# Patient Record
Sex: FEMALE | Race: WHITE | NOT HISPANIC OR LATINO | Employment: OTHER | URBAN - METROPOLITAN AREA
[De-identification: names, ages, dates, MRNs, and addresses within clinical notes are randomized per-mention and may not be internally consistent; named-entity substitution may affect disease eponyms.]

---

## 2022-11-26 ENCOUNTER — HOSPITAL ENCOUNTER (INPATIENT)
Facility: MEDICAL CENTER | Age: 77
LOS: 27 days | DRG: 308 | End: 2022-12-23
Attending: STUDENT IN AN ORGANIZED HEALTH CARE EDUCATION/TRAINING PROGRAM | Admitting: STUDENT IN AN ORGANIZED HEALTH CARE EDUCATION/TRAINING PROGRAM
Payer: MEDICARE

## 2022-11-26 ENCOUNTER — HOSPITAL ENCOUNTER (OUTPATIENT)
Dept: RADIOLOGY | Facility: MEDICAL CENTER | Age: 77
End: 2022-11-26

## 2022-11-26 DIAGNOSIS — E83.42 HYPOMAGNESEMIA: ICD-10-CM

## 2022-11-26 DIAGNOSIS — R78.81 MRSA BACTEREMIA: ICD-10-CM

## 2022-11-26 DIAGNOSIS — L27.0 DRUG RASH: ICD-10-CM

## 2022-11-26 DIAGNOSIS — B95.62 MRSA BACTEREMIA: ICD-10-CM

## 2022-11-26 DIAGNOSIS — I48.91 ATRIAL FIBRILLATION WITH RAPID VENTRICULAR RESPONSE (HCC): ICD-10-CM

## 2022-11-26 DIAGNOSIS — M62.82 NON-TRAUMATIC RHABDOMYOLYSIS: ICD-10-CM

## 2022-11-26 PROBLEM — R79.89 ELEVATED LFTS: Status: ACTIVE | Noted: 2022-11-26

## 2022-11-26 PROBLEM — R62.7 ADULT FAILURE TO THRIVE: Status: ACTIVE | Noted: 2022-11-26

## 2022-11-26 PROBLEM — E87.6 HYPOKALEMIA: Status: ACTIVE | Noted: 2022-11-26

## 2022-11-26 PROBLEM — E87.20 METABOLIC ACIDOSIS, NORMAL ANION GAP (NAG): Status: ACTIVE | Noted: 2022-11-26

## 2022-11-26 PROBLEM — U07.1 COVID-19 VIRUS INFECTION: Status: ACTIVE | Noted: 2022-11-26

## 2022-11-26 LAB
ALBUMIN SERPL BCP-MCNC: 3.2 G/DL (ref 3.2–4.9)
ALBUMIN/GLOB SERPL: 1.1 G/DL
ALP SERPL-CCNC: 55 U/L (ref 30–99)
ALT SERPL-CCNC: 58 U/L (ref 2–50)
AMPHET UR QL SCN: NEGATIVE
ANION GAP SERPL CALC-SCNC: 15 MMOL/L (ref 7–16)
APPEARANCE UR: CLEAR
AST SERPL-CCNC: 77 U/L (ref 12–45)
BACTERIA #/AREA URNS HPF: NEGATIVE /HPF
BARBITURATES UR QL SCN: NEGATIVE
BASOPHILS # BLD AUTO: 0.6 % (ref 0–1.8)
BASOPHILS # BLD: 0.05 K/UL (ref 0–0.12)
BENZODIAZ UR QL SCN: NEGATIVE
BILIRUB SERPL-MCNC: 0.7 MG/DL (ref 0.1–1.5)
BILIRUB UR QL STRIP.AUTO: NEGATIVE
BUN SERPL-MCNC: 31 MG/DL (ref 8–22)
BZE UR QL SCN: NEGATIVE
CALCIUM SERPL-MCNC: 8.6 MG/DL (ref 8.5–10.5)
CANNABINOIDS UR QL SCN: NEGATIVE
CHLORIDE SERPL-SCNC: 107 MMOL/L (ref 96–112)
CK SERPL-CCNC: 816 U/L (ref 0–154)
CO2 SERPL-SCNC: 19 MMOL/L (ref 20–33)
COLOR UR: YELLOW
CREAT SERPL-MCNC: 0.69 MG/DL (ref 0.5–1.4)
EOSINOPHIL # BLD AUTO: 0.05 K/UL (ref 0–0.51)
EOSINOPHIL NFR BLD: 0.6 % (ref 0–6.9)
EPI CELLS #/AREA URNS HPF: NEGATIVE /HPF
ERYTHROCYTE [DISTWIDTH] IN BLOOD BY AUTOMATED COUNT: 50.3 FL (ref 35.9–50)
GFR SERPLBLD CREATININE-BSD FMLA CKD-EPI: 90 ML/MIN/1.73 M 2
GLOBULIN SER CALC-MCNC: 2.8 G/DL (ref 1.9–3.5)
GLUCOSE SERPL-MCNC: 101 MG/DL (ref 65–99)
GLUCOSE UR STRIP.AUTO-MCNC: NEGATIVE MG/DL
HCT VFR BLD AUTO: 33.9 % (ref 37–47)
HGB BLD-MCNC: 11.3 G/DL (ref 12–16)
IMM GRANULOCYTES # BLD AUTO: 0.03 K/UL (ref 0–0.11)
IMM GRANULOCYTES NFR BLD AUTO: 0.4 % (ref 0–0.9)
KETONES UR STRIP.AUTO-MCNC: 40 MG/DL
LEUKOCYTE ESTERASE UR QL STRIP.AUTO: NEGATIVE
LYMPHOCYTES # BLD AUTO: 1.29 K/UL (ref 1–4.8)
LYMPHOCYTES NFR BLD: 16.3 % (ref 22–41)
MAGNESIUM SERPL-MCNC: 1.7 MG/DL (ref 1.5–2.5)
MCH RBC QN AUTO: 31.4 PG (ref 27–33)
MCHC RBC AUTO-ENTMCNC: 33.3 G/DL (ref 33.6–35)
MCV RBC AUTO: 94.2 FL (ref 81.4–97.8)
METHADONE UR QL SCN: NEGATIVE
MICRO URNS: ABNORMAL
MONOCYTES # BLD AUTO: 0.69 K/UL (ref 0–0.85)
MONOCYTES NFR BLD AUTO: 8.7 % (ref 0–13.4)
NEUTROPHILS # BLD AUTO: 5.78 K/UL (ref 2–7.15)
NEUTROPHILS NFR BLD: 73.4 % (ref 44–72)
NITRITE UR QL STRIP.AUTO: NEGATIVE
NRBC # BLD AUTO: 0 K/UL
NRBC BLD-RTO: 0 /100 WBC
OPIATES UR QL SCN: NEGATIVE
OXYCODONE UR QL SCN: NEGATIVE
PCP UR QL SCN: NEGATIVE
PH UR STRIP.AUTO: 5.5 [PH] (ref 5–8)
PLATELET # BLD AUTO: 238 K/UL (ref 164–446)
PMV BLD AUTO: 10.2 FL (ref 9–12.9)
POTASSIUM SERPL-SCNC: 3.2 MMOL/L (ref 3.6–5.5)
PROPOXYPH UR QL SCN: NEGATIVE
PROT SERPL-MCNC: 6 G/DL (ref 6–8.2)
PROT UR QL STRIP: NEGATIVE MG/DL
RBC # BLD AUTO: 3.6 M/UL (ref 4.2–5.4)
RBC # URNS HPF: ABNORMAL /HPF
RBC UR QL AUTO: ABNORMAL
SODIUM SERPL-SCNC: 141 MMOL/L (ref 135–145)
SP GR UR STRIP.AUTO: >=1.045
T4 FREE SERPL-MCNC: 1.89 NG/DL (ref 0.93–1.7)
THYROPEROXIDASE AB SERPL-ACNC: <9 IU/ML (ref 0–9)
TROPONIN T SERPL-MCNC: 31 NG/L (ref 6–19)
TROPONIN T SERPL-MCNC: 33 NG/L (ref 6–19)
TSH SERPL DL<=0.005 MIU/L-ACNC: 0.67 UIU/ML (ref 0.38–5.33)
UROBILINOGEN UR STRIP.AUTO-MCNC: 1 MG/DL
WBC # BLD AUTO: 7.9 K/UL (ref 4.8–10.8)
WBC #/AREA URNS HPF: ABNORMAL /HPF

## 2022-11-26 PROCEDURE — 8E0ZXY6 ISOLATION: ICD-10-PCS | Performed by: FAMILY MEDICINE

## 2022-11-26 PROCEDURE — 81001 URINALYSIS AUTO W/SCOPE: CPT

## 2022-11-26 PROCEDURE — 84439 ASSAY OF FREE THYROXINE: CPT

## 2022-11-26 PROCEDURE — 36415 COLL VENOUS BLD VENIPUNCTURE: CPT

## 2022-11-26 PROCEDURE — 82550 ASSAY OF CK (CPK): CPT

## 2022-11-26 PROCEDURE — 700102 HCHG RX REV CODE 250 W/ 637 OVERRIDE(OP): Performed by: STUDENT IN AN ORGANIZED HEALTH CARE EDUCATION/TRAINING PROGRAM

## 2022-11-26 PROCEDURE — 86376 MICROSOMAL ANTIBODY EACH: CPT

## 2022-11-26 PROCEDURE — 84484 ASSAY OF TROPONIN QUANT: CPT

## 2022-11-26 PROCEDURE — 80307 DRUG TEST PRSMV CHEM ANLYZR: CPT

## 2022-11-26 PROCEDURE — 80053 COMPREHEN METABOLIC PANEL: CPT

## 2022-11-26 PROCEDURE — 85025 COMPLETE CBC W/AUTO DIFF WBC: CPT

## 2022-11-26 PROCEDURE — 99223 1ST HOSP IP/OBS HIGH 75: CPT | Mod: AI | Performed by: STUDENT IN AN ORGANIZED HEALTH CARE EDUCATION/TRAINING PROGRAM

## 2022-11-26 PROCEDURE — 770020 HCHG ROOM/CARE - TELE (206)

## 2022-11-26 PROCEDURE — A9270 NON-COVERED ITEM OR SERVICE: HCPCS | Performed by: STUDENT IN AN ORGANIZED HEALTH CARE EDUCATION/TRAINING PROGRAM

## 2022-11-26 PROCEDURE — 83735 ASSAY OF MAGNESIUM: CPT

## 2022-11-26 PROCEDURE — 99285 EMERGENCY DEPT VISIT HI MDM: CPT

## 2022-11-26 PROCEDURE — 84443 ASSAY THYROID STIM HORMONE: CPT

## 2022-11-26 PROCEDURE — 700105 HCHG RX REV CODE 258: Performed by: STUDENT IN AN ORGANIZED HEALTH CARE EDUCATION/TRAINING PROGRAM

## 2022-11-26 PROCEDURE — 51798 US URINE CAPACITY MEASURE: CPT

## 2022-11-26 RX ORDER — BISACODYL 10 MG
10 SUPPOSITORY, RECTAL RECTAL
Status: DISCONTINUED | OUTPATIENT
Start: 2022-11-26 | End: 2022-12-19

## 2022-11-26 RX ORDER — AMOXICILLIN 250 MG
2 CAPSULE ORAL 2 TIMES DAILY
Status: DISCONTINUED | OUTPATIENT
Start: 2022-11-26 | End: 2022-12-19

## 2022-11-26 RX ORDER — HYDRALAZINE HYDROCHLORIDE 20 MG/ML
10 INJECTION INTRAMUSCULAR; INTRAVENOUS EVERY 4 HOURS PRN
Status: DISCONTINUED | OUTPATIENT
Start: 2022-11-26 | End: 2022-12-23 | Stop reason: HOSPADM

## 2022-11-26 RX ORDER — ENOXAPARIN SODIUM 100 MG/ML
40 INJECTION SUBCUTANEOUS DAILY
Status: DISCONTINUED | OUTPATIENT
Start: 2022-11-26 | End: 2022-11-26

## 2022-11-26 RX ORDER — METOPROLOL TARTRATE 1 MG/ML
5 INJECTION, SOLUTION INTRAVENOUS
Status: DISCONTINUED | OUTPATIENT
Start: 2022-11-26 | End: 2022-12-23 | Stop reason: HOSPADM

## 2022-11-26 RX ORDER — POLYETHYLENE GLYCOL 3350 17 G/17G
1 POWDER, FOR SOLUTION ORAL
Status: DISCONTINUED | OUTPATIENT
Start: 2022-11-26 | End: 2022-12-19

## 2022-11-26 RX ORDER — ACETAMINOPHEN 325 MG/1
650 TABLET ORAL EVERY 6 HOURS PRN
Status: DISCONTINUED | OUTPATIENT
Start: 2022-11-26 | End: 2022-12-13

## 2022-11-26 RX ORDER — IBUPROFEN 200 MG
200 TABLET ORAL EVERY 6 HOURS PRN
Status: ON HOLD | COMMUNITY
End: 2022-12-23

## 2022-11-26 RX ORDER — SODIUM CHLORIDE 9 MG/ML
INJECTION, SOLUTION INTRAVENOUS CONTINUOUS
Status: DISCONTINUED | OUTPATIENT
Start: 2022-11-26 | End: 2022-11-26

## 2022-11-26 RX ORDER — POTASSIUM CHLORIDE 20 MEQ/1
40 TABLET, EXTENDED RELEASE ORAL ONCE
Status: COMPLETED | OUTPATIENT
Start: 2022-11-26 | End: 2022-11-26

## 2022-11-26 RX ADMIN — SODIUM CHLORIDE: 9 INJECTION, SOLUTION INTRAVENOUS at 07:49

## 2022-11-26 RX ADMIN — DOCUSATE SODIUM 50 MG AND SENNOSIDES 8.6 MG 2 TABLET: 8.6; 5 TABLET, FILM COATED ORAL at 17:17

## 2022-11-26 RX ADMIN — APIXABAN 5 MG: 5 TABLET, FILM COATED ORAL at 17:17

## 2022-11-26 RX ADMIN — APIXABAN 5 MG: 5 TABLET, FILM COATED ORAL at 11:08

## 2022-11-26 RX ADMIN — POTASSIUM CHLORIDE 40 MEQ: 1500 TABLET, EXTENDED RELEASE ORAL at 11:08

## 2022-11-26 ASSESSMENT — FIBROSIS 4 INDEX
FIB4 SCORE: 3.23

## 2022-11-26 ASSESSMENT — LIFESTYLE VARIABLES
EVER HAD A DRINK FIRST THING IN THE MORNING TO STEADY YOUR NERVES TO GET RID OF A HANGOVER: NO
ALCOHOL_USE: NO
HAVE YOU EVER FELT YOU SHOULD CUT DOWN ON YOUR DRINKING: NO
ON A TYPICAL DAY WHEN YOU DRINK ALCOHOL HOW MANY DRINKS DO YOU HAVE: 0
DOES PATIENT WANT TO STOP DRINKING: NO
SUBSTANCE_ABUSE: 0
HAVE PEOPLE ANNOYED YOU BY CRITICIZING YOUR DRINKING: NO
AVERAGE NUMBER OF DAYS PER WEEK YOU HAVE A DRINK CONTAINING ALCOHOL: 0
TOTAL SCORE: 0
EVER FELT BAD OR GUILTY ABOUT YOUR DRINKING: NO
TOTAL SCORE: 0
HOW MANY TIMES IN THE PAST YEAR HAVE YOU HAD 5 OR MORE DRINKS IN A DAY: 0
CONSUMPTION TOTAL: NEGATIVE
TOTAL SCORE: 0

## 2022-11-26 ASSESSMENT — COGNITIVE AND FUNCTIONAL STATUS - GENERAL
DRESSING REGULAR LOWER BODY CLOTHING: A LOT
EATING MEALS: A LITTLE
PERSONAL GROOMING: A LITTLE
SUGGESTED CMS G CODE MODIFIER MOBILITY: CL
DRESSING REGULAR UPPER BODY CLOTHING: A LOT
TURNING FROM BACK TO SIDE WHILE IN FLAT BAD: A LOT
DAILY ACTIVITIY SCORE: 14
MOBILITY SCORE: 12
CLIMB 3 TO 5 STEPS WITH RAILING: A LOT
WALKING IN HOSPITAL ROOM: A LOT
STANDING UP FROM CHAIR USING ARMS: A LOT
MOVING FROM LYING ON BACK TO SITTING ON SIDE OF FLAT BED: A LOT
SUGGESTED CMS G CODE MODIFIER DAILY ACTIVITY: CK
HELP NEEDED FOR BATHING: A LOT
MOVING TO AND FROM BED TO CHAIR: A LOT
TOILETING: A LOT

## 2022-11-26 ASSESSMENT — ENCOUNTER SYMPTOMS
WEAKNESS: 1
INSOMNIA: 0
HEADACHES: 0
COUGH: 0
CHILLS: 0
FEVER: 0
BACK PAIN: 0
FALLS: 1
EYE PAIN: 0
NAUSEA: 0
FOCAL WEAKNESS: 0
VOMITING: 0
ABDOMINAL PAIN: 0
SHORTNESS OF BREATH: 0
SENSORY CHANGE: 0
LOSS OF CONSCIOUSNESS: 0
SORE THROAT: 0
PALPITATIONS: 0
BLURRED VISION: 0
DIARRHEA: 0
DIZZINESS: 0

## 2022-11-26 ASSESSMENT — CHA2DS2 SCORE
PRIOR STROKE OR TIA OR THROMBOEMBOLISM: NO
DIABETES: NO
VASCULAR DISEASE: NO
CHF OR LEFT VENTRICULAR DYSFUNCTION: NO
HYPERTENSION: NO
SEX: FEMALE
AGE 75 OR GREATER: YES
CHA2DS2 VASC SCORE: 3
AGE 65 TO 74: NO

## 2022-11-26 ASSESSMENT — PATIENT HEALTH QUESTIONNAIRE - PHQ9
1. LITTLE INTEREST OR PLEASURE IN DOING THINGS: NOT AT ALL
2. FEELING DOWN, DEPRESSED, IRRITABLE, OR HOPELESS: NOT AT ALL
SUM OF ALL RESPONSES TO PHQ9 QUESTIONS 1 AND 2: 0

## 2022-11-26 ASSESSMENT — PAIN DESCRIPTION - PAIN TYPE: TYPE: ACUTE PAIN

## 2022-11-26 NOTE — ED NOTES
Unable to obtain med rec, patient unable to stay awake for interview   No demo or pharmacy on file

## 2022-11-26 NOTE — ED NOTES
Patient reports that her preferred pharmacy is [facility] in [place], [place].  An attempt made to contact this pharmacy, but is closed on the weekends.

## 2022-11-26 NOTE — ED PROVIDER NOTES
CHIEF COMPLAINT  Chief Complaint   Patient presents with    Fall    Shortness of Breath    Abnormal Labs       HPI  Paulina Castellanos is a 77 y.o. female who presents as a transfer from an outside facility.  Patient had a mechanical ground-level fall, stated that she slipped out of her chair and slid to the ground.  She was having subjective fevers chills and generalized weakness for the past few days, and then was unable to get off off the ground after her fall.  Did not strike her head no LOC.  States that she laid on the ground for several days before she was found by a neighbor.  She was brought to [facility] where she was noted to have mild rhabdomyolysis, and be in atrial fibrillation with rapid ventricular response.  No prior history of A. fib.  She was given diltiazem and a amiodarone, and was transferred here for higher level of care secondary to her A. fib RVR.  Patient was also noted to have a COVID-19 infection.  Did have a CTA which was negative for PE.  REVIEW OF SYSTEMS  See HPI for further details. All other systems are negative.     PAST MEDICAL HISTORY       SOCIAL HISTORY  Social History     Tobacco Use    Smoking status: Not on file    Smokeless tobacco: Not on file   Substance and Sexual Activity    Alcohol use: Not on file    Drug use: Not on file    Sexual activity: Not on file       SURGICAL HISTORY  patient denies any surgical history    CURRENT MEDICATIONS  Home Medications    **Home medications have not yet been reviewed for this encounter**         ALLERGIES  No Known Allergies    FAMILY HISTORY  No pertinent family history    PHYSICAL EXAM   BP (!) 141/63   Pulse 82   Temp 36.7 °C (98.1 °F) (Temporal)   Resp (!) 25   SpO2 96%  @[id number]::@   Pulse ox interpretation: I interpret this pulse ox as normal.  VITALS - vital signs documented prior to this note have been reviewed and noted,  GENERAL - awake, alert, oriented, GCS 15, no apparent distress, non-toxic  appearing  HEENT -  normocephalic, atraumatic, pupils equal, sclera anicteric, mucus  membranes moist  NECK - supple, no meningismus, full active range of motion, trachea midline  CARDIOVASCULAR - regular rate/rhythm, no murmurs/gallops/rubs  PULMONARY - no respiratory distress, speaking in full sentences, clear to  auscultation bilaterally, no wheezing/ronchi/rales, no accessory muscle use  GASTROINTESTINAL - soft, non-tender, non-distended, no rebound, guarding,  or peritonitis  GENITOURINARY - Deferred  NEUROLOGIC - Awake alert, normal mental status, speech fluid, cognition  normal, moves all extremities  MUSCULOSKELETAL - no obvious asymmetry or deformities present  EXTREMITIES - warm, well-perfused, no cyanosis or significant edema  DERMATOLOGIC - warm, dry, no rashes, no jaundice  PSYCHIATRIC - normal affect, normal insight, normal concentration            EKG  EKG from outside facility does show an atrial fibrillation with a rapid ventricular response abnormal curious QTC axis, no STEMI pattern.  Interpreted and is A. fib RVR.    LABS      Labs Reviewed - No data to display      Pertinent Labs & Imaging studies reviewed. (See chart for details)    RADIOLOGY  No orders to display             ED COURSE/PROCEDURES          Medications - No data to display            MEDICAL DECISION MAKING    Patient presented for evaluation of atrial fibrillation with rapid ventricular response as well as a rhabdomyolysis from outside facility.  Patient was bolused with diltiazem and loaded with amiodarone.  Upon arrival in our emergency department the patient is in a normal sinus rhythm.  Review labs are remarkable for mild rhabdomyolysis.  Repeat labs were obtained, CK is elevated.  Troponin mildly elevated likely demand secondary to the patient's A. fib RVR.  Given the patient's rhabdo, generalized weakness elevated troponin believe that she warrants admission for further care and evaluation.        FINAL IMPRESSION  1.  Paroxysmal atrial  fibrillation  2.  Rhabdomyolysis  3.  Generalized weakness  4.  COVID-19  5.  Elevated troponin         Electronically signed by: Taras Hector D.O., 11/26/2022 5:04 AM      Dictation Disclaimer  Please note this report has been produced using speech recognition software and  may contain errors related to that system, including errors seen in grammar,  punctuation and spelling, as well as words and phrases that may be inappropriate.  If there are any questions or concerns, please feel free to contact the dictating  physician for clarification.

## 2022-11-26 NOTE — H&P
Hospital Medicine History & Physical Note    Date of Service  11/26/2022    Primary Care Physician  No primary care provider on file.    Code Status  Full Code    Chief Complaint  Chief Complaint   Patient presents with    Fall    Shortness of Breath    Abnormal Labs       History of Presenting Illness  Paulina Castellanos is a 77 y.o. female who was transferred from outside facility 11/26/2022 with atrial fibrillation with RVR on amiodarone drip, mild rhabdo.  No significant PMH, does not follow with physicians, only takes ibuprofen as needed.  Patient says she slipped while getting out of her bed and was unable to get up off the ground afterwards for a few days before being found by neighbor.  Denied hitting her head and denies LOC.  At outside facility she was found to have mild rhabdomyolysis with a CPK of 1500.      While there initially she was sinus rhythm but then converted to A. fib with RVR to the 150s, heart rate improved with initial diltiazem 10 mg IV but then went back up, and additional 5 mg diltiazem was given but she became hypotensive so stopped and she was started on amiodarone drip.  She also had an episode of SVT for a few minutes confirmed by EKG with heart rate up to the 180s.      On my evaluation in the ED amiodarone was turned off and no longer in atrial fibrillation.  She was also noted to have a COVID-19 positive but is not requiring any oxygen and denies any shortness of breath or cough.  CTA chest was negative for PE, showed findings consistent with pulmonary hypertension and atelectasis.    On my evaluation afebrile, hemodynamically stable, not in RVR, saturating well on room air.    I discussed the plan of care with patient, bedside RN, and edp .    Review of Systems  Review of Systems   Constitutional:  Negative for chills and fever.   HENT:  Negative for sore throat.    Respiratory:  Negative for cough and shortness of breath.    Cardiovascular:  Negative for chest pain.   Gastrointestinal:   Negative for abdominal pain, diarrhea, nausea and vomiting.   Genitourinary:  Negative for dysuria and urgency.   Musculoskeletal:  Positive for falls.   Neurological:  Negative for dizziness and headaches.   All other systems reviewed and are negative.    Past Medical History  Skin cancer    Surgical History  Procedure for skin cancer  Family History  family history is not on file.   Family history reviewed with patient. There is no family history that is pertinent to the chief complaint.     Social History  Denies tobacco, alcohol, drug use    Allergies  No Known Allergies    Medications  None       Physical Exam  Temp:  [36.7 °C (98.1 °F)] 36.7 °C (98.1 °F)  Pulse:  [71-89] 81  Resp:  [12-25] 21  BP: (113-141)/(53-63) 120/59  SpO2:  [92 %-99 %] 92 %  Blood Pressure : 117/53   Temperature: 36.7 °C (98.1 °F)   Pulse: 71   Respiration: 12   Pulse Oximetry: 99 %       Physical Exam  Constitutional:       Appearance: Normal appearance.   HENT:      Head: Normocephalic and atraumatic.      Mouth/Throat:      Mouth: Mucous membranes are moist.      Pharynx: No oropharyngeal exudate or posterior oropharyngeal erythema.   Eyes:      General: No scleral icterus.  Cardiovascular:      Rate and Rhythm: Normal rate and regular rhythm.      Pulses: Normal pulses.      Heart sounds: Normal heart sounds. No murmur heard.  Pulmonary:      Effort: Pulmonary effort is normal. No respiratory distress.      Breath sounds: Normal breath sounds.   Abdominal:      General: There is no distension.      Palpations: Abdomen is soft.      Tenderness: There is no abdominal tenderness.   Musculoskeletal:         General: No swelling or tenderness. Normal range of motion.      Cervical back: Normal range of motion and neck supple.   Skin:     General: Skin is warm and dry.   Neurological:      General: No focal deficit present.      Mental Status: She is alert and oriented to person, place, and time.   Psychiatric:         Mood and Affect:  Mood normal.       Laboratory:  Recent Labs     11/26/22 0331   WBC 7.9   RBC 3.60*   HEMOGLOBIN 11.3*   HEMATOCRIT 33.9*   MCV 94.2   MCH 31.4   MCHC 33.3*   RDW 50.3*   PLATELETCT 238   MPV 10.2     Recent Labs     11/26/22 0331   SODIUM 141   POTASSIUM 3.2*   CHLORIDE 107   CO2 19*   GLUCOSE 101*   BUN 31*   CREATININE 0.69   CALCIUM 8.6     Recent Labs     11/26/22 0331   ALTSGPT 58*   ASTSGOT 77*   ALKPHOSPHAT 55   TBILIRUBIN 0.7   GLUCOSE 101*         No results for input(s): NTPROBNP in the last 72 hours.      Recent Labs     11/26/22 0331   TROPONINT 31*       Imaging:  EC-ECHOCARDIOGRAM COMPLETE W/O CONT    (Results Pending)       EKG:  I have personally reviewed the images and compared with prior images. and My impression is: outside EKG reviewed, SVT    Assessment/Plan:  Justification for Admission Status  I anticipate this patient will require at least two midnights for appropriate medical management, necessitating inpatient admission because Afib rvr, rhabdo, Eleaated lft. Needs placement and is covid positive     * Atrial fibrillation with rapid ventricular response (HCC)- (present on admission)  Assessment & Plan  New onset atrial fibrillation, patient does not follow with physicians and is not on any medications  Was in A. fib with RVR outside facility initially improved with diltiazem but she became hypotensive so she was started on amiodarone drip  On my evaluation of amiodarone drip and in sinus rhythm  [id number] of 3 for age and female sex.  Day team to discuss anticoagulation with patient  Echocardiogram and thyroid panel ordered    Adult failure to thrive- (present on admission)  Assessment & Plan  Patient lives alone, fell down and was unable to get up for couple days.  Per report from outside hospital her home was found to be in an unlivable condition  PT, OT ordered for placement.  She is COVID-positive    COVID-19 virus infection- (present on admission)  Assessment & Plan  Patient  positive for COVID, denies any shortness of breath or cough  Supportive care and isolation    Metabolic acidosis, normal anion gap (NAG)- (present on admission)  Assessment & Plan  Bicarb 19 on presentation likely due to dehydration.  IV fluids    Non-traumatic rhabdomyolysis- (present on admission)  Assessment & Plan  Slipped on the ground and was unable to get up for couple days, CPK at outside facility 1500, trended down to 800 here  No SO, mild LFT elevation.  Continue IV fluids    Elevated LFTs- (present on admission)  Assessment & Plan  On presentation AST 77, ALT 58.  Likely secondary to mild rhabdomyolysis.  IV fluids    Hypokalemia- (present on admission)  Assessment & Plan  3.2 on presentation, replete as needed check magnesium      VTE prophylaxis: enoxaparin ppx

## 2022-11-26 NOTE — ED TRIAGE NOTES
[place] transfer, patient CPS case, found down by family yesterday possibly down since Monday. +COVID, +RHABDO, +AfibRVR on amnio drip new onset.

## 2022-11-26 NOTE — PROGRESS NOTES
[facility] Medicine Daily Progress Note    Date of Service  11/26/2022    Chief Complaint  Paulina Castellanos is a 77 y.o. female admitted 11/26/2022 with found down unable to get up.    Hospital Course  Paulina Castellanos is a 77 y.o. female who was transferred from outside facility 11/26/2022 with atrial fibrillation with RVR on amiodarone drip, mild rhabdo.  No significant PMH, does not follow with physicians, only takes ibuprofen as needed.  Patient says she slipped while getting out of her bed and was unable to get up off the ground afterwards for a few days before being found by neighbor.  Denied hitting her head and denies LOC.  At outside facility she was found to have mild rhabdomyolysis with a CPK of 1500.       While there initially she was sinus rhythm but then converted to A. fib with RVR to the 150s, heart rate improved with initial diltiazem 10 mg IV but then went back up, and additional 5 mg diltiazem was given but she became hypotensive so stopped and she was started on amiodarone drip.  She also had an episode of SVT for a few minutes confirmed by EKG with heart rate up to the 180s.       On my evaluation in the ED amiodarone was turned off and no longer in atrial fibrillation.  She was also noted to have a COVID-19 positive but is not requiring any oxygen and denies any shortness of breath or cough.  CTA chest was negative for PE, showed findings consistent with pulmonary hypertension and atelectasis.    Interval Problem Update  Admitted earlier this morning for atrial fibrillation in RVR at OSH, found down on floor for multiple days with elevated CPK.  Patient altered on presentation? She is alert and oriented to self, place, situation at bedside in ER.  She is in normal sinus rhythm, EKG from OSH reviewed showing atrial fibrillation.  Discussed anticoagulation for a fib stroke prevention, she is agreeable to starting AC.  Start eliquis.  Echo ordered.  Continue telemetry monitoring.  TSH normal, free T4  slightly elevated. TSH receptor and TPO antibodies ordered. Consider RAIU scan inpatient/outpatient pending results.  COVID positive, on room air. Supportive treatment.  CPK improved and minimal at 816, stop IV fluids today.  PT/OT evaluation for disposition planning, may need SNF as she was unable to get herself off floor at home without assistance.    I have discussed this patient's plan of care and discharge plan at IDT rounds today with Case Management, Nursing, Nursing leadership, and other members of the IDT team.    Consultants/Specialty  none    Code Status  Full Code    Disposition  Patient is not medically cleared for discharge.   Anticipate discharge to to skilled nursing facility.  I have placed the appropriate orders for post-discharge needs.    Review of Systems  Review of Systems   Constitutional:  Negative for chills and fever.   Eyes:  Negative for blurred vision and pain.   Respiratory:  Negative for cough and shortness of breath.    Cardiovascular:  Negative for chest pain, palpitations and leg swelling.   Gastrointestinal:  Negative for abdominal pain, nausea and vomiting.   Genitourinary:  Negative for dysuria and urgency.   Musculoskeletal:  Positive for falls. Negative for back pain.   Skin:  Negative for itching and rash.   Neurological:  Positive for weakness. Negative for dizziness, sensory change, focal weakness, loss of consciousness and headaches.   Psychiatric/Behavioral:  Negative for substance abuse. The patient does not have insomnia.       Physical Exam  Temp:  [36.7 °C (98.1 °F)] 36.7 °C (98.1 °F)  Pulse:  [71-89] 79  Resp:  [10-25] 10  BP: (113-141)/(53-63) 119/58  SpO2:  [92 %-99 %] 98 %    Physical Exam  Vitals reviewed.   Constitutional:       General: She is not in acute distress.     Appearance: She is not diaphoretic.   HENT:      Head: Normocephalic and atraumatic.      Right Ear: External ear normal.      Left Ear: External ear normal.      Nose: Nose normal. No  congestion.      Mouth/Throat:      Pharynx: No oropharyngeal exudate or posterior oropharyngeal erythema.   Eyes:      Extraocular Movements: Extraocular movements intact.      Pupils: Pupils are equal, round, and reactive to light.   Cardiovascular:      Rate and Rhythm: Normal rate and regular rhythm.   Pulmonary:      Effort: Pulmonary effort is normal. No respiratory distress.      Breath sounds: Normal breath sounds. No wheezing or rales.   Abdominal:      General: Bowel sounds are normal. There is no distension.      Palpations: Abdomen is soft.      Tenderness: There is no abdominal tenderness. There is no guarding.   Musculoskeletal:         General: No swelling. Normal range of motion.      Cervical back: Normal range of motion and neck supple.      Right lower leg: No edema.      Left lower leg: No edema.   Skin:     General: Skin is warm and dry.   Neurological:      General: No focal deficit present.      Mental Status: She is alert and oriented to person, place, and time.      Cranial Nerves: No cranial nerve deficit.      Sensory: No sensory deficit.      Motor: No weakness.   Psychiatric:         Mood and Affect: Mood normal.         Behavior: Behavior normal.       Fluids    Intake/Output Summary (Last 24 hours) at 11/26/2022 1218  Last data filed at 11/26/2022 1113  Gross per 24 hour   Intake 270.9 ml   Output --   Net 270.9 ml       Laboratory  Recent Labs     11/26/22  0331   WBC 7.9   RBC 3.60*   HEMOGLOBIN 11.3*   HEMATOCRIT 33.9*   MCV 94.2   MCH 31.4   MCHC 33.3*   RDW 50.3*   PLATELETCT 238   MPV 10.2     Recent Labs     11/26/22  0331   SODIUM 141   POTASSIUM 3.2*   CHLORIDE 107   CO2 19*   GLUCOSE 101*   BUN 31*   CREATININE 0.69   CALCIUM 8.6                   Imaging  OUTSIDE IMAGES-CT CHEST   Final Result      EC-ECHOCARDIOGRAM COMPLETE W/O CONT    (Results Pending)        Assessment/Plan  * Atrial fibrillation with rapid ventricular response (HCC)- (present on admission)  Assessment &  Plan  New onset atrial fibrillation, patient does not follow with physicians and is not on any medications  Was in A. fib with RVR outside facility initially improved with diltiazem but she became hypotensive so she was started on amiodarone drip  Remains in sinus rhythm  [id number] of 3 for age and female sex. Discussed anticoagulation and a fib stroke risk , she is agreeable to starting AC, started on eliquis.  Echocardiogram ordered  TSH normal, free T4 slightly elevated, TSH and TPO antibodies ordered, consider RAIU scan    Metabolic acidosis, normal anion gap (NAG)- (present on admission)  Assessment & Plan  Bicarb 19 on presentation likely due to dehydration.  IV fluids    Adult failure to thrive- (present on admission)  Assessment & Plan  Patient lives alone, fell down and was unable to get up for couple days.  Per report from outside hospital her home was found to be in an unlivable condition  PT, OT ordered for placement.  She is COVID-positive    Non-traumatic rhabdomyolysis- (present on admission)  Assessment & Plan  Slipped on the ground and was unable to get up for couple days, CPK at outside facility 1500, trended down to 800 here  No SO, mild LFT elevation.  Can stop IV fluids    Elevated LFTs- (present on admission)  Assessment & Plan  On presentation AST 77, ALT 58.  Likely secondary to mild rhabdomyolysis.  IV fluids    COVID-19 virus infection- (present on admission)  Assessment & Plan  Patient positive for COVID, denies any shortness of breath or cough  Supportive care and isolation    Hypokalemia- (present on admission)  Assessment & Plan  3.2 on presentation, replete as needed check magnesium       VTE prophylaxis: therapeutic anticoagulation with eliquis    I have performed a physical exam and reviewed and updated ROS and Plan today (11/26/2022). In review of yesterday's note (11/25/2022), there are no changes except as documented above.

## 2022-11-26 NOTE — PROGRESS NOTES
4 Eyes Skin Assessment Completed by EVERT Camara and SOSA Weems.    Head WDL  Ears WDL  Nose WDL  Mouth WDL  Neck WDL  Breast/Chest Scab  Shoulder Blades Redness and Blanching  Spine Redness and Blanching  (R) Arm/Elbow/Hand Bruising  (L) Arm/Elbow/Hand Bruising  Abdomen Scab  Groin Scab  Scrotum/Coccyx/Buttocks Redness and Blanching  (R) Leg Bruising  (L) Leg Bruising  (R) Heel/Foot/Toe Callous  (L) Heel/Foot/Toe WDL          Devices In Places Tele Box and Pulse Ox      Interventions In Place Pillows and Heels Loaded W/Pillows    Possible Skin Injury No    Pictures Uploaded Into Epic N/A  Wound Consult Placed N/A  RN Wound Prevention Protocol Ordered No

## 2022-11-27 ENCOUNTER — APPOINTMENT (OUTPATIENT)
Dept: CARDIOLOGY | Facility: MEDICAL CENTER | Age: 77
DRG: 308 | End: 2022-11-27
Attending: STUDENT IN AN ORGANIZED HEALTH CARE EDUCATION/TRAINING PROGRAM
Payer: MEDICARE

## 2022-11-27 PROBLEM — J96.01 ACUTE RESPIRATORY FAILURE WITH HYPOXIA (HCC): Status: ACTIVE | Noted: 2022-11-27

## 2022-11-27 PROBLEM — J12.82 PNEUMONIA DUE TO COVID-19 VIRUS: Status: ACTIVE | Noted: 2022-11-26

## 2022-11-27 LAB
ALBUMIN SERPL BCP-MCNC: 3 G/DL (ref 3.2–4.9)
ALBUMIN/GLOB SERPL: 1.2 G/DL
ALP SERPL-CCNC: 51 U/L (ref 30–99)
ALT SERPL-CCNC: 49 U/L (ref 2–50)
ANION GAP SERPL CALC-SCNC: 8 MMOL/L (ref 7–16)
AST SERPL-CCNC: 51 U/L (ref 12–45)
BILIRUB SERPL-MCNC: 0.4 MG/DL (ref 0.1–1.5)
BUN SERPL-MCNC: 26 MG/DL (ref 8–22)
CALCIUM SERPL-MCNC: 8.7 MG/DL (ref 8.5–10.5)
CHLORIDE SERPL-SCNC: 110 MMOL/L (ref 96–112)
CO2 SERPL-SCNC: 21 MMOL/L (ref 20–33)
CREAT SERPL-MCNC: 0.78 MG/DL (ref 0.5–1.4)
ERYTHROCYTE [DISTWIDTH] IN BLOOD BY AUTOMATED COUNT: 50.6 FL (ref 35.9–50)
GFR SERPLBLD CREATININE-BSD FMLA CKD-EPI: 78 ML/MIN/1.73 M 2
GLOBULIN SER CALC-MCNC: 2.6 G/DL (ref 1.9–3.5)
GLUCOSE SERPL-MCNC: 145 MG/DL (ref 65–99)
HCT VFR BLD AUTO: 33.2 % (ref 37–47)
HGB BLD-MCNC: 10.9 G/DL (ref 12–16)
LV EJECT FRACT  99904: 65
LV EJECT FRACT MOD 2C 99903: 73.03
LV EJECT FRACT MOD 4C 99902: 78.72
LV EJECT FRACT MOD BP 99901: 73.07
MCH RBC QN AUTO: 30.6 PG (ref 27–33)
MCHC RBC AUTO-ENTMCNC: 32.8 G/DL (ref 33.6–35)
MCV RBC AUTO: 93.3 FL (ref 81.4–97.8)
PLATELET # BLD AUTO: 231 K/UL (ref 164–446)
PMV BLD AUTO: 9.6 FL (ref 9–12.9)
POTASSIUM SERPL-SCNC: 3.7 MMOL/L (ref 3.6–5.5)
PROT SERPL-MCNC: 5.6 G/DL (ref 6–8.2)
RBC # BLD AUTO: 3.56 M/UL (ref 4.2–5.4)
SODIUM SERPL-SCNC: 139 MMOL/L (ref 135–145)
WBC # BLD AUTO: 6.7 K/UL (ref 4.8–10.8)

## 2022-11-27 PROCEDURE — 80053 COMPREHEN METABOLIC PANEL: CPT

## 2022-11-27 PROCEDURE — 85027 COMPLETE CBC AUTOMATED: CPT

## 2022-11-27 PROCEDURE — 97535 SELF CARE MNGMENT TRAINING: CPT

## 2022-11-27 PROCEDURE — 51798 US URINE CAPACITY MEASURE: CPT

## 2022-11-27 PROCEDURE — 93306 TTE W/DOPPLER COMPLETE: CPT | Mod: 26 | Performed by: INTERNAL MEDICINE

## 2022-11-27 PROCEDURE — 97166 OT EVAL MOD COMPLEX 45 MIN: CPT

## 2022-11-27 PROCEDURE — 93306 TTE W/DOPPLER COMPLETE: CPT

## 2022-11-27 PROCEDURE — 36415 COLL VENOUS BLD VENIPUNCTURE: CPT

## 2022-11-27 PROCEDURE — 700102 HCHG RX REV CODE 250 W/ 637 OVERRIDE(OP): Performed by: STUDENT IN AN ORGANIZED HEALTH CARE EDUCATION/TRAINING PROGRAM

## 2022-11-27 PROCEDURE — A9270 NON-COVERED ITEM OR SERVICE: HCPCS | Performed by: STUDENT IN AN ORGANIZED HEALTH CARE EDUCATION/TRAINING PROGRAM

## 2022-11-27 PROCEDURE — 97162 PT EVAL MOD COMPLEX 30 MIN: CPT

## 2022-11-27 PROCEDURE — 99233 SBSQ HOSP IP/OBS HIGH 50: CPT | Performed by: STUDENT IN AN ORGANIZED HEALTH CARE EDUCATION/TRAINING PROGRAM

## 2022-11-27 PROCEDURE — 83520 IMMUNOASSAY QUANT NOS NONAB: CPT

## 2022-11-27 PROCEDURE — 770020 HCHG ROOM/CARE - TELE (206)

## 2022-11-27 RX ORDER — DEXAMETHASONE 6 MG/1
6 TABLET ORAL DAILY
Status: DISCONTINUED | OUTPATIENT
Start: 2022-11-27 | End: 2022-12-04

## 2022-11-27 RX ORDER — BENZONATATE 100 MG/1
100 CAPSULE ORAL 3 TIMES DAILY PRN
Status: DISCONTINUED | OUTPATIENT
Start: 2022-11-27 | End: 2022-12-22

## 2022-11-27 RX ORDER — GUAIFENESIN/DEXTROMETHORPHAN 100-10MG/5
5 SYRUP ORAL 3 TIMES DAILY
Status: DISCONTINUED | OUTPATIENT
Start: 2022-11-27 | End: 2022-12-21

## 2022-11-27 RX ADMIN — APIXABAN 5 MG: 5 TABLET, FILM COATED ORAL at 06:07

## 2022-11-27 RX ADMIN — DOCUSATE SODIUM 50 MG AND SENNOSIDES 8.6 MG 2 TABLET: 8.6; 5 TABLET, FILM COATED ORAL at 16:47

## 2022-11-27 RX ADMIN — DEXAMETHASONE 6 MG: 4 TABLET ORAL at 13:17

## 2022-11-27 RX ADMIN — GUAIFENESIN SYRUP AND DEXTROMETHORPHAN 5 ML: 100; 10 SYRUP ORAL at 16:49

## 2022-11-27 RX ADMIN — APIXABAN 5 MG: 5 TABLET, FILM COATED ORAL at 16:47

## 2022-11-27 RX ADMIN — GUAIFENESIN SYRUP AND DEXTROMETHORPHAN 5 ML: 100; 10 SYRUP ORAL at 19:48

## 2022-11-27 ASSESSMENT — COGNITIVE AND FUNCTIONAL STATUS - GENERAL
PERSONAL GROOMING: A LITTLE
EATING MEALS: A LITTLE
MOBILITY SCORE: 7
DAILY ACTIVITIY SCORE: 14
SUGGESTED CMS G CODE MODIFIER DAILY ACTIVITY: CK
SUGGESTED CMS G CODE MODIFIER MOBILITY: CM
MOVING FROM LYING ON BACK TO SITTING ON SIDE OF FLAT BED: UNABLE
HELP NEEDED FOR BATHING: A LOT
DRESSING REGULAR UPPER BODY CLOTHING: A LOT
WALKING IN HOSPITAL ROOM: TOTAL
CLIMB 3 TO 5 STEPS WITH RAILING: TOTAL
MOVING TO AND FROM BED TO CHAIR: UNABLE
TURNING FROM BACK TO SIDE WHILE IN FLAT BAD: UNABLE
STANDING UP FROM CHAIR USING ARMS: A LOT
DRESSING REGULAR LOWER BODY CLOTHING: A LOT
TOILETING: A LOT

## 2022-11-27 ASSESSMENT — ENCOUNTER SYMPTOMS
FOCAL WEAKNESS: 0
COUGH: 0
PALPITATIONS: 0
HEADACHES: 0
WEAKNESS: 1
EYE PAIN: 0
SHORTNESS OF BREATH: 0
NAUSEA: 0
FEVER: 0
CHILLS: 0
BLURRED VISION: 0
INSOMNIA: 0
LOSS OF CONSCIOUSNESS: 0
FALLS: 1
DIZZINESS: 0
BACK PAIN: 0
VOMITING: 0
ABDOMINAL PAIN: 0
SENSORY CHANGE: 0

## 2022-11-27 ASSESSMENT — LIFESTYLE VARIABLES: SUBSTANCE_ABUSE: 0

## 2022-11-27 ASSESSMENT — PATIENT HEALTH QUESTIONNAIRE - PHQ9
SUM OF ALL RESPONSES TO PHQ9 QUESTIONS 1 AND 2: 0
2. FEELING DOWN, DEPRESSED, IRRITABLE, OR HOPELESS: NOT AT ALL
1. LITTLE INTEREST OR PLEASURE IN DOING THINGS: NOT AT ALL

## 2022-11-27 ASSESSMENT — GAIT ASSESSMENTS: GAIT LEVEL OF ASSIST: UNABLE TO PARTICIPATE

## 2022-11-27 ASSESSMENT — ACTIVITIES OF DAILY LIVING (ADL): TOILETING: INDEPENDENT

## 2022-11-27 ASSESSMENT — PAIN DESCRIPTION - PAIN TYPE: TYPE: ACUTE PAIN

## 2022-11-27 NOTE — THERAPY
Physical Therapy   Initial Evaluation     Patient Name: Paulina Castellanos  Age:  77 y.o., Sex:  female  Medical Record #: [id number]  Today's Date: 11/27/2022     Precautions  Precautions: Fall Risk    Assessment  Patient is 77 y.o. female admitted after being found down for days. Pt diagnosed with COVID, afib with RVR, and mild rhabdo. Pt presented with generalized weakness and balance deficits. Max assist required for bed mob and transfers with 2 assist. PT will cont while pt is in acute care setting to address strength, balance, activity tolerance, safety, and mobility.     Plan    Recommend Physical Therapy 3 times per week until therapy goals are met for the following treatments:  Bed Mobility, Gait Training, Neuro Re-Education / Balance, Self Care/Home Evaluation, Stair Training, Therapeutic Activities, and Therapeutic Exercises    DC Equipment Recommendations: Unable to determine at this time  Discharge Recommendations: Recommend post-acute placement for additional physical therapy services prior to discharge home          11/27/22 0933   Vitals   O2 (LPM) 1   O2 Delivery Device Silicone Nasal Cannula   Prior Living Situation   Prior Services Home-Independent   Housing / Facility 1 Story House   Steps Into Home 4   Steps In Home 0   Rail Right Rail (Steps into Home)   Equipment Owned Single Point Cane   Lives with - Patient's Self Care Capacity Alone and Able to Care For Self   Comments per RN, pts family assists with some IADLs   Prior Level of Functional Mobility   Bed Mobility Independent   Transfer Status Independent   Ambulation Independent   Distance Ambulation (Feet)   (community)   Assistive Devices Used None   Stairs Independent   Comments pt typically goes to [employer] on the weekends and has a friend who drives her   History of Falls   History of Falls Yes   Cognition    Cognition / Consciousness X   Level of Consciousness Alert   Safety Awareness Impaired   New Learning Impaired   Comments poor carryover    Active ROM Lower Body    Active ROM Lower Body  WDL   Strength Lower Body   Lower Body Strength  X   Gross Strength Generalized Weakness, Equal Bilaterally   Sensation Lower Body   Lower Extremity Sensation   WDL   Balance Assessment   Sitting Balance (Static) Fair -   Sitting Balance (Dynamic) Poor +   Standing Balance (Static) Poor -   Standing Balance (Dynamic) Trace +   Weight Shift Sitting Poor   Weight Shift Standing Poor   Comments transfer with 2 assist   Gait Analysis   Gait Level Of Assist Unable to Participate   Bed Mobility    Supine to Sit Maximal Assist   Scooting Maximal Assist   Functional Mobility   Sit to Stand Maximal Assist   Bed, Chair, Wheelchair Transfer Maximal Assist   Transfer Method Stand Pivot   [facility]   Edema / Skin Assessment   Edema / Skin  Not Assessed   Short Term Goals    Short Term Goal # 1 pt will be able to complete supine<>sitting with SPV in 6tx in order to dc home   Short Term Goal # 2 pt will be able to complete functional transfers with FWW and min assist in 6tx in order to progress home   Short Term Goal # 3 pt will be able to ambulate 25ft with FWW and min assist in 6tx in order to progress to home   Short Term Goal # 4 pt will be able to negotiate 4 steps with min assist in 6tx in order to progress to home   Education Group   Education Provided Role of Physical Therapist   Role of Physical Therapist Patient Response Patient;Acceptance;Demonstration;Action Demonstration   Anticipated Discharge Equipment and Recommendations   DC Equipment Recommendations Unable to determine at this time   Discharge Recommendations Recommend post-acute placement for additional physical therapy services prior to discharge home

## 2022-11-27 NOTE — CARE PLAN
The patient is Stable - Low risk of patient condition declining or worsening    Shift Goals  Clinical Goals: Monitor I&Os, Monitor VS,labs  Patient Goals: Sleep  Family Goals: n/a    Progress made toward(s) clinical / shift goals:    Problem: Knowledge Deficit - Standard  Goal: Patient and family/care givers will demonstrate understanding of plan of care, disease process/condition, diagnostic tests and medications  Outcome: Progressing     Problem: Skin Integrity  Goal: Skin integrity is maintained or improved  Outcome: Progressing     Problem: Fall Risk  Goal: Patient will remain free from falls  Outcome: Progressing       Patient is not progressing towards the following goals:

## 2022-11-27 NOTE — PROGRESS NOTES
Report received from day shift RN, assumed care of pt. Pt A&Ox3, disoriented to time. Plan of care discussed with pt, labs and chart reviewed. All needs met at this time. Tele box on. On RA. Call light within reach, bed locked and in lowest position. All fall precautions and hourly rounding in place.

## 2022-11-27 NOTE — CARE PLAN
The patient is Stable - Low risk of patient condition declining or worsening    Shift Goals  Clinical Goals: monitor O2, monitor I&Os  Patient Goals: rest, call family  Family Goals: n/a    Progress made toward(s) clinical / shift goals:    Problem: Knowledge Deficit - Standard  Goal: Patient and family/care givers will demonstrate understanding of plan of care, disease process/condition, diagnostic tests and medications  Outcome: Progressing     Problem: Skin Integrity  Goal: Skin integrity is maintained or improved  Outcome: Progressing     Problem: Fall Risk  Goal: Patient will remain free from falls  Outcome: Progressing       Patient is not progressing towards the following goals:

## 2022-11-27 NOTE — PROGRESS NOTES
"Alyse Spears, patients niece who lives in [place], called RN and requested update. Per patient, ok to update. Alyse states patient lives alone, states she has \"cognitive issues that she has had her whole life\" that bar her from being completley independent (patient doesn't pay her own bills), states that patients POA is sister Marielena who lives in [place] and patients older brother lives in same town as her in [place]. Alyse unable to provide phone number for contact. Alyse expressed concerns for discharge planning stating she feels patient is unsafe to be discharged home alone.  "

## 2022-11-27 NOTE — PROGRESS NOTES
MONITOR SUMMARY:     Rhythm: SR, ST  Rate: 92 - 111  Ectopy: (r)PVC  Measurements: .17/.08/.33

## 2022-11-27 NOTE — PROGRESS NOTES
[facility] Medicine Daily Progress Note    Date of Service  11/27/2022    Chief Complaint  Paulina Castellanos is a 77 y.o. female admitted 11/26/2022 with found down unable to get up.    Hospital Course  Paulina Castellanos is a 77 y.o. female who was transferred from outside facility 11/26/2022 with atrial fibrillation with RVR on amiodarone drip, mild rhabdo.  No significant PMH, does not follow with physicians, only takes ibuprofen as needed.  Patient says she slipped while getting out of her bed and was unable to get up off the ground afterwards for a few days before being found by neighbor.  Denied hitting her head and denies LOC.  At outside facility she was found to have mild rhabdomyolysis with a CPK of 1500.       While there initially she was sinus rhythm but then converted to A. fib with RVR to the 150s, heart rate improved with initial diltiazem 10 mg IV but then went back up, and additional 5 mg diltiazem was given but she became hypotensive so stopped and she was started on amiodarone drip.  She also had an episode of SVT for a few minutes confirmed by EKG with heart rate up to the 180s.       On my evaluation in the ED amiodarone was turned off and no longer in atrial fibrillation.  She was also noted to have a COVID-19 positive but is not requiring any oxygen and denies any shortness of breath or cough.  CTA chest was negative for PE, showed findings consistent with pulmonary hypertension and atelectasis.    Interval Problem Update  No acute events overnight.  Remains in sinus rhythm.  Continue eliquis for hx a fib.  Echo pending for a fib evaluation.  TPO antibody normal, TSH receptor antibody pending.  Consider RAIU scan outpatient pending results.  On 2L oxygen. With COVID infection, will start decadron.  PT/OT recommend SNF, referral placed.      I have discussed this patient's plan of care and discharge plan at IDT rounds today with Case Management, Nursing, Nursing leadership, and other members of the IDT  team.    Consultants/Specialty  none    Code Status  Full Code    Disposition  Patient is not medically cleared for discharge.   Anticipate discharge to to skilled nursing facility.  I have placed the appropriate orders for post-discharge needs.    Review of Systems  Review of Systems   Constitutional:  Negative for chills and fever.   Eyes:  Negative for blurred vision and pain.   Respiratory:  Negative for cough and shortness of breath.    Cardiovascular:  Negative for chest pain, palpitations and leg swelling.   Gastrointestinal:  Negative for abdominal pain, nausea and vomiting.   Genitourinary:  Negative for dysuria and urgency.   Musculoskeletal:  Positive for falls. Negative for back pain.   Skin:  Negative for itching and rash.   Neurological:  Positive for weakness. Negative for dizziness, sensory change, focal weakness, loss of consciousness and headaches.   Psychiatric/Behavioral:  Negative for substance abuse. The patient does not have insomnia.       Physical Exam  Temp:  [36.1 °C (97 °F)-37.2 °C (99 °F)] 36.6 °C (97.9 °F)  Pulse:  [] 102  Resp:  [14-25] 18  BP: (119-141)/(57-81) 128/81  SpO2:  [91 %-96 %] 96 %    Physical Exam  Vitals reviewed.   Constitutional:       General: She is not in acute distress.     Appearance: She is not diaphoretic.   HENT:      Head: Normocephalic and atraumatic.      Right Ear: External ear normal.      Left Ear: External ear normal.      Nose: Nose normal. No congestion.      Mouth/Throat:      Pharynx: No oropharyngeal exudate or posterior oropharyngeal erythema.   Eyes:      Extraocular Movements: Extraocular movements intact.      Pupils: Pupils are equal, round, and reactive to light.   Cardiovascular:      Rate and Rhythm: Normal rate and regular rhythm.   Pulmonary:      Effort: Pulmonary effort is normal. No respiratory distress.      Breath sounds: Normal breath sounds. No wheezing or rales.   Abdominal:      General: Bowel sounds are normal. There is no  distension.      Palpations: Abdomen is soft.      Tenderness: There is no abdominal tenderness. There is no guarding.   Musculoskeletal:         General: No swelling. Normal range of motion.      Cervical back: Normal range of motion and neck supple.      Right lower leg: No edema.      Left lower leg: No edema.   Skin:     General: Skin is warm and dry.   Neurological:      General: No focal deficit present.      Mental Status: She is alert and oriented to person, place, and time.      Cranial Nerves: No cranial nerve deficit.      Sensory: No sensory deficit.      Motor: No weakness.   Psychiatric:         Mood and Affect: Mood normal.         Behavior: Behavior normal.       Fluids    Intake/Output Summary (Last 24 hours) at 11/27/2022 1158  Last data filed at 11/27/2022 0600  Gross per 24 hour   Intake 720 ml   Output --   Net 720 ml       Laboratory  Recent Labs     11/26/22  0331 11/27/22  0022   WBC 7.9 6.7   RBC 3.60* 3.56*   HEMOGLOBIN 11.3* 10.9*   HEMATOCRIT 33.9* 33.2*   MCV 94.2 93.3   MCH 31.4 30.6   MCHC 33.3* 32.8*   RDW 50.3* 50.6*   PLATELETCT 238 231   MPV 10.2 9.6     Recent Labs     11/26/22  0331 11/27/22  0022   SODIUM 141 139   POTASSIUM 3.2* 3.7   CHLORIDE 107 110   CO2 19* 21   GLUCOSE 101* 145*   BUN 31* 26*   CREATININE 0.69 0.78   CALCIUM 8.6 8.7                   Imaging  OUTSIDE IMAGES-CT CHEST   Final Result      EC-ECHOCARDIOGRAM COMPLETE W/O CONT    (Results Pending)        Assessment/Plan  * Atrial fibrillation with rapid ventricular response (HCC)- (present on admission)  Assessment & Plan  New onset atrial fibrillation, patient does not follow with physicians and is not on any medications  Was in A. fib with RVR outside facility initially improved with diltiazem but she became hypotensive so she was started on amiodarone drip  Remains in sinus rhythm  [id number] of 3 for age and female sex. Discussed anticoagulation and a fib stroke risk , she is agreeable to starting AC,  started on eliquis.  Echocardiogram ordered  TSH normal, free T4 slightly elevated, TSH and TPO antibodies ordered, consider RAIU scan    Acute respiratory failure with hypoxia (HCC)  Assessment & Plan  Due to COVID infection  Start decadron  On 2L oxygen, continue to wean as able    Metabolic acidosis, normal anion gap (NAG)- (present on admission)  Assessment & Plan  Bicarb 19 on presentation likely due to dehydration.  IV fluids    Adult failure to thrive- (present on admission)  Assessment & Plan  Patient lives alone, fell down and was unable to get up for couple days.  Per report from outside hospital her home was found to be in an unlivable condition  PT, OT ordered for placement.  She is COVID-positive    Non-traumatic rhabdomyolysis- (present on admission)  Assessment & Plan  Slipped on the ground and was unable to get up for couple days, CPK at outside facility 1500, trended down to 800 here  No SO, mild LFT elevation.  Can stop IV fluids    Elevated LFTs- (present on admission)  Assessment & Plan  On presentation AST 77, ALT 58.  Likely secondary to mild rhabdomyolysis.   improved    Pneumonia due to COVID-19 virus- (present on admission)  Assessment & Plan  Patient positive for COVID, denies any shortness of breath or cough  On 2L oxygen  Start decadron    Hypokalemia- (present on admission)  Assessment & Plan  3.2 on presentation, replete as needed check magnesium       VTE prophylaxis: therapeutic anticoagulation with eliquis    I have performed a physical exam and reviewed and updated ROS and Plan today (11/27/2022). In review of yesterday's note (11/26/2022), there are no changes except as documented above.

## 2022-11-27 NOTE — CARE PLAN
The patient is Stable - Low risk of patient condition declining or worsening    Shift Goals  Clinical Goals: monitor O2, discharge planning  Patient Goals: sleep  Family Goals: safe discharge, visiting    Progress made toward(s) clinical / shift goals:    Problem: Knowledge Deficit - Standard  Goal: Patient and family/care givers will demonstrate understanding of plan of care, disease process/condition, diagnostic tests and medications  Outcome: Progressing     Problem: Skin Integrity  Goal: Skin integrity is maintained or improved  Outcome: Progressing     Problem: Fall Risk  Goal: Patient will remain free from falls  Outcome: Progressing       Patient is not progressing towards the following goals:

## 2022-11-27 NOTE — THERAPY
"Occupational Therapy   Initial Evaluation     Patient Name: Paulina Castellanos  Age:  77 y.o., Sex:  female  Medical Record #: [id number]  Today's Date: 11/27/2022       Precautions: Fall Risk    Assessment  Patient is 77 y.o. female admitted after being found down for several days. PMhx: no significant hx besides skin cancer,  and family reporting baseline cognitive impairment, pt does not see a physician regularly. This admission pt is dx w/Afib, acute respiratory failure w/hypoxia, metabolic acidosis, adult FTT, non-traumatic rhabdomyolysis, elevated LFT's, pneumonia d/t COVID, and hypokalemia. Today pt presents w/generalized weakness and pain, limited activity tolerance impaired postural control and impaired sequencing, attention, and safety awareness.     Plan  Recommend Occupational Therapy 3 times per week until therapy goals are met for the following treatments:  Adaptive Equipment, Cognitive Skill Development, Neuro Re-Education / Balance, Self Care/Activities of Daily Living, Therapeutic Activities, and Therapeutic Exercises.    DC Equipment Recommendations: Unable to determine at this time  Discharge Recommendations: Recommend post-acute placement for additional occupational therapy services prior to discharge home     Subjective  \"I have a big cat at home like maybe 25lbs he bites me sometimes\"      Objective     11/27/22 0945   Charge Group   OT Evaluation OT Evaluation Mod   OT Self Care / ADL 1   Total Time Spent   OT Time Spent Yes   OT Self Care / ADL (Minutes) 15   OT Evaluation (Minutes) 20   OT Total Time Spent (Calculated) 35   Initial Contact Note    Initial Contact Note Order Received and Verified, Occupational Therapy Evaluation in Progress with Full Report to Follow.   Prior Living Situation   Prior Services Home-Independent   Housing / Facility 1 Story House   Steps Into Home 4   Steps In Home 0   Bathroom Set up Bathtub / Shower Combination   Equipment Owned Single Point Cane   Lives with - " Patient's Self Care Capacity Alone and Unable to Care For Self;Other (Comments)   Comments Notes/RN indicate pt gets assist w/IADl's or at least bill pay, unclear if pt is able to care for herself   Prior Level of ADL Function   Self Feeding Independent   Grooming / Hygiene Independent   Bathing Independent   Dressing Independent   Toileting Independent   Comments per pt report   Prior Level of IADL Function   Medication Management Independent   Laundry Independent   Kitchen Mobility Independent   Finances Requires Assist   Home Management Requires Assist   Shopping Requires Assist   Prior Level Of Mobility Independent With Device in Community   History of Falls   History of Falls Yes   Precautions   Precautions Fall Risk   Pain 0 - 10 Group   Location Generalized   Therapist Pain Assessment During Activity;Nurse Notified;5   Cognition    Cognition / Consciousness X   Level of Consciousness Alert   Safety Awareness Impaired   New Learning Impaired   Sequencing Impaired   Comments very pleasant and cooperative generally easily distracted needed cues to terminate tasks   Passive ROM Upper Body   Passive ROM Upper Body WDL   Active ROM Upper Body   Active ROM Upper Body  WDL   Strength Upper Body   Upper Body Strength  X   Gross Strength Generalized Weakness, Equal Bilaterally.    Sensation Upper Body   Upper Extremity Sensation  Not Tested   Upper Body Muscle Tone   Upper Body Muscle Tone  WDL   Neurological Concerns   Neurological Concerns No   Coordination Upper Body   Coordination WDL   Balance Assessment   Sitting Balance (Static) Fair -   Sitting Balance (Dynamic) Poor +   Standing Balance (Static) Poor -   Standing Balance (Dynamic) Trace +   Weight Shift Sitting Poor   Weight Shift Standing Poor   Comments x2 for txf   Bed Mobility    Supine to Sit Maximal Assist   Scooting Maximal Assist   ADL Assessment   Grooming Moderate Assist;Seated   Upper Body Dressing Moderate Assist   Lower Body Dressing Maximal  Assist   Toileting   (NT)   Comments limited by weakness and sequencing- requried increased cues to terminate grooming tasks (washed face and oral care seated in chair)   How much help from another person does the patient currently need...   6 Clicks Daily Activity Score 14   Functional Mobility   Sit to Stand Maximal Assist   Bed, Chair, Wheelchair Transfer Maximal Assist   Transfer Method Stand Pivot   Mobility EOB>chair   Edema / Skin Assessment   Edema / Skin  Not Assessed   Activity Tolerance   Comments fatigued quickly   Patient / Family Goals   Patient / Family Goal #1 to go home   Short Term Goals   Short Term Goal # 1 pt will complete grooming seated w/set up   Short Term Goal # 2 pt will complete txf to BSC w/min A   Short Term Goal # 3 pt will complete UB dressing w/set u p   Education Group   Role of Occupational Therapist Patient Response Patient;Acceptance;Explanation;Demonstration   Problem List   Problem List Decreased Active Daily Living Skills;Decreased Upper Extremity Strength Right;Decreased Upper Extremity Strength Left;Decreased Functional Mobility;Decreased Activity Tolerance;Safety Awareness Deficits / Cognition;Impaired Postural Control / Balance

## 2022-11-28 PROCEDURE — 99232 SBSQ HOSP IP/OBS MODERATE 35: CPT | Performed by: STUDENT IN AN ORGANIZED HEALTH CARE EDUCATION/TRAINING PROGRAM

## 2022-11-28 PROCEDURE — 770001 HCHG ROOM/CARE - MED/SURG/GYN PRIV*

## 2022-11-28 PROCEDURE — 700102 HCHG RX REV CODE 250 W/ 637 OVERRIDE(OP): Performed by: STUDENT IN AN ORGANIZED HEALTH CARE EDUCATION/TRAINING PROGRAM

## 2022-11-28 PROCEDURE — A9270 NON-COVERED ITEM OR SERVICE: HCPCS | Performed by: STUDENT IN AN ORGANIZED HEALTH CARE EDUCATION/TRAINING PROGRAM

## 2022-11-28 RX ADMIN — APIXABAN 5 MG: 5 TABLET, FILM COATED ORAL at 18:51

## 2022-11-28 RX ADMIN — APIXABAN 5 MG: 5 TABLET, FILM COATED ORAL at 05:10

## 2022-11-28 RX ADMIN — GUAIFENESIN SYRUP AND DEXTROMETHORPHAN 5 ML: 100; 10 SYRUP ORAL at 08:29

## 2022-11-28 RX ADMIN — DEXAMETHASONE 6 MG: 4 TABLET ORAL at 05:11

## 2022-11-28 ASSESSMENT — ENCOUNTER SYMPTOMS
FALLS: 1
DIZZINESS: 0
SHORTNESS OF BREATH: 0
VOMITING: 0
BLURRED VISION: 0
ABDOMINAL PAIN: 0
COUGH: 0
LOSS OF CONSCIOUSNESS: 0
CHILLS: 0
SENSORY CHANGE: 0
BACK PAIN: 0
FOCAL WEAKNESS: 0
INSOMNIA: 0
PALPITATIONS: 0
FEVER: 0
WEAKNESS: 1
NAUSEA: 0
HEADACHES: 0
EYE PAIN: 0

## 2022-11-28 ASSESSMENT — LIFESTYLE VARIABLES: SUBSTANCE_ABUSE: 0

## 2022-11-28 ASSESSMENT — FIBROSIS 4 INDEX: FIB4 SCORE: 2.4

## 2022-11-28 NOTE — PROGRESS NOTES
Hospital Medicine Daily Progress Note    Date of Service  11/28/2022    Chief Complaint  Paulina Castellanos is a 77 y.o. female admitted 11/26/2022 with found down unable to get up.    Hospital Course  Paulina Castellanos is a 77 y.o. female who was transferred from outside facility 11/26/2022 with atrial fibrillation with RVR on amiodarone drip, mild rhabdo.  Patient slid off her bed at home and was unable to get up for multiple days until found by friend. She was treated with IV fluids and amiodarone at OSH. On presentation she is in sinus rhythm. She is COVID positive with acute hypoxic respiratory failure requiring nasal cannula oxygen. She is started on decadron. She requires additional PT/OT at SNF. She is medically cleared to discharge to SNF when available, may have to wait 10 days for COVID clearance per SNF policy.    Interval Problem Update  No acute events overnight.  Remains in sinus rhythm.  Continue eliquis for hx a fib.  Echo reviewed, EF 65%, no acute abnormalities.  TPO antibody normal, TSH receptor antibody pending for mildly elevated free T4.  Consider RAIU scan outpatient pending results.  On 2L oxygen, wean as able.  Continue decadron for COVID pneumonia.  Patient is medically cleared pending SNF. May need to wait 10 days for COVID clearance per SNF policy. CM following.      I have discussed this patient's plan of care and discharge plan at IDT rounds today with Case Management, Nursing, Nursing leadership, and other members of the IDT team.    Consultants/Specialty  none    Code Status  Full Code    Disposition  Patient is medically cleared for discharge.   Anticipate discharge to to skilled nursing facility.  I have placed the appropriate orders for post-discharge needs.    Review of Systems  Review of Systems   Constitutional:  Negative for chills and fever.   Eyes:  Negative for blurred vision and pain.   Respiratory:  Negative for cough and shortness of breath.    Cardiovascular:  Negative for chest  pain, palpitations and leg swelling.   Gastrointestinal:  Negative for abdominal pain, nausea and vomiting.   Genitourinary:  Negative for dysuria and urgency.   Musculoskeletal:  Positive for falls. Negative for back pain.   Skin:  Negative for itching and rash.   Neurological:  Positive for weakness. Negative for dizziness, sensory change, focal weakness, loss of consciousness and headaches.   Psychiatric/Behavioral:  Negative for substance abuse. The patient does not have insomnia.       Physical Exam  Temp:  [36.4 °C (97.5 °F)-37.1 °C (98.8 °F)] 36.4 °C (97.5 °F)  Pulse:  [] 111  Resp:  [16-22] 18  BP: (122-154)/(71-87) 136/71  SpO2:  [89 %-98 %] 94 %    Physical Exam  Vitals reviewed.   Constitutional:       General: She is not in acute distress.     Appearance: She is not diaphoretic.   HENT:      Head: Normocephalic and atraumatic.      Right Ear: External ear normal.      Left Ear: External ear normal.      Nose: Nose normal. No congestion.      Mouth/Throat:      Pharynx: No oropharyngeal exudate or posterior oropharyngeal erythema.   Eyes:      Extraocular Movements: Extraocular movements intact.      Pupils: Pupils are equal, round, and reactive to light.   Cardiovascular:      Rate and Rhythm: Normal rate and regular rhythm.   Pulmonary:      Effort: Pulmonary effort is normal. No respiratory distress.      Breath sounds: Normal breath sounds. No wheezing or rales.   Abdominal:      General: Bowel sounds are normal. There is no distension.      Palpations: Abdomen is soft.      Tenderness: There is no abdominal tenderness. There is no guarding.   Musculoskeletal:         General: No swelling. Normal range of motion.      Cervical back: Normal range of motion and neck supple.      Right lower leg: No edema.      Left lower leg: No edema.   Skin:     General: Skin is warm and dry.   Neurological:      General: No focal deficit present.      Mental Status: She is alert and oriented to person, place,  and time.      Cranial Nerves: No cranial nerve deficit.      Sensory: No sensory deficit.      Motor: No weakness.   Psychiatric:         Mood and Affect: Mood normal.         Behavior: Behavior normal.       Fluids    Intake/Output Summary (Last 24 hours) at 11/28/2022 1450  Last data filed at 11/28/2022 1200  Gross per 24 hour   Intake 480 ml   Output 1100 ml   Net -620 ml       Laboratory  Recent Labs     11/26/22  0331 11/27/22  0022   WBC 7.9 6.7   RBC 3.60* 3.56*   HEMOGLOBIN 11.3* 10.9*   HEMATOCRIT 33.9* 33.2*   MCV 94.2 93.3   MCH 31.4 30.6   MCHC 33.3* 32.8*   RDW 50.3* 50.6*   PLATELETCT 238 231   MPV 10.2 9.6     Recent Labs     11/26/22  0331 11/27/22  0022   SODIUM 141 139   POTASSIUM 3.2* 3.7   CHLORIDE 107 110   CO2 19* 21   GLUCOSE 101* 145*   BUN 31* 26*   CREATININE 0.69 0.78   CALCIUM 8.6 8.7                   Imaging  EC-ECHOCARDIOGRAM COMPLETE W/O CONT   Final Result      OUTSIDE IMAGES-CT CHEST   Final Result           Assessment/Plan  * Atrial fibrillation with rapid ventricular response (HCC)- (present on admission)  Assessment & Plan  New onset atrial fibrillation, patient does not follow with physicians and is not on any medications  Was in A. fib with RVR outside facility initially improved with diltiazem but she became hypotensive so she was started on amiodarone drip  Remains in sinus rhythm  [id number] of 3 for age and female sex. Discussed anticoagulation and a fib stroke risk , she is agreeable to starting AC, started on eliquis.  Echocardiogram normal, EF 65%.  TSH normal, free T4 slightly elevated, TSH and TPO antibodies ordered, consider RAIU scan    Acute respiratory failure with hypoxia (HCC)  Assessment & Plan  Due to COVID infection  Start decadron  On 2L oxygen, continue to wean as able    Metabolic acidosis, normal anion gap (NAG)- (present on admission)  Assessment & Plan  Bicarb 19 on presentation likely due to dehydration.  IV fluids    Adult failure to thrive-  (present on admission)  Assessment & Plan  Patient lives alone, fell down and was unable to get up for couple days.  Per report from outside hospital her home was found to be in an unlivable condition  SNF pending   She is COVID-positive    Non-traumatic rhabdomyolysis- (present on admission)  Assessment & Plan  Slipped on the ground and was unable to get up for couple days, CPK at outside facility 1500, trended down to 800 here  No SO, mild LFT elevation.  Can stop IV fluids    Elevated LFTs- (present on admission)  Assessment & Plan  On presentation AST 77, ALT 58.  Likely secondary to mild rhabdomyolysis.   improved    Pneumonia due to COVID-19 virus- (present on admission)  Assessment & Plan  Patient positive for COVID, denies any shortness of breath or cough  On 2L oxygen  Continue decadron  Possible barrier to SNF, needs to be COVID cleared per SNF policy, CM following    Hypokalemia- (present on admission)  Assessment & Plan  3.2 on presentation  Improved  Monitor as needed       VTE prophylaxis: therapeutic anticoagulation with eliquis    I have performed a physical exam and reviewed and updated ROS and Plan today (11/28/2022). In review of yesterday's note (11/27/2022), there are no changes except as documented above.

## 2022-11-28 NOTE — PROGRESS NOTES
Med rec complete per patient at bedside. Patient takes no rx meds, supplements/vitamins, and only the occasional ibuprofen.    Patient has NKDA.    No abx in the last 30 days.    Pharmacy is [facility] in [place], [place]

## 2022-11-28 NOTE — CARE PLAN
The patient is Stable - Low risk of patient condition declining or worsening    Shift Goals  Clinical Goals: monitor 02, discharge planning/placement, hemodynamic stability  Patient Goals: go home  Family Goals: RICARDO    Progress made toward(s) clinical / shift goals:    Problem: Skin Integrity  Goal: Skin integrity is maintained or improved  Outcome: Progressing     Problem: Fall Risk  Goal: Patient will remain free from falls  Outcome: Progressing       Patient is not progressing towards the following goals:      Problem: Knowledge Deficit - Standard  Goal: Patient and family/care givers will demonstrate understanding of plan of care, disease process/condition, diagnostic tests and medications  Outcome: Not Progressing

## 2022-11-28 NOTE — DISCHARGE PLANNING
Case Management Discharge Planning    Admission Date: 11/26/2022  GMLOS: 5.2  ALOS: 2    6-Clicks ADL Score: 14  6-Clicks Mobility Score: 7    PT and/or OT Eval ordered: Yes  Post-acute Referrals Ordered: Yes  Post-acute Choice Obtained: No  Has referral(s) been sent to post-acute provider:  No  RNCM to inquire Choice    Anticipated Discharge Dispo: Discharge Disposition: D/T to SNF with Medicare cert in anticipation of skilled care (03)    Medically Clear: No    Next Steps: f/u with pt and medical team to discuss dc needs and barriers.    Barriers to Discharge: Medical clearance and Pending Placement    RNCM PC to family on contacts to discuss discharge wishes; per RAE Rivas to call pt brother Carlos (who is hard of hearing); awaiting callback.

## 2022-11-28 NOTE — DISCHARGE PLANNING
Received Choice form at 1316  Agency/Facility Name: [facility]   Referral sent per Choice form @ 1400     1412  Agency/Facility Name: [facility]   Spoke To: Natalio   Outcome: Natalio called they are considering referral. Per Natalio Pt needs to be Covid Clear for 10 days which is 12/6.

## 2022-11-29 LAB
CRP SERPL HS-MCNC: 2.78 MG/DL (ref 0–0.75)
D DIMER PPP IA.FEU-MCNC: 1.38 UG/ML (FEU) (ref 0–0.5)
PROCALCITONIN SERPL-MCNC: 0.07 NG/ML
TSH RECEP AB SER-ACNC: <0.8 IU/L

## 2022-11-29 PROCEDURE — 36415 COLL VENOUS BLD VENIPUNCTURE: CPT

## 2022-11-29 PROCEDURE — 86140 C-REACTIVE PROTEIN: CPT

## 2022-11-29 PROCEDURE — 99232 SBSQ HOSP IP/OBS MODERATE 35: CPT | Performed by: HOSPITALIST

## 2022-11-29 PROCEDURE — A9270 NON-COVERED ITEM OR SERVICE: HCPCS | Performed by: STUDENT IN AN ORGANIZED HEALTH CARE EDUCATION/TRAINING PROGRAM

## 2022-11-29 PROCEDURE — 84145 PROCALCITONIN (PCT): CPT

## 2022-11-29 PROCEDURE — 700102 HCHG RX REV CODE 250 W/ 637 OVERRIDE(OP): Performed by: STUDENT IN AN ORGANIZED HEALTH CARE EDUCATION/TRAINING PROGRAM

## 2022-11-29 PROCEDURE — 770001 HCHG ROOM/CARE - MED/SURG/GYN PRIV*

## 2022-11-29 PROCEDURE — 85379 FIBRIN DEGRADATION QUANT: CPT

## 2022-11-29 RX ADMIN — APIXABAN 5 MG: 5 TABLET, FILM COATED ORAL at 05:29

## 2022-11-29 RX ADMIN — GUAIFENESIN SYRUP AND DEXTROMETHORPHAN 5 ML: 100; 10 SYRUP ORAL at 21:09

## 2022-11-29 RX ADMIN — APIXABAN 5 MG: 5 TABLET, FILM COATED ORAL at 17:41

## 2022-11-29 RX ADMIN — GUAIFENESIN SYRUP AND DEXTROMETHORPHAN 5 ML: 100; 10 SYRUP ORAL at 09:22

## 2022-11-29 RX ADMIN — GUAIFENESIN SYRUP AND DEXTROMETHORPHAN 5 ML: 100; 10 SYRUP ORAL at 16:21

## 2022-11-29 RX ADMIN — DEXAMETHASONE 6 MG: 4 TABLET ORAL at 05:29

## 2022-11-29 ASSESSMENT — ENCOUNTER SYMPTOMS
FALLS: 1
WEAKNESS: 1
BLURRED VISION: 0
LOSS OF CONSCIOUSNESS: 0
INSOMNIA: 0
SHORTNESS OF BREATH: 0
NAUSEA: 0
EYE PAIN: 0
VOMITING: 0
COUGH: 0
HEADACHES: 0
BACK PAIN: 0
CHILLS: 0
FOCAL WEAKNESS: 0
SENSORY CHANGE: 0
FEVER: 0
PALPITATIONS: 0
DIZZINESS: 0
ABDOMINAL PAIN: 0

## 2022-11-29 ASSESSMENT — FIBROSIS 4 INDEX: FIB4 SCORE: 2.4

## 2022-11-29 ASSESSMENT — LIFESTYLE VARIABLES: SUBSTANCE_ABUSE: 0

## 2022-11-29 ASSESSMENT — PAIN DESCRIPTION - PAIN TYPE: TYPE: ACUTE PAIN

## 2022-11-29 NOTE — CARE PLAN
The patient is Stable - Low risk of patient condition declining or worsening    Shift Goals  Clinical Goals: monitor resp  Patient Goals: rest  Family Goals: NA    Progress made toward(s) clinical / shift goals:  skin remains intact and free from falls      Patient is not progressing towards the following goals:

## 2022-11-29 NOTE — PROGRESS NOTES
Hospital Medicine Daily Progress Note    Date of Service  11/29/2022    Chief Complaint  Paulina Castellanos is a 77 y.o. female admitted 11/26/2022 with found down unable to get up.    Hospital Course  Paulina Castellanos is a 77 y.o. female who was transferred from outside facility 11/26/2022 with atrial fibrillation with RVR on amiodarone drip, mild rhabdo.  Patient slid off her bed at home and was unable to get up for multiple days until found by friend. She was treated with IV fluids and amiodarone at OSH. On presentation she is in sinus rhythm. She is COVID positive with acute hypoxic respiratory failure requiring nasal cannula oxygen. She is started on decadron. She requires additional PT/OT at SNF. She is medically cleared to discharge to SNF when available, may have to wait 10 days for COVID clearance per SNF policy.    Interval Problem Update  11/29: Patient's cardiac echo did not find significant abnormalities.  Check CRP, D-dimer, procalcitonin.  Waiting on SNF placement.  Elevated T4 is secondary to sick euthyroid syndrome.      I have discussed this patient's plan of care and discharge plan at IDT rounds today with Case Management, Nursing, Nursing leadership, and other members of the IDT team.    Consultants/Specialty  none    Code Status  Full Code    Disposition  Patient is medically cleared for discharge.   Anticipate discharge to to skilled nursing facility.  I have placed the appropriate orders for post-discharge needs.    Review of Systems  Review of Systems   Constitutional:  Negative for chills and fever.   Eyes:  Negative for blurred vision and pain.   Respiratory:  Negative for cough and shortness of breath.    Cardiovascular:  Negative for chest pain, palpitations and leg swelling.   Gastrointestinal:  Negative for abdominal pain, nausea and vomiting.   Genitourinary:  Negative for dysuria and urgency.   Musculoskeletal:  Positive for falls. Negative for back pain.   Skin:  Negative for itching and rash.    Neurological:  Positive for weakness. Negative for dizziness, sensory change, focal weakness, loss of consciousness and headaches.   Psychiatric/Behavioral:  Negative for substance abuse. The patient does not have insomnia.       Physical Exam  Temp:  [36 °C (96.8 °F)-36.9 °C (98.4 °F)] 36.9 °C (98.4 °F)  Pulse:  [] 104  Resp:  [16-18] 17  BP: (138-172)/(75-86) 138/75  SpO2:  [92 %-97 %] 95 %    Physical Exam  Vitals reviewed.   Constitutional:       General: She is not in acute distress.     Appearance: She is not diaphoretic.   HENT:      Head: Normocephalic and atraumatic.      Right Ear: External ear normal.      Left Ear: External ear normal.      Nose: Nose normal. No congestion.      Mouth/Throat:      Pharynx: No oropharyngeal exudate or posterior oropharyngeal erythema.   Eyes:      Extraocular Movements: Extraocular movements intact.      Pupils: Pupils are equal, round, and reactive to light.   Cardiovascular:      Rate and Rhythm: Normal rate and regular rhythm.   Pulmonary:      Effort: Pulmonary effort is normal. No respiratory distress.      Breath sounds: Normal breath sounds. No wheezing or rales.   Abdominal:      General: Bowel sounds are normal. There is no distension.      Palpations: Abdomen is soft.      Tenderness: There is no abdominal tenderness. There is no guarding.   Musculoskeletal:         General: No swelling. Normal range of motion.      Cervical back: Normal range of motion and neck supple.      Right lower leg: No edema.      Left lower leg: No edema.   Skin:     General: Skin is warm and dry.   Neurological:      General: No focal deficit present.      Mental Status: She is alert and oriented to person, place, and time.      Cranial Nerves: No cranial nerve deficit.      Sensory: No sensory deficit.      Motor: No weakness.   Psychiatric:         Mood and Affect: Mood normal.         Behavior: Behavior normal.       Fluids    Intake/Output Summary (Last 24 hours) at  11/29/2022 1525  Last data filed at 11/29/2022 0500  Gross per 24 hour   Intake 620 ml   Output 1100 ml   Net -480 ml       Laboratory  Recent Labs     11/27/22  0022   WBC 6.7   RBC 3.56*   HEMOGLOBIN 10.9*   HEMATOCRIT 33.2*   MCV 93.3   MCH 30.6   MCHC 32.8*   RDW 50.6*   PLATELETCT 231   MPV 9.6     Recent Labs     11/27/22  0022   SODIUM 139   POTASSIUM 3.7   CHLORIDE 110   CO2 21   GLUCOSE 145*   BUN 26*   CREATININE 0.78   CALCIUM 8.7                   Imaging  EC-ECHOCARDIOGRAM COMPLETE W/O CONT   Final Result      OUTSIDE IMAGES-CT CHEST   Final Result           Assessment/Plan  * Atrial fibrillation with rapid ventricular response (HCC)- (present on admission)  Assessment & Plan  New onset atrial fibrillation, patient does not follow with physicians and is not on any medications  Was in A. fib with RVR outside facility initially improved with diltiazem but she became hypotensive so she was started on amiodarone drip  Remains in sinus rhythm  [id number] of 3 for age and female sex. Discussed anticoagulation and a fib stroke risk , she is agreeable to starting AC, started on eliquis.  Echocardiogram normal, EF 65%.  Elevated T4 secondary to sick euthyroid syndrome    Acute respiratory failure with hypoxia (HCC)  Assessment & Plan  Due to COVID infection  Start decadron  On 2L oxygen, continue to wean as able    Metabolic acidosis, normal anion gap (NAG)- (present on admission)  Assessment & Plan  Bicarb 19 on presentation likely due to dehydration.  IV fluids    Adult failure to thrive- (present on admission)  Assessment & Plan  Patient lives alone, fell down and was unable to get up for couple days.  Per report from outside hospital her home was found to be in an unlivable condition  SNF pending   She is COVID-positive    Non-traumatic rhabdomyolysis- (present on admission)  Assessment & Plan  Slipped on the ground and was unable to get up for couple days, CPK at outside facility 1500, trended down to 800  here  No SO, mild LFT elevation.  Can stop IV fluids    Elevated LFTs- (present on admission)  Assessment & Plan  On presentation AST 77, ALT 58.  Likely secondary to mild rhabdomyolysis.   improved    Pneumonia due to COVID-19 virus- (present on admission)  Assessment & Plan  Patient positive for COVID, denies any shortness of breath or cough  On 2L oxygen  Continue decadron  Possible barrier to SNF, needs to be COVID cleared per SNF policy, CM following    Hypokalemia- (present on admission)  Assessment & Plan  3.2 on presentation  Improved  Monitor as needed       VTE prophylaxis: therapeutic anticoagulation with eliquis    I have performed a physical exam and reviewed and updated ROS and Plan today (11/29/2022). In review of yesterday's note (11/28/2022), there are no changes except as documented above.

## 2022-11-29 NOTE — DISCHARGE PLANNING
Agency/Facility Name: [facility]   Spoke To: Jayro   Outcome: DPA called to verify Covid bed availability, Per Jayro no Covid bed available at the moment, will need to wait for Covid clearance on 12/6 for bed.

## 2022-11-30 ENCOUNTER — APPOINTMENT (OUTPATIENT)
Dept: RADIOLOGY | Facility: MEDICAL CENTER | Age: 77
DRG: 308 | End: 2022-11-30
Attending: HOSPITALIST
Payer: MEDICARE

## 2022-11-30 PROCEDURE — 700102 HCHG RX REV CODE 250 W/ 637 OVERRIDE(OP): Performed by: STUDENT IN AN ORGANIZED HEALTH CARE EDUCATION/TRAINING PROGRAM

## 2022-11-30 PROCEDURE — 93971 EXTREMITY STUDY: CPT | Mod: LT

## 2022-11-30 PROCEDURE — A9270 NON-COVERED ITEM OR SERVICE: HCPCS | Performed by: STUDENT IN AN ORGANIZED HEALTH CARE EDUCATION/TRAINING PROGRAM

## 2022-11-30 PROCEDURE — 770001 HCHG ROOM/CARE - MED/SURG/GYN PRIV*

## 2022-11-30 PROCEDURE — 93971 EXTREMITY STUDY: CPT | Mod: 26,LT | Performed by: INTERNAL MEDICINE

## 2022-11-30 PROCEDURE — 99232 SBSQ HOSP IP/OBS MODERATE 35: CPT | Performed by: HOSPITALIST

## 2022-11-30 PROCEDURE — 97530 THERAPEUTIC ACTIVITIES: CPT

## 2022-11-30 RX ADMIN — GUAIFENESIN SYRUP AND DEXTROMETHORPHAN 5 ML: 100; 10 SYRUP ORAL at 17:14

## 2022-11-30 RX ADMIN — DEXAMETHASONE 6 MG: 4 TABLET ORAL at 05:17

## 2022-11-30 RX ADMIN — GUAIFENESIN SYRUP AND DEXTROMETHORPHAN 5 ML: 100; 10 SYRUP ORAL at 09:11

## 2022-11-30 RX ADMIN — GUAIFENESIN SYRUP AND DEXTROMETHORPHAN 5 ML: 100; 10 SYRUP ORAL at 20:00

## 2022-11-30 RX ADMIN — APIXABAN 5 MG: 5 TABLET, FILM COATED ORAL at 17:14

## 2022-11-30 RX ADMIN — APIXABAN 5 MG: 5 TABLET, FILM COATED ORAL at 05:17

## 2022-11-30 RX ADMIN — DOCUSATE SODIUM 50 MG AND SENNOSIDES 8.6 MG 2 TABLET: 8.6; 5 TABLET, FILM COATED ORAL at 17:14

## 2022-11-30 ASSESSMENT — ENCOUNTER SYMPTOMS
DIZZINESS: 0
NAUSEA: 0
COUGH: 0
BLURRED VISION: 0
ABDOMINAL PAIN: 0
CHILLS: 0
BACK PAIN: 0
WEAKNESS: 1
VOMITING: 0
SENSORY CHANGE: 0
EYE PAIN: 0
HEADACHES: 0
FEVER: 0
LOSS OF CONSCIOUSNESS: 0
SHORTNESS OF BREATH: 0
FALLS: 1
PALPITATIONS: 0
FOCAL WEAKNESS: 0
INSOMNIA: 0

## 2022-11-30 ASSESSMENT — COGNITIVE AND FUNCTIONAL STATUS - GENERAL
SUGGESTED CMS G CODE MODIFIER DAILY ACTIVITY: CL
MOVING TO AND FROM BED TO CHAIR: UNABLE
TURNING FROM BACK TO SIDE WHILE IN FLAT BAD: UNABLE
DAILY ACTIVITIY SCORE: 9
DRESSING REGULAR UPPER BODY CLOTHING: TOTAL
MOVING FROM LYING ON BACK TO SITTING ON SIDE OF FLAT BED: UNABLE
WALKING IN HOSPITAL ROOM: TOTAL
PERSONAL GROOMING: A LOT
EATING MEALS: A LITTLE
SUGGESTED CMS G CODE MODIFIER MOBILITY: CM
MOBILITY SCORE: 7
CLIMB 3 TO 5 STEPS WITH RAILING: TOTAL
STANDING UP FROM CHAIR USING ARMS: A LOT
STANDING UP FROM CHAIR USING ARMS: TOTAL
DRESSING REGULAR LOWER BODY CLOTHING: TOTAL
TOILETING: TOTAL
WALKING IN HOSPITAL ROOM: TOTAL
CLIMB 3 TO 5 STEPS WITH RAILING: TOTAL
TURNING FROM BACK TO SIDE WHILE IN FLAT BAD: A LOT
MOVING TO AND FROM BED TO CHAIR: A LOT
MOVING FROM LYING ON BACK TO SITTING ON SIDE OF FLAT BED: UNABLE
HELP NEEDED FOR BATHING: TOTAL
MOBILITY SCORE: 8
SUGGESTED CMS G CODE MODIFIER MOBILITY: CM

## 2022-11-30 ASSESSMENT — FIBROSIS 4 INDEX: FIB4 SCORE: 2.4

## 2022-11-30 ASSESSMENT — LIFESTYLE VARIABLES: SUBSTANCE_ABUSE: 0

## 2022-11-30 ASSESSMENT — GAIT ASSESSMENTS: GAIT LEVEL OF ASSIST: UNABLE TO PARTICIPATE

## 2022-11-30 NOTE — DISCHARGE PLANNING
Agency/Facility Name: [facility]   Spoke To: Jayro   Outcome: DPA called to verify iso bed available. Jayro informed no iso bed available today.     1035  Agency/Facility Name: [facility]   Spoke To: Cecile   Outcome: DPA called to verify if [facility] has iso beds available, Per Cecile no iso beds available and can accept to facility until she is Covid clear on 12/6.

## 2022-11-30 NOTE — THERAPY
"Physical Therapy   Daily Treatment     Patient Name: Paulina Castellanos  Age:  77 y.o., Sex:  female  Medical Record #: [id number]  Today's Date: 11/30/2022     Precautions  Precautions: Fall Risk  Comments: COVID+    Assessment    Pt seen for follow up PT tx. Pt very agreeable to work with therapist. Max assist required for bed mobility with HOB raised as well as max assist for STS and SPT to chair. Pt fearful of falling but responds well to cues for hand placement and participation with transfers. Once in chair, pt instructed in and encouraged to perform LE exercises for ROM and strength. PT will cont while pt is in acute care setting.     Plan    Continue current treatment plan.    DC Equipment Recommendations: Unable to determine at this time  Discharge Recommendations: Recommend post-acute placement for additional physical therapy services prior to discharge home      Subjective    \"I'm going to miss the view here\"       11/30/22 1004   Vitals   O2 (LPM) 2   O2 Delivery Device Silicone Nasal Cannula   Cognition    Level of Consciousness Alert   Comments very pleasant and cooperative but poor attention   Other Treatments   Other Treatments Provided increased time required between transitions   Balance   Sitting Balance (Static) Fair -   Sitting Balance (Dynamic) Poor +   Standing Balance (Static) Poor   Standing Balance (Dynamic) Poor -   Weight Shift Sitting Fair   Weight Shift Standing Poor   Skilled Intervention Verbal Cuing;Compensatory Strategies   Comments transfer only   Gait Analysis   Gait Level Of Assist Unable to Participate   Bed Mobility    Supine to Sit Maximal Assist   Scooting Moderate Assist   Skilled Intervention Verbal Cuing;Compensatory Strategies   Comments HOB raised   Functional Mobility   Sit to Stand Maximal Assist   Bed, Chair, Wheelchair Transfer Maximal Assist   Transfer Method Stand Pivot   Mobility EOB>recliner   Skilled Intervention Verbal Cuing;Compensatory Strategies   Short Term Goals  "   Short Term Goal # 1 pt will be able to complete supine<>sitting with SPV in 6tx in order to dc home   Goal Outcome # 1 goal not met   Short Term Goal # 2 pt will be able to complete functional transfers with FWW and min assist in 6tx in order to progress home   Goal Outcome # 2 Goal not met   Short Term Goal # 3 pt will be able to ambulate 25ft with FWW and min assist in 6tx in order to progress to home   Goal Outcome # 3 Goal not met   Short Term Goal # 4 pt will be able to negotiate 4 steps with min assist in 6tx in order to progress to home   Goal Outcome # 4 Goal not met   Anticipated Discharge Equipment and Recommendations   DC Equipment Recommendations Unable to determine at this time   Discharge Recommendations Recommend post-acute placement for additional physical therapy services prior to discharge home

## 2022-11-30 NOTE — PROGRESS NOTES
Hospital Medicine Daily Progress Note    Date of Service  11/30/2022    Chief Complaint  Paulina Castellanos is a 77 y.o. female admitted 11/26/2022 with found down unable to get up.    Hospital Course  Paulina Castellanos is a 77 y.o. female who was transferred from outside facility 11/26/2022 with atrial fibrillation with RVR on amiodarone drip, mild rhabdo.  Patient slid off her bed at home and was unable to get up for multiple days until found by friend. She was treated with IV fluids and amiodarone at OSH. On presentation she is in sinus rhythm. She is COVID positive with acute hypoxic respiratory failure requiring nasal cannula oxygen. She is started on decadron. She requires additional PT/OT at SNF. She is medically cleared to discharge to SNF when available, may have to wait 10 days for COVID clearance per SNF policy.    Interval Problem Update  11/29: Patient's cardiac echo did not find significant abnormalities.  Check CRP, D-dimer, procalcitonin.  Waiting on SNF placement.  Elevated T4 is secondary to sick euthyroid syndrome.    11/30: Patient had a venous Doppler of the left upper extremity that was negative.  Cardiac echo did not reveal any abnormalities.  CRP is 2.78 and will continue with Decadron.  We will continue to wean her off oxygen.  We will have a reevaluation by PT OT.  SNF placement is pending.    I have discussed this patient's plan of care and discharge plan at IDT rounds today with Case Management, Nursing, Nursing leadership, and other members of the IDT team.    Consultants/Specialty  none    Code Status  Full Code    Disposition  Patient is medically cleared for discharge.   Anticipate discharge to to skilled nursing facility.  I have placed the appropriate orders for post-discharge needs.    Review of Systems  Review of Systems   Constitutional:  Negative for chills and fever.   Eyes:  Negative for blurred vision and pain.   Respiratory:  Negative for cough and shortness of breath.    Cardiovascular:   Negative for chest pain, palpitations and leg swelling.   Gastrointestinal:  Negative for abdominal pain, nausea and vomiting.   Genitourinary:  Negative for dysuria and urgency.   Musculoskeletal:  Positive for falls. Negative for back pain.   Skin:  Negative for itching and rash.   Neurological:  Positive for weakness. Negative for dizziness, sensory change, focal weakness, loss of consciousness and headaches.   Psychiatric/Behavioral:  Negative for substance abuse. The patient does not have insomnia.       Physical Exam  Temp:  [36.5 °C (97.7 °F)-37.4 °C (99.3 °F)] 36.5 °C (97.7 °F)  Pulse:  [] 98  Resp:  [17-22] 19  BP: (103-143)/(60-88) 121/81  SpO2:  [93 %-97 %] 96 %    Physical Exam  Vitals reviewed.   Constitutional:       General: She is not in acute distress.     Appearance: She is not diaphoretic.   HENT:      Head: Normocephalic and atraumatic.      Right Ear: External ear normal.      Left Ear: External ear normal.      Nose: Nose normal. No congestion.      Mouth/Throat:      Pharynx: No oropharyngeal exudate or posterior oropharyngeal erythema.   Eyes:      Extraocular Movements: Extraocular movements intact.      Pupils: Pupils are equal, round, and reactive to light.   Cardiovascular:      Rate and Rhythm: Normal rate and regular rhythm.   Pulmonary:      Effort: Pulmonary effort is normal. No respiratory distress.      Breath sounds: Normal breath sounds. No wheezing or rales.   Abdominal:      General: Bowel sounds are normal. There is no distension.      Palpations: Abdomen is soft.      Tenderness: There is no abdominal tenderness. There is no guarding.   Musculoskeletal:         General: No swelling. Normal range of motion.      Cervical back: Normal range of motion and neck supple.      Right lower leg: No edema.      Left lower leg: No edema.   Skin:     General: Skin is warm and dry.   Neurological:      General: No focal deficit present.      Mental Status: She is alert and oriented  to person, place, and time.      Cranial Nerves: No cranial nerve deficit.      Sensory: No sensory deficit.      Motor: No weakness.   Psychiatric:         Mood and Affect: Mood normal.         Behavior: Behavior normal.       Fluids    Intake/Output Summary (Last 24 hours) at 11/30/2022 1530  Last data filed at 11/30/2022 1512  Gross per 24 hour   Intake 1500 ml   Output 2100 ml   Net -600 ml       Laboratory                            Imaging  US-EXTREMITY VENOUS UPPER UNILAT LEFT         EC-ECHOCARDIOGRAM COMPLETE W/O CONT   Final Result      OUTSIDE IMAGES-CT CHEST   Final Result           Assessment/Plan  * Atrial fibrillation with rapid ventricular response (HCC)- (present on admission)  Assessment & Plan  New onset atrial fibrillation, patient does not follow with physicians and is not on any medications  Was in A. fib with RVR outside facility initially improved with diltiazem but she became hypotensive so she was started on amiodarone drip  Remains in sinus rhythm  [id number] of 3 for age and female sex. Discussed anticoagulation and a fib stroke risk , she is agreeable to starting AC, started on eliquis.  Echocardiogram normal, EF 65%.  Elevated T4 secondary to sick euthyroid syndrome    Acute respiratory failure with hypoxia (HCC)  Assessment & Plan  Due to COVID infection  Start decadron  On 2L oxygen, continue to wean as able    Metabolic acidosis, normal anion gap (NAG)- (present on admission)  Assessment & Plan  Bicarb 19 on presentation likely due to dehydration.  IV fluids    Adult failure to thrive- (present on admission)  Assessment & Plan  Patient lives alone, fell down and was unable to get up for couple days.  Per report from outside hospital her home was found to be in an unlivable condition  SNF pending   She is COVID-positive    Non-traumatic rhabdomyolysis- (present on admission)  Assessment & Plan  Slipped on the ground and was unable to get up for couple days, CPK at outside facility  1500, trended down to 800 here  No SO, mild LFT elevation.  Can stop IV fluids    Elevated LFTs- (present on admission)  Assessment & Plan  On presentation AST 77, ALT 58.  Likely secondary to mild rhabdomyolysis.   improved    Pneumonia due to COVID-19 virus- (present on admission)  Assessment & Plan  Patient positive for COVID, denies any shortness of breath or cough  On 2L oxygen  Continue decadron  Possible barrier to SNF, needs to be COVID cleared per SNF policy, CM following    Hypokalemia- (present on admission)  Assessment & Plan  3.2 on presentation  Improved  Monitor as needed       VTE prophylaxis: therapeutic anticoagulation with eliquis    I have performed a physical exam and reviewed and updated ROS and Plan today (11/30/2022). In review of yesterday's note (11/29/2022), there are no changes except as documented above.

## 2022-11-30 NOTE — CARE PLAN
The patient is Watcher - Medium risk of patient condition declining or worsening    Shift Goals  Clinical Goals: Monitor COVID symptoms  Patient Goals: Rest and Comfort  Family Goals: RICARDO    Progress made toward(s) clinical / shift goals:      Patient is not progressing towards the following goals:    Report received from day shift RN. The patient's assessments and medication administration were documented on the flow sheets and MAR respectively. Pt had an uneventful evening. Report was given to day shift RN.

## 2022-11-30 NOTE — CARE PLAN
The patient is Stable - Low risk of patient condition declining or worsening    Shift Goals  Clinical Goals: monitor covid symptoms  Patient Goals: rest, comfort  Family Goals: NA    Progress made toward(s) clinical / shift goals:    Problem: Knowledge Deficit - Standard  Goal: Patient and family/care givers will demonstrate understanding of plan of care, disease process/condition, diagnostic tests and medications  Outcome: Progressing     Problem: Skin Integrity  Goal: Skin integrity is maintained or improved  Outcome: Progressing     Problem: Fall Risk  Goal: Patient will remain free from falls  Outcome: Progressing       Patient is not progressing towards the following goals:

## 2022-12-01 PROBLEM — I80.9 THROMBOPHLEBITIS: Status: ACTIVE | Noted: 2022-12-01

## 2022-12-01 LAB
ALBUMIN SERPL BCP-MCNC: 3 G/DL (ref 3.2–4.9)
ALBUMIN/GLOB SERPL: 0.9 G/DL
ALP SERPL-CCNC: 63 U/L (ref 30–99)
ALT SERPL-CCNC: 24 U/L (ref 2–50)
ANION GAP SERPL CALC-SCNC: 14 MMOL/L (ref 7–16)
AST SERPL-CCNC: 19 U/L (ref 12–45)
BILIRUB SERPL-MCNC: 0.3 MG/DL (ref 0.1–1.5)
BUN SERPL-MCNC: 29 MG/DL (ref 8–22)
CALCIUM SERPL-MCNC: 8.9 MG/DL (ref 8.5–10.5)
CHLORIDE SERPL-SCNC: 98 MMOL/L (ref 96–112)
CO2 SERPL-SCNC: 20 MMOL/L (ref 20–33)
CREAT SERPL-MCNC: 0.99 MG/DL (ref 0.5–1.4)
GFR SERPLBLD CREATININE-BSD FMLA CKD-EPI: 59 ML/MIN/1.73 M 2
GLOBULIN SER CALC-MCNC: 3.3 G/DL (ref 1.9–3.5)
GLUCOSE SERPL-MCNC: 115 MG/DL (ref 65–99)
PHOSPHATE SERPL-MCNC: 2 MG/DL (ref 2.5–4.5)
POTASSIUM SERPL-SCNC: 3.7 MMOL/L (ref 3.6–5.5)
PROT SERPL-MCNC: 6.3 G/DL (ref 6–8.2)
SODIUM SERPL-SCNC: 132 MMOL/L (ref 135–145)

## 2022-12-01 PROCEDURE — 97535 SELF CARE MNGMENT TRAINING: CPT

## 2022-12-01 PROCEDURE — 770001 HCHG ROOM/CARE - MED/SURG/GYN PRIV*

## 2022-12-01 PROCEDURE — 700111 HCHG RX REV CODE 636 W/ 250 OVERRIDE (IP): Performed by: HOSPITALIST

## 2022-12-01 PROCEDURE — 80053 COMPREHEN METABOLIC PANEL: CPT

## 2022-12-01 PROCEDURE — 700105 HCHG RX REV CODE 258: Performed by: HOSPITALIST

## 2022-12-01 PROCEDURE — 36415 COLL VENOUS BLD VENIPUNCTURE: CPT

## 2022-12-01 PROCEDURE — 99232 SBSQ HOSP IP/OBS MODERATE 35: CPT | Performed by: HOSPITALIST

## 2022-12-01 PROCEDURE — 84100 ASSAY OF PHOSPHORUS: CPT

## 2022-12-01 PROCEDURE — A9270 NON-COVERED ITEM OR SERVICE: HCPCS | Performed by: STUDENT IN AN ORGANIZED HEALTH CARE EDUCATION/TRAINING PROGRAM

## 2022-12-01 PROCEDURE — 700102 HCHG RX REV CODE 250 W/ 637 OVERRIDE(OP): Performed by: STUDENT IN AN ORGANIZED HEALTH CARE EDUCATION/TRAINING PROGRAM

## 2022-12-01 PROCEDURE — 700101 HCHG RX REV CODE 250: Performed by: HOSPITALIST

## 2022-12-01 RX ORDER — SODIUM CHLORIDE, SODIUM LACTATE, POTASSIUM CHLORIDE, CALCIUM CHLORIDE 600; 310; 30; 20 MG/100ML; MG/100ML; MG/100ML; MG/100ML
INJECTION, SOLUTION INTRAVENOUS CONTINUOUS
Status: DISCONTINUED | OUTPATIENT
Start: 2022-12-01 | End: 2022-12-03

## 2022-12-01 RX ADMIN — APIXABAN 5 MG: 5 TABLET, FILM COATED ORAL at 06:38

## 2022-12-01 RX ADMIN — AMPICILLIN SODIUM AND SULBACTAM SODIUM 3 G: 2; 1 INJECTION, POWDER, FOR SOLUTION INTRAMUSCULAR; INTRAVENOUS at 22:13

## 2022-12-01 RX ADMIN — GUAIFENESIN SYRUP AND DEXTROMETHORPHAN 5 ML: 100; 10 SYRUP ORAL at 15:10

## 2022-12-01 RX ADMIN — AMPICILLIN SODIUM AND SULBACTAM SODIUM 3 G: 2; 1 INJECTION, POWDER, FOR SOLUTION INTRAMUSCULAR; INTRAVENOUS at 15:11

## 2022-12-01 RX ADMIN — DEXAMETHASONE 6 MG: 4 TABLET ORAL at 06:38

## 2022-12-01 RX ADMIN — APIXABAN 5 MG: 5 TABLET, FILM COATED ORAL at 16:29

## 2022-12-01 RX ADMIN — POTASSIUM PHOSPHATE, MONOBASIC AND POTASSIUM PHOSPHATE, DIBASIC 30 MMOL: 224; 236 INJECTION, SOLUTION, CONCENTRATE INTRAVENOUS at 16:29

## 2022-12-01 RX ADMIN — SODIUM CHLORIDE, POTASSIUM CHLORIDE, SODIUM LACTATE AND CALCIUM CHLORIDE: 600; 310; 30; 20 INJECTION, SOLUTION INTRAVENOUS at 16:30

## 2022-12-01 RX ADMIN — GUAIFENESIN SYRUP AND DEXTROMETHORPHAN 5 ML: 100; 10 SYRUP ORAL at 08:42

## 2022-12-01 RX ADMIN — GUAIFENESIN SYRUP AND DEXTROMETHORPHAN 5 ML: 100; 10 SYRUP ORAL at 21:42

## 2022-12-01 ASSESSMENT — ENCOUNTER SYMPTOMS
NAUSEA: 0
BLURRED VISION: 0
HEADACHES: 0
PALPITATIONS: 0
ABDOMINAL PAIN: 0
WEAKNESS: 1
INSOMNIA: 0
FOCAL WEAKNESS: 0
FEVER: 0
LOSS OF CONSCIOUSNESS: 0
VOMITING: 0
DIZZINESS: 0
SENSORY CHANGE: 0
CHILLS: 0
SHORTNESS OF BREATH: 0
BACK PAIN: 0
EYE PAIN: 0
COUGH: 0
FALLS: 1

## 2022-12-01 ASSESSMENT — COGNITIVE AND FUNCTIONAL STATUS - GENERAL
DRESSING REGULAR LOWER BODY CLOTHING: A LOT
SUGGESTED CMS G CODE MODIFIER DAILY ACTIVITY: CL
PERSONAL GROOMING: A LOT
DAILY ACTIVITIY SCORE: 13
EATING MEALS: A LITTLE
DRESSING REGULAR UPPER BODY CLOTHING: A LOT
HELP NEEDED FOR BATHING: A LOT
TOILETING: A LOT

## 2022-12-01 ASSESSMENT — FIBROSIS 4 INDEX: FIB4 SCORE: 1.28

## 2022-12-01 ASSESSMENT — PAIN DESCRIPTION - PAIN TYPE: TYPE: ACUTE PAIN

## 2022-12-01 ASSESSMENT — LIFESTYLE VARIABLES: SUBSTANCE_ABUSE: 0

## 2022-12-01 NOTE — CARE PLAN
The patient is Stable - Low risk of patient condition declining or worsening    Shift Goals  Clinical Goals: Monitor and wean o2  Patient Goals: Rest, comfort  Family Goals: RICARDO    Progress made toward(s) clinical / shift goals:        Problem: Knowledge Deficit - Standard  Goal: Patient and family/care givers will demonstrate understanding of plan of care, disease process/condition, diagnostic tests and medications  Outcome: Progressing     Problem: Skin Integrity  Goal: Skin integrity is maintained or improved  Outcome: Progressing     Problem: Fall Risk  Goal: Patient will remain free from falls  Outcome: Progressing     Problem: Psychosocial  Goal: Patient's level of anxiety will decrease  Outcome: Progressing  Goal: Patient's ability to verbalize feelings about condition will improve  Outcome: Progressing  Goal: Patient's ability to re-evaluate and adapt role responsibilities will improve  Outcome: Progressing  Goal: Patient and family will demonstrate ability to cope with life altering diagnosis and/or procedure  Outcome: Progressing  Goal: Spiritual and cultural needs incorporated into hospitalization  Outcome: Progressing     Problem: Communication  Goal: The ability to communicate needs accurately and effectively will improve  Outcome: Progressing     Problem: Respiratory  Goal: Patient will achieve/maintain optimum respiratory ventilation and gas exchange  Outcome: Progressing       Patient is not progressing towards the following goals:

## 2022-12-01 NOTE — CARE PLAN
The patient is Stable - Low risk of patient condition declining or worsening    Shift Goals  Clinical Goals: monitor covid symptoms  Patient Goals: comfort, talk with daniel  Family Goals: RICARDO    Progress made toward(s) clinical / shift goals:    Problem: Knowledge Deficit - Standard  Goal: Patient and family/care givers will demonstrate understanding of plan of care, disease process/condition, diagnostic tests and medications  Outcome: Progressing     Problem: Fall Risk  Goal: Patient will remain free from falls  Outcome: Progressing     Problem: Psychosocial  Goal: Patient's level of anxiety will decrease  Outcome: Progressing       Patient is not progressing towards the following goals:

## 2022-12-01 NOTE — PROGRESS NOTES
Report received from Nataliia Khan. Assumed pt care. Pt sitting up in bed for dinner. Pt A&O x 3. Fall precautions in place, call light and belongings within reach, bed in lowest position. No signs of distress.

## 2022-12-01 NOTE — PROGRESS NOTES
RN received Sepsis alert on pt. MD Shira notified. Discussed high HR and RR. Pt has shallow breaths but not in signs of resp. Distress. MD stated digoxin was started yesterday to help with high HR. Addressing thrombophlebitis with Unasyn. No changes to POC. RN will monitor for changes in pt and VS and labs.

## 2022-12-01 NOTE — PROGRESS NOTES
Hospital Medicine Daily Progress Note    Date of Service  12/1/2022    Chief Complaint  Paulina Castellanos is a 77 y.o. female admitted 11/26/2022 with found down unable to get up.    Hospital Course  Paulina Castellanos is a 77 y.o. female who was transferred from outside facility 11/26/2022 with atrial fibrillation with RVR on amiodarone drip, mild rhabdo.  Patient slid off her bed at home and was unable to get up for multiple days until found by friend. She was treated with IV fluids and amiodarone at OSH. On presentation she is in sinus rhythm. She is COVID positive with acute hypoxic respiratory failure requiring nasal cannula oxygen. She is started on decadron. She requires additional PT/OT at SNF. She is medically cleared to discharge to SNF when available, may have to wait 10 days for COVID clearance per SNF policy.    Interval Problem Update  11/29: Patient's cardiac echo did not find significant abnormalities.  Check CRP, D-dimer, procalcitonin.  Waiting on SNF placement.  Elevated T4 is secondary to sick euthyroid syndrome.    11/30: Patient had a venous Doppler of the left upper extremity that was negative.  Cardiac echo did not reveal any abnormalities.  CRP is 2.78 and will continue with Decadron.  We will continue to wean her off oxygen.  We will have a reevaluation by PT OT.  SNF placement is pending.    12/1: Patient was found to have thrombophlebitis on the right arm.  I have started IV Unasyn.    I have discussed this patient's plan of care and discharge plan at IDT rounds today with Case Management, Nursing, Nursing leadership, and other members of the IDT team.    Consultants/Specialty  none    Code Status  Full Code    Disposition  Patient is medically cleared for discharge.   Anticipate discharge to to skilled nursing facility.  I have placed the appropriate orders for post-discharge needs.    Review of Systems  Review of Systems   Constitutional:  Negative for chills and fever.   Eyes:  Negative for blurred  vision and pain.   Respiratory:  Negative for cough and shortness of breath.    Cardiovascular:  Negative for chest pain, palpitations and leg swelling.   Gastrointestinal:  Negative for abdominal pain, nausea and vomiting.   Genitourinary:  Negative for dysuria and urgency.   Musculoskeletal:  Positive for falls. Negative for back pain.   Skin:  Negative for itching and rash.   Neurological:  Positive for weakness. Negative for dizziness, sensory change, focal weakness, loss of consciousness and headaches.   Psychiatric/Behavioral:  Negative for substance abuse. The patient does not have insomnia.       Physical Exam  Temp:  [36.2 °C (97.2 °F)-38.2 °C (100.8 °F)] 38.2 °C (100.8 °F)  Pulse:  [] 110  Resp:  [18-29] 22  BP: (103-135)/(62-81) 115/62  SpO2:  [93 %-98 %] 93 %    Physical Exam  Vitals reviewed.   Constitutional:       General: She is not in acute distress.     Appearance: She is not diaphoretic.   HENT:      Head: Normocephalic and atraumatic.      Right Ear: External ear normal.      Left Ear: External ear normal.      Nose: Nose normal. No congestion.      Mouth/Throat:      Pharynx: No oropharyngeal exudate or posterior oropharyngeal erythema.   Eyes:      Extraocular Movements: Extraocular movements intact.      Pupils: Pupils are equal, round, and reactive to light.   Cardiovascular:      Rate and Rhythm: Normal rate and regular rhythm.   Pulmonary:      Effort: Pulmonary effort is normal. No respiratory distress.      Breath sounds: Normal breath sounds. No wheezing or rales.   Abdominal:      General: Bowel sounds are normal. There is no distension.      Palpations: Abdomen is soft.      Tenderness: There is no abdominal tenderness. There is no guarding.   Musculoskeletal:         General: No swelling. Normal range of motion.      Cervical back: Normal range of motion and neck supple.      Right lower leg: No edema.      Left lower leg: No edema.   Skin:     General: Skin is warm and dry.    Neurological:      General: No focal deficit present.      Mental Status: She is alert and oriented to person, place, and time.      Cranial Nerves: No cranial nerve deficit.      Sensory: No sensory deficit.      Motor: No weakness.   Psychiatric:         Mood and Affect: Mood normal.         Behavior: Behavior normal.       Fluids    Intake/Output Summary (Last 24 hours) at 12/1/2022 1437  Last data filed at 12/1/2022 1235  Gross per 24 hour   Intake 1280 ml   Output 1700 ml   Net -420 ml         Laboratory        Recent Labs     12/01/22  0330   SODIUM 132*   POTASSIUM 3.7   CHLORIDE 98   CO2 20   GLUCOSE 115*   BUN 29*   CREATININE 0.99   CALCIUM 8.9                     Imaging  US-EXTREMITY VENOUS UPPER UNILAT LEFT   Final Result      EC-ECHOCARDIOGRAM COMPLETE W/O CONT   Final Result      OUTSIDE IMAGES-CT CHEST   Final Result             Assessment/Plan  * Atrial fibrillation with rapid ventricular response (HCC)- (present on admission)  Assessment & Plan  New onset atrial fibrillation, patient does not follow with physicians and is not on any medications  Was in A. fib with RVR outside facility initially improved with diltiazem but she became hypotensive so she was started on amiodarone drip  Remains in sinus rhythm  IAT3XO2-ZBHs of 3 for age and female sex. Discussed anticoagulation and a fib stroke risk , she is agreeable to starting AC, started on eliquis.  Echocardiogram normal, EF 65%.  Elevated T4 secondary to sick euthyroid syndrome    Thrombophlebitis  Assessment & Plan  Secondary to an infiltrative right IV  IV Unasyn    Acute respiratory failure with hypoxia (HCC)  Assessment & Plan  Due to COVID infection  Start decadron  On 2L oxygen, continue to wean as able    Metabolic acidosis, normal anion gap (NAG)- (present on admission)  Assessment & Plan  Bicarb 19 on presentation likely due to dehydration.  IV fluids    Adult failure to thrive- (present on admission)  Assessment & Plan  Patient lives  alone, fell down and was unable to get up for couple days.  Per report from outside hospital her home was found to be in an unlivable condition  SNF pending   She is COVID-positive    Non-traumatic rhabdomyolysis- (present on admission)  Assessment & Plan  Slipped on the ground and was unable to get up for couple days, CPK at outside facility 1500, trended down to 800 here  No SO, mild LFT elevation.  Can stop IV fluids    Elevated LFTs- (present on admission)  Assessment & Plan  On presentation AST 77, ALT 58.  Likely secondary to mild rhabdomyolysis.   improved    Pneumonia due to COVID-19 virus- (present on admission)  Assessment & Plan  Patient positive for COVID, denies any shortness of breath or cough  On 2L oxygen  Continue decadron  Possible barrier to SNF, needs to be COVID cleared per SNF policy, CM following    Hypokalemia- (present on admission)  Assessment & Plan  3.2 on presentation  Improved  Monitor as needed         VTE prophylaxis: therapeutic anticoagulation with eliquis    I have performed a physical exam and reviewed and updated ROS and Plan today (12/1/2022). In review of yesterday's note (11/30/2022), there are no changes except as documented above.

## 2022-12-01 NOTE — PROGRESS NOTES
Pt resting in bed. Call bell within reach. Pt reports no pain. On 2L NC. Bed locked and low. Personal items within reach. Pt medical so no tele monitoring.

## 2022-12-01 NOTE — CARE PLAN
The patient is Stable - Low risk of patient condition declining or worsening    Shift Goals  Clinical Goals: Monitor and wean o2  Patient Goals: Rest, comfort  Family Goals: RICARDO    Progress made toward(s) clinical / shift goals:    Problem: Knowledge Deficit - Standard  Goal: Patient and family/care givers will demonstrate understanding of plan of care, disease process/condition, diagnostic tests and medications  Outcome: Progressing     Problem: Fall Risk  Goal: Patient will remain free from falls  Outcome: Progressing     Problem: Communication  Goal: The ability to communicate needs accurately and effectively will improve  Outcome: Progressing       Patient is not progressing towards the following goals:

## 2022-12-02 ENCOUNTER — APPOINTMENT (OUTPATIENT)
Dept: RADIOLOGY | Facility: MEDICAL CENTER | Age: 77
DRG: 308 | End: 2022-12-02
Attending: HOSPITALIST
Payer: MEDICARE

## 2022-12-02 PROBLEM — G93.40 ENCEPHALOPATHY: Status: ACTIVE | Noted: 2022-12-02

## 2022-12-02 PROBLEM — A41.9 SEPSIS (HCC): Status: ACTIVE | Noted: 2022-12-02

## 2022-12-02 LAB
ALBUMIN SERPL BCP-MCNC: 3.2 G/DL (ref 3.2–4.9)
ALBUMIN/GLOB SERPL: 0.9 G/DL
ALP SERPL-CCNC: 73 U/L (ref 30–99)
ALT SERPL-CCNC: 26 U/L (ref 2–50)
ANION GAP SERPL CALC-SCNC: 21 MMOL/L (ref 7–16)
AST SERPL-CCNC: 30 U/L (ref 12–45)
BASE EXCESS BLDA CALC-SCNC: -2 MMOL/L (ref -4–3)
BASOPHILS # BLD AUTO: 0.3 % (ref 0–1.8)
BASOPHILS # BLD: 0.05 K/UL (ref 0–0.12)
BILIRUB SERPL-MCNC: 0.5 MG/DL (ref 0.1–1.5)
BODY TEMPERATURE: 36.4 CENTIGRADE
BUN SERPL-MCNC: 31 MG/DL (ref 8–22)
CALCIUM SERPL-MCNC: 8.9 MG/DL (ref 8.5–10.5)
CHLORIDE SERPL-SCNC: 102 MMOL/L (ref 96–112)
CO2 SERPL-SCNC: 17 MMOL/L (ref 20–33)
CREAT SERPL-MCNC: 1.08 MG/DL (ref 0.5–1.4)
CRP SERPL HS-MCNC: 25.02 MG/DL (ref 0–0.75)
EKG IMPRESSION: NORMAL
EOSINOPHIL # BLD AUTO: 0.03 K/UL (ref 0–0.51)
EOSINOPHIL NFR BLD: 0.2 % (ref 0–6.9)
ERYTHROCYTE [DISTWIDTH] IN BLOOD BY AUTOMATED COUNT: 46.8 FL (ref 35.9–50)
GFR SERPLBLD CREATININE-BSD FMLA CKD-EPI: 53 ML/MIN/1.73 M 2
GLOBULIN SER CALC-MCNC: 3.4 G/DL (ref 1.9–3.5)
GLUCOSE BLD STRIP.AUTO-MCNC: 164 MG/DL (ref 65–99)
GLUCOSE BLD STRIP.AUTO-MCNC: 169 MG/DL (ref 65–99)
GLUCOSE SERPL-MCNC: 138 MG/DL (ref 65–99)
HCO3 BLDA-SCNC: 21 MMOL/L (ref 17–25)
HCT VFR BLD AUTO: 34.9 % (ref 37–47)
HGB BLD-MCNC: 11.6 G/DL (ref 12–16)
IMM GRANULOCYTES # BLD AUTO: 0.23 K/UL (ref 0–0.11)
IMM GRANULOCYTES NFR BLD AUTO: 1.5 % (ref 0–0.9)
LACTATE SERPL-SCNC: 1 MMOL/L (ref 0.5–2)
LACTATE SERPL-SCNC: 1.3 MMOL/L (ref 0.5–2)
LACTATE SERPL-SCNC: 1.8 MMOL/L (ref 0.5–2)
LACTATE SERPL-SCNC: 1.9 MMOL/L (ref 0.5–2)
LACTATE SERPL-SCNC: 2 MMOL/L (ref 0.5–2)
LYMPHOCYTES # BLD AUTO: 0.51 K/UL (ref 1–4.8)
LYMPHOCYTES NFR BLD: 3.2 % (ref 22–41)
MCH RBC QN AUTO: 30.4 PG (ref 27–33)
MCHC RBC AUTO-ENTMCNC: 33.2 G/DL (ref 33.6–35)
MCV RBC AUTO: 91.4 FL (ref 81.4–97.8)
MONOCYTES # BLD AUTO: 1.09 K/UL (ref 0–0.85)
MONOCYTES NFR BLD AUTO: 6.9 % (ref 0–13.4)
NEUTROPHILS # BLD AUTO: 13.89 K/UL (ref 2–7.15)
NEUTROPHILS NFR BLD: 87.9 % (ref 44–72)
NRBC # BLD AUTO: 0 K/UL
NRBC BLD-RTO: 0 /100 WBC
PCO2 BLDA: 29.5 MMHG (ref 26–37)
PCO2 TEMP ADJ BLDA: 28.7 MMHG (ref 26–37)
PH BLDA: 7.47 [PH] (ref 7.4–7.5)
PH TEMP ADJ BLDA: 7.48 [PH] (ref 7.4–7.5)
PLATELET # BLD AUTO: 149 K/UL (ref 164–446)
PMV BLD AUTO: 9.4 FL (ref 9–12.9)
PO2 BLDA: 106.1 MMHG (ref 64–87)
PO2 TEMP ADJ BLDA: 102.5 MMHG (ref 64–87)
POTASSIUM SERPL-SCNC: 4.8 MMOL/L (ref 3.6–5.5)
PROCALCITONIN SERPL-MCNC: 20.9 NG/ML
PROT SERPL-MCNC: 6.6 G/DL (ref 6–8.2)
RBC # BLD AUTO: 3.82 M/UL (ref 4.2–5.4)
SAO2 % BLDA: 97.3 % (ref 93–99)
SCCMEC + MECA PNL NOSE NAA+PROBE: NEGATIVE
SODIUM SERPL-SCNC: 140 MMOL/L (ref 135–145)
WBC # BLD AUTO: 15.8 K/UL (ref 4.8–10.8)

## 2022-12-02 PROCEDURE — 93010 ELECTROCARDIOGRAM REPORT: CPT | Performed by: INTERNAL MEDICINE

## 2022-12-02 PROCEDURE — 86140 C-REACTIVE PROTEIN: CPT

## 2022-12-02 PROCEDURE — 80053 COMPREHEN METABOLIC PANEL: CPT

## 2022-12-02 PROCEDURE — 770001 HCHG ROOM/CARE - MED/SURG/GYN PRIV*

## 2022-12-02 PROCEDURE — A9270 NON-COVERED ITEM OR SERVICE: HCPCS | Performed by: STUDENT IN AN ORGANIZED HEALTH CARE EDUCATION/TRAINING PROGRAM

## 2022-12-02 PROCEDURE — 87147 CULTURE TYPE IMMUNOLOGIC: CPT

## 2022-12-02 PROCEDURE — 87040 BLOOD CULTURE FOR BACTERIA: CPT

## 2022-12-02 PROCEDURE — 82962 GLUCOSE BLOOD TEST: CPT | Mod: 91

## 2022-12-02 PROCEDURE — 36415 COLL VENOUS BLD VENIPUNCTURE: CPT

## 2022-12-02 PROCEDURE — 87150 DNA/RNA AMPLIFIED PROBE: CPT

## 2022-12-02 PROCEDURE — 99233 SBSQ HOSP IP/OBS HIGH 50: CPT | Performed by: HOSPITALIST

## 2022-12-02 PROCEDURE — 700105 HCHG RX REV CODE 258: Performed by: HOSPITALIST

## 2022-12-02 PROCEDURE — 87641 MR-STAPH DNA AMP PROBE: CPT

## 2022-12-02 PROCEDURE — 84145 PROCALCITONIN (PCT): CPT

## 2022-12-02 PROCEDURE — 82803 BLOOD GASES ANY COMBINATION: CPT

## 2022-12-02 PROCEDURE — 87186 SC STD MICRODIL/AGAR DIL: CPT

## 2022-12-02 PROCEDURE — 87077 CULTURE AEROBIC IDENTIFY: CPT

## 2022-12-02 PROCEDURE — 85025 COMPLETE CBC W/AUTO DIFF WBC: CPT

## 2022-12-02 PROCEDURE — 700102 HCHG RX REV CODE 250 W/ 637 OVERRIDE(OP): Performed by: STUDENT IN AN ORGANIZED HEALTH CARE EDUCATION/TRAINING PROGRAM

## 2022-12-02 PROCEDURE — 700111 HCHG RX REV CODE 636 W/ 250 OVERRIDE (IP): Performed by: HOSPITALIST

## 2022-12-02 PROCEDURE — 83605 ASSAY OF LACTIC ACID: CPT | Mod: 91

## 2022-12-02 PROCEDURE — 93005 ELECTROCARDIOGRAM TRACING: CPT | Performed by: HOSPITALIST

## 2022-12-02 PROCEDURE — 71045 X-RAY EXAM CHEST 1 VIEW: CPT

## 2022-12-02 RX ORDER — SODIUM CHLORIDE, SODIUM LACTATE, POTASSIUM CHLORIDE, AND CALCIUM CHLORIDE .6; .31; .03; .02 G/100ML; G/100ML; G/100ML; G/100ML
500 INJECTION, SOLUTION INTRAVENOUS
Status: DISCONTINUED | OUTPATIENT
Start: 2022-12-02 | End: 2022-12-23 | Stop reason: HOSPADM

## 2022-12-02 RX ADMIN — VANCOMYCIN HYDROCHLORIDE 2000 MG: 500 INJECTION, POWDER, LYOPHILIZED, FOR SOLUTION INTRAVENOUS at 12:25

## 2022-12-02 RX ADMIN — APIXABAN 5 MG: 5 TABLET, FILM COATED ORAL at 18:29

## 2022-12-02 RX ADMIN — APIXABAN 5 MG: 5 TABLET, FILM COATED ORAL at 04:55

## 2022-12-02 RX ADMIN — GUAIFENESIN SYRUP AND DEXTROMETHORPHAN 5 ML: 100; 10 SYRUP ORAL at 20:22

## 2022-12-02 RX ADMIN — GUAIFENESIN SYRUP AND DEXTROMETHORPHAN 5 ML: 100; 10 SYRUP ORAL at 15:19

## 2022-12-02 RX ADMIN — AMPICILLIN SODIUM AND SULBACTAM SODIUM 3 G: 2; 1 INJECTION, POWDER, FOR SOLUTION INTRAMUSCULAR; INTRAVENOUS at 09:05

## 2022-12-02 RX ADMIN — AMPICILLIN SODIUM AND SULBACTAM SODIUM 3 G: 2; 1 INJECTION, POWDER, FOR SOLUTION INTRAMUSCULAR; INTRAVENOUS at 03:54

## 2022-12-02 RX ADMIN — GUAIFENESIN SYRUP AND DEXTROMETHORPHAN 5 ML: 100; 10 SYRUP ORAL at 09:04

## 2022-12-02 RX ADMIN — AMPICILLIN SODIUM AND SULBACTAM SODIUM 3 G: 2; 1 INJECTION, POWDER, FOR SOLUTION INTRAMUSCULAR; INTRAVENOUS at 15:21

## 2022-12-02 RX ADMIN — DOCUSATE SODIUM 50 MG AND SENNOSIDES 8.6 MG 2 TABLET: 8.6; 5 TABLET, FILM COATED ORAL at 18:29

## 2022-12-02 RX ADMIN — AMPICILLIN SODIUM AND SULBACTAM SODIUM 3 G: 2; 1 INJECTION, POWDER, FOR SOLUTION INTRAMUSCULAR; INTRAVENOUS at 20:27

## 2022-12-02 RX ADMIN — DEXAMETHASONE 6 MG: 4 TABLET ORAL at 04:55

## 2022-12-02 ASSESSMENT — LIFESTYLE VARIABLES
SUBSTANCE_ABUSE: 0
EVER_SMOKED: YES

## 2022-12-02 ASSESSMENT — COPD QUESTIONNAIRES
DO YOU EVER COUGH UP ANY MUCUS OR PHLEGM?: NO/ONLY WITH OCCASIONAL COLDS OR INFECTIONS
COPD SCREENING SCORE: 3
DURING THE PAST 4 WEEKS HOW MUCH DID YOU FEEL SHORT OF BREATH: SOME OF THE TIME
HAVE YOU SMOKED AT LEAST 100 CIGARETTES IN YOUR ENTIRE LIFE: NO/DON'T KNOW

## 2022-12-02 ASSESSMENT — ENCOUNTER SYMPTOMS
WEAKNESS: 1
SENSORY CHANGE: 0
VOMITING: 0
PALPITATIONS: 0
FALLS: 1
BACK PAIN: 0
ABDOMINAL PAIN: 0
SHORTNESS OF BREATH: 0
COUGH: 0
CHILLS: 0
EYE PAIN: 0
LOSS OF CONSCIOUSNESS: 0
NAUSEA: 0
INSOMNIA: 0
FOCAL WEAKNESS: 0
DIZZINESS: 0
BLURRED VISION: 0
FEVER: 0
HEADACHES: 0

## 2022-12-02 ASSESSMENT — FIBROSIS 4 INDEX: FIB4 SCORE: 3

## 2022-12-02 ASSESSMENT — PAIN DESCRIPTION - PAIN TYPE: TYPE: ACUTE PAIN

## 2022-12-02 NOTE — PROGRESS NOTES
"Pharmacy Vancomycin Kinetics Note for 12/2/2022     77 y.o. female on Vancomycin day # 1    Vancomycin Indication (AUC Dosing): Skin/skin structure infection    Provider specified end date: 12/09/22    Active Antibiotics (From admission, onward)      Ordered     Ordering Provider       Fri Dec 2, 2022 11:45 AM    12/02/22 1145  vancomycin (VANCOCIN) 2,000 mg in  mL IVPB  (vancomycin (VANCOCIN) IV (LD + Maintenance))  ONCE        Note to Pharmacy: Dose per Pharmacokinetic Protocol    Linwood Silva M.D.       Fri Dec 2, 2022  9:42 AM    12/02/22 0942  MD Alert...Vancomycin per Pharmacy  PHARMACY TO DOSE        Question:  Indication(s) for vancomycin?  Answer:  Skin and soft tissue infection    Linwood Silva M.D.       Thu Dec 1, 2022  8:41 PM    12/01/22 2041  ampicillin/sulbactam (UNASYN) 3 g in  mL IVPB  EVERY 6 HOURS         Linwood Silva M.D.          Dosing Weight: 80.1 kg (176 lb 9.4 oz)    Admission History: Admitted on 11/26/2022 for Atrial fibrillation with rapid ventricular response (HCC) [I48.91]  Pertinent history: Unasyn started 12/1 for thrombophlebitis from pIV infilitrating w/ subsequent fever. Bcx's ordered & abx's escalated to vanc/unasyn for MRSA coverage given worsening site of potential infection, PCT 20 & worsening leukocytosis (attributed to steroids vs bacterial infection?).    Allergies:     Patient has no known allergies.     Pertinent cultures to date:     Results       Procedure Component Value Units Date/Time    MRSA By PCR (Amp) [[id number]]     Order Status: No result Specimen: Respirate from Nares     BLOOD CULTURE [[id number]] Collected: 12/02/22 0514    Order Status: Sent Specimen: Blood from Peripheral Updated: 12/02/22 0637    Narrative:      Enhanced Droplet, Contact, and Eye Protection  Per Hospital Policy: Only change Specimen Src: to \"Line\" if  specified by physician order.    BLOOD CULTURE [[id number]] Collected: 12/02/22 0514    Order Status: Sent Specimen: Blood from " "Peripheral Updated: 22 0636    Narrative:      Enhanced Droplet, Contact, and Eye Protection  Per Hospital Policy: Only change Specimen Src: to \"Line\" if  specified by physician order.    URINALYSIS [[id number]]     Order Status: No result Specimen: Urine, Clean Catch     URINALYSIS [[id number]]  (Abnormal) Collected: 22 033    Order Status: Completed Specimen: Urine, Clean Catch Updated: 22     Color Yellow     Character Clear     Specific Gravity >=1.045     Ph 5.5     Glucose Negative mg/dL      Ketones 40 mg/dL      Protein Negative mg/dL      Bilirubin Negative     Urobilinogen, Urine 1.0     Nitrite Negative     Leukocyte Esterase Negative     Occult Blood Small     Micro Urine Req Microscopic    Narrative:      Enhanced Droplet, Contact, and Eye Protection            Labs:     Estimated Creatinine Clearance: 43.4 mL/min (by C-G formula based on SCr of 1.08 mg/dL).  Recent Labs     22  0142   WBC 15.8*   NEUTSPOLYS 87.90*     Recent Labs     220 22  0514   BUN 29* 31*   CREATININE 0.99 1.08   ALBUMIN 3.0* 3.2       Intake/Output Summary (Last 24 hours) at 2022 1157  Last data filed at 2022 1000  Gross per 24 hour   Intake 530.03 ml   Output 700 ml   Net -169.97 ml      /68   Pulse 93   Temp 36.1 °C (97 °F)   Resp 14   Ht 1.575 m (5' 2\")   Wt 80.1 kg (176 lb 9.4 oz)   SpO2 99%  Temp (24hrs), Av.8 °C (98.2 °F), Min:36.1 °C (97 °F), Max:37.5 °C (99.5 °F)      List concerns for Vancomycin clearance:     Age;Obesity;BUN/Scr ratio greater than 20:1    Pharmacokinetics:     AUC kinetics:   Ke (hr ^-1): 0.0404 hr^-1  Half life: 17.16 hr  Clearance: 2.629  Estimated TDD: 1314.5  Estimated Dose: 1052  Estimated interval: 19.2    A/P:     -  Vancomycin dose: 2g loading dose x1 (~25 mg/kg), followed by 1250mg q24h for maintenance dosing    -  Next vancomycin level(s): steady state, after 4th/5th maintenance dose    -  Predicted vancomycin AUC from " initial AUC test calculator: 475 mg·hr/L    -  Comments: Refer to pertinent history above for additional details. Numerous concerns for accumulation noted. MRSA nares ordered, consider de-escalating if PCR results are negative.      Santana Emerson, EmeliD

## 2022-12-02 NOTE — DISCHARGE INSTRUCTIONS
Atrial Fibrillation    Atrial fibrillation is a type of heartbeat that is irregular or fast (rapid). If you have this condition, your heart beats without any order. This makes it hard for your heart to pump blood in a normal way. Having this condition gives you more risk for stroke, heart failure, and other heart problems.  Atrial fibrillation may start all of a sudden and then stop on its own, or it may become a long-lasting problem.  What are the causes?  This condition may be caused by heart conditions, such as:  High blood pressure.  Heart failure.  Heart valve disease.  Heart surgery.  Other causes include:  Pneumonia.  Obstructive sleep apnea.  Lung cancer.  Thyroid disease.  Drinking too much alcohol.  Sometimes the cause is not known.  What increases the risk?  You are more likely to develop this condition if:  You smoke.  You are older.  You have diabetes.  You are overweight.  You have a family history of this condition.  You exercise often and hard.  What are the signs or symptoms?  Common symptoms of this condition include:  A feeling like your heart is beating very fast.  Chest pain.  Feeling short of breath.  Feeling light-headed or weak.  Getting tired easily.  Follow these instructions at home:  Medicines  Take over-the-counter and prescription medicines only as told by your doctor.  If your doctor gives you a blood-thinning medicine, take it exactly as told. Taking too much of it can cause bleeding. Taking too little of it does not protect you against clots. Clots can cause a stroke.  Lifestyle         Do not use any tobacco products. These include cigarettes, chewing tobacco, and e-cigarettes. If you need help quitting, ask your doctor.  Do not drink alcohol.  Do not drink beverages that have caffeine. These include coffee, soda, and tea.  Follow diet instructions as told by your doctor.  Exercise regularly as told by your doctor.  General instructions  If you have a condition that causes breathing  "to stop for a short period of time (apnea), treat it as told by your doctor.  Keep a healthy weight. Do not use diet pills unless your doctor says they are safe for you. Diet pills may make heart problems worse.  Keep all follow-up visits as told by your doctor. This is important.  Contact a doctor if:  You notice a change in the speed, rhythm, or strength of your heartbeat.  You are taking a blood-thinning medicine and you see more bruising.  You get tired more easily when you move or exercise.  You have a sudden change in weight.  Get help right away if:    You have pain in your chest or your belly (abdomen).  You have trouble breathing.  You have blood in your vomit, poop, or pee (urine).  You have any signs of a stroke. \"BE FAST\" is an easy way to remember the main warning signs:  B - Balance. Signs are dizziness, sudden trouble walking, or loss of balance.  E - Eyes. Signs are trouble seeing or a change in how you see.  F - Face. Signs are sudden weakness or loss of feeling in the face, or the face or eyelid drooping on one side.  A - Arms. Signs are weakness or loss of feeling in an arm. This happens suddenly and usually on one side of the body.  S - Speech. Signs are sudden trouble speaking, slurred speech, or trouble understanding what people say.  T - Time. Time to call emergency services. Write down what time symptoms started.  You have other signs of a stroke, such as:  A sudden, very bad headache with no known cause.  Feeling sick to your stomach (nausea).  Throwing up (vomiting).  Jerky movements you cannot control (seizure).  These symptoms may be an emergency. Do not wait to see if the symptoms will go away. Get medical help right away. Call your local emergency services (911 in the [place] Do not drive yourself to the hospital.  Summary  Atrial fibrillation is a type of heartbeat that is irregular or fast (rapid).  You are at higher risk of this condition if you smoke, are older, have diabetes, or are " overweight.  Follow your doctor's instructions about medicines, diet, exercise, and follow-up visits.  Get help right away if you think that you have signs of a stroke.  This information is not intended to replace advice given to you by your health care provider. Make sure you discuss any questions you have with your health care provider.  Document Released: 09/26/2009 Document Revised: 02/21/2019 Document Reviewed: 02/08/2019  Elsevier Patient Education © 2020 [employer]

## 2022-12-02 NOTE — THERAPY
"Occupational Therapy  Daily Treatment     Patient Name: Paulina Castellanos  Age:  77 y.o., Sex:  female  Medical Record #: [id number]  Today's Date: 12/1/2022     Precautions: Fall Risk  Comments: COVID+    Assessment    Pt seen for OT tx, agreeable to participate in ADLs. Pt answered orientation questions correctly but affect seems somewhat odd with delayed responses, pt moaning with each breath at rest but unable to identify her pain specifically. Pt participated in seated grooming but required assist for thorough hair brushing d/t fatigue and LUE pain from thrombophlebitis to L biceps area. Pt declined OOB activity d/t generalized discomfort and malaise. Pt will require post-acute placement for additional therapy prior to DC home.     Plan    Continue current treatment plan.    DC Equipment Recommendations: Unable to determine at this time  Discharge Recommendations: Recommend post-acute placement for additional occupational therapy services prior to discharge home    Subjective    \"My stomach, I guess.\" (When asked to localize her pain)     Objective       12/01/22 1602   Precautions   Precautions Fall Risk   Comments COVID+   Pain 0 - 10 Group   Therapist Pain Assessment Prior to Activity;During Activity;Post Activity Pain Same as Prior to Activity;Nurse Notified  (moaning in pain, unable to specify or localize)   Cognition    Level of Consciousness Alert   Safety Awareness Impaired   New Learning Impaired   Sequencing Impaired   Comments pt moaning, unable to localize pain, moaning seems habitual/not related to specific discomfort   Strength Upper Body   Gross Strength Generalized Weakness, Equal Bilaterally.    Sitting Upper Body Exercises   Sitting Upper Body Exercises Yes   Front Arm Raise Bilateral   Bicep Curls Bilateral   Elbow Extension Bilateral   Comments against gravity, cued for AROM exercise, pt with redness and warmth to L biceps area, RN and MD aware   Balance   Sitting Balance (Static) Fair -   Sitting " Balance (Dynamic) Poor +   Standing Balance (Static) Poor   Standing Balance (Dynamic) Poor -   Weight Shift Sitting Fair   Weight Shift Standing Poor   Skilled Intervention Verbal Cuing;Compensatory Strategies   Comments transfer only   Activities of Daily Living   Eating Minimal Assist   Grooming Moderate Assist;Seated   Upper Body Dressing Moderate Assist   Lower Body Dressing Maximal Assist   Toileting Maximal Assist   Skilled Intervention Sequencing;Tactile Cuing;Verbal Cuing   How much help from another person does the patient currently need...   Putting on and taking off regular lower body clothing? 2   Bathing (including washing, rinsing, and drying)? 2   Toileting, which includes using a toilet, bedpan, or urinal? 2   Putting on and taking off regular upper body clothing? 2   Taking care of personal grooming such as brushing teeth? 2   Eating meals? 3   6 Clicks Daily Activity Score 13   Activity Tolerance   Comments moaning, generalized discomfort, low tolerance for activity   Patient / Family Goals   Patient / Family Goal #1 to go home   Short Term Goals   Short Term Goal # 1 pt will complete grooming seated w/set up   Goal Outcome # 1 Progressing slower than expected   Short Term Goal # 2 pt will complete txf to BSC w/min A   Goal Outcome # 2 Goal not met   Short Term Goal # 3 pt will complete UB dressing w/set u p   Goal Outcome # 3 Progressing slower than expected   Education Group   Education Provided Role of Occupational Therapist;Activities of Daily Living   Role of Occupational Therapist Patient Response Patient;Acceptance;Explanation;Verbal Demonstration;Action Demonstration   ADL Patient Response Patient;Acceptance;Explanation;Verbal Demonstration;Action Demonstration   Anticipated Discharge Equipment and Recommendations   DC Equipment Recommendations Unable to determine at this time   Discharge Recommendations Recommend post-acute placement for additional occupational therapy services prior to  discharge home

## 2022-12-02 NOTE — RESPIRATORY CARE
Pt is on 4L NC SpO2 100%. No respiratory distress noted. Pt unable to answer respiratory consult questions at this time.

## 2022-12-02 NOTE — PROGRESS NOTES
Hospital Medicine Daily Progress Note    Date of Service  12/2/2022    Chief Complaint  Paulina Castellanos is a 77 y.o. female admitted 11/26/2022 with found down unable to get up.    Hospital Course  Paulina Castellanos is a 77 y.o. female who was transferred from outside facility 11/26/2022 with atrial fibrillation with RVR on amiodarone drip, mild rhabdo.  Patient slid off her bed at home and was unable to get up for multiple days until found by friend. She was treated with IV fluids and amiodarone at OSH. On presentation she is in sinus rhythm. She is COVID positive with acute hypoxic respiratory failure requiring nasal cannula oxygen. She is started on decadron. She requires additional PT/OT at SNF. She is medically cleared to discharge to SNF when available, may have to wait 10 days for COVID clearance per SNF policy.    Interval Problem Update  11/29: Patient's cardiac echo did not find significant abnormalities.  Check CRP, D-dimer, procalcitonin.  Waiting on SNF placement.  Elevated T4 is secondary to sick euthyroid syndrome.    11/30: Patient had a venous Doppler of the left upper extremity that was negative.  Cardiac echo did not reveal any abnormalities.  CRP is 2.78 and will continue with Decadron.  We will continue to wean her off oxygen.  We will have a reevaluation by PT OT.  SNF placement is pending.    12/1: Patient was found to have thrombophlebitis on the right arm.  I have started IV Unasyn.    12/2: Patient was found to have worsening WBC count.  We will continue with IV hydration.  Procalcitonin is elevated, CRP is elevated.  I will start IV vancomycin.  She was found to be lethargic on examination likely secondary to infectious encephalopathy.  ABG did not show any evidence of hypercapnia or hypoxia.  Blood glucose was within normal limits.  Chest x-ray found mild bilateral infiltrates.  Blood cultures are still pending.    I have discussed this patient's plan of care and discharge plan at IDT rounds today  with Case Management, Nursing, Nursing leadership, and other members of the IDT team.    Consultants/Specialty  none    Code Status  Full Code    Disposition  Patient is medically cleared for discharge.   Anticipate discharge to to skilled nursing facility.  I have placed the appropriate orders for post-discharge needs.    Review of Systems  Review of Systems   Constitutional:  Negative for chills and fever.   Eyes:  Negative for blurred vision and pain.   Respiratory:  Negative for cough and shortness of breath.    Cardiovascular:  Negative for chest pain, palpitations and leg swelling.   Gastrointestinal:  Negative for abdominal pain, nausea and vomiting.   Genitourinary:  Negative for dysuria and urgency.   Musculoskeletal:  Positive for falls. Negative for back pain.   Skin:  Negative for itching and rash.   Neurological:  Positive for weakness. Negative for dizziness, sensory change, focal weakness, loss of consciousness and headaches.   Psychiatric/Behavioral:  Negative for substance abuse. The patient does not have insomnia.       Physical Exam  Temp:  [36.1 °C (97 °F)-37.5 °C (99.5 °F)] 36.1 °C (97 °F)  Pulse:  [] 93  Resp:  [14-23] 14  BP: (102-135)/(61-73) 102/65  SpO2:  [93 %-100 %] 100 %    Physical Exam  Vitals reviewed.   Constitutional:       General: She is not in acute distress.     Appearance: She is not diaphoretic.   HENT:      Head: Normocephalic and atraumatic.      Right Ear: External ear normal.      Left Ear: External ear normal.      Nose: Nose normal. No congestion.      Mouth/Throat:      Pharynx: No oropharyngeal exudate or posterior oropharyngeal erythema.   Eyes:      Extraocular Movements: Extraocular movements intact.      Pupils: Pupils are equal, round, and reactive to light.   Cardiovascular:      Rate and Rhythm: Normal rate and regular rhythm.   Pulmonary:      Effort: Pulmonary effort is normal. No respiratory distress.      Breath sounds: Normal breath sounds. No  wheezing or rales.   Abdominal:      General: Bowel sounds are normal. There is no distension.      Palpations: Abdomen is soft.      Tenderness: There is no abdominal tenderness. There is no guarding.   Musculoskeletal:         General: No swelling. Normal range of motion.      Cervical back: Normal range of motion and neck supple.      Right lower leg: No edema.      Left lower leg: No edema.   Skin:     General: Skin is warm and dry.   Neurological:      General: No focal deficit present.      Mental Status: She is alert and oriented to person, place, and time.      Cranial Nerves: No cranial nerve deficit.      Sensory: No sensory deficit.      Motor: No weakness.   Psychiatric:         Mood and Affect: Mood normal.         Behavior: Behavior normal.       Fluids    Intake/Output Summary (Last 24 hours) at 12/2/2022 1355  Last data filed at 12/2/2022 1000  Gross per 24 hour   Intake 290.03 ml   Output 700 ml   Net -409.97 ml       Laboratory  Recent Labs     12/02/22  0142   WBC 15.8*   RBC 3.82*   HEMOGLOBIN 11.6*   HEMATOCRIT 34.9*   MCV 91.4   MCH 30.4   MCHC 33.2*   RDW 46.8   PLATELETCT 149*   MPV 9.4       Recent Labs     12/01/22  0330 12/02/22  0514   SODIUM 132* 140   POTASSIUM 3.7 4.8   CHLORIDE 98 102   CO2 20 17*   GLUCOSE 115* 138*   BUN 29* 31*   CREATININE 0.99 1.08   CALCIUM 8.9 8.9                     Imaging  DX-CHEST-PORTABLE (1 VIEW)   Final Result      1.  Curvilinear opacities in the lung bases likely representing atelectasis, less likely pneumonitis.      US-EXTREMITY VENOUS UPPER UNILAT LEFT   Final Result      EC-ECHOCARDIOGRAM COMPLETE W/O CONT   Final Result      OUTSIDE IMAGES-CT CHEST   Final Result             Assessment/Plan  * Atrial fibrillation with rapid ventricular response (HCC)- (present on admission)  Assessment & Plan  New onset atrial fibrillation, patient does not follow with physicians and is not on any medications  Was in A. fib with RVR outside facility initially  improved with diltiazem but she became hypotensive so she was started on amiodarone drip  Remains in sinus rhythm  [id number] of 3 for age and female sex. Discussed anticoagulation and a fib stroke risk , she is agreeable to starting AC, started on eliquis.  Echocardiogram normal, EF 65%.  Elevated T4 secondary to sick euthyroid syndrome    Encephalopathy  Assessment & Plan  secondary to infectious etiology  ABG did not show any evidence of hypercapnia or hypoxia    Sepsis (HCC)  Assessment & Plan  This is Sepsis Not present on admission  SIRS criteria identified on my evaluation include: Fever, with temperature greater than 101 deg F, Tachycardia, with heart rate greater than 90 BPM and Leukocytosis, with WBC greater than 12,000  Source is thrombophlebitis  Sepsis protocol initiated  Fluid resuscitation ordered per protocol  Crystalloid Fluid Administration: Patient has advanced or end-stage heart failure (with documentation of NYHA class III or symptoms with minimal exertion, Or NYHA class IV or symptoms at rest or with activity), for this/these reason(s) it is unsafe for this patient to receive 30 mL/kg of fluid  Partial Fluid Dose Given: 10 mL/kg per current or ideal body weight, patient to be reassessed shortly after completion of partial fluid bolus to ensure adequacy of resuscitation  IV antibiotics as appropriate for source of sepsis  Reassessment: I have reassessed the patient's hemodynamic status          Thrombophlebitis  Assessment & Plan  Secondary to an infiltrative right IV  IV Unasyn    Acute respiratory failure with hypoxia (HCC)  Assessment & Plan  Due to COVID infection  Start decadron  On 2L oxygen, continue to wean as able    Metabolic acidosis, normal anion gap (NAG)- (present on admission)  Assessment & Plan  Bicarb 19 on presentation likely due to dehydration.  IV fluids    Adult failure to thrive- (present on admission)  Assessment & Plan  Patient lives alone, fell down and was unable to  get up for couple days.  Per report from outside hospital her home was found to be in an unlivable condition  SNF pending   She is COVID-positive    Non-traumatic rhabdomyolysis- (present on admission)  Assessment & Plan  Slipped on the ground and was unable to get up for couple days, CPK at outside facility 1500, trended down to 800 here  No SO, mild LFT elevation.  Can stop IV fluids    Elevated LFTs- (present on admission)  Assessment & Plan  On presentation AST 77, ALT 58.  Likely secondary to mild rhabdomyolysis.   improved    Pneumonia due to COVID-19 virus- (present on admission)  Assessment & Plan  Patient positive for COVID, denies any shortness of breath or cough  On 2L oxygen  Continue decadron  Possible barrier to SNF, needs to be COVID cleared per SNF policy, CM following    Hypokalemia- (present on admission)  Assessment & Plan  3.2 on presentation  Improved  Monitor as needed       VTE prophylaxis: therapeutic anticoagulation with eliquis    I have performed a physical exam and reviewed and updated ROS and Plan today (12/2/2022). In review of yesterday's note (12/1/2022), there are no changes except as documented above.

## 2022-12-02 NOTE — PROGRESS NOTES
Patient difficult to arouse at 1000. MD Silva notified. FSBG taken and normal. Vitals all WNL. ABG and lactate ordered and drawn. Results WNL. Patient still somnolent and difficulty maintaining conversation. Patient following commands.. RN still concerned over patient status, RRT called. 12 lead and CXR done. All WNL. Over the course of the rapid response, patient's mentation improved and was able to hold a conversation with nurses. MD Silva notified.

## 2022-12-02 NOTE — CODE DOCUMENTATION
Pt minimally responsive, previously conversational yesterday. VSS. MD aware, labs and xray ordered. Pt grimacing on palpation of RUQ of abdomen.

## 2022-12-02 NOTE — CARE PLAN
The patient is Watcher - Medium risk of patient condition declining or worsening    Shift Goals  Clinical Goals: Monitor o2, safety  Patient Goals: Comfort  Family Goals: RICARDO    Progress made toward(s) clinical / shift goals:    Problem: Skin Integrity  Goal: Skin integrity is maintained or improved  Outcome: Progressing     Problem: Fall Risk  Goal: Patient will remain free from falls  Outcome: Progressing       Patient is not progressing towards the following goals:

## 2022-12-02 NOTE — PROGRESS NOTES
Report received from Nataliia Hines. Assumed pt care. Pt sitting up in bed. Pt A&O x 3. Fall precautions in place, call light and belongings within reach, bed in lowest position. No signs of distress.

## 2022-12-02 NOTE — PROGRESS NOTES
Rapid Response Summary     Rapid response called at 1143 for: Altered mental status     VS: WDL (See Vitals Flowsheet)  Additional info: Pt somnolent, minimally responsive  MD Paged: Shira  Interventions:    Imaging/Tests: Chest X-ray and EKG    Labs: BMP   Medications:  antibiotics already ordered, fluids infusing   Other: N/A  Disposition: Improved with rapid response team interventions. Primary RN updated on plan of care. Transfer not indicated at this time.  RRT following, patient awake and conversing now, self-suctioning oral secretions.

## 2022-12-03 PROBLEM — B95.62 MRSA BACTEREMIA: Status: ACTIVE | Noted: 2022-12-03

## 2022-12-03 PROBLEM — R78.81 MRSA BACTEREMIA: Status: ACTIVE | Noted: 2022-12-03

## 2022-12-03 PROBLEM — A41.02 MRSA (METHICILLIN RESISTANT STAPHYLOCOCCUS AUREUS) SEPTICEMIA (HCC): Status: RESOLVED | Noted: 2022-12-03 | Resolved: 2022-12-03

## 2022-12-03 PROBLEM — A41.02 MRSA (METHICILLIN RESISTANT STAPHYLOCOCCUS AUREUS) SEPTICEMIA (HCC): Status: ACTIVE | Noted: 2022-12-03

## 2022-12-03 LAB
ANION GAP SERPL CALC-SCNC: 10 MMOL/L (ref 7–16)
BASOPHILS # BLD AUTO: 0.4 % (ref 0–1.8)
BASOPHILS # BLD: 0.07 K/UL (ref 0–0.12)
BUN SERPL-MCNC: 33 MG/DL (ref 8–22)
CALCIUM SERPL-MCNC: 8.7 MG/DL (ref 8.5–10.5)
CHLORIDE SERPL-SCNC: 99 MMOL/L (ref 96–112)
CO2 SERPL-SCNC: 21 MMOL/L (ref 20–33)
CREAT SERPL-MCNC: 0.67 MG/DL (ref 0.5–1.4)
EOSINOPHIL # BLD AUTO: 0.02 K/UL (ref 0–0.51)
EOSINOPHIL NFR BLD: 0.1 % (ref 0–6.9)
ERYTHROCYTE [DISTWIDTH] IN BLOOD BY AUTOMATED COUNT: 47.9 FL (ref 35.9–50)
GFR SERPLBLD CREATININE-BSD FMLA CKD-EPI: 90 ML/MIN/1.73 M 2
GLUCOSE SERPL-MCNC: 119 MG/DL (ref 65–99)
HCT VFR BLD AUTO: 29.9 % (ref 37–47)
HGB BLD-MCNC: 10.2 G/DL (ref 12–16)
IMM GRANULOCYTES # BLD AUTO: 0.16 K/UL (ref 0–0.11)
IMM GRANULOCYTES NFR BLD AUTO: 0.9 % (ref 0–0.9)
LYMPHOCYTES # BLD AUTO: 0.68 K/UL (ref 1–4.8)
LYMPHOCYTES NFR BLD: 4 % (ref 22–41)
MCH RBC QN AUTO: 31.5 PG (ref 27–33)
MCHC RBC AUTO-ENTMCNC: 34.1 G/DL (ref 33.6–35)
MCV RBC AUTO: 92.3 FL (ref 81.4–97.8)
MONOCYTES # BLD AUTO: 1.16 K/UL (ref 0–0.85)
MONOCYTES NFR BLD AUTO: 6.8 % (ref 0–13.4)
NEUTROPHILS # BLD AUTO: 15.01 K/UL (ref 2–7.15)
NEUTROPHILS NFR BLD: 87.8 % (ref 44–72)
NRBC # BLD AUTO: 0 K/UL
NRBC BLD-RTO: 0 /100 WBC
PLATELET # BLD AUTO: 126 K/UL (ref 164–446)
PMV BLD AUTO: 9.9 FL (ref 9–12.9)
POTASSIUM SERPL-SCNC: 4.5 MMOL/L (ref 3.6–5.5)
RBC # BLD AUTO: 3.24 M/UL (ref 4.2–5.4)
SODIUM SERPL-SCNC: 130 MMOL/L (ref 135–145)
WBC # BLD AUTO: 17.1 K/UL (ref 4.8–10.8)

## 2022-12-03 PROCEDURE — 87040 BLOOD CULTURE FOR BACTERIA: CPT

## 2022-12-03 PROCEDURE — 36415 COLL VENOUS BLD VENIPUNCTURE: CPT

## 2022-12-03 PROCEDURE — 700105 HCHG RX REV CODE 258: Performed by: HOSPITALIST

## 2022-12-03 PROCEDURE — 87147 CULTURE TYPE IMMUNOLOGIC: CPT

## 2022-12-03 PROCEDURE — 80048 BASIC METABOLIC PNL TOTAL CA: CPT

## 2022-12-03 PROCEDURE — 700102 HCHG RX REV CODE 250 W/ 637 OVERRIDE(OP): Performed by: STUDENT IN AN ORGANIZED HEALTH CARE EDUCATION/TRAINING PROGRAM

## 2022-12-03 PROCEDURE — 99232 SBSQ HOSP IP/OBS MODERATE 35: CPT | Performed by: HOSPITALIST

## 2022-12-03 PROCEDURE — A9270 NON-COVERED ITEM OR SERVICE: HCPCS | Performed by: STUDENT IN AN ORGANIZED HEALTH CARE EDUCATION/TRAINING PROGRAM

## 2022-12-03 PROCEDURE — 85025 COMPLETE CBC W/AUTO DIFF WBC: CPT

## 2022-12-03 PROCEDURE — 700111 HCHG RX REV CODE 636 W/ 250 OVERRIDE (IP): Performed by: HOSPITALIST

## 2022-12-03 PROCEDURE — 770001 HCHG ROOM/CARE - MED/SURG/GYN PRIV*

## 2022-12-03 PROCEDURE — 99223 1ST HOSP IP/OBS HIGH 75: CPT | Performed by: INTERNAL MEDICINE

## 2022-12-03 RX ADMIN — DEXAMETHASONE 6 MG: 4 TABLET ORAL at 05:02

## 2022-12-03 RX ADMIN — AMPICILLIN SODIUM AND SULBACTAM SODIUM 3 G: 2; 1 INJECTION, POWDER, FOR SOLUTION INTRAMUSCULAR; INTRAVENOUS at 03:37

## 2022-12-03 RX ADMIN — SODIUM CHLORIDE, POTASSIUM CHLORIDE, SODIUM LACTATE AND CALCIUM CHLORIDE: 600; 310; 30; 20 INJECTION, SOLUTION INTRAVENOUS at 06:48

## 2022-12-03 RX ADMIN — APIXABAN 5 MG: 5 TABLET, FILM COATED ORAL at 05:02

## 2022-12-03 RX ADMIN — APIXABAN 5 MG: 5 TABLET, FILM COATED ORAL at 18:00

## 2022-12-03 RX ADMIN — GUAIFENESIN SYRUP AND DEXTROMETHORPHAN 5 ML: 100; 10 SYRUP ORAL at 09:00

## 2022-12-03 RX ADMIN — DOCUSATE SODIUM 50 MG AND SENNOSIDES 8.6 MG 2 TABLET: 8.6; 5 TABLET, FILM COATED ORAL at 05:02

## 2022-12-03 RX ADMIN — GUAIFENESIN SYRUP AND DEXTROMETHORPHAN 5 ML: 100; 10 SYRUP ORAL at 15:00

## 2022-12-03 RX ADMIN — GUAIFENESIN SYRUP AND DEXTROMETHORPHAN 5 ML: 100; 10 SYRUP ORAL at 21:06

## 2022-12-03 RX ADMIN — AMPICILLIN SODIUM AND SULBACTAM SODIUM 3 G: 2; 1 INJECTION, POWDER, FOR SOLUTION INTRAMUSCULAR; INTRAVENOUS at 09:00

## 2022-12-03 RX ADMIN — VANCOMYCIN HYDROCHLORIDE 1750 MG: 500 INJECTION, POWDER, LYOPHILIZED, FOR SOLUTION INTRAVENOUS at 13:00

## 2022-12-03 ASSESSMENT — ENCOUNTER SYMPTOMS
SENSORY CHANGE: 0
LOSS OF CONSCIOUSNESS: 0
DIZZINESS: 0
VOMITING: 0
INSOMNIA: 0
EYE PAIN: 0
NAUSEA: 0
HEADACHES: 0
FEVER: 0
SHORTNESS OF BREATH: 0
ABDOMINAL PAIN: 0
BACK PAIN: 0
CHILLS: 0
WEAKNESS: 1
PALPITATIONS: 0
BLURRED VISION: 0
COUGH: 0
FOCAL WEAKNESS: 0
FALLS: 1

## 2022-12-03 ASSESSMENT — FIBROSIS 4 INDEX: FIB4 SCORE: 3.55

## 2022-12-03 ASSESSMENT — LIFESTYLE VARIABLES: SUBSTANCE_ABUSE: 0

## 2022-12-03 ASSESSMENT — PAIN DESCRIPTION - PAIN TYPE
TYPE: ACUTE PAIN
TYPE: ACUTE PAIN

## 2022-12-03 NOTE — CONSULTS
"INFECTIOUS DISEASES INPATIENT CONSULT NOTE     Date of Service: 12/3/2022    Consult Requested By: Linwood Silva M.D.    Reason for Consultation: MRSA sepsis    History of Present Illness:   Paluina Castellanos is a 77 y.o. female admitted 11/26/2022. For Afib with RVR and rhabdomyolysis.  No significant PMH, does not follow with physicians, only takes ibuprofen as needed.  By report \"Patient says she slipped while getting out of her bed and was unable to get up off the ground afterwards for a few days before being found by neighbor.  Denied hitting her head and denies LOC.  At outside facility she was found to have mild rhabdomyolysis with a CPK of 1500.    While there initially she was sinus rhythm but then converted to A. fib with RVR to the 150s, heart rate improved with initial diltiazem 10 mg IV but then went back up, and additional 5 mg diltiazem was given but she became hypotensive so stopped and she was started on amiodarone drip.  She also had an episode of SVT for a few minutes confirmed by EKG with heart rate up to the 180s.\"       In ED afebrile, No hypoxemia No leukocytosis  CTA chest was negative for PE, showed findings consistent with pulmonary hypertension and atelectasis. Noted to have thrombophlebitis on 12/1-started on Unasyn  She had low grade fever on 12/1 and on 12/2 lethargic. Blood cultures done and are now +MRSA  She was started on vancomycin  Infectious Diseases consulted for antibiotic recommendation and management      Review Of Systems:  Low grade fever on 12/1-resolved  On room air-no resp complaint  All other systems are reviewed and are negative limited    PMH:   History reviewed. No pertinent past medical history.      PSH:  History reviewed. No pertinent surgical history.    FAMILY HX:  History reviewed. No pertinent family history.    SOCIAL HX:  Social History     Socioeconomic History    Marital status: Single     Spouse name: Not on file    Number of children: Not on file    Years of " education: Not on file    Highest education level: Not on file   Occupational History    Not on file   Tobacco Use    Smoking status: Never     Passive exposure: Never    Smokeless tobacco: Never   Substance and Sexual Activity    Alcohol use: Never    Drug use: Never    Sexual activity: Not on file   Other Topics Concern    Not on file   Social History Narrative    Not on file     Social Determinants of Health     Financial Resource Strain: Not on file   Food Insecurity: Not on file   Transportation Needs: Not on file   Physical Activity: Not on file   Stress: Not on file   Social Connections: Not on file   Intimate Partner Violence: Not on file   Housing Stability: Not on file     Social History     Tobacco Use   Smoking Status Never    Passive exposure: Never   Smokeless Tobacco Never     Social History     Substance and Sexual Activity   Alcohol Use Never       Allergies/Intolerances:  No Known Allergies      Other Current Medications:    Current Facility-Administered Medications:     vancomycin (VANCOCIN) 1,750 mg in  mL IVPB, 1,750 mg, Intravenous, Q24HR, Linwood Silva M.D., Last Rate: 250 mL/hr at 12/03/22 1300, 1,750 mg at 12/03/22 1300    MD Alert...Vancomycin per Pharmacy, , Other, PHARMACY TO DOSE, Linwood Silva M.D.    Respiratory Therapy Consult, , Nebulization, Continuous RT, Linwood Silva M.D.    lactated ringers infusion (BOLUS), 500 mL, Intravenous, Once PRN, Linwood Silva M.D.    dexamethasone (DECADRON) tablet 6 mg, 6 mg, Oral, DAILY, Sathish Juarez M.D., 6 mg at 12/03/22 0502    guaiFENesin dextromethorphan (ROBITUSSIN DM) 100-10 MG/5ML syrup 5 mL, 5 mL, Oral, TID, Sathish Juarez M.D., 5 mL at 12/03/22 0900    benzonatate (TESSALON) capsule 100 mg, 100 mg, Oral, TID PRN, Sathish Juarez M.D.    senna-docusate (PERICOLACE or SENOKOT S) 8.6-50 MG per tablet 2 Tablet, 2 Tablet, Oral, BID, 2 Tablet at 12/03/22 0502 **AND** polyethylene glycol/lytes (MIRALAX) PACKET 1 Packet, 1 Packet, Oral, QDAY  "PRN **AND** magnesium hydroxide (MILK OF MAGNESIA) suspension 30 mL, 30 mL, Oral, QDAY PRN **AND** bisacodyl (DULCOLAX) suppository 10 mg, 10 mg, Rectal, QDAY PRN, Yonis Orlando M.D.    acetaminophen (Tylenol) tablet 650 mg, 650 mg, Oral, Q6HRS PRN, Yonis Orlando M.D.    hydrALAZINE (APRESOLINE) injection 10 mg, 10 mg, Intravenous, Q4HRS PRN, Yonis Orlando M.D.    Metoprolol Tartrate (LOPRESSOR) injection 5 mg, 5 mg, Intravenous, Q5 MIN PRN, Yonis Orlando M.D.    apixaban (ELIQUIS) tablet 5 mg, 5 mg, Oral, BID, Sathish Juarez M.D., 5 mg at 22 0502      Most Recent Vital Signs:  /74   Pulse 92   Temp 37 °C (98.6 °F) (Temporal)   Resp 20   Ht 1.575 m (5' 2\")   Wt 80.2 kg (176 lb 12.9 oz)   SpO2 92%   BMI 32.34 kg/m²   Temp  Av.8 °C (98.2 °F)  Min: 36 °C (96.8 °F)  Max: 38.2 °C (100.8 °F)    Physical Exam:  General: Ill-appearing, disheveled frail  Neck: supple, no JVD  Chest: CTAB, unlabored.No wheeze or rhonchi  Cardiac: RRR,  ectopy  Abdomen: + bowel sounds, soft, non-tender, non-distended  Extremities: No cyanosis, clubbing. no edema, 2+ pulses  Skin: no rashes thrombophlebitis LUE with erythema humer  Neuro: Not oriented OLIVER CN intact Speech fluent  Psych: Mood and behaviour normal      Pertinent Lab Results:  Recent Labs     22  05   WBC 15.8* 17.1*      Recent Labs     222 22  0524   HEMOGLOBIN 11.6* 10.2*   HEMATOCRIT 34.9* 29.9*   MCV 91.4 92.3   MCH 30.4 31.5   PLATELETCT 149* 126*         Recent Labs     22  0330 22  0514 22  0524   SODIUM 132* 140 130*   POTASSIUM 3.7 4.8 4.5   CHLORIDE 98 102 99   CO2 20 17* 21   CREATININE 0.99 1.08 0.67        Recent Labs     22  0330 22  0514   ALBUMIN 3.0* 3.2        Pertinent Micro:  Results       Procedure Component Value Units Date/Time    BLOOD CULTURE [[id number]] Collected: 22 1233    Order Status: Sent Specimen: Blood from Peripheral Updated: 22 1251 " "   Narrative:      Enhanced Droplet, Contact, and Eye Protection  Per Hospital Policy: Only change Specimen Src: to \"Line\" if  specified by physician order.    BLOOD CULTURE [[id number]] Collected: 12/03/22 1233    Order Status: Sent Specimen: Blood from Peripheral Updated: 12/03/22 1251    Narrative:      Enhanced Droplet, Contact, and Eye Protection  Per Hospital Policy: Only change Specimen Src: to \"Line\" if  specified by physician order.    BLOOD CULTURE [[id number]]  (Abnormal) Collected: 12/02/22 0514    Order Status: Completed Specimen: Blood from Peripheral Updated: 12/03/22 0318     Significant Indicator POS     Source BLD     Site PERIPHERAL     Culture Result Growth detected by Bactec instrument. 12/03/2022  01:14  Gram Stain: Gram positive cocci: Possible Staphylococcus sp.  Methicillin Resistant Staphylococcus aureus (MRSA)  detected by PCR.  Susceptibility to follow.      Narrative:      CALL  [facility]  171 tel. [phone],  CALLED  171 tel. [phone] 12/03/2022, 03:18, RB PERF. RESULTS CALLED  TO:[phone] ([place])  Enhanced Droplet, Contact, and Eye Protection  Per Hospital Policy: Only change Specimen Src: to \"Line\" if  specified by physician order.  Right Hand    BLOOD CULTURE [[id number]]  (Abnormal) Collected: 12/02/22 0514    Order Status: Completed Specimen: Blood from Peripheral Updated: 12/03/22 0115     Significant Indicator POS     Source BLD     Site PERIPHERAL     Culture Result Growth detected by Bactec instrument. 12/03/2022  01:14  Gram Stain: Gram positive cocci: Possible Staphylococcus sp.      Narrative:      Enhanced Droplet, Contact, and Eye Protection  Per Hospital Policy: Only change Specimen Src: to \"Line\" if  specified by physician order.  Right AC    MRSA By PCR (Amp) [[id number]] Collected: 12/02/22 1220    Order Status: Completed Specimen: Respirate from Nares Updated: 12/02/22 1704     MRSA by PCR Negative    Narrative:      Enhanced Droplet, Contact, and Eye Protection  Collected " By: [id number] RENETTA QUINTERO    URINALYSIS [[id number]]     Order Status: No result Specimen: Urine, Clean Catch           No results found for: BLOODCULTU, BLDCULT, BCHOLD     Studies:  CXR  IMPRESSION:   1.  Curvilinear opacities in the lung bases likely representing atelectasis, less likely pneumonitis.    CONCLUSIONS   Left upper extremity venous duplex imaging.    No evidence of deep venous thrombosis.    Evidence of acute to subacute superficial venous thrombosis in the LEFT    cephalic vein from the mid to distal bicep.    IMPRESSION:   Recent COVID  MRSA sepsis  Leukocytosis  thrombophlebitis    PLAN:     MRSA sepsis  Unclear if arm source  CXR no new infiltrates and no hypoxemia so doubt pneumonia  Low grade fever  Leukocytosis persistent  BCxs + MRSA 12/2  Repeat Bcxs every 48 hours until neg  PICC when Bcxs neg 48 hours  TTE unrevealing on 11/27; recommend TEODORO if persistently positive blood cultures  Continue vancomycin  Monitor vanco trough and renal function  Anticipate 6 weeks IV abx from date of negative blood cxs     DC COVID isolation per Infection Control    Plan of care discussed with TAMMY Silva M.D.. Will continue to follow    Sofia Hernández M.D.

## 2022-12-03 NOTE — PROGRESS NOTES
Hospital Medicine Daily Progress Note    Date of Service  12/3/2022    Chief Complaint  Paulina Castellanos is a 77 y.o. female admitted 11/26/2022 with found down unable to get up.    Hospital Course  Paulina Castellanos is a 77 y.o. female who was transferred from outside facility 11/26/2022 with atrial fibrillation with RVR on amiodarone drip, mild rhabdo.  Patient slid off her bed at home and was unable to get up for multiple days until found by friend. She was treated with IV fluids and amiodarone at OSH. On presentation she is in sinus rhythm. She is COVID positive with acute hypoxic respiratory failure requiring nasal cannula oxygen. She is started on decadron. She requires additional PT/OT at SNF. She is medically cleared to discharge to SNF when available, may have to wait 10 days for COVID clearance per SNF policy.    Interval Problem Update  11/29: Patient's cardiac echo did not find significant abnormalities.  Check CRP, D-dimer, procalcitonin.  Waiting on SNF placement.  Elevated T4 is secondary to sick euthyroid syndrome.    11/30: Patient had a venous Doppler of the left upper extremity that was negative.  Cardiac echo did not reveal any abnormalities.  CRP is 2.78 and will continue with Decadron.  We will continue to wean her off oxygen.  We will have a reevaluation by PT OT.  SNF placement is pending.    12/1: Patient was found to have thrombophlebitis on the right arm.  I have started IV Unasyn.    12/2: Patient was found to have worsening WBC count.  We will continue with IV hydration.  Procalcitonin is elevated, CRP is elevated.  I will start IV vancomycin.  She was found to be lethargic on examination likely secondary to infectious encephalopathy.  ABG did not show any evidence of hypercapnia or hypoxia.  Blood glucose was within normal limits.  Chest x-ray found mild bilateral infiltrates.  Blood cultures are still pending.    12/3: Patient was found to have MRSA bacteremia.  IV vancomycin was started  yesterday.  I have consulted infectious disease for further management.  Patient currently does not complain of back pain, sore throat, headaches, photophobia.  Mental status is improved today.    I have discussed this patient's plan of care and discharge plan at IDT rounds today with Case Management, Nursing, Nursing leadership, and other members of the IDT team.    Consultants/Specialty  none    Code Status  Full Code    Disposition  Patient is medically cleared for discharge.   Anticipate discharge to to skilled nursing facility.  I have placed the appropriate orders for post-discharge needs.    Review of Systems  Review of Systems   Constitutional:  Negative for chills and fever.   Eyes:  Negative for blurred vision and pain.   Respiratory:  Negative for cough and shortness of breath.    Cardiovascular:  Negative for chest pain, palpitations and leg swelling.   Gastrointestinal:  Negative for abdominal pain, nausea and vomiting.   Genitourinary:  Negative for dysuria and urgency.   Musculoskeletal:  Positive for falls. Negative for back pain.   Skin:  Negative for itching and rash.   Neurological:  Positive for weakness. Negative for dizziness, sensory change, focal weakness, loss of consciousness and headaches.   Psychiatric/Behavioral:  Negative for substance abuse. The patient does not have insomnia.       Physical Exam  Temp:  [36.1 °C (97 °F)-37 °C (98.6 °F)] 37 °C (98.6 °F)  Pulse:  [86-98] 92  Resp:  [17-20] 20  BP: (109-119)/(64-74) 119/74  SpO2:  [92 %-100 %] 92 %    Physical Exam  Vitals reviewed.   Constitutional:       General: She is not in acute distress.     Appearance: She is not diaphoretic.   HENT:      Head: Normocephalic and atraumatic.      Right Ear: External ear normal.      Left Ear: External ear normal.      Nose: Nose normal. No congestion.      Mouth/Throat:      Pharynx: No oropharyngeal exudate or posterior oropharyngeal erythema.   Eyes:      Extraocular Movements: Extraocular  movements intact.      Pupils: Pupils are equal, round, and reactive to light.   Cardiovascular:      Rate and Rhythm: Normal rate and regular rhythm.   Pulmonary:      Effort: Pulmonary effort is normal. No respiratory distress.      Breath sounds: Normal breath sounds. No wheezing or rales.   Abdominal:      General: Bowel sounds are normal. There is no distension.      Palpations: Abdomen is soft.      Tenderness: There is no abdominal tenderness. There is no guarding.   Musculoskeletal:         General: No swelling. Normal range of motion.      Cervical back: Normal range of motion and neck supple.      Right lower leg: No edema.      Left lower leg: No edema.   Skin:     General: Skin is warm and dry.   Neurological:      General: No focal deficit present.      Mental Status: She is alert and oriented to person, place, and time.      Cranial Nerves: No cranial nerve deficit.      Sensory: No sensory deficit.      Motor: No weakness.   Psychiatric:         Mood and Affect: Mood normal.         Behavior: Behavior normal.       Fluids    Intake/Output Summary (Last 24 hours) at 12/3/2022 1223  Last data filed at 12/3/2022 0600  Gross per 24 hour   Intake 1855.55 ml   Output 1000 ml   Net 855.55 ml       Laboratory  Recent Labs     12/02/22  0142 12/03/22  0524   WBC 15.8* 17.1*   RBC 3.82* 3.24*   HEMOGLOBIN 11.6* 10.2*   HEMATOCRIT 34.9* 29.9*   MCV 91.4 92.3   MCH 30.4 31.5   MCHC 33.2* 34.1   RDW 46.8 47.9   PLATELETCT 149* 126*   MPV 9.4 9.9       Recent Labs     12/01/22  0330 12/02/22  0514 12/03/22  0524   SODIUM 132* 140 130*   POTASSIUM 3.7 4.8 4.5   CHLORIDE 98 102 99   CO2 20 17* 21   GLUCOSE 115* 138* 119*   BUN 29* 31* 33*   CREATININE 0.99 1.08 0.67   CALCIUM 8.9 8.9 8.7                     Imaging  DX-CHEST-PORTABLE (1 VIEW)   Final Result      1.  Curvilinear opacities in the lung bases likely representing atelectasis, less likely pneumonitis.      US-EXTREMITY VENOUS UPPER UNILAT LEFT   Final  Result      EC-ECHOCARDIOGRAM COMPLETE W/O CONT   Final Result      OUTSIDE IMAGES-CT CHEST   Final Result      EC-ECHOCARDIOGRAM COMPLETE W/O CONT    (Results Pending)          Assessment/Plan  * Atrial fibrillation with rapid ventricular response (HCC)- (present on admission)  Assessment & Plan  New onset atrial fibrillation, patient does not follow with physicians and is not on any medications  Was in A. fib with RVR outside facility initially improved with diltiazem but she became hypotensive so she was started on amiodarone drip  Remains in sinus rhythm  [id number] of 3 for age and female sex. Discussed anticoagulation and a fib stroke risk , she is agreeable to starting AC, started on eliquis.  Echocardiogram normal, EF 65%.  Elevated T4 secondary to sick euthyroid syndrome    MRSA bacteremia  Assessment & Plan  IV vancomycin- follow trough levels   ID has been consulted    Encephalopathy  Assessment & Plan  secondary to infectious etiology  ABG did not show any evidence of hypercapnia or hypoxia    Sepsis (HCC)  Assessment & Plan  This is Sepsis Not present on admission  SIRS criteria identified on my evaluation include: Fever, with temperature greater than 101 deg F, Tachycardia, with heart rate greater than 90 BPM and Leukocytosis, with WBC greater than 12,000  Source is thrombophlebitis  Sepsis protocol initiated  Fluid resuscitation ordered per protocol  Crystalloid Fluid Administration: Patient has advanced or end-stage heart failure (with documentation of NYHA class III or symptoms with minimal exertion, Or NYHA class IV or symptoms at rest or with activity), for this/these reason(s) it is unsafe for this patient to receive 30 mL/kg of fluid  Partial Fluid Dose Given: 10 mL/kg per current or ideal body weight, patient to be reassessed shortly after completion of partial fluid bolus to ensure adequacy of resuscitation  IV antibiotics as appropriate for source of sepsis  Reassessment: I have reassessed  the patient's hemodynamic status          Thrombophlebitis  Assessment & Plan  Secondary to an infiltrative right IV  IV Unasyn  12/2: Worsening WBC count start IV vancomycin    Acute respiratory failure with hypoxia (HCC)  Assessment & Plan  Due to COVID infection  Start decadron  On 2L oxygen, continue to wean as able    Metabolic acidosis, normal anion gap (NAG)- (present on admission)  Assessment & Plan  Bicarb 19 on presentation likely due to dehydration.  IV fluids    Adult failure to thrive- (present on admission)  Assessment & Plan  Patient lives alone, fell down and was unable to get up for couple days.  Per report from outside hospital her home was found to be in an unlivable condition  SNF pending   She is COVID-positive    Non-traumatic rhabdomyolysis- (present on admission)  Assessment & Plan  Slipped on the ground and was unable to get up for couple days, CPK at outside facility 1500, trended down to 800 here  No SO, mild LFT elevation.  Can stop IV fluids    Elevated LFTs- (present on admission)  Assessment & Plan  On presentation AST 77, ALT 58.  Likely secondary to mild rhabdomyolysis.   improved    Pneumonia due to COVID-19 virus- (present on admission)  Assessment & Plan  Patient positive for COVID, denies any shortness of breath or cough  On 2L oxygen  Continue decadron  Possible barrier to SNF, needs to be COVID cleared per SNF policy, CM following    Hypokalemia- (present on admission)  Assessment & Plan  3.2 on presentation  Improved  Monitor as needed       VTE prophylaxis: therapeutic anticoagulation with eliquis    I have performed a physical exam and reviewed and updated ROS and Plan today (12/3/2022). In review of yesterday's note (12/2/2022), there are no changes except as documented above.

## 2022-12-03 NOTE — PROGRESS NOTES
"Pharmacy Vancomycin Kinetics Note for 12/3/2022     77 y.o. female on Vancomycin day # 2     Vancomycin Indication (AUC Dosing): Bacteremia    Provider specified end date: 12/17/22    Active Antibiotics (From admission, onward)      Ordered     Ordering Provider       Sat Dec 3, 2022 10:12 AM    12/03/22 1012  MD Alert...Vancomycin per Pharmacy  PHARMACY TO DOSE        Note to Pharmacy: Dose per Pharmacokinetic Protocol   Question:  Indication(s) for vancomycin?  Answer:  Staphylococcus aureus bacteremia    Linwood Silva M.D.    12/03/22 1012  vancomycin (VANCOCIN) 1,750 mg in  mL IVPB  (vancomycin (VANCOCIN) IV (LD + Maintenance))  EVERY 24 HOURS        Note to Pharmacy: Dose per Pharmacokinetic Protocol    Linwood Silva M.D.          Dosing Weight: 80.2 kg (176 lb 12.9 oz)    Admission History: Admitted on 11/26/2022 for Atrial fibrillation with rapid ventricular response (HCC) [I48.91]  Pertinent history: Bcx's 2/2 now +MRSA, unasyn dc'd & vanco cont'd. TTE ordered, will need repeat bcx's & likely ID consult.    Allergies:     Patient has no known allergies.     Pertinent cultures to date:     Results       Procedure Component Value Units Date/Time    BLOOD CULTURE [[id number]]  (Abnormal) Collected: 12/02/22 0514    Order Status: Completed Specimen: Blood from Peripheral Updated: 12/03/22 0318     Significant Indicator POS     Source BLD     Site PERIPHERAL     Culture Result Growth detected by Bactec instrument. 12/03/2022  01:14  Gram Stain: Gram positive cocci: Possible Staphylococcus sp.  Methicillin Resistant Staphylococcus aureus (MRSA)  detected by PCR.  Susceptibility to follow.      Narrative:      CALL  [facility]  171 tel. [phone],  CALLED  171 tel. [phone] 12/03/2022, 03:18, RB PERF. RESULTS CALLED  TO:[phone] ([place])  Enhanced Droplet, Contact, and Eye Protection  Per Hospital Policy: Only change Specimen Src: to \"Line\" if  specified by physician order.  Right Hand    BLOOD CULTURE [[id number]]  " "(Abnormal) Collected: 12/02/22 0514    Order Status: Completed Specimen: Blood from Peripheral Updated: 12/03/22 0115     Significant Indicator POS     Source BLD     Site PERIPHERAL     Culture Result Growth detected by Bactec instrument. 12/03/2022  01:14  Gram Stain: Gram positive cocci: Possible Staphylococcus sp.      Narrative:      Enhanced Droplet, Contact, and Eye Protection  Per Hospital Policy: Only change Specimen Src: to \"Line\" if  specified by physician order.  Right AC    MRSA By PCR (Amp) [[id number]] Collected: 12/02/22 1220    Order Status: Completed Specimen: Respirate from Nares Updated: 12/02/22 1704     MRSA by PCR Negative    Narrative:      Enhanced Droplet, Contact, and Eye Protection  Collected By: [id number] RENETTA INGRID    URINALYSIS [[id number]]     Order Status: No result Specimen: Urine, Clean Catch     URINALYSIS [[id number]]  (Abnormal) Collected: 11/26/22 0331    Order Status: Completed Specimen: Urine, Clean Catch Updated: 11/26/22 0831     Color Yellow     Character Clear     Specific Gravity >=1.045     Ph 5.5     Glucose Negative mg/dL      Ketones 40 mg/dL      Protein Negative mg/dL      Bilirubin Negative     Urobilinogen, Urine 1.0     Nitrite Negative     Leukocyte Esterase Negative     Occult Blood Small     Micro Urine Req Microscopic    Narrative:      Enhanced Droplet, Contact, and Eye Protection          Labs:     Estimated Creatinine Clearance: 70 mL/min (by C-G formula based on SCr of 0.67 mg/dL).  Recent Labs     12/02/22  0142 12/03/22  0524   WBC 15.8* 17.1*   NEUTSPOLYS 87.90* 87.80*     Recent Labs     12/01/22  0330 12/02/22  0514 12/03/22  0524   BUN 29* 31* 33*   CREATININE 0.99 1.08 0.67   ALBUMIN 3.0* 3.2  --        Intake/Output Summary (Last 24 hours) at 12/3/2022 1017  Last data filed at 12/3/2022 0600  Gross per 24 hour   Intake 1855.55 ml   Output 1000 ml   Net 855.55 ml      /74   Pulse 92   Temp 37 °C (98.6 °F) (Temporal)   Resp 20   Ht 1.575 " "m (5' 2\")   Wt 80.2 kg (176 lb 12.9 oz)   SpO2 92%  Temp (24hrs), Av.4 °C (97.6 °F), Min:36.1 °C (97 °F), Max:37 °C (98.6 °F)      List concerns for Vancomycin clearance:     Age;Obesity;BUN/Scr ratio greater than 20:1    Pharmacokinetics:    AUC kinetics:   Ke (hr ^-1): 0.0625 hr^-1  Half life: 11.09 hr  Clearance: 3.454  Estimated TDD: 1727  Estimated Dose: 943  Estimated interval: 13.1    A/P:     -  Vancomycin dose: maintenance dose changed to 1750mg IV q24h given improved renal fx & MRSA bacteremia    -  Next vancomycin level(s): at steady state after 4th/5th maintenance dose    -  Predicted vancomycin AUC from initial AUC test calculator: 507 mg·hr/L    -  Comments: Bcx's 2/2 now +MRSA, unasyn dc'd & vanco cont'd. TTE ordered, will need repeat bcx's & likely ID consult.       Santana Emerson, PharmD    "

## 2022-12-04 ENCOUNTER — APPOINTMENT (OUTPATIENT)
Dept: CARDIOLOGY | Facility: MEDICAL CENTER | Age: 77
DRG: 308 | End: 2022-12-04
Attending: HOSPITALIST
Payer: MEDICARE

## 2022-12-04 LAB
ANION GAP SERPL CALC-SCNC: 9 MMOL/L (ref 7–16)
BASOPHILS # BLD AUTO: 0.5 % (ref 0–1.8)
BASOPHILS # BLD: 0.07 K/UL (ref 0–0.12)
BUN SERPL-MCNC: 29 MG/DL (ref 8–22)
CALCIUM SERPL-MCNC: 8.6 MG/DL (ref 8.5–10.5)
CHLORIDE SERPL-SCNC: 102 MMOL/L (ref 96–112)
CO2 SERPL-SCNC: 21 MMOL/L (ref 20–33)
CREAT SERPL-MCNC: 0.71 MG/DL (ref 0.5–1.4)
EOSINOPHIL # BLD AUTO: 0.03 K/UL (ref 0–0.51)
EOSINOPHIL NFR BLD: 0.2 % (ref 0–6.9)
ERYTHROCYTE [DISTWIDTH] IN BLOOD BY AUTOMATED COUNT: 47.8 FL (ref 35.9–50)
GFR SERPLBLD CREATININE-BSD FMLA CKD-EPI: 88 ML/MIN/1.73 M 2
GLUCOSE SERPL-MCNC: 88 MG/DL (ref 65–99)
HCT VFR BLD AUTO: 29.4 % (ref 37–47)
HGB BLD-MCNC: 9.9 G/DL (ref 12–16)
IMM GRANULOCYTES # BLD AUTO: 0.1 K/UL (ref 0–0.11)
IMM GRANULOCYTES NFR BLD AUTO: 0.7 % (ref 0–0.9)
LYMPHOCYTES # BLD AUTO: 1.1 K/UL (ref 1–4.8)
LYMPHOCYTES NFR BLD: 7.9 % (ref 22–41)
MCH RBC QN AUTO: 30.9 PG (ref 27–33)
MCHC RBC AUTO-ENTMCNC: 33.7 G/DL (ref 33.6–35)
MCV RBC AUTO: 91.9 FL (ref 81.4–97.8)
MONOCYTES # BLD AUTO: 0.69 K/UL (ref 0–0.85)
MONOCYTES NFR BLD AUTO: 5 % (ref 0–13.4)
NEUTROPHILS # BLD AUTO: 11.94 K/UL (ref 2–7.15)
NEUTROPHILS NFR BLD: 85.7 % (ref 44–72)
NRBC # BLD AUTO: 0 K/UL
NRBC BLD-RTO: 0 /100 WBC
PLATELET # BLD AUTO: 145 K/UL (ref 164–446)
PMV BLD AUTO: 10.5 FL (ref 9–12.9)
POTASSIUM SERPL-SCNC: 3.8 MMOL/L (ref 3.6–5.5)
RBC # BLD AUTO: 3.2 M/UL (ref 4.2–5.4)
SODIUM SERPL-SCNC: 132 MMOL/L (ref 135–145)
WBC # BLD AUTO: 13.9 K/UL (ref 4.8–10.8)

## 2022-12-04 PROCEDURE — 85025 COMPLETE CBC W/AUTO DIFF WBC: CPT

## 2022-12-04 PROCEDURE — 80048 BASIC METABOLIC PNL TOTAL CA: CPT

## 2022-12-04 PROCEDURE — 99233 SBSQ HOSP IP/OBS HIGH 50: CPT | Performed by: INTERNAL MEDICINE

## 2022-12-04 PROCEDURE — 700102 HCHG RX REV CODE 250 W/ 637 OVERRIDE(OP): Performed by: STUDENT IN AN ORGANIZED HEALTH CARE EDUCATION/TRAINING PROGRAM

## 2022-12-04 PROCEDURE — 99232 SBSQ HOSP IP/OBS MODERATE 35: CPT | Performed by: STUDENT IN AN ORGANIZED HEALTH CARE EDUCATION/TRAINING PROGRAM

## 2022-12-04 PROCEDURE — A9270 NON-COVERED ITEM OR SERVICE: HCPCS | Performed by: STUDENT IN AN ORGANIZED HEALTH CARE EDUCATION/TRAINING PROGRAM

## 2022-12-04 PROCEDURE — 770001 HCHG ROOM/CARE - MED/SURG/GYN PRIV*

## 2022-12-04 PROCEDURE — 700111 HCHG RX REV CODE 636 W/ 250 OVERRIDE (IP): Performed by: HOSPITALIST

## 2022-12-04 PROCEDURE — 36415 COLL VENOUS BLD VENIPUNCTURE: CPT

## 2022-12-04 PROCEDURE — 700105 HCHG RX REV CODE 258: Performed by: HOSPITALIST

## 2022-12-04 RX ADMIN — VANCOMYCIN HYDROCHLORIDE 1750 MG: 500 INJECTION, POWDER, LYOPHILIZED, FOR SOLUTION INTRAVENOUS at 13:27

## 2022-12-04 RX ADMIN — DOCUSATE SODIUM 50 MG AND SENNOSIDES 8.6 MG 2 TABLET: 8.6; 5 TABLET, FILM COATED ORAL at 17:53

## 2022-12-04 RX ADMIN — APIXABAN 5 MG: 5 TABLET, FILM COATED ORAL at 17:53

## 2022-12-04 RX ADMIN — DOCUSATE SODIUM 50 MG AND SENNOSIDES 8.6 MG 2 TABLET: 8.6; 5 TABLET, FILM COATED ORAL at 04:12

## 2022-12-04 RX ADMIN — DEXAMETHASONE 6 MG: 4 TABLET ORAL at 04:11

## 2022-12-04 RX ADMIN — GUAIFENESIN SYRUP AND DEXTROMETHORPHAN 5 ML: 100; 10 SYRUP ORAL at 08:42

## 2022-12-04 RX ADMIN — GUAIFENESIN SYRUP AND DEXTROMETHORPHAN 5 ML: 100; 10 SYRUP ORAL at 20:00

## 2022-12-04 RX ADMIN — APIXABAN 5 MG: 5 TABLET, FILM COATED ORAL at 04:11

## 2022-12-04 ASSESSMENT — ENCOUNTER SYMPTOMS
SENSORY CHANGE: 0
DIZZINESS: 0
BLURRED VISION: 0
ABDOMINAL PAIN: 0
CHILLS: 0
FOCAL WEAKNESS: 0
VOMITING: 0
SHORTNESS OF BREATH: 0
LOSS OF CONSCIOUSNESS: 0
MYALGIAS: 1
HEADACHES: 0
EYE PAIN: 0
WEAKNESS: 1
FALLS: 1
NAUSEA: 0
FEVER: 0
BACK PAIN: 0
PALPITATIONS: 0
COUGH: 0
INSOMNIA: 0

## 2022-12-04 ASSESSMENT — PAIN DESCRIPTION - PAIN TYPE
TYPE: ACUTE PAIN

## 2022-12-04 ASSESSMENT — LIFESTYLE VARIABLES: SUBSTANCE_ABUSE: 0

## 2022-12-04 NOTE — CARE PLAN
Problem: Infection - Standard  Goal: Patient will remain free from infection  Outcome: Progressing     Problem: Hemodynamics  Goal: Patient's hemodynamics, fluid balance and neurologic status will be stable or improve  Outcome: Progressing   The patient is Watcher - Medium risk of patient condition declining or worsening    Shift Goals  Clinical Goals: hemodynamic stability  Patient Goals: rest  Family Goals: RICARDO    Progress made toward(s) clinical / shift goals:  IV vanco administration    Patient is not progressing towards the following goals:

## 2022-12-04 NOTE — PROGRESS NOTES
Report received, pt care assumed,. VSS, pt assessment complete. Pt aaox3, no signs of distress noted at this time. POC discussed with pt and verbalizes no questions.  Pt denies any additional needs at this time. Bed in lowest position, bed alarm on, pt educated on fall risk and verbalized understanding, call light within reach, will continue to monitor.

## 2022-12-04 NOTE — PROGRESS NOTES
Infectious Disease Progress Note    Author: Sofia Hernández M.D. Date & Time of service: 2022  1:53 PM    Chief Complaint:  MRSA sepsis    Interval History:  77 y.o. female admitted 2022. For Afib with RVR and rhabdomyolysis +COVID  Developed fever and AMS dueing hosp-blood cultures +MRSA   AF WBC 13.9 Oriented to person and place c/o LUE pain No dyspnea or CP. No new neck, back, joint pain  Labs Reviewed, Medications Reviewed, and Radiology Reviewed.    Review of Systems:  Review of Systems   Constitutional:  Negative for fever.   Musculoskeletal:  Positive for myalgias.   All other systems reviewed and are negative.    Hemodynamics:  Temp (24hrs), Av.6 °C (97.8 °F), Min:36.3 °C (97.3 °F), Max:36.9 °C (98.4 °F)  Temperature: 36.4 °C (97.6 °F)  Pulse  Av.8  Min: 71  Max: 136   Blood Pressure : 123/68       Physical Exam:  Physical Exam  Vitals and nursing note reviewed.   Constitutional:       General: She is not in acute distress.     Appearance: She is ill-appearing. She is not toxic-appearing or diaphoretic.   HENT:      Mouth/Throat:      Pharynx: No oropharyngeal exudate.      Comments: Fair dentition  Eyes:      General: No scleral icterus.     Extraocular Movements: Extraocular movements intact.      Pupils: Pupils are equal, round, and reactive to light.   Pulmonary:      Effort: Pulmonary effort is normal. No respiratory distress.      Breath sounds: No stridor.   Abdominal:      General: There is no distension.      Palpations: Abdomen is soft.      Tenderness: There is no abdominal tenderness.   Musculoskeletal:         General: Swelling and tenderness present.      Cervical back: Neck supple. No rigidity.   Skin:     Coloration: Skin is pale. Skin is not jaundiced.      Findings: Bruising and erythema present.      Comments: IV site left AC oozing LUE with erythema palpable cord no fluctuance   Neurological:      General: No focal deficit present.      Mental Status: She is  alert. She is disoriented.       Meds:    Current Facility-Administered Medications:     vancomycin    MD Alert...Vancomycin per Pharmacy    Respiratory Therapy Consult    LR    guaiFENesin dextromethorphan    benzonatate    senna-docusate **AND** polyethylene glycol/lytes **AND** magnesium hydroxide **AND** bisacodyl    acetaminophen    hydrALAZINE    Metoprolol Tartrate    apixaban    Labs:  Recent Labs     12/02/22  0142 12/03/22  0524 12/04/22  0330   WBC 15.8* 17.1* 13.9*   RBC 3.82* 3.24* 3.20*   HEMOGLOBIN 11.6* 10.2* 9.9*   HEMATOCRIT 34.9* 29.9* 29.4*   MCV 91.4 92.3 91.9   MCH 30.4 31.5 30.9   RDW 46.8 47.9 47.8   PLATELETCT 149* 126* 145*   MPV 9.4 9.9 10.5   NEUTSPOLYS 87.90* 87.80* 85.70*   LYMPHOCYTES 3.20* 4.00* 7.90*   MONOCYTES 6.90 6.80 5.00   EOSINOPHILS 0.20 0.10 0.20   BASOPHILS 0.30 0.40 0.50     Recent Labs     12/02/22  0514 12/03/22  0524 12/04/22  0330   SODIUM 140 130* 132*   POTASSIUM 4.8 4.5 3.8   CHLORIDE 102 99 102   CO2 17* 21 21   GLUCOSE 138* 119* 88   BUN 31* 33* 29*     Recent Labs     12/02/22  0514 12/03/22  0524 12/04/22  0330   ALBUMIN 3.2  --   --    TBILIRUBIN 0.5  --   --    ALKPHOSPHAT 73  --   --    TOTPROTEIN 6.6  --   --    ALTSGPT 26  --   --    ASTSGOT 30  --   --    CREATININE 1.08 0.67 0.71       Imaging:  DX-CHEST-PORTABLE (1 VIEW)    Result Date: 12/2/2022 12/2/2022 12:15 PM HISTORY/REASON FOR EXAM:  Shortness of Breath. TECHNIQUE/EXAM DESCRIPTION AND NUMBER OF VIEWS: Single portable view of the chest. COMPARISON: None FINDINGS: Heart size is within normal limits. There are curvilinear opacities in the lung bases. No pleural abnormalities are noted.     1.  Curvilinear opacities in the lung bases likely representing atelectasis, less likely pneumonitis.    US-EXTREMITY VENOUS UPPER UNILAT LEFT    Result Date: 11/30/2022   Upper Extremity  Venous Duplex Report  Vascular Laboratory  CONCLUSIONS  Left upper extremity venous duplex imaging.  No evidence of deep  venous thrombosis.  Evidence of acute to subacute superficial venous thrombosis in the LEFT  cephalic vein from the mid to distal bicep.  MASSIEL SILVA  Exam Date:     2022 13:29  Room #:     Inpatient  Priority:     Routine  Ht (in):             Wt (lb):  Ordering Physician:        EWELINA WOOD  Referring Physician:       [id number] Rice  Sonographer:               Giovana Moran RVKAROLINA  Study Type:                Complete Unilateral  Technical Quality:         Adequate  Age:    77    Gender:     F  MRN:    [id number]  :    1945      BSA:  Indications:     Localized swelling, mass and lump, unspecified upper limb,                   Edema, unspecified  CPT Codes:       44007  ICD Codes:       R22.30  R60.9  History:         Left upper extremity swelling/edema. No prior exams.  Limitations:  PROCEDURES:  Left upper extremity venous duplex imaging.  The following venous structures were evaluated: internal jugular,  subclavian, axillary, brachial, cephalic, and basilic veins.  Serial compression, color, and spectral Doppler flow evaluations were  performed.  FINDINGS:  Left upper extremity-  No evidence of deep venous thrombosis.  Evidence of acute to subacute superficial venous thrombosis in the cephalic  vein from the mid to distal bicep.  All other veins demonstrate complete color filling and compressibility with  normal venous flow dynamics including spontaneous flow and respiratory  phasicity.  Flow was evaluated in the contralateral subclavian vein and normal venous  flow dynamics including spontaneous flow and respiratory phasic variation  were demonstrated.  Brittney MUNOZ To  (Electronically Signed)  Final Date:      2022                   16:24    EC-ECHOCARDIOGRAM COMPLETE W/O CONT    Result Date: 2022  Transthoracic Echo Report Echocardiography Laboratory CONCLUSIONS No prior study is available for comparison. Normal left ventricular chamber size. Mild concentric left  ventricular hypertrophy. The left ventricular ejection fraction is visually estimated to be 65%. Trace mitral regurgitation. MASSIEL SILVA Exam Date:         2022                    13:31 Exam Location:     Inpatient Priority:          Routine Ordering Physician:        BETH GIL Referring Physician:       [id number] Rose Sonographer:               Frank ARANDA Age:    77     Gender:    F MRN:    [id number] :    1945 BSA:    1.89   Ht (in):    67     Wt (lb):    170 Exam Type:     Complete Indications:     Atrial Fibrillation, Shortness of breath ICD Codes:       427.31  786.05 CPT Codes:       57020 BP:   121    /   65     HR: Technical Quality:       Poor MEASUREMENTS  (Male / Female) Normal Values 2D ECHO LV Diastolic Diameter PLAX        4.2 cm                4.2 - 5.9 / 3.9 - 5.3 cm LV Systolic Diameter PLAX         2.7 cm                2.1 - 4.0 cm IVS Diastolic Thickness           1.1 cm                LVPW Diastolic Thickness          1.1 cm                LVOT Diameter                     1.8 cm                Estimated LV Ejection Fraction    65 %                  LV Ejection Fraction MOD BP       73.1 %                >= 55  % LV Ejection Fraction MOD 4C       78.7 %                LV Ejection Fraction MOD 2C       73 %                  DOPPLER AV Peak Velocity                  1.1 m/s               AV Peak Gradient                  5.3 mmHg              AV Mean Gradient                  2.9 mmHg              LVOT Peak Velocity                1.1 m/s               AV Area Cont Eq vti               2.6 cm2               MV Velocity Time Integral         21 cm                 Mitral E Point Velocity           0.86 m/s              Mitral E to A Ratio               0.75                  Mitral A Duration                 156 ms                MV Pressure Half Time             31.9 ms               MV Area PHT                       6.9 cm2               MV Deceleration Time              85.8  "ms               * Indicates values subject to auto-interpretation LV EF:  65    % FINDINGS Left Ventricle Normal left ventricular chamber size. Mild concentric left ventricular hypertrophy. Normal left ventricular systolic function. The left ventricular ejection fraction is visually estimated to be 65%. Normal regional wall motion. Right Ventricle The right ventricle is not well visualized. Right Atrium The right atrium is not well visualized. Left Atrium Normal left atrial size. Left atrial volume index is 24 mL/sq m. Mitral Valve The mitral valve is not well visualized. No mitral stenosis. Trace mitral regurgitation. Aortic Valve The aortic valve is not well visualized. Tricuspid Valve The tricuspid valve is not well visualized. No stenosis or regurgitation seen. Pulmonic Valve The pulmonic valve is not well visualized. No stenosis or regurgitation seen. Pericardium No pericardial effusion. Aorta Normal aortic root for body surface area. The ascending aorta diameter is 3.1 cm. Zafar Bailon M.D. (Electronically Signed) Final Date:     27 November 2022                 20:23      Micro:  Results       Procedure Component Value Units Date/Time    BLOOD CULTURE [[id number]]  (Abnormal) Collected: 12/02/22 0514    Order Status: Completed Specimen: Blood from Peripheral Updated: 12/04/22 1117     Significant Indicator POS     Source BLD     Site PERIPHERAL     Culture Result Growth detected by Bactec instrument. 12/03/2022  01:14      Staphylococcus aureus  Methicillin resistant by screening method      Narrative:      Enhanced Droplet, Contact, and Eye Protection  Per Hospital Policy: Only change Specimen Src: to \"Line\" if  specified by physician order.  Right AC    BLOOD CULTURE [[id number]]  (Abnormal) Collected: 12/02/22 0514    Order Status: Completed Specimen: Blood from Peripheral Updated: 12/04/22 1107     Significant Indicator POS     Source BLD     Site PERIPHERAL     Culture Result Growth detected by Bactec " "instrument. 12/03/2022  01:14  Methicillin Resistant Staphylococcus aureus (MRSA)  detected by PCR.  Susceptibility to follow.        Staphylococcus aureus  Susceptibilities in progress      Narrative:      CALL  [facility]  171 tel. [phone],  CALLED  171 tel. [phone] 12/03/2022, 03:18, RB PERF. RESULTS CALLED  TO:[phone] ([place])  Enhanced Droplet, Contact, and Eye Protection  Per Hospital Policy: Only change Specimen Src: to \"Line\" if  specified by physician order.  Right Hand    BLOOD CULTURE [[id number]]  (Abnormal) Collected: 12/03/22 1233    Order Status: Completed Specimen: Blood from Peripheral Updated: 12/04/22 1042     Significant Indicator POS     Source BLD     Site PERIPHERAL     Culture Result Growth detected by Bactec instrument. 12/04/2022  10:42  Gram Stain: Gram positive cocci: Possible Staphylococcus sp.      Narrative:      CALL  [facility]  141 tel. [phone],  CALLED  141 tel. [phone] 12/04/2022, 10:41, RB PERF. RESULTS CALLED  TO:Mark [phone]  Enhanced Droplet, Contact, and Eye Protection  Per Hospital Policy: Only change Specimen Src: to \"Line\" if  specified by physician order.  Left Hand    BLOOD CULTURE [[id number]] Collected: 12/03/22 1233    Order Status: Completed Specimen: Blood from Peripheral Updated: 12/04/22 0949     Significant Indicator NEG     Source BLD     Site PERIPHERAL     Culture Result No Growth  Note: Blood cultures are incubated for 5 days and  are monitored continuously.Positive blood cultures  are called to the RN and reported as soon as  they are identified.      Narrative:      Enhanced Droplet, Contact, and Eye Protection  Per Hospital Policy: Only change Specimen Src: to \"Line\" if  specified by physician order.  Right Hand    MRSA By PCR (Amp) [[id number]] Collected: 12/02/22 1220    Order Status: Completed Specimen: Respirate from Nares Updated: 12/02/22 1704     MRSA by PCR Negative    Narrative:      Enhanced Droplet, Contact, and Eye Protection  Collected By: " [id number] RENETTA QUINTERO    URINALYSIS [[id number]]     Order Status: No result Specimen: Urine, Clean Catch             Assessment:  Active Hospital Problems    Diagnosis     *Atrial fibrillation with rapid ventricular response (HCC) [I48.91]     MRSA bacteremia [R78.81, B95.62]     Sepsis (HCC) [A41.9]     Encephalopathy [G93.40]     Thrombophlebitis [I80.9]     Acute respiratory failure with hypoxia (HCC) [J96.01]     Hypokalemia [E87.6]     Pneumonia due to COVID-19 virus [U07.1, J12.82]     Elevated LFTs [R79.89]     Non-traumatic rhabdomyolysis [M62.82]     Adult failure to thrive [R62.7]     Metabolic acidosis, normal anion gap (NAG) [E87.20]      Recent COVID  MRSA sepsis  SO improved  Thrombocytopenia improved  Leukocytosis  thrombophlebitis     PLAN:   MRSA sepsis  Thrombophlebitis  Unclear if arm source-remains tender-consider more detailed imaging  CXR no new infiltrates and no hypoxemia so doubt pneumonia  Low grade fever resolved  Leukocytosis persistent but now decreasing  BCxs + MRSA 12/2 and 12/3  Repeat Bcxs every 48 hours until neg  PICC when Bcxs neg 48 hours  TTE unrevealing on 11/27; recommend TEODORO as persistently positive blood cultures  Continue vancomycin  Monitor vanco trough and renal function  Anticipate 6 weeks IV abx from date of negative blood cxs      COVID, recovering  Out of COVID isolation   Not hypoxemic

## 2022-12-04 NOTE — CARE PLAN
The patient is Stable - Low risk of patient condition declining or worsening    Shift Goals  Clinical Goals: safety  Patient Goals: watch mary movie    Progress made toward(s) clinical / shift goals:  Pt with bed alarm on, call light in reach, bed in lowest position with 3 bed rails up, fall risk sign outside of door and frequent rounding in place. Pt was able to finish watching the Mary movie prior to going to sleep.      Problem: Knowledge Deficit - Standard  Goal: Patient and family/care givers will demonstrate understanding of plan of care, disease process/condition, diagnostic tests and medications  Outcome: Progressing     Problem: Skin Integrity  Goal: Skin integrity is maintained or improved  Outcome: Progressing     Problem: Fall Risk  Goal: Patient will remain free from falls  Outcome: Progressing     Problem: Communication  Goal: The ability to communicate needs accurately and effectively will improve  Outcome: Progressing     Problem: Pain - Standard  Goal: Alleviation of pain or a reduction in pain to the patient’s comfort goal  Outcome: Progressing       Patient is not progressing towards the following goals:

## 2022-12-04 NOTE — PROGRESS NOTES
Report received from tele RN. Pt transported with all belongings accounted for.  Assessment complete.  A&O x 4. Patient calls appropriately.  Patient ambulates with max assist. Q2 turns in place. Bed alarm on.   Patient has 0/10 pain.  Denies N&V. Tolerating regular diet.  L upper arm red and swollen. Mepilex placed to heels.  + void to purewick, + flatus, + BM.  Patient denies SOB.  Review plan with of care with patient. Call light and personal belongings within reach. Hourly rounding in place. All needs met at this time.

## 2022-12-04 NOTE — PROGRESS NOTES
Hospital Medicine Daily Progress Note    Date of Service  12/4/2022    Chief Complaint  Paulina Castellanos is a 77 y.o. female admitted 11/26/2022 with found down unable to get up.    Hospital Course  Paulina Castellanos is a 77 y.o. female who was transferred from outside facility 11/26/2022 with atrial fibrillation with RVR on amiodarone drip, mild rhabdo.  Patient slid off her bed at home and was unable to get up for multiple days until found by friend. She was treated with IV fluids and amiodarone at OSH. On presentation she is in sinus rhythm. She is COVID positive with acute hypoxic respiratory failure requiring nasal cannula oxygen. She is started on decadron. She requires additional PT/OT at SNF. She is medically cleared to discharge to SNF when available, may have to wait 10 days for COVID clearance per SNF policy.    Interval Problem Update  11/29: Patient's cardiac echo did not find significant abnormalities.  Check CRP, D-dimer, procalcitonin.  Waiting on SNF placement.  Elevated T4 is secondary to sick euthyroid syndrome.    11/30: Patient had a venous Doppler of the left upper extremity that was negative.  Cardiac echo did not reveal any abnormalities.  CRP is 2.78 and will continue with Decadron.  We will continue to wean her off oxygen.  We will have a reevaluation by PT OT.  SNF placement is pending.    12/1: Patient was found to have thrombophlebitis on the right arm.  I have started IV Unasyn.    12/2: Patient was found to have worsening WBC count.  We will continue with IV hydration.  Procalcitonin is elevated, CRP is elevated.  I will start IV vancomycin.  She was found to be lethargic on examination likely secondary to infectious encephalopathy.  ABG did not show any evidence of hypercapnia or hypoxia.  Blood glucose was within normal limits.  Chest x-ray found mild bilateral infiltrates.  Blood cultures are still pending.    12/3: Patient was found to have MRSA bacteremia.  IV vancomycin was started  yesterday.  I have consulted infectious disease for further management.  Patient currently does not complain of back pain, sore throat, headaches, photophobia.  Mental status is improved today.    12/4: Patient's blood cultures are still positive for MRSA.  Will consult cardiology for TEODORO.  White count improved to 13.9.  Patient is off isolation precautions for COVID    I have discussed this patient's plan of care and discharge plan at IDT rounds today with Case Management, Nursing, Nursing leadership, and other members of the IDT team.    Consultants/Specialty  none    Code Status  Full Code    Disposition  Patient is medically cleared for discharge.   Anticipate discharge to to skilled nursing facility.  I have placed the appropriate orders for post-discharge needs.    Review of Systems  Review of Systems   Constitutional:  Negative for chills and fever.   Eyes:  Negative for blurred vision and pain.   Respiratory:  Negative for cough and shortness of breath.    Cardiovascular:  Negative for chest pain, palpitations and leg swelling.   Gastrointestinal:  Negative for abdominal pain, nausea and vomiting.   Genitourinary:  Negative for dysuria and urgency.   Musculoskeletal:  Positive for falls. Negative for back pain.   Skin:  Negative for itching and rash.   Neurological:  Positive for weakness. Negative for dizziness, sensory change, focal weakness, loss of consciousness and headaches.   Psychiatric/Behavioral:  Negative for substance abuse. The patient does not have insomnia.       Physical Exam  Temp:  [36.3 °C (97.3 °F)-36.9 °C (98.4 °F)] 36.4 °C (97.6 °F)  Pulse:  [85-92] 87  Resp:  [16-20] 18  BP: (122-133)/(63-78) 123/68  SpO2:  [93 %-96 %] 94 %    Physical Exam  Vitals reviewed.   Constitutional:       General: She is not in acute distress.     Appearance: She is not diaphoretic.   HENT:      Head: Normocephalic and atraumatic.      Right Ear: External ear normal.      Left Ear: External ear normal.       Nose: Nose normal. No congestion.      Mouth/Throat:      Pharynx: No oropharyngeal exudate or posterior oropharyngeal erythema.   Eyes:      Extraocular Movements: Extraocular movements intact.      Pupils: Pupils are equal, round, and reactive to light.   Cardiovascular:      Rate and Rhythm: Normal rate and regular rhythm.   Pulmonary:      Effort: Pulmonary effort is normal. No respiratory distress.      Breath sounds: Normal breath sounds. No wheezing or rales.   Abdominal:      General: Bowel sounds are normal. There is no distension.      Palpations: Abdomen is soft.      Tenderness: There is no abdominal tenderness. There is no guarding.   Musculoskeletal:         General: No swelling. Normal range of motion.      Cervical back: Normal range of motion and neck supple.      Right lower leg: No edema.      Left lower leg: No edema.   Skin:     General: Skin is warm and dry.   Neurological:      General: No focal deficit present.      Mental Status: She is alert and oriented to person, place, and time.      Cranial Nerves: No cranial nerve deficit.      Sensory: No sensory deficit.      Motor: No weakness.   Psychiatric:         Mood and Affect: Mood normal.         Behavior: Behavior normal.       Fluids    Intake/Output Summary (Last 24 hours) at 12/4/2022 1031  Last data filed at 12/4/2022 0801  Gross per 24 hour   Intake --   Output 1350 ml   Net -1350 ml       Laboratory  Recent Labs     12/02/22  0142 12/03/22  0524 12/04/22  0330   WBC 15.8* 17.1* 13.9*   RBC 3.82* 3.24* 3.20*   HEMOGLOBIN 11.6* 10.2* 9.9*   HEMATOCRIT 34.9* 29.9* 29.4*   MCV 91.4 92.3 91.9   MCH 30.4 31.5 30.9   MCHC 33.2* 34.1 33.7   RDW 46.8 47.9 47.8   PLATELETCT 149* 126* 145*   MPV 9.4 9.9 10.5       Recent Labs     12/02/22  0514 12/03/22  0524 12/04/22  0330   SODIUM 140 130* 132*   POTASSIUM 4.8 4.5 3.8   CHLORIDE 102 99 102   CO2 17* 21 21   GLUCOSE 138* 119* 88   BUN 31* 33* 29*   CREATININE 1.08 0.67 0.71   CALCIUM 8.9 8.7  8.6                     Imaging  DX-CHEST-PORTABLE (1 VIEW)   Final Result      1.  Curvilinear opacities in the lung bases likely representing atelectasis, less likely pneumonitis.      US-EXTREMITY VENOUS UPPER UNILAT LEFT   Final Result      EC-ECHOCARDIOGRAM COMPLETE W/O CONT   Final Result      OUTSIDE IMAGES-CT CHEST   Final Result             Assessment/Plan  * Atrial fibrillation with rapid ventricular response (HCC)- (present on admission)  Assessment & Plan  New onset atrial fibrillation, patient does not follow with physicians and is not on any medications  Was in A. fib with RVR outside facility initially improved with diltiazem but she became hypotensive so she was started on amiodarone drip  Remains in sinus rhythm  [id number] of 3 for age and female sex. Discussed anticoagulation and a fib stroke risk , she is agreeable to starting AC, started on eliquis.  Echocardiogram normal, EF 65%.  Elevated T4 secondary to sick euthyroid syndrome    MRSA bacteremia  Assessment & Plan  IV vancomycin- follow trough levels   ID has been consulted  Repeat blood cultures again positive for MRSA.  Cardiology consult for TEODORO tomorrow n.p.o. at midnight    Encephalopathy  Assessment & Plan  secondary to infectious etiology  ABG did not show any evidence of hypercapnia or hypoxia    Sepsis (HCC)  Assessment & Plan  This is Sepsis Not present on admission  SIRS criteria identified on my evaluation include: Fever, with temperature greater than 101 deg F, Tachycardia, with heart rate greater than 90 BPM and Leukocytosis, with WBC greater than 12,000  Source is thrombophlebitis  Sepsis protocol initiated  Fluid resuscitation ordered per protocol  Crystalloid Fluid Administration: Patient has advanced or end-stage heart failure (with documentation of NYHA class III or symptoms with minimal exertion, Or NYHA class IV or symptoms at rest or with activity), for this/these reason(s) it is unsafe for this patient to receive 30  mL/kg of fluid  Partial Fluid Dose Given: 10 mL/kg per current or ideal body weight, patient to be reassessed shortly after completion of partial fluid bolus to ensure adequacy of resuscitation  IV antibiotics as appropriate for source of sepsis  Reassessment: I have reassessed the patient's hemodynamic status          Thrombophlebitis  Assessment & Plan  Secondary to an infiltrative right IV  IV Unasyn  12/2: Worsening WBC count start IV vancomycin    Acute respiratory failure with hypoxia (HCC)  Assessment & Plan  Due to COVID infection  Resolved patient on room air    Adult failure to thrive- (present on admission)  Assessment & Plan  Patient lives alone, fell down and was unable to get up for couple days.  Per report from outside hospital her home was found to be in an unlivable condition  SNF pending   She is COVID-positive    Non-traumatic rhabdomyolysis- (present on admission)  Assessment & Plan  Slipped on the ground and was unable to get up for couple days, CPK at outside facility 1500, trended down to 800 here  No SO, mild LFT elevation.  Can stop IV fluids    Elevated LFTs- (present on admission)  Assessment & Plan  On presentation AST 77, ALT 58.  Likely secondary to mild rhabdomyolysis.   improved    Pneumonia due to COVID-19 virus- (present on admission)  Assessment & Plan  Patient positive for COVID, denies any shortness of breath or cough  On room air, discontinue Decadron  Isolation precautions were discontinued per infection control    Metabolic acidosis, normal anion gap (NAG)- (present on admission)  Assessment & Plan  Resolved    Hypokalemia- (present on admission)  Assessment & Plan  Resolved       VTE prophylaxis: therapeutic anticoagulation with eliquis    I have performed a physical exam and reviewed and updated ROS and Plan today (12/4/2022). In review of yesterday's note (12/3/2022), there are no changes except as documented above.

## 2022-12-04 NOTE — PROGRESS NOTES
.4 Eyes Skin Assessment Completed by EVERT Casas and EVERT Salazar.    Head WDL  Ears WDL  Nose WDL  Mouth WDL  Neck WDL  Breast/Chest WDL  Shoulder Blades Redness and Blanching  Spine WDL  (R) Arm/Elbow/Hand Bruising  (L) Arm/Elbow/Hand Redness, Bruising, and Swelling  Abdomen WDL  Groin WDL  Scrotum/Coccyx/Buttocks Redness and Blanching  (R) Leg Bruising  (L) Leg WDL  (R) Heel/Foot/Toe Redness and Blanching  (L) Heel/Foot/Toe Redness and Blanching          Devices In Places Pulse Ox, purewick      Interventions In Place Heel Mepilex, Waffle Overlay, Pillows, Q2 Turns, Barrier Cream, and Pressure Redistribution Mattress    Possible Skin Injury No    Pictures Uploaded Into Epic N/A  Wound Consult Placed N/A  RN Wound Prevention Protocol Ordered No

## 2022-12-04 NOTE — CARE PLAN
Problem: Knowledge Deficit - Standard  Goal: Patient and family/care givers will demonstrate understanding of plan of care, disease process/condition, diagnostic tests and medications  Outcome: Progressing     Problem: Skin Integrity  Goal: Skin integrity is maintained or improved  Outcome: Progressing     Problem: Fall Risk  Goal: Patient will remain free from falls  Outcome: Progressing     Problem: Psychosocial  Goal: Patient's level of anxiety will decrease  Outcome: Progressing  Goal: Patient's ability to verbalize feelings about condition will improve  Outcome: Progressing  Goal: Patient's ability to re-evaluate and adapt role responsibilities will improve  Outcome: Progressing  Goal: Patient and family will demonstrate ability to cope with life altering diagnosis and/or procedure  Outcome: Progressing  Goal: Spiritual and cultural needs incorporated into hospitalization  Outcome: Progressing     Problem: Communication  Goal: The ability to communicate needs accurately and effectively will improve  Outcome: Progressing     Problem: Respiratory  Goal: Patient will achieve/maintain optimum respiratory ventilation and gas exchange  Outcome: Progressing     Problem: Pain - Standard  Goal: Alleviation of pain or a reduction in pain to the patient’s comfort goal  Outcome: Progressing     Problem: Hemodynamics  Goal: Patient's hemodynamics, fluid balance and neurologic status will be stable or improve  Outcome: Progressing     Problem: Fluid Volume  Goal: Fluid volume balance will be maintained  Outcome: Progressing     Problem: Urinary - Renal Perfusion  Goal: Ability to achieve and maintain adequate renal perfusion and functioning will improve  Outcome: Progressing     Problem: Mechanical Ventilation  Goal: Safe management of artificial airway and ventilation  Outcome: Progressing  Goal: Successful weaning off mechanical ventilator, spontaneously maintains adequate gas exchange  Outcome: Progressing  Goal:  Patient will be able to express needs and understand communication  Outcome: Progressing     Problem: Physical Regulation  Goal: Diagnostic test results will improve  Outcome: Progressing  Goal: Signs and symptoms of infection will decrease  Outcome: Progressing     Problem: Infection - Standard  Goal: Patient will remain free from infection  Outcome: Progressing   The patient is Stable - Low risk of patient condition declining or worsening    Shift Goals  Clinical Goals: safety  Patient Goals: rest  Family Goals: RICARDO    Progress made toward(s) clinical / shift goals:  Patient in no signs of distress or discomfort, agreed to daily plan, required no further intervention at this time    Patient is not progressing towards the following goals:

## 2022-12-05 ENCOUNTER — APPOINTMENT (OUTPATIENT)
Dept: CARDIOLOGY | Facility: MEDICAL CENTER | Age: 77
DRG: 308 | End: 2022-12-05
Attending: NURSE PRACTITIONER
Payer: MEDICARE

## 2022-12-05 LAB
ANION GAP SERPL CALC-SCNC: 10 MMOL/L (ref 7–16)
BACTERIA BLD CULT: ABNORMAL
BUN SERPL-MCNC: 30 MG/DL (ref 8–22)
CALCIUM SERPL-MCNC: 8.4 MG/DL (ref 8.5–10.5)
CHLORIDE SERPL-SCNC: 101 MMOL/L (ref 96–112)
CO2 SERPL-SCNC: 20 MMOL/L (ref 20–33)
CREAT SERPL-MCNC: 0.87 MG/DL (ref 0.5–1.4)
ERYTHROCYTE [DISTWIDTH] IN BLOOD BY AUTOMATED COUNT: 47.2 FL (ref 35.9–50)
GFR SERPLBLD CREATININE-BSD FMLA CKD-EPI: 69 ML/MIN/1.73 M 2
GLUCOSE SERPL-MCNC: 92 MG/DL (ref 65–99)
HCT VFR BLD AUTO: 31.7 % (ref 37–47)
HGB BLD-MCNC: 10.8 G/DL (ref 12–16)
MCH RBC QN AUTO: 31 PG (ref 27–33)
MCHC RBC AUTO-ENTMCNC: 34.1 G/DL (ref 33.6–35)
MCV RBC AUTO: 91.1 FL (ref 81.4–97.8)
PLATELET # BLD AUTO: 155 K/UL (ref 164–446)
PMV BLD AUTO: 10.4 FL (ref 9–12.9)
POTASSIUM SERPL-SCNC: 4 MMOL/L (ref 3.6–5.5)
RBC # BLD AUTO: 3.48 M/UL (ref 4.2–5.4)
SIGNIFICANT IND 70042: ABNORMAL
SITE SITE: ABNORMAL
SODIUM SERPL-SCNC: 131 MMOL/L (ref 135–145)
SOURCE SOURCE: ABNORMAL
WBC # BLD AUTO: 9.4 K/UL (ref 4.8–10.8)

## 2022-12-05 PROCEDURE — A9270 NON-COVERED ITEM OR SERVICE: HCPCS | Performed by: STUDENT IN AN ORGANIZED HEALTH CARE EDUCATION/TRAINING PROGRAM

## 2022-12-05 PROCEDURE — 87186 SC STD MICRODIL/AGAR DIL: CPT

## 2022-12-05 PROCEDURE — 80048 BASIC METABOLIC PNL TOTAL CA: CPT

## 2022-12-05 PROCEDURE — 87040 BLOOD CULTURE FOR BACTERIA: CPT | Mod: 91

## 2022-12-05 PROCEDURE — 85027 COMPLETE CBC AUTOMATED: CPT

## 2022-12-05 PROCEDURE — 700105 HCHG RX REV CODE 258: Performed by: HOSPITALIST

## 2022-12-05 PROCEDURE — 99223 1ST HOSP IP/OBS HIGH 75: CPT | Mod: FS | Performed by: INTERNAL MEDICINE

## 2022-12-05 PROCEDURE — 99232 SBSQ HOSP IP/OBS MODERATE 35: CPT | Performed by: STUDENT IN AN ORGANIZED HEALTH CARE EDUCATION/TRAINING PROGRAM

## 2022-12-05 PROCEDURE — 36415 COLL VENOUS BLD VENIPUNCTURE: CPT

## 2022-12-05 PROCEDURE — 700102 HCHG RX REV CODE 250 W/ 637 OVERRIDE(OP): Performed by: STUDENT IN AN ORGANIZED HEALTH CARE EDUCATION/TRAINING PROGRAM

## 2022-12-05 PROCEDURE — 770001 HCHG ROOM/CARE - MED/SURG/GYN PRIV*

## 2022-12-05 PROCEDURE — 87077 CULTURE AEROBIC IDENTIFY: CPT

## 2022-12-05 PROCEDURE — 700111 HCHG RX REV CODE 636 W/ 250 OVERRIDE (IP): Performed by: HOSPITALIST

## 2022-12-05 PROCEDURE — 97530 THERAPEUTIC ACTIVITIES: CPT

## 2022-12-05 PROCEDURE — 99233 SBSQ HOSP IP/OBS HIGH 50: CPT | Performed by: INTERNAL MEDICINE

## 2022-12-05 PROCEDURE — 97535 SELF CARE MNGMENT TRAINING: CPT

## 2022-12-05 PROCEDURE — 87147 CULTURE TYPE IMMUNOLOGIC: CPT

## 2022-12-05 RX ADMIN — ACETAMINOPHEN 650 MG: 325 TABLET, FILM COATED ORAL at 12:08

## 2022-12-05 RX ADMIN — GUAIFENESIN SYRUP AND DEXTROMETHORPHAN 5 ML: 100; 10 SYRUP ORAL at 18:02

## 2022-12-05 RX ADMIN — GUAIFENESIN SYRUP AND DEXTROMETHORPHAN 5 ML: 100; 10 SYRUP ORAL at 10:13

## 2022-12-05 RX ADMIN — APIXABAN 5 MG: 5 TABLET, FILM COATED ORAL at 18:02

## 2022-12-05 RX ADMIN — VANCOMYCIN HYDROCHLORIDE 1750 MG: 500 INJECTION, POWDER, LYOPHILIZED, FOR SOLUTION INTRAVENOUS at 13:45

## 2022-12-05 ASSESSMENT — COGNITIVE AND FUNCTIONAL STATUS - GENERAL
CLIMB 3 TO 5 STEPS WITH RAILING: A LOT
TOILETING: A LOT
HELP NEEDED FOR BATHING: A LOT
PERSONAL GROOMING: A LITTLE
MOVING FROM LYING ON BACK TO SITTING ON SIDE OF FLAT BED: A LOT
PERSONAL GROOMING: A LITTLE
DRESSING REGULAR UPPER BODY CLOTHING: A LOT
MOBILITY SCORE: 7
STANDING UP FROM CHAIR USING ARMS: A LOT
DRESSING REGULAR UPPER BODY CLOTHING: A LOT
CLIMB 3 TO 5 STEPS WITH RAILING: TOTAL
DRESSING REGULAR LOWER BODY CLOTHING: A LOT
DAILY ACTIVITIY SCORE: 14
HELP NEEDED FOR BATHING: A LOT
TURNING FROM BACK TO SIDE WHILE IN FLAT BAD: A LOT
TOILETING: A LOT
STANDING UP FROM CHAIR USING ARMS: A LOT
STANDING UP FROM CHAIR USING ARMS: A LOT
SUGGESTED CMS G CODE MODIFIER MOBILITY: CM
SUGGESTED CMS G CODE MODIFIER MOBILITY: CL
DAILY ACTIVITIY SCORE: 15
EATING MEALS: A LITTLE
SUGGESTED CMS G CODE MODIFIER DAILY ACTIVITY: CK
WALKING IN HOSPITAL ROOM: TOTAL
WALKING IN HOSPITAL ROOM: A LOT
HELP NEEDED FOR BATHING: A LOT
MOVING FROM LYING ON BACK TO SITTING ON SIDE OF FLAT BED: A LOT
DRESSING REGULAR UPPER BODY CLOTHING: A LOT
CLIMB 3 TO 5 STEPS WITH RAILING: A LOT
MOVING TO AND FROM BED TO CHAIR: UNABLE
MOVING FROM LYING ON BACK TO SITTING ON SIDE OF FLAT BED: UNABLE
SUGGESTED CMS G CODE MODIFIER DAILY ACTIVITY: CK
PERSONAL GROOMING: A LITTLE
TOILETING: A LOT
DRESSING REGULAR LOWER BODY CLOTHING: A LOT
MOVING TO AND FROM BED TO CHAIR: A LOT
DRESSING REGULAR LOWER BODY CLOTHING: A LOT
DAILY ACTIVITIY SCORE: 15
WALKING IN HOSPITAL ROOM: A LOT
TURNING FROM BACK TO SIDE WHILE IN FLAT BAD: UNABLE
SUGGESTED CMS G CODE MODIFIER DAILY ACTIVITY: CK
MOBILITY SCORE: 12
TURNING FROM BACK TO SIDE WHILE IN FLAT BAD: A LOT

## 2022-12-05 ASSESSMENT — LIFESTYLE VARIABLES: SUBSTANCE_ABUSE: 0

## 2022-12-05 ASSESSMENT — ENCOUNTER SYMPTOMS
SHORTNESS OF BREATH: 0
DIZZINESS: 0
CHILLS: 0
CONFUSION: 0
CHEST TIGHTNESS: 0
PALPITATIONS: 0
MYALGIAS: 1
ABDOMINAL PAIN: 0
DIARRHEA: 0
FATIGUE: 0
VOMITING: 0
FOCAL WEAKNESS: 0
NERVOUS/ANXIOUS: 0
NAUSEA: 0
ACTIVITY CHANGE: 0
BRUISES/BLEEDS EASILY: 0
LIGHT-HEADEDNESS: 0
LOSS OF CONSCIOUSNESS: 0
ABDOMINAL DISTENTION: 0
COUGH: 0
WEAKNESS: 1
INSOMNIA: 0
SENSORY CHANGE: 0
FEVER: 0
DIZZINESS: 1
HEADACHES: 0

## 2022-12-05 ASSESSMENT — PAIN DESCRIPTION - PAIN TYPE
TYPE: ACUTE PAIN

## 2022-12-05 ASSESSMENT — GAIT ASSESSMENTS: GAIT LEVEL OF ASSIST: UNABLE TO PARTICIPATE

## 2022-12-05 NOTE — CONSULTS
Cardiology Initial Consultation    Date of Service  12/5/2022    Referring Physician  Lobo Edwards D.O.    Reason for Consultation  TEODORO    History of Presenting Illness  Paulina Castellanos is a 77 y.o. female with no significant past medical history who presented 11/26/2022 from her SNF with acute respiratory failure secondary to COVID-pneumonia, rhabdo, A. fib with RVR, and MRSA bacteremia.    Patient feels well.  Notes mild dizziness.  Otherwise, denies chest pain, shortness of breath, palpitations, orthopnea, PND or Edema.  She is sitting up in the chair working on [employer].  No further A. fib events likely secondary to COVID.    ID following.  Blood cultures positive for MRSA secondary to thrombophlebitis, culprit IV has been removed.  TTE was completed prior to thrombophlebitis.  TEODORO recommended by ID.    The risks, benefits, and alternatives to transesophageal echocardiogram with IV sedation were discussed with the patient in specific detail, including oropharyngeal and esophageal traumas including hoarseness and dysphagia after the procedure. Rare cases demonstrating serious or fatal complications associated with transesophageal echocardiogram have been reported in the adult population, including cardiac, pulmonary and bleeding complications in less than 1% of people. Patients with an identified intracardiac thrombus are at increased risk for embolic events and this appears to be reduced with anticoagulant therapy. The patient verbalized understandings about these  possible complications and wishes to proceed with this procedure    Review of Systems  Review of Systems   Constitutional:  Negative for activity change, fatigue and fever.   Respiratory:  Negative for cough, chest tightness and shortness of breath.    Cardiovascular:  Negative for chest pain, palpitations and leg swelling.   Gastrointestinal:  Negative for abdominal distention and abdominal pain.   Genitourinary:  Negative for hematuria.    Skin:  Negative for pallor and rash.   Neurological:  Positive for dizziness. Negative for light-headedness.   Hematological:  Does not bruise/bleed easily.   Psychiatric/Behavioral:  Negative for confusion. The patient is not nervous/anxious.      Past Medical History   has no past medical history on file.    Surgical History   has no past surgical history on file.    Family History  family history is not on file.    Social History   reports that she has never smoked. She has never been exposed to tobacco smoke. She has never used smokeless tobacco. She reports that she does not drink alcohol and does not use drugs.    Medications  Prior to Admission Medications   Prescriptions Last Dose Informant Patient Reported? Taking?   ibuprofen (MOTRIN) 200 MG Tab   Yes Yes   Sig: Take 200 mg by mouth every 6 hours as needed.      Facility-Administered Medications: None       Allergies  No Known Allergies    Vital signs in last 24 hours  Temp:  [35.9 °C (96.7 °F)-37.1 °C (98.7 °F)] 35.9 °C (96.7 °F)  Pulse:  [] 98  Resp:  [18-20] 18  BP: (107-126)/(55-76) 124/69  SpO2:  [90 %-98 %] 95 %    Physical Exam  Physical Exam  Constitutional:       General: She is not in acute distress.     Appearance: She is obese.   HENT:      Head: Normocephalic and atraumatic.   Eyes:      Extraocular Movements: Extraocular movements intact.      Conjunctiva/sclera: Conjunctivae normal.   Cardiovascular:      Rate and Rhythm: Normal rate and regular rhythm.      Pulses: Normal pulses.      Heart sounds: Normal heart sounds.   Pulmonary:      Effort: Pulmonary effort is normal.      Breath sounds: Normal breath sounds.   Musculoskeletal:      Right lower leg: No edema.      Left lower leg: No edema.   Skin:     General: Skin is warm and dry.      Findings: No rash.   Neurological:      Mental Status: She is alert.       Lab Review  Lab Results   Component Value Date/Time    WBC 9.4 12/05/2022 12:36 AM    RBC 3.48 (L) 12/05/2022 12:36 AM     HEMOGLOBIN 10.8 (L) 12/05/2022 12:36 AM    HEMATOCRIT 31.7 (L) 12/05/2022 12:36 AM    MCV 91.1 12/05/2022 12:36 AM    MCH 31.0 12/05/2022 12:36 AM    MCHC 34.1 12/05/2022 12:36 AM    MPV 10.4 12/05/2022 12:36 AM      Lab Results   Component Value Date/Time    SODIUM 131 (L) 12/05/2022 12:36 AM    POTASSIUM 4.0 12/05/2022 12:36 AM    CHLORIDE 101 12/05/2022 12:36 AM    CO2 20 12/05/2022 12:36 AM    GLUCOSE 92 12/05/2022 12:36 AM    BUN 30 (H) 12/05/2022 12:36 AM    CREATININE 0.87 12/05/2022 12:36 AM      Lab Results   Component Value Date/Time    ASTSGOT 30 12/02/2022 05:14 AM    ALTSGPT 26 12/02/2022 05:14 AM     Lab Results   Component Value Date/Time    TROPONINT 33 (H) 11/26/2022 10:03 AM       No results for input(s): NTPROBNP in the last 72 hours.    Cardiac Imaging and Procedures Review  EKG:  My personal interpretation of the EKG dated 12/2/2022 is SR    Echocardiogram (11/27/2022):  No prior study is available for comparison.   Normal left ventricular chamber size.  Mild concentric left ventricular hypertrophy.  The left ventricular ejection fraction is visually estimated to be 65%.  Trace mitral regurgitation.    Cardiac Catheterization:  NA    Imaging  Chest X-Ray (12/2/2022):  Curvilinear opacities in the lung bases likely representing atelectasis, less likely pneumonitis.       Assessment/Plan  MRSA bacteremia; A. fib RVR secondary to COVID-pneumonia (resolved); SO (resolved)  -Eliquis for [id number] 3  -Blood cultures 12/3 positive for MRSA  -Vanco per ID  -Repeat blood cultures pending  -Left upper extremity clot suspicious for seeding.  TEODORO ordered.  Risks and benefits discussed per above.  Patient wishes to proceed.  -Due to her isolation status, TEODORO likely to be scheduled Thursday or Friday.  We will place n.p.o. orders at midnight once definitive time scheduled.    Thank you for allowing me to participate in the care of this patient.    I will continue to follow this patient    Please  contact me with any questions.    Please see Dr. Delarosa's attestation for further details and MDM.     I personally spent a total of 20 minutes which includes face-to-face time and non-face-to-face time spent on preparing to see the patient, reviewing hospital notes and tests, obtaining history from the patient, performing a medically appropriate exam, counseling and educating the patient, ordering medications/tests/procedures/referrals as clinically indicated, and documenting information in the electronic medical record.    KARRIE Moreau.   [facility]  [phone]

## 2022-12-05 NOTE — PROGRESS NOTES
Infectious Disease Progress Note    Author: Ramesh Tee M.D. Date & Time of service: 2022  8:53 AM    Chief Complaint:  MRSA bacteremia    Interval History:  77 y.o. female admitted 2022. For Afib with RVR and rhabdomyolysis +COVID  Developed fever and AMS dueing hosp-blood cultures +MRSA   AF WBC 13.9 Oriented to person and place c/o LUE pain No dyspnea or CP. No new neck, back, joint pain   patient remains afebrile, white count is resolved now, down to 9.4, tolerating vancomycin.  Patient states she is tired but overall better.   left arm    Labs Reviewed, Medications Reviewed, and Radiology Reviewed.    Review of Systems:  Review of Systems   Constitutional:  Positive for malaise/fatigue. Negative for fever.   Gastrointestinal:  Negative for abdominal pain, diarrhea, nausea and vomiting.   Musculoskeletal:  Positive for myalgias.   Skin:  Negative for itching and rash.   All other systems reviewed and are negative.    Hemodynamics:  Temp (24hrs), Av.8 °C (98.3 °F), Min:36.4 °C (97.6 °F), Max:37.1 °C (98.7 °F)  Temperature: 36.9 °C (98.4 °F)  Pulse  Av.8  Min: 71  Max: 136   Blood Pressure : 107/56       Physical Exam:  Physical Exam  Vitals and nursing note reviewed.   Constitutional:       General: She is not in acute distress.     Appearance: She is ill-appearing. She is not toxic-appearing or diaphoretic.   HENT:      Mouth/Throat:      Pharynx: No oropharyngeal exudate.      Comments: Fair dentition  Eyes:      General: No scleral icterus.     Extraocular Movements: Extraocular movements intact.      Pupils: Pupils are equal, round, and reactive to light.   Pulmonary:      Effort: Pulmonary effort is normal. No respiratory distress.      Breath sounds: No stridor.   Abdominal:      General: There is no distension.      Palpations: Abdomen is soft.      Tenderness: There is no abdominal tenderness.   Musculoskeletal:         General: Swelling and tenderness present.       Cervical back: Neck supple. No rigidity.   Skin:     Coloration: Skin is pale. Skin is not jaundiced.      Findings: Bruising and erythema present.      Comments: IV site left AC oozing LUE with erythema palpable cord no fluctuance   Neurological:      General: No focal deficit present.      Mental Status: She is alert. She is disoriented.       Meds:    Current Facility-Administered Medications:     lidocaine    vancomycin    MD Alert...Vancomycin per Pharmacy    Respiratory Therapy Consult    LR    guaiFENesin dextromethorphan    benzonatate    senna-docusate **AND** polyethylene glycol/lytes **AND** magnesium hydroxide **AND** bisacodyl    acetaminophen    hydrALAZINE    Metoprolol Tartrate    apixaban    Labs:  Recent Labs     12/03/22 0524 12/04/22 0330 12/05/22  0036   WBC 17.1* 13.9* 9.4   RBC 3.24* 3.20* 3.48*   HEMOGLOBIN 10.2* 9.9* 10.8*   HEMATOCRIT 29.9* 29.4* 31.7*   MCV 92.3 91.9 91.1   MCH 31.5 30.9 31.0   RDW 47.9 47.8 47.2   PLATELETCT 126* 145* 155*   MPV 9.9 10.5 10.4   NEUTSPOLYS 87.80* 85.70*  --    LYMPHOCYTES 4.00* 7.90*  --    MONOCYTES 6.80 5.00  --    EOSINOPHILS 0.10 0.20  --    BASOPHILS 0.40 0.50  --        Recent Labs     12/03/22 0524 12/04/22 0330 12/05/22  0036   SODIUM 130* 132* 131*   POTASSIUM 4.5 3.8 4.0   CHLORIDE 99 102 101   CO2 21 21 20   GLUCOSE 119* 88 92   BUN 33* 29* 30*       Recent Labs     12/03/22 0524 12/04/22 0330 12/05/22  0036   CREATININE 0.67 0.71 0.87         Imaging:  DX-CHEST-PORTABLE (1 VIEW)    Result Date: 12/2/2022 12/2/2022 12:15 PM HISTORY/REASON FOR EXAM:  Shortness of Breath. TECHNIQUE/EXAM DESCRIPTION AND NUMBER OF VIEWS: Single portable view of the chest. COMPARISON: None FINDINGS: Heart size is within normal limits. There are curvilinear opacities in the lung bases. No pleural abnormalities are noted.     1.  Curvilinear opacities in the lung bases likely representing atelectasis, less likely pneumonitis.    US-EXTREMITY VENOUS UPPER  UNILAT LEFT    Result Date: 2022   Upper Extremity  Venous Duplex Report  Vascular Laboratory  CONCLUSIONS  Left upper extremity venous duplex imaging.  No evidence of deep venous thrombosis.  Evidence of acute to subacute superficial venous thrombosis in the LEFT  cephalic vein from the mid to distal bicep.  MASSIEL SILVA  Exam Date:     2022 13:29  Room #:     Inpatient  Priority:     Routine  Ht (in):             Wt (lb):  Ordering Physician:        EWELINA WOOD  Referring Physician:       NINA Mcknight  Sonographer:               Giovana Moran RVKAROLINA  Study Type:                Complete Unilateral  Technical Quality:         Adequate  Age:    77    Gender:     F  MRN:    [id number]  :    1945      BSA:  Indications:     Localized swelling, mass and lump, unspecified upper limb,                   Edema, unspecified  CPT Codes:       16915  ICD Codes:       R22.30  R60.9  History:         Left upper extremity swelling/edema. No prior exams.  Limitations:  PROCEDURES:  Left upper extremity venous duplex imaging.  The following venous structures were evaluated: internal jugular,  subclavian, axillary, brachial, cephalic, and basilic veins.  Serial compression, color, and spectral Doppler flow evaluations were  performed.  FINDINGS:  Left upper extremity-  No evidence of deep venous thrombosis.  Evidence of acute to subacute superficial venous thrombosis in the cephalic  vein from the mid to distal bicep.  All other veins demonstrate complete color filling and compressibility with  normal venous flow dynamics including spontaneous flow and respiratory  phasicity.  Flow was evaluated in the contralateral subclavian vein and normal venous  flow dynamics including spontaneous flow and respiratory phasic variation  were demonstrated.  Brittney MUNOZ To  (Electronically Signed)  Final Date:      2022                   16:24    EC-ECHOCARDIOGRAM COMPLETE W/O CONT    Result Date:  2022  Transthoracic Echo Report Echocardiography Laboratory CONCLUSIONS No prior study is available for comparison. Normal left ventricular chamber size. Mild concentric left ventricular hypertrophy. The left ventricular ejection fraction is visually estimated to be 65%. Trace mitral regurgitation. MASSIEL SILVA Exam Date:         2022                    13:31 Exam Location:     Inpatient Priority:          Routine Ordering Physician:        BETH GIL Referring Physician:       [id number] Rose Sonographer:               Frank Weber RCS Age:    77     Gender:    F MRN:    [id number] :    1945 BSA:    1.89   Ht (in):    67     Wt (lb):    170 Exam Type:     Complete Indications:     Atrial Fibrillation, Shortness of breath ICD Codes:       427.31  786.05 CPT Codes:       50243 BP:   121    /   65     HR: Technical Quality:       Poor MEASUREMENTS  (Male / Female) Normal Values 2D ECHO LV Diastolic Diameter PLAX        4.2 cm                4.2 - 5.9 / 3.9 - 5.3 cm LV Systolic Diameter PLAX         2.7 cm                2.1 - 4.0 cm IVS Diastolic Thickness           1.1 cm                LVPW Diastolic Thickness          1.1 cm                LVOT Diameter                     1.8 cm                Estimated LV Ejection Fraction    65 %                  LV Ejection Fraction MOD BP       73.1 %                >= 55  % LV Ejection Fraction MOD 4C       78.7 %                LV Ejection Fraction MOD 2C       73 %                  DOPPLER AV Peak Velocity                  1.1 m/s               AV Peak Gradient                  5.3 mmHg              AV Mean Gradient                  2.9 mmHg              LVOT Peak Velocity                1.1 m/s               AV Area Cont Eq vti               2.6 cm2               MV Velocity Time Integral         21 cm                 Mitral E Point Velocity           0.86 m/s              Mitral E to A Ratio               0.75                  Mitral A Duration                  156 ms                MV Pressure Half Time             31.9 ms               MV Area PHT                       6.9 cm2               MV Deceleration Time              85.8 ms               * Indicates values subject to auto-interpretation LV EF:  65    % FINDINGS Left Ventricle Normal left ventricular chamber size. Mild concentric left ventricular hypertrophy. Normal left ventricular systolic function. The left ventricular ejection fraction is visually estimated to be 65%. Normal regional wall motion. Right Ventricle The right ventricle is not well visualized. Right Atrium The right atrium is not well visualized. Left Atrium Normal left atrial size. Left atrial volume index is 24 mL/sq m. Mitral Valve The mitral valve is not well visualized. No mitral stenosis. Trace mitral regurgitation. Aortic Valve The aortic valve is not well visualized. Tricuspid Valve The tricuspid valve is not well visualized. No stenosis or regurgitation seen. Pulmonic Valve The pulmonic valve is not well visualized. No stenosis or regurgitation seen. Pericardium No pericardial effusion. Aorta Normal aortic root for body surface area. The ascending aorta diameter is 3.1 cm. Zafar Bailon M.D. (Electronically Signed) Final Date:     27 November 2022                 20:23      Micro:  Results       Procedure Component Value Units Date/Time    BLOOD CULTURE [[id number]]  (Abnormal) Collected: 12/03/22 1233    Order Status: Completed Specimen: Blood from Peripheral Updated: 12/05/22 0801     Significant Indicator POS     Source BLD     Site PERIPHERAL     Culture Result Growth detected by Bactec instrument. 12/04/2022  10:42      Methicillin Resistant Staphylococcus aureus  See previous culture for sensitivity report.      Narrative:      CALL  [facility]  141 tel. [phone],  CALLED  141 tel. [phone] 12/04/2022, 10:41, RB PERF. RESULTS CALLED  TO:Mark [phone]  Enhanced Droplet, Contact, and Eye Protection  Per Hospital Policy: Only  "change Specimen Src: to \"Line\" if  specified by physician order.  Left Hand    BLOOD CULTURE [[id number]]  (Abnormal) Collected: 12/03/22 1233    Order Status: Completed Specimen: Blood from Peripheral Updated: 12/05/22 0759     Significant Indicator POS     Source BLD     Site PERIPHERAL     Culture Result Growth detected by Bactec instrument. 12/04/2022  17:48      Methicillin Resistant Staphylococcus aureus  See previous culture for sensitivity report.      Narrative:      Enhanced Droplet, Contact, and Eye Protection  Per Hospital Policy: Only change Specimen Src: to \"Line\" if  specified by physician order.  Right Hand    BLOOD CULTURE [[id number]]  (Abnormal) Collected: 12/02/22 0514    Order Status: Completed Specimen: Blood from Peripheral Updated: 12/05/22 0730     Significant Indicator POS     Source BLD     Site PERIPHERAL     Culture Result Growth detected by Bactec instrument. 12/03/2022  01:14      Methicillin Resistant Staphylococcus aureus  See previous culture for sensitivity report.      Narrative:      Enhanced Droplet, Contact, and Eye Protection  Per Hospital Policy: Only change Specimen Src: to \"Line\" if  specified by physician order.  Right AC    BLOOD CULTURE [[id number]]  (Abnormal)  (Susceptibility) Collected: 12/02/22 0514    Order Status: Completed Specimen: Blood from Peripheral Updated: 12/05/22 0729     Significant Indicator POS     Source BLD     Site PERIPHERAL     Culture Result Growth detected by Bactec instrument. 12/03/2022  01:14  Methicillin Resistant Staphylococcus aureus (MRSA)  detected by PCR.        Methicillin Resistant Staphylococcus aureus    Narrative:      CALL  [facility]  171 tel. [phone],  CALLED  171 tel. [phone] 12/03/2022, 03:18, RB PERF. RESULTS CALLED  TO:[phone] (Maksim)  Enhanced Droplet, Contact, and Eye Protection  Per Hospital Policy: Only change Specimen Src: to \"Line\" if  specified by physician order.  Right Hand    Susceptibility       Methicillin resistant " staphylococcus aureus (1)       Antibiotic Interpretation Microscan   Method Status    Azithromycin Resistant >4 mcg/mL JADIEL Final    Clindamycin Sensitive 0.5 mcg/mL JADIEL Final    Cefazolin Resistant <=8 mcg/mL JADIEL Final    Cefepime Resistant >16 mcg/mL JADIEL Final    Ceftaroline Sensitive <=0.5 mcg/mL JADIEL Final    Daptomycin Sensitive 1 mcg/mL JADIEL Final    Ampicillin/sulbactam Resistant >16/8 mcg/mL JADIEL Final    Erythromycin Resistant >4 mcg/mL JADIEL Final    Vancomycin Sensitive 1 mcg/mL JADIEL Final    Oxacillin Resistant >2 mcg/mL JADIEL Final    Trimeth/Sulfa Sensitive <=0.5/9.5 mcg/mL JADIEL Final    Tetracycline Sensitive <=4 mcg/mL JADIEL Final                       MRSA By PCR (Amp) [[id number]] Collected: 12/02/22 1220    Order Status: Completed Specimen: Respirate from Nares Updated: 12/02/22 1704     MRSA by PCR Negative    Narrative:      Enhanced Droplet, Contact, and Eye Protection  Collected By: [id number] RENETTA QUINTERO    URINALYSIS [[id number]]     Order Status: No result Specimen: Urine, Clean Catch             Assessment:  This is a 77-year-old female patient who was transferred from an outside facility, slid off her bed at home and was unable to get up for multiple days, found by a friend, was found to be in A. fib with RVR and with mild rhabdomyolysis.  She was also noted to have COVID-19 requiring nasal cannula oxygen, resolved.  On 12/1, she developed right arm thrombophlebitis and the following day had worsening leukocytosis and procalcitonin, blood cultures positive for MRSA.    Hospital onset sepsis with MRSA bacteremia, likely secondary to thrombophlebitis that may be seeded with infection based on exam findings..  IV line has been removed.    Pertinent Diagnoses:  MRSA bacteremia, persistent  SO, resolved  Recent COVID-19  Thrombophlebitis     PLAN:   MRSA bacteremia  Thrombophlebitis  Low grade fever and leukocytosis resolved  BCxs + MRSA 12/2 and 12/3  Continue vancomycin. Monitor vanco trough and renal  function  Repeat blood cultures x2  Suspicion for seeding of infection in left upper extremity clot.  Follow along clinically.  Warm compresses  TTE 11/27 with no obvious valve vegetations but was not septic at the time.  Repeat TTE ordered, discontinued and TEODORO has been ordered.  We will follow along    Disposition: Unable to determine at this time  Need for PICC line: Unable to determine at this time    Discussed with

## 2022-12-05 NOTE — DISCHARGE PLANNING
Case Management Discharge Planning    Admission Date: 11/26/2022  GMLOS: 3.4  ALOS: 9    6-Clicks ADL Score: 15  6-Clicks Mobility Score: 12  PT and/or OT Eval ordered: Yes  Post-acute Referrals Ordered: Yes  Post-acute Choice Obtained: Yes  Has referral(s) been sent to post-acute provider:  Yes      Anticipated Discharge Dispo: Discharge Disposition: D/T to SNF with Medicare cert in anticipation of skilled care (03)    DME Needed: No    Action(s) Taken: Updated Provider/Nurse on Discharge Plan    Pt has been accepted at  [facility] once medically cleared.    Pt has been discussed in IDT rounds today and the plan is TEODORO today .    This RN CM received  a package for Pt from RN STACI Hines , This RN CM gave package to Pt .  Package is from Pt's Sister in [place], Marielena Slade.   Per Pt once medically cleared, [facility]       Escalations Completed: None    Medically Clear: No    Next Steps:   This RN CM to continue to assist Pt with discharge as needed    Barriers to Discharge:   Pending medical clearance    Is the patient up for discharge tomorrow: No

## 2022-12-05 NOTE — PROGRESS NOTES
Hospital Medicine Daily Progress Note    Date of Service  12/5/2022    Chief Complaint  Paulina Castellanos is a 77 y.o. female admitted 11/26/2022 with found down unable to get up.    Hospital Course  Paulina Castellanos is a 77 y.o. female who was transferred from outside facility 11/26/2022 with atrial fibrillation with RVR on amiodarone drip, mild rhabdo.  Patient slid off her bed at home and was unable to get up for multiple days until found by friend. She was treated with IV fluids and amiodarone at OSH. On presentation she is in sinus rhythm. She is COVID positive with acute hypoxic respiratory failure requiring nasal cannula oxygen. She is started on decadron. She requires additional PT/OT at SNF. She is medically cleared to discharge to SNF when available, may have to wait 10 days for COVID clearance per SNF policy.    Interval Problem Update  11/29: Patient's cardiac echo did not find significant abnormalities.  Check CRP, D-dimer, procalcitonin.  Waiting on SNF placement.  Elevated T4 is secondary to sick euthyroid syndrome.    11/30: Patient had a venous Doppler of the left upper extremity that was negative.  Cardiac echo did not reveal any abnormalities.  CRP is 2.78 and will continue with Decadron.  We will continue to wean her off oxygen.  We will have a reevaluation by PT OT.  SNF placement is pending.    12/1: Patient was found to have thrombophlebitis on the right arm.  I have started IV Unasyn.    12/2: Patient was found to have worsening WBC count.  We will continue with IV hydration.  Procalcitonin is elevated, CRP is elevated.  I will start IV vancomycin.  She was found to be lethargic on examination likely secondary to infectious encephalopathy.  ABG did not show any evidence of hypercapnia or hypoxia.  Blood glucose was within normal limits.  Chest x-ray found mild bilateral infiltrates.  Blood cultures are still pending.    12/3: Patient was found to have MRSA bacteremia.  IV vancomycin was started  yesterday.  I have consulted infectious disease for further management.  Patient currently does not complain of back pain, sore throat, headaches, photophobia.  Mental status is improved today.    12/4: Patient's blood cultures are still positive for MRSA.  Will consult cardiology for TEODORO.  White count improved to 13.9.  Patient is off isolation precautions for COVID    12/5: Tolerating vancomycin.  Awaiting TEODORO to evaluate for endocarditis.  WBC improved to 9.4.  No specific complaints today for the patient.  N.p.o. awaiting procedure.    I have discussed this patient's plan of care and discharge plan at IDT rounds today with Case Management, Nursing, Nursing leadership, and other members of the IDT team.    Consultants/Specialty  none    Code Status  Full Code    Disposition  Patient is medically cleared for discharge.   Anticipate discharge to to skilled nursing facility.  I have placed the appropriate orders for post-discharge needs.    Review of Systems  Review of Systems   Constitutional:  Negative for chills and fever.   Respiratory:  Negative for cough and shortness of breath.    Cardiovascular:  Negative for chest pain, palpitations and leg swelling.   Gastrointestinal:  Negative for abdominal pain, nausea and vomiting.   Neurological:  Positive for weakness. Negative for dizziness, sensory change, focal weakness, loss of consciousness and headaches.   Psychiatric/Behavioral:  Negative for substance abuse. The patient does not have insomnia.    All other systems reviewed and are negative.     Physical Exam  Temp:  [36.2 °C (97.2 °F)-37.1 °C (98.7 °F)] 36.2 °C (97.2 °F)  Pulse:  [] 101  Resp:  [18-20] 18  BP: (107-126)/(55-76) 108/55  SpO2:  [90 %-98 %] 91 %    Physical Exam  Vitals and nursing note reviewed.   Constitutional:       General: She is not in acute distress.     Appearance: She is not diaphoretic.   HENT:      Head: Normocephalic and atraumatic.   Eyes:      General: No scleral  icterus.  Cardiovascular:      Rate and Rhythm: Normal rate and regular rhythm.   Pulmonary:      Effort: Pulmonary effort is normal. No respiratory distress.      Breath sounds: Normal breath sounds. No wheezing or rales.   Abdominal:      General: Bowel sounds are normal. There is no distension.      Palpations: Abdomen is soft.      Tenderness: There is no abdominal tenderness. There is no guarding.   Musculoskeletal:         General: No swelling. Normal range of motion.      Right lower leg: No edema.      Left lower leg: No edema.   Skin:     General: Skin is warm and dry.   Neurological:      General: No focal deficit present.      Mental Status: She is alert and oriented to person, place, and time.      Cranial Nerves: No cranial nerve deficit.      Sensory: No sensory deficit.      Motor: No weakness.   Psychiatric:         Mood and Affect: Mood normal.         Behavior: Behavior normal.       Fluids    Intake/Output Summary (Last 24 hours) at 12/5/2022 1441  Last data filed at 12/5/2022 1130  Gross per 24 hour   Intake --   Output 0 ml   Net 0 ml         Laboratory  Recent Labs     12/03/22 0524 12/04/22  0330 12/05/22  0036   WBC 17.1* 13.9* 9.4   RBC 3.24* 3.20* 3.48*   HEMOGLOBIN 10.2* 9.9* 10.8*   HEMATOCRIT 29.9* 29.4* 31.7*   MCV 92.3 91.9 91.1   MCH 31.5 30.9 31.0   MCHC 34.1 33.7 34.1   RDW 47.9 47.8 47.2   PLATELETCT 126* 145* 155*   MPV 9.9 10.5 10.4         Recent Labs     12/03/22 0524 12/04/22  0330 12/05/22  0036   SODIUM 130* 132* 131*   POTASSIUM 4.5 3.8 4.0   CHLORIDE 99 102 101   CO2 21 21 20   GLUCOSE 119* 88 92   BUN 33* 29* 30*   CREATININE 0.67 0.71 0.87   CALCIUM 8.7 8.6 8.4*                       Imaging  DX-CHEST-PORTABLE (1 VIEW)   Final Result      1.  Curvilinear opacities in the lung bases likely representing atelectasis, less likely pneumonitis.      US-EXTREMITY VENOUS UPPER UNILAT LEFT   Final Result      EC-ECHOCARDIOGRAM COMPLETE W/O CONT   Final Result      OUTSIDE  IMAGES-CT CHEST   Final Result      EC-TEODORO W/O CONT    (Results Pending)          Assessment/Plan  * Atrial fibrillation with rapid ventricular response (HCC)- (present on admission)  Assessment & Plan  New onset atrial fibrillation, patient does not follow with physicians and is not on any medications  Was in A. fib with RVR outside facility initially improved with diltiazem but she became hypotensive so she was started on amiodarone drip  Remains in sinus rhythm  [id number] of 3 for age and female sex. Discussed anticoagulation and a fib stroke risk , she is agreeable to starting AC, started on eliquis.  Echocardiogram normal, EF 65%.  Elevated T4 secondary to sick euthyroid syndrome    MRSA bacteremia  Assessment & Plan  IV vancomycin- follow trough levels   ID has been consulted  Repeat blood cultures again positive for MRSA.  Cardiology consult for TEODORO tomorrow n.p.o. at midnight    Encephalopathy  Assessment & Plan  secondary to infectious etiology  ABG did not show any evidence of hypercapnia or hypoxia    Sepsis (HCC)  Assessment & Plan  This is Sepsis Not present on admission  SIRS criteria identified on my evaluation include: Fever, with temperature greater than 101 deg F, Tachycardia, with heart rate greater than 90 BPM and Leukocytosis, with WBC greater than 12,000  Source is thrombophlebitis  Sepsis protocol initiated  Fluid resuscitation ordered per protocol  Crystalloid Fluid Administration: Patient has advanced or end-stage heart failure (with documentation of NYHA class III or symptoms with minimal exertion, Or NYHA class IV or symptoms at rest or with activity), for this/these reason(s) it is unsafe for this patient to receive 30 mL/kg of fluid  Partial Fluid Dose Given: 10 mL/kg per current or ideal body weight, patient to be reassessed shortly after completion of partial fluid bolus to ensure adequacy of resuscitation  IV antibiotics as appropriate for source of sepsis  Reassessment: I have  reassessed the patient's hemodynamic status          Thrombophlebitis  Assessment & Plan  Secondary to an infiltrative right IV  IV Unasyn  12/2: Worsening WBC count start IV vancomycin    Acute respiratory failure with hypoxia (HCC)  Assessment & Plan  Due to COVID infection  Resolved patient on room air    Adult failure to thrive- (present on admission)  Assessment & Plan  Patient lives alone, fell down and was unable to get up for couple days.  Per report from outside hospital her home was found to be in an unlivable condition  SNF pending   She is COVID-positive    Non-traumatic rhabdomyolysis- (present on admission)  Assessment & Plan  Slipped on the ground and was unable to get up for couple days, CPK at outside facility 1500, trended down to 800 here  No SO, mild LFT elevation.  Can stop IV fluids    Elevated LFTs- (present on admission)  Assessment & Plan  On presentation AST 77, ALT 58.  Likely secondary to mild rhabdomyolysis.   improved    Pneumonia due to COVID-19 virus- (present on admission)  Assessment & Plan  Patient positive for COVID, denies any shortness of breath or cough  On room air, discontinue Decadron  Isolation precautions were discontinued per infection control    Metabolic acidosis, normal anion gap (NAG)- (present on admission)  Assessment & Plan  Resolved    Hypokalemia- (present on admission)  Assessment & Plan  Resolved         VTE prophylaxis: therapeutic anticoagulation with eliquis    I have performed a physical exam and reviewed and updated ROS and Plan today (12/5/2022). In review of yesterday's note (12/4/2022), there are no changes except as documented above.

## 2022-12-05 NOTE — CARE PLAN
The patient is Stable - Low risk of patient condition declining or worsening    Shift Goals  Clinical Goals: q2 turns  Patient Goals: rest    Progress made toward(s) clinical / shift goals:  Q2 turns in place to maintain skin integrity. Pt has been resting comfortably during the shift.      Problem: Knowledge Deficit - Standard  Goal: Patient and family/care givers will demonstrate understanding of plan of care, disease process/condition, diagnostic tests and medications  Outcome: Progressing     Problem: Skin Integrity  Goal: Skin integrity is maintained or improved  Outcome: Progressing     Problem: Fall Risk  Goal: Patient will remain free from falls  Outcome: Progressing     Problem: Communication  Goal: The ability to communicate needs accurately and effectively will improve  Outcome: Progressing     Problem: Pain - Standard  Goal: Alleviation of pain or a reduction in pain to the patient’s comfort goal  Outcome: Progressing       Patient is not progressing towards the following goals:

## 2022-12-05 NOTE — PROGRESS NOTES
"Bedside report received.  Assessment complete.  A&O x 4. Patient calls appropriately.  Patient ambulates with max assist. Bed alarm on.   Patient reports pain as \"high\" but unable to give numerical rating. Declined Tylenol due to NPO status. Heat pack given.  Denies N&V. Tolerating NPO diet.  + void, + flatus, + BM.  Patient denies SOB.  SCD's refused.  Review plan with of care with patient. Call light and personal belongings within reach. Hourly rounding in place. All needs met at this time.   "

## 2022-12-05 NOTE — PROGRESS NOTES
Bedside report received.  Assessment complete.  A&O x 4. Patient calls appropriately.  Patient ambulates with max assist. Q2 turns in place. Bed alarm on.   Patient has 0/10 pain.  Denies N&V. Tolerating regular diet.  L upper arm red and swollen. Mepilex placed to heels.  + void, + flatus, - BM.  Patient denies SOB.  Review plan with of care with patient. Call light and personal belongings within reach. Hourly rounding in place. All needs met at this time.

## 2022-12-06 PROBLEM — R54 FRAILTY SYNDROME IN GERIATRIC PATIENT: Status: ACTIVE | Noted: 2022-11-26

## 2022-12-06 PROBLEM — E83.42 HYPOMAGNESEMIA: Status: ACTIVE | Noted: 2022-12-06

## 2022-12-06 PROBLEM — D64.9 ANEMIA: Status: ACTIVE | Noted: 2022-12-06

## 2022-12-06 PROBLEM — E83.39 HYPOPHOSPHATEMIA: Status: ACTIVE | Noted: 2022-12-06

## 2022-12-06 PROBLEM — E87.1 HYPONATREMIA: Status: ACTIVE | Noted: 2022-12-06

## 2022-12-06 LAB
ANION GAP SERPL CALC-SCNC: 10 MMOL/L (ref 7–16)
BUN SERPL-MCNC: 27 MG/DL (ref 8–22)
CALCIUM SERPL-MCNC: 8.3 MG/DL (ref 8.5–10.5)
CHLORIDE SERPL-SCNC: 101 MMOL/L (ref 96–112)
CO2 SERPL-SCNC: 20 MMOL/L (ref 20–33)
CREAT SERPL-MCNC: 0.92 MG/DL (ref 0.5–1.4)
ERYTHROCYTE [DISTWIDTH] IN BLOOD BY AUTOMATED COUNT: 48.3 FL (ref 35.9–50)
GFR SERPLBLD CREATININE-BSD FMLA CKD-EPI: 64 ML/MIN/1.73 M 2
GLUCOSE SERPL-MCNC: 95 MG/DL (ref 65–99)
HCT VFR BLD AUTO: 30.5 % (ref 37–47)
HGB BLD-MCNC: 10.1 G/DL (ref 12–16)
MCH RBC QN AUTO: 30.5 PG (ref 27–33)
MCHC RBC AUTO-ENTMCNC: 33.1 G/DL (ref 33.6–35)
MCV RBC AUTO: 92.1 FL (ref 81.4–97.8)
PLATELET # BLD AUTO: 177 K/UL (ref 164–446)
PMV BLD AUTO: 10.1 FL (ref 9–12.9)
POTASSIUM SERPL-SCNC: 4 MMOL/L (ref 3.6–5.5)
RBC # BLD AUTO: 3.31 M/UL (ref 4.2–5.4)
SODIUM SERPL-SCNC: 131 MMOL/L (ref 135–145)
VANCOMYCIN PEAK SERPL-MCNC: 47.5 UG/ML (ref 20–40)
WBC # BLD AUTO: 9.7 K/UL (ref 4.8–10.8)

## 2022-12-06 PROCEDURE — 36415 COLL VENOUS BLD VENIPUNCTURE: CPT

## 2022-12-06 PROCEDURE — 700102 HCHG RX REV CODE 250 W/ 637 OVERRIDE(OP): Performed by: STUDENT IN AN ORGANIZED HEALTH CARE EDUCATION/TRAINING PROGRAM

## 2022-12-06 PROCEDURE — 80202 ASSAY OF VANCOMYCIN: CPT

## 2022-12-06 PROCEDURE — A9270 NON-COVERED ITEM OR SERVICE: HCPCS | Performed by: PHYSICIAN ASSISTANT

## 2022-12-06 PROCEDURE — 700102 HCHG RX REV CODE 250 W/ 637 OVERRIDE(OP): Performed by: PHYSICIAN ASSISTANT

## 2022-12-06 PROCEDURE — 770020 HCHG ROOM/CARE - TELE (206)

## 2022-12-06 PROCEDURE — 99232 SBSQ HOSP IP/OBS MODERATE 35: CPT | Performed by: FAMILY MEDICINE

## 2022-12-06 PROCEDURE — 700105 HCHG RX REV CODE 258: Performed by: HOSPITALIST

## 2022-12-06 PROCEDURE — 99233 SBSQ HOSP IP/OBS HIGH 50: CPT | Performed by: INTERNAL MEDICINE

## 2022-12-06 PROCEDURE — A9270 NON-COVERED ITEM OR SERVICE: HCPCS | Performed by: STUDENT IN AN ORGANIZED HEALTH CARE EDUCATION/TRAINING PROGRAM

## 2022-12-06 PROCEDURE — 700111 HCHG RX REV CODE 636 W/ 250 OVERRIDE (IP): Performed by: HOSPITALIST

## 2022-12-06 PROCEDURE — 80048 BASIC METABOLIC PNL TOTAL CA: CPT

## 2022-12-06 PROCEDURE — 85027 COMPLETE CBC AUTOMATED: CPT

## 2022-12-06 RX ADMIN — VANCOMYCIN HYDROCHLORIDE 1750 MG: 500 INJECTION, POWDER, LYOPHILIZED, FOR SOLUTION INTRAVENOUS at 14:08

## 2022-12-06 RX ADMIN — APIXABAN 5 MG: 5 TABLET, FILM COATED ORAL at 04:22

## 2022-12-06 RX ADMIN — GUAIFENESIN SYRUP AND DEXTROMETHORPHAN 5 ML: 100; 10 SYRUP ORAL at 20:59

## 2022-12-06 RX ADMIN — ACETAMINOPHEN 650 MG: 325 TABLET, FILM COATED ORAL at 12:29

## 2022-12-06 RX ADMIN — METOPROLOL TARTRATE 12.5 MG: 25 TABLET, FILM COATED ORAL at 12:29

## 2022-12-06 RX ADMIN — GUAIFENESIN SYRUP AND DEXTROMETHORPHAN 5 ML: 100; 10 SYRUP ORAL at 09:21

## 2022-12-06 RX ADMIN — GUAIFENESIN SYRUP AND DEXTROMETHORPHAN 5 ML: 100; 10 SYRUP ORAL at 16:34

## 2022-12-06 RX ADMIN — APIXABAN 5 MG: 5 TABLET, FILM COATED ORAL at 17:35

## 2022-12-06 RX ADMIN — METOPROLOL TARTRATE 12.5 MG: 25 TABLET, FILM COATED ORAL at 17:35

## 2022-12-06 ASSESSMENT — ENCOUNTER SYMPTOMS
MUSCULOSKELETAL NEGATIVE: 1
CHEST TIGHTNESS: 0
MYALGIAS: 0
VOMITING: 0
WHEEZING: 0
COUGH: 0
ENDOCRINE NEGATIVE: 1
BRUISES/BLEEDS EASILY: 0
DIAPHORESIS: 0
RESPIRATORY NEGATIVE: 1
SORE THROAT: 0
EYES NEGATIVE: 1
CHILLS: 0
CONSTIPATION: 0
LIGHT-HEADEDNESS: 0
HEADACHES: 0
PSYCHIATRIC NEGATIVE: 1
WEIGHT LOSS: 0
SENSORY CHANGE: 0
NECK PAIN: 0
BLURRED VISION: 0
SHORTNESS OF BREATH: 0
DIZZINESS: 0
ALLERGIC/IMMUNOLOGIC NEGATIVE: 1
SPEECH CHANGE: 0
NERVOUS/ANXIOUS: 0
HEMATOLOGIC/LYMPHATIC NEGATIVE: 1
GASTROINTESTINAL NEGATIVE: 1
CARDIOVASCULAR NEGATIVE: 1
WEAKNESS: 1
FEVER: 0
HEARTBURN: 0
FATIGUE: 0
PALPITATIONS: 0
MYALGIAS: 1
NAUSEA: 0
ABDOMINAL PAIN: 0
FLANK PAIN: 0
NEUROLOGICAL NEGATIVE: 1
CONSTITUTIONAL NEGATIVE: 1
COUGH: 1
DIARRHEA: 0
BACK PAIN: 0
FOCAL WEAKNESS: 0

## 2022-12-06 ASSESSMENT — PAIN DESCRIPTION - PAIN TYPE
TYPE: ACUTE PAIN

## 2022-12-06 NOTE — PROGRESS NOTES
4 Eyes Skin Assessment Completed by EVERT Gutierrez and EVERT Doherty.    Head WDL  Ears WDL  Nose WDL  Mouth WDL  Neck WDL  Breast/Chest WDL  Shoulder Blades WDL  Spine WDL  (R) Arm/Elbow/Hand WDL  (L) Arm/Elbow/Hand Swelling, Discoloration, and Edema  Abdomen WDL  Groin WDL  Scrotum/Coccyx/Buttocks WDL  (R) Leg WDL  (L) Leg WDL  (R) Heel/Foot/Toe WDL  (L) Heel/Foot/Toe WDL          Devices In Places Tele Box      Interventions In Place Heel Mepilex, Waffle Overlay, Pillows, and Q2 Turns    Possible Skin Injury No    Pictures Uploaded Into Epic N/A  Wound Consult Placed N/A  RN Wound Prevention Protocol Ordered Yes

## 2022-12-06 NOTE — THERAPY
"Physical Therapy   Daily Treatment     Patient Name: Paulina Castellanos  Age:  77 y.o., Sex:  female  Medical Record #: [id number]  Today's Date: 12/5/2022     Precautions  Precautions: Fall Risk  Comments: covid recovered    Assessment    Pt received in bed and agreeable to PT session. Pt required overall mod to max A to mobilize from sup>sit and bed>chair. Pt only required min A for STS, but was limited with ability to take steps with component of fear of falling. Pt was very appreciative of therapy efforts and reported improved comfort once in the chair. Continue to recommend placement for further rehab. Will follow for acute PT to progress.    Plan    Continue current treatment plan.    DC Equipment Recommendations: Unable to determine at this time  Discharge Recommendations: Recommend post-acute placement for additional physical therapy services prior to discharge home      Subjective    Pt moaned throughout session.     Objective       12/05/22 1501   Precautions   Precautions Fall Risk   Comments covid recovered   Pain 0 - 10 Group   Therapist Pain Assessment Post Activity Pain Same as Prior to Activity;Nurse Notified  (c/o generalized pain, moaning throughout, not rated)   Cognition    Level of Consciousness Alert   Comments Pt moaned throughout session, reporting pain \"everywhere\". Pt reports improved once out of bed. Pt pleasant and cooperative.   Sitting Lower Body Exercises   Comments B LAQ in sitting 2x10   Balance   Sitting Balance (Static) Fair -   Sitting Balance (Dynamic) Fair -   Standing Balance (Static) Poor +   Standing Balance (Dynamic) Poor +   Weight Shift Sitting Fair   Weight Shift Standing Poor   Skilled Intervention Tactile Cuing;Verbal Cuing;Facilitation   Comments FWW in standing, required facilitation for transfer, fear of falling   Gait Analysis   Gait Level Of Assist Unable to Participate   Bed Mobility    Supine to Sit Maximal Assist   Scooting Moderate Assist   Rolling Moderate Assist to Rt. "   Skilled Intervention Verbal Cuing;Tactile Cuing;Sequencing;Facilitation   Comments HOB raised, cues for rolling technique and sitting from sidelying position   Functional Mobility   Sit to Stand Minimal Assist   Bed, Chair, Wheelchair Transfer Maximal Assist   Transfer Method Stand Step   Mobility bed>chair   Skilled Intervention Verbal Cuing;Tactile Cuing;Compensatory Strategies   Comments STSx2 from EOB with min A. Pt with difficulty moving LE for transfer, significant fear of falling   How much difficulty does the patient currently have...   Turning over in bed (including adjusting bedclothes, sheets and blankets)? 1   Sitting down on and standing up from a chair with arms (e.g., wheelchair, bedside commode, etc.) 1   Moving from lying on back to sitting on the side of the bed? 1   How much help from another person does the patient currently need...   Moving to and from a bed to a chair (including a wheelchair)? 2   Need to walk in a hospital room? 1   Climbing 3-5 steps with a railing? 1   6 clicks Mobility Score 7   Short Term Goals    Short Term Goal # 1 pt will be able to complete supine<>sitting with SPV in 6tx in order to dc home   Goal Outcome # 1 goal not met   Short Term Goal # 2 pt will be able to complete functional transfers with FWW and min assist in 6tx in order to progress home   Goal Outcome # 2 Goal not met   Short Term Goal # 3 pt will be able to ambulate 25ft with FWW and min assist in 6tx in order to progress to home   Goal Outcome # 3 Goal not met   Short Term Goal # 4 pt will be able to negotiate 4 steps with min assist in 6tx in order to progress to home   Goal Outcome # 4 Goal not met   Anticipated Discharge Equipment and Recommendations   DC Equipment Recommendations Unable to determine at this time   Discharge Recommendations Recommend post-acute placement for additional physical therapy services prior to discharge home   Interdisciplinary Plan of Care Collaboration   IDT Collaboration  with  Nursing   Patient Position at End of Therapy Seated;Chair Alarm On;Call Light within Reach;Tray Table within Reach;Phone within Reach   Collaboration Comments RN updated   Session Information   Date / Session Number  12/5-3(1/3, 12/10)

## 2022-12-06 NOTE — PROGRESS NOTES
Bedside report received.  Assessment complete.  A&O x 4. Patient calls appropriately.  Patient ambulates with max assist. Bed alarm on.   Patient has 0/10 pain. Pain management available with prescribed medications.  Denies N&V. Tolerating regular diet.  + void, + flatus, + BM.  Patient denies SOB.  SCD's refused.  Review plan with of care with patient. Call light and personal belongings within reach. Hourly rounding in place. All needs met at this time.

## 2022-12-06 NOTE — CARE PLAN
The patient is Stable - Low risk of patient condition declining or worsening    Shift Goals  Clinical Goals: Q2 turns, mobility, skin check  Patient Goals: rest  Family Goals: RICARDO    Progress made toward(s) clinical / shift goals:      Patient's skin integrity is maintained with Q2 turns and frequent assessments and interventions of incontinence. Pain is controlled with PRN pain medication.    Problem: Skin Integrity  Goal: Skin integrity is maintained or improved  Outcome: Progressing     Problem: Pain - Standard  Goal: Alleviation of pain or a reduction in pain to the patient’s comfort goal  Outcome: Progressing

## 2022-12-06 NOTE — PROGRESS NOTES
"Cardiology Follow Up Progress Note    Date of Service  12/6/2022    Attending Physician  Nawaf Palacios M.D.    Chief Complaint   Fall and shortness of breath    HPI  Paulina Castellanos is a 77 y.o. female admitted 11/26/2022 with acute respiratory failure secondary to COVID pneumonia.    Her hospital course has been complicated by rhabdomyolysis, A. fib with RVR and MRSA bacteremia.  A TEODORO was recommended by infectious disease.    Interim Events  12/6/2022: No cardiac events overnight. Sinus rhythm with PVCs and PACs at rates of 94-99 on telemetry. Vitals signs stable. SPO2 91-97% on room air. She reports that she is doing ok because she is \"still alive.\" No chest pain or palpitations. No shortness of breath, dyspnea on exertion, orthopnea or PND. No lower extremity edema. No dizziness or lightheadedness. No syncope or presyncope.        Review of Systems  Review of Systems   Constitutional: Negative.  Negative for chills, diaphoresis, fatigue and fever.   HENT: Negative.     Eyes: Negative.    Respiratory: Negative.  Negative for cough, chest tightness and shortness of breath.    Cardiovascular: Negative.  Negative for chest pain, palpitations and leg swelling.   Gastrointestinal: Negative.  Negative for constipation, diarrhea, nausea and vomiting.   Endocrine: Negative.    Genitourinary: Negative.  Negative for decreased urine volume, difficulty urinating, dysuria and frequency.   Musculoskeletal: Negative.    Skin: Negative.    Allergic/Immunologic: Negative.    Neurological: Negative.  Negative for dizziness and light-headedness.   Hematological: Negative.  Does not bruise/bleed easily.   Psychiatric/Behavioral: Negative.       Vital signs in last 24 hours  Temp:  [35.9 °C (96.7 °F)-36.9 °C (98.4 °F)] 36.6 °C (97.8 °F)  Pulse:  [] 107  Resp:  [18] 18  BP: (107-133)/(55-78) 126/59  SpO2:  [90 %-97 %] 93 %    Physical Exam  Physical Exam  Vitals and nursing note reviewed.   Constitutional:       General: She is " not in acute distress.     Appearance: Normal appearance. She is not toxic-appearing.   HENT:      Head: Normocephalic and atraumatic.      Right Ear: External ear normal.      Left Ear: External ear normal.      Nose: Nose normal.      Mouth/Throat:      Mouth: Mucous membranes are moist.      Pharynx: Oropharynx is clear.   Eyes:      General: No scleral icterus.     Extraocular Movements: Extraocular movements intact.      Conjunctiva/sclera: Conjunctivae normal.      Pupils: Pupils are equal, round, and reactive to light.   Neck:      Vascular: No JVD.   Cardiovascular:      Rate and Rhythm: Normal rate and regular rhythm.      Pulses: Normal pulses.      Heart sounds: Normal heart sounds. No murmur heard.    No friction rub. No gallop.   Pulmonary:      Effort: Pulmonary effort is normal.      Breath sounds: Normal breath sounds.   Abdominal:      General: Abdomen is flat. Bowel sounds are normal. There is no distension.      Palpations: Abdomen is soft.   Musculoskeletal:         General: Normal range of motion.      Cervical back: Normal range of motion and neck supple.      Right lower leg: No edema.      Left lower leg: No edema.   Skin:     General: Skin is warm and dry.      Capillary Refill: Capillary refill takes less than 2 seconds.      Coloration: Skin is not jaundiced.   Neurological:      General: No focal deficit present.      Mental Status: She is alert and oriented to person, place, and time.   Psychiatric:         Mood and Affect: Mood normal.         Behavior: Behavior normal.       Lab Review  Lab Results   Component Value Date/Time    WBC 9.7 12/06/2022 12:20 AM    RBC 3.31 (L) 12/06/2022 12:20 AM    HEMOGLOBIN 10.1 (L) 12/06/2022 12:20 AM    HEMATOCRIT 30.5 (L) 12/06/2022 12:20 AM    MCV 92.1 12/06/2022 12:20 AM    MCH 30.5 12/06/2022 12:20 AM    MCHC 33.1 (L) 12/06/2022 12:20 AM    MPV 10.1 12/06/2022 12:20 AM      Lab Results   Component Value Date/Time    SODIUM 131 (L) 12/06/2022 12:20  AM    POTASSIUM 4.0 12/06/2022 12:20 AM    CHLORIDE 101 12/06/2022 12:20 AM    CO2 20 12/06/2022 12:20 AM    GLUCOSE 95 12/06/2022 12:20 AM    BUN 27 (H) 12/06/2022 12:20 AM    CREATININE 0.92 12/06/2022 12:20 AM      Lab Results   Component Value Date/Time    ASTSGOT 30 12/02/2022 05:14 AM    ALTSGPT 26 12/02/2022 05:14 AM     Lab Results   Component Value Date/Time    TROPONINT 33 (H) 11/26/2022 10:03 AM       No results for input(s): NTPROBNP in the last 72 hours.    Cardiac Imaging and Procedures Review  EKG:  My personal interpretation of the EKG dated 12/2/2022 is sinus rhythm    Echocardiogram: 11/27/2022  No prior study is available for comparison.   Normal left ventricular chamber size.  Mild concentric left ventricular hypertrophy.  The left ventricular ejection fraction is visually estimated to be 65%.  Trace mitral regurgitation.    Imaging  Chest X-Ray:  12/2/2022  FINDINGS:  Heart size is within normal limits.  There are curvilinear opacities in the lung bases.  No pleural abnormalities are noted.  IMPRESSION:  1.  Curvilinear opacities in the lung bases likely representing atelectasis, less likely pneumonitis.    Assessment/Plan  New onset A. fib with RVR  -Likely due to Covid-pneumonia  -In sinus rhythm since 11/26/2022 after IV diltiazem and amiodarone.  -Asymptomatic now in sinus rhythm. Amiodarone drip discontinued 11/26/2022 with no recurrence of A. fib.    -ISP2LS8-GHDk Score : At least 3  -Anticoagulated with Eliquis 5mg twice daily  -Start Metoprolol 12.5mg BID for rate control    MRSA bacteremia  -Blood cultures initially positive 12/3/2022. Repeat blood culture is pending.   - Being treated with vanco per ID  -Left upper extremity clot suspicious for seeding  - TEODORO with anesthesia is scheduled for 1300 tomorrow.     Please see Dr. Delarosa's attestation for further details and recommendations.  Cardiology will continue to follow.    Please contact me with any questions.    Thank you for  allowing me to participate in the care of Paulina Castellanos .    Michelle Lozano PA-C, Cardiology  [facility]  [address] [address]  [place] [address]  Phone: [phone]  Fax: [phone]    PLEASE NOTE: This Note was created using voice recognition Software. I have made every reasonable attempt to correct obvious errors, but I expect that there are errors of grammar and possibly content that I did not discover before finalizing the note.     I personally spent a total of 13 minutes which includes face-to-face time and non-face-to-face time spent on preparing to see the patient, reviewing hospital notes and tests, obtaining history from the patient, performing a medically appropriate exam, counseling and educating the patient, ordering medications/tests/procedures/referrals as clinically indicated, and documenting information in the electronic medical record.

## 2022-12-06 NOTE — PROGRESS NOTES
Infectious Disease Progress Note    Author: Ramesh Tee M.D. Date & Time of service: 2022  8:49 AM    Chief Complaint:  MRSA bacteremia    Interval History:  77 y.o. female admitted 2022. For Afib with RVR and rhabdomyolysis +COVID  Developed fever and AMS dueing hosp-blood cultures +MRSA   AF WBC 13.9 Oriented to person and place c/o LUE pain No dyspnea or CP. No new neck, back, joint pain   patient remains afebrile, white count is resolved now, down to 9.4, tolerating vancomycin.  Patient states she is tired but overall better.   left arm   patient remains afebrile, white count is 9.7, tolerating vancomycin.  Plan as below    Labs Reviewed, Medications Reviewed, and Radiology Reviewed.    Review of Systems:  Review of Systems   Constitutional:  Positive for malaise/fatigue. Negative for fever.   Gastrointestinal:  Negative for abdominal pain, diarrhea, nausea and vomiting.   Musculoskeletal:  Positive for myalgias.   Skin:  Negative for itching and rash.   All other systems reviewed and are negative.    Hemodynamics:  Temp (24hrs), Av.3 °C (97.4 °F), Min:35.9 °C (96.7 °F), Max:36.7 °C (98.1 °F)  Temperature: 36.7 °C (98.1 °F)  Pulse  Av.8  Min: 71  Max: 136   Blood Pressure : 104/59       Physical Exam:  Physical Exam  Vitals and nursing note reviewed.   Constitutional:       General: She is not in acute distress.     Appearance: She is ill-appearing. She is not toxic-appearing or diaphoretic.   HENT:      Mouth/Throat:      Pharynx: No oropharyngeal exudate.      Comments: Fair dentition  Eyes:      General: No scleral icterus.     Extraocular Movements: Extraocular movements intact.      Pupils: Pupils are equal, round, and reactive to light.   Pulmonary:      Effort: Pulmonary effort is normal. No respiratory distress.      Breath sounds: No stridor.   Abdominal:      General: There is no distension.      Palpations: Abdomen is soft.      Tenderness: There is no  abdominal tenderness.   Musculoskeletal:         General: Swelling and tenderness present.      Cervical back: Neck supple. No rigidity.   Skin:     Coloration: Skin is pale. Skin is not jaundiced.      Findings: Bruising and erythema present.      Comments: IV site left AC oozing LUE with erythema palpable cord no fluctuance   Neurological:      General: No focal deficit present.      Mental Status: She is alert. She is disoriented.       Meds:    Current Facility-Administered Medications:     vancomycin    MD Alert...Vancomycin per Pharmacy    Respiratory Therapy Consult    LR    guaiFENesin dextromethorphan    benzonatate    senna-docusate **AND** polyethylene glycol/lytes **AND** magnesium hydroxide **AND** bisacodyl    acetaminophen    hydrALAZINE    Metoprolol Tartrate    apixaban    Labs:  Recent Labs     12/04/22 0330 12/05/22 0036 12/06/22  0020   WBC 13.9* 9.4 9.7   RBC 3.20* 3.48* 3.31*   HEMOGLOBIN 9.9* 10.8* 10.1*   HEMATOCRIT 29.4* 31.7* 30.5*   MCV 91.9 91.1 92.1   MCH 30.9 31.0 30.5   RDW 47.8 47.2 48.3   PLATELETCT 145* 155* 177   MPV 10.5 10.4 10.1   NEUTSPOLYS 85.70*  --   --    LYMPHOCYTES 7.90*  --   --    MONOCYTES 5.00  --   --    EOSINOPHILS 0.20  --   --    BASOPHILS 0.50  --   --        Recent Labs     12/04/22 0330 12/05/22 0036 12/06/22  0020   SODIUM 132* 131* 131*   POTASSIUM 3.8 4.0 4.0   CHLORIDE 102 101 101   CO2 21 20 20   GLUCOSE 88 92 95   BUN 29* 30* 27*       Recent Labs     12/04/22 0330 12/05/22  0036 12/06/22  0020   CREATININE 0.71 0.87 0.92         Imaging:  DX-CHEST-PORTABLE (1 VIEW)    Result Date: 12/2/2022 12/2/2022 12:15 PM HISTORY/REASON FOR EXAM:  Shortness of Breath. TECHNIQUE/EXAM DESCRIPTION AND NUMBER OF VIEWS: Single portable view of the chest. COMPARISON: None FINDINGS: Heart size is within normal limits. There are curvilinear opacities in the lung bases. No pleural abnormalities are noted.     1.  Curvilinear opacities in the lung bases likely  representing atelectasis, less likely pneumonitis.    US-EXTREMITY VENOUS UPPER UNILAT LEFT    Result Date: 2022   Upper Extremity  Venous Duplex Report  Vascular Laboratory  CONCLUSIONS  Left upper extremity venous duplex imaging.  No evidence of deep venous thrombosis.  Evidence of acute to subacute superficial venous thrombosis in the LEFT  cephalic vein from the mid to distal bicep.  MASSIEL SILVA  Exam Date:     2022 13:29  Room #:     Inpatient  Priority:     Routine  Ht (in):             Wt (lb):  Ordering Physician:        EWELINA WOOD  Referring Physician:       NINA Mcknight  Sonographer:               Giovana oMran RVKAROLINA  Study Type:                Complete Unilateral  Technical Quality:         Adequate  Age:    77    Gender:     F  MRN:    [id number]  :    1945      BSA:  Indications:     Localized swelling, mass and lump, unspecified upper limb,                   Edema, unspecified  CPT Codes:       70297  ICD Codes:       R22.30  R60.9  History:         Left upper extremity swelling/edema. No prior exams.  Limitations:  PROCEDURES:  Left upper extremity venous duplex imaging.  The following venous structures were evaluated: internal jugular,  subclavian, axillary, brachial, cephalic, and basilic veins.  Serial compression, color, and spectral Doppler flow evaluations were  performed.  FINDINGS:  Left upper extremity-  No evidence of deep venous thrombosis.  Evidence of acute to subacute superficial venous thrombosis in the cephalic  vein from the mid to distal bicep.  All other veins demonstrate complete color filling and compressibility with  normal venous flow dynamics including spontaneous flow and respiratory  phasicity.  Flow was evaluated in the contralateral subclavian vein and normal venous  flow dynamics including spontaneous flow and respiratory phasic variation  were demonstrated.  Brittney MUNOZ To  (Electronically Signed)  Final Date:      2022                    16:24    EC-ECHOCARDIOGRAM COMPLETE W/O CONT    Result Date: 2022  Transthoracic Echo Report Echocardiography Laboratory CONCLUSIONS No prior study is available for comparison. Normal left ventricular chamber size. Mild concentric left ventricular hypertrophy. The left ventricular ejection fraction is visually estimated to be 65%. Trace mitral regurgitation. MASSIEL SILVA Exam Date:         2022                    13:31 Exam Location:     Inpatient Priority:          Routine Ordering Physician:        BETH GIL Referring Physician:       [id number] Ibarra Sonographer:               Frank ARANDA Age:    77     Gender:    F MRN:    [id number] :    1945 BSA:    1.89   Ht (in):    67     Wt (lb):    170 Exam Type:     Complete Indications:     Atrial Fibrillation, Shortness of breath ICD Codes:       427.31  786.05 CPT Codes:       80569 BP:   121    /   65     HR: Technical Quality:       Poor MEASUREMENTS  (Male / Female) Normal Values 2D ECHO LV Diastolic Diameter PLAX        4.2 cm                4.2 - 5.9 / 3.9 - 5.3 cm LV Systolic Diameter PLAX         2.7 cm                2.1 - 4.0 cm IVS Diastolic Thickness           1.1 cm                LVPW Diastolic Thickness          1.1 cm                LVOT Diameter                     1.8 cm                Estimated LV Ejection Fraction    65 %                  LV Ejection Fraction MOD BP       73.1 %                >= 55  % LV Ejection Fraction MOD 4C       78.7 %                LV Ejection Fraction MOD 2C       73 %                  DOPPLER AV Peak Velocity                  1.1 m/s               AV Peak Gradient                  5.3 mmHg              AV Mean Gradient                  2.9 mmHg              LVOT Peak Velocity                1.1 m/s               AV Area Cont Eq vti               2.6 cm2               MV Velocity Time Integral         21 cm                 Mitral E Point Velocity           0.86 m/s              Mitral E to A  Ratio               0.75                  Mitral A Duration                 156 ms                MV Pressure Half Time             31.9 ms               MV Area PHT                       6.9 cm2               MV Deceleration Time              85.8 ms               * Indicates values subject to auto-interpretation LV EF:  65    % FINDINGS Left Ventricle Normal left ventricular chamber size. Mild concentric left ventricular hypertrophy. Normal left ventricular systolic function. The left ventricular ejection fraction is visually estimated to be 65%. Normal regional wall motion. Right Ventricle The right ventricle is not well visualized. Right Atrium The right atrium is not well visualized. Left Atrium Normal left atrial size. Left atrial volume index is 24 mL/sq m. Mitral Valve The mitral valve is not well visualized. No mitral stenosis. Trace mitral regurgitation. Aortic Valve The aortic valve is not well visualized. Tricuspid Valve The tricuspid valve is not well visualized. No stenosis or regurgitation seen. Pulmonic Valve The pulmonic valve is not well visualized. No stenosis or regurgitation seen. Pericardium No pericardial effusion. Aorta Normal aortic root for body surface area. The ascending aorta diameter is 3.1 cm. Zafar Bailon M.D. (Electronically Signed) Final Date:     27 November 2022                 20:23      Micro:  Results       Procedure Component Value Units Date/Time    BLOOD CULTURE [[id number]]  (Abnormal) Collected: 12/05/22 1048    Order Status: Completed Specimen: Blood from Peripheral Updated: 12/06/22 0430     Significant Indicator POS     Source BLD     Site PERIPHERAL     Culture Result Growth detected by Bactec instrument. 12/06/2022  04:29  Gram Stain: Gram positive cocci: Possible Staphylococcus sp.      Narrative:      CALL  [facility]ter[facility]  141 tel. [phone],  CALLED  141 tel. [phone] 12/06/2022, 04:30, RB PERF. RESULTS CALLED TO:RN  [phone]  Contact  Per Hospital Policy: Only change  "Specimen Src: to \"Line\" if  specified by physician order.  Right Forearm/Arm    BLOOD CULTURE [[id number]]  (Abnormal) Collected: 12/05/22 1048    Order Status: Completed Specimen: Blood from Peripheral Updated: 12/06/22 0334     Significant Indicator POS     Source BLD     Site PERIPHERAL     Culture Result Growth detected by Bactec instrument. 12/06/2022  03:32  Gram Stain: Gram positive cocci: Possible Staphylococcus sp.      Narrative:      CALL  [facility]  141 tel. [phone],  CALLED  141 tel. [phone] 12/06/2022, 03:33, RB PERF. RESULTS CALLED TO: RN  [phone]  Contact  Per Hospital Policy: Only change Specimen Src: to \"Line\" if  specified by physician order.  Left Hand    BLOOD CULTURE [[id number]]  (Abnormal) Collected: 12/03/22 1233    Order Status: Completed Specimen: Blood from Peripheral Updated: 12/05/22 0801     Significant Indicator POS     Source BLD     Site PERIPHERAL     Culture Result Growth detected by Bactec instrument. 12/04/2022  10:42      Methicillin Resistant Staphylococcus aureus  See previous culture for sensitivity report.      Narrative:      CALL  [facility]  141 tel. [phone],  CALLED  141 tel. [phone] 12/04/2022, 10:41, RB PERF. RESULTS CALLED  TO:Mark LOYD [phone]  Enhanced Droplet, Contact, and Eye Protection  Per Hospital Policy: Only change Specimen Src: to \"Line\" if  specified by physician order.  Left Hand    BLOOD CULTURE [[id number]]  (Abnormal) Collected: 12/03/22 1233    Order Status: Completed Specimen: Blood from Peripheral Updated: 12/05/22 0759     Significant Indicator POS     Source BLD     Site PERIPHERAL     Culture Result Growth detected by Bactec instrument. 12/04/2022  17:48      Methicillin Resistant Staphylococcus aureus  See previous culture for sensitivity report.      Narrative:      Enhanced Droplet, Contact, and Eye Protection  Per Hospital Policy: Only change Specimen Src: to \"Line\" if  specified by physician order.  Right Hand    BLOOD CULTURE [[id number]]  " "(Abnormal) Collected: 12/02/22 0514    Order Status: Completed Specimen: Blood from Peripheral Updated: 12/05/22 0730     Significant Indicator POS     Source BLD     Site PERIPHERAL     Culture Result Growth detected by Bactec instrument. 12/03/2022  01:14      Methicillin Resistant Staphylococcus aureus  See previous culture for sensitivity report.      Narrative:      Enhanced Droplet, Contact, and Eye Protection  Per Hospital Policy: Only change Specimen Src: to \"Line\" if  specified by physician order.  Right AC    BLOOD CULTURE [[id number]]  (Abnormal)  (Susceptibility) Collected: 12/02/22 0514    Order Status: Completed Specimen: Blood from Peripheral Updated: 12/05/22 0729     Significant Indicator POS     Source BLD     Site PERIPHERAL     Culture Result Growth detected by Bactec instrument. 12/03/2022  01:14  Methicillin Resistant Staphylococcus aureus (MRSA)  detected by PCR.        Methicillin Resistant Staphylococcus aureus    Narrative:      CALL  [facility]  171 tel. [phone],  CALLED  171 tel. [phone] 12/03/2022, 03:18, RB PERF. RESULTS CALLED  TO:[phone] (Maksim)  Enhanced Droplet, Contact, and Eye Protection  Per Hospital Policy: Only change Specimen Src: to \"Line\" if  specified by physician order.  Right Hand    Susceptibility       Methicillin resistant staphylococcus aureus (1)       Antibiotic Interpretation Microscan   Method Status    Azithromycin Resistant >4 mcg/mL JADIEL Final    Clindamycin Sensitive 0.5 mcg/mL JADIEL Final    Cefazolin Resistant <=8 mcg/mL JADIEL Final    Cefepime Resistant >16 mcg/mL JADIEL Final    Ceftaroline Sensitive <=0.5 mcg/mL JADIEL Final    Daptomycin Sensitive 1 mcg/mL JADIEL Final    Ampicillin/sulbactam Resistant >16/8 mcg/mL JADIEL Final    Erythromycin Resistant >4 mcg/mL JADIEL Final    Vancomycin Sensitive 1 mcg/mL JADIEL Final    Oxacillin Resistant >2 mcg/mL JADIEL Final    Trimeth/Sulfa Sensitive <=0.5/9.5 mcg/mL JADIEL Final    Tetracycline Sensitive <=4 mcg/mL JADIEL Final                 "       MRSA By PCR (Amp) [[id number]] Collected: 12/02/22 1220    Order Status: Completed Specimen: Respirate from Nares Updated: 12/02/22 1704     MRSA by PCR Negative    Narrative:      Enhanced Droplet, Contact, and Eye Protection  Collected By: [id number] RENETTA QUINTERO    URINALYSIS [[id number]]     Order Status: No result Specimen: Urine, Clean Catch             Assessment:  This is a 77-year-old female patient who was transferred from an outside facility, slid off her bed at home and was unable to get up for multiple days, found by a friend, was found to be in A. fib with RVR and with mild rhabdomyolysis.  She was also noted to have COVID-19 requiring nasal cannula oxygen, resolved.  On 12/1, she developed right arm thrombophlebitis and the following day had worsening leukocytosis and procalcitonin, blood cultures positive for MRSA.    Hospital onset sepsis with MRSA bacteremia, likely secondary to thrombophlebitis that may be seeded with infection based on exam findings..  IV line has been removed.    Pertinent Diagnoses:  MRSA bacteremia, persistent  SO, resolved  Recent COVID-19  Thrombophlebitis     PLAN:   MRSA bacteremia  Thrombophlebitis, likely seeded with infection  Low grade fever and leukocytosis resolved  BCxs + MRSA 12/2 and 12/3, 12/5  Continue vancomycin. Monitor vanco trough and renal function  Suspicion for seeding of infection in left upper extremity clot.  Follow along clinically.  Warm compresses.  If no significant improvement, may need imaging to ensure no abscess development  TTE 11/27 with no obvious valve vegetations but was not septic at the time.  Repeat TTE ordered, discontinued and TEODORO has been ordered, pending.  We will follow along    Disposition: Unable to determine at this time  Need for PICC line: Unable to determine at this time    Discussed with

## 2022-12-06 NOTE — PROGRESS NOTES
Hospital Medicine Daily Progress Note    Date of Service  12/6/2022    Chief Complaint  Paulina Castellanos is a 77 y.o. female admitted 11/26/2022 with atrial fibrillation with rapid ventricular rate.      Hospital Course  Admitted for atrial fibrillation with rapid ventricular rate, COVID-19 with pneumonia and respiratory failure with hypoxia, mild rhabdomyolysis. She was started on Decadron, oxygen supplementation, and supportive measures.  She was placed on metoprolol for rate control, and Eliquis for anticoagulation.  She apparently developed fever and encephalopathy, and was noted to have left arm thrombophlebitis.  Work-up further showed MRSA bacteremia.  Infectious disease was consulted on the case.  Patient was started empiric coverage with IV vancomycin.  Repeat blood cultures have remained positive for MRSA.    Interval Problem Update  Bacteremia -repeat cultures positive for MRSA, for TEODORO  A. Fib -currently sinus  Encephalopathy -alert and oriented  Respiratory failure - off O2    I have discussed this patient's plan of care and discharge plan at IDT rounds today with Case Management, Nursing, Nursing leadership, and other members of the IDT team.    Consultants/Specialty  cardiology and infectious disease    Code Status  Full Code    Disposition  Patient is not medically cleared for discharge.   Anticipate discharge to to skilled nursing facility.  I have placed the appropriate orders for post-discharge needs.    Review of Systems  Review of Systems   Constitutional:  Positive for malaise/fatigue. Negative for chills, diaphoresis, fever and weight loss.   HENT:  Negative for congestion, hearing loss and sore throat.    Eyes:  Negative for blurred vision.   Respiratory:  Positive for cough. Negative for shortness of breath and wheezing.    Cardiovascular:  Negative for chest pain, palpitations and leg swelling.   Gastrointestinal:  Negative for abdominal pain, diarrhea, heartburn, nausea and vomiting.    Genitourinary:  Negative for dysuria, flank pain and hematuria.   Musculoskeletal:  Negative for back pain, joint pain, myalgias and neck pain.   Skin:  Negative for rash.   Neurological:  Positive for weakness. Negative for dizziness, sensory change, speech change, focal weakness and headaches.   Psychiatric/Behavioral:  The patient is not nervous/anxious.       Physical Exam  Temp:  [35.9 °C (96.7 °F)-36.7 °C (98.1 °F)] 36.7 °C (98 °F)  Pulse:  [] 92  Resp:  [18] 18  BP: (104-133)/(59-78) 110/61  SpO2:  [92 %-97 %] 94 %    Physical Exam  Vitals and nursing note reviewed.   HENT:      Head: Normocephalic and atraumatic.      Nose: No congestion.      Mouth/Throat:      Mouth: Mucous membranes are moist.   Eyes:      Extraocular Movements: Extraocular movements intact.      Conjunctiva/sclera: Conjunctivae normal.   Cardiovascular:      Rate and Rhythm: Normal rate and regular rhythm.   Pulmonary:      Effort: Pulmonary effort is normal.      Breath sounds: Normal breath sounds.   Abdominal:      General: There is no distension.      Tenderness: There is no abdominal tenderness. There is no guarding or rebound.   Musculoskeletal:         General: Swelling (left arm) present.      Cervical back: No tenderness.      Right lower leg: No edema.      Left lower leg: No edema.   Skin:     General: Skin is warm and dry.   Neurological:      General: No focal deficit present.      Mental Status: She is alert and oriented to person, place, and time.      Cranial Nerves: No cranial nerve deficit.       Fluids    Intake/Output Summary (Last 24 hours) at 12/6/2022 1537  Last data filed at 12/6/2022 0922  Gross per 24 hour   Intake 120 ml   Output 0 ml   Net 120 ml       Laboratory  Recent Labs     12/04/22  0330 12/05/22  0036 12/06/22  0020   WBC 13.9* 9.4 9.7   RBC 3.20* 3.48* 3.31*   HEMOGLOBIN 9.9* 10.8* 10.1*   HEMATOCRIT 29.4* 31.7* 30.5*   MCV 91.9 91.1 92.1   MCH 30.9 31.0 30.5   MCHC 33.7 34.1 33.1*   RDW 47.8  47.2 48.3   PLATELETCT 145* 155* 177   MPV 10.5 10.4 10.1     Recent Labs     12/04/22  0330 12/05/22  0036 12/06/22  0020   SODIUM 132* 131* 131*   POTASSIUM 3.8 4.0 4.0   CHLORIDE 102 101 101   CO2 21 20 20   GLUCOSE 88 92 95   BUN 29* 30* 27*   CREATININE 0.71 0.87 0.92   CALCIUM 8.6 8.4* 8.3*                   Imaging  DX-CHEST-PORTABLE (1 VIEW)   Final Result      1.  Curvilinear opacities in the lung bases likely representing atelectasis, less likely pneumonitis.      US-EXTREMITY VENOUS UPPER UNILAT LEFT   Final Result      EC-ECHOCARDIOGRAM COMPLETE W/O CONT   Final Result      OUTSIDE IMAGES-CT CHEST   Final Result      EC-TEODORO W/O CONT    (Results Pending)        Assessment/Plan  * Atrial fibrillation with rapid ventricular response (HCC)- (present on admission)  Assessment & Plan  Metoprolol, Eliquis  [id number] -  3    MRSA bacteremia- (present on admission)  Assessment & Plan  IV Vancomycin  Follow blood cultures  For TEODORO    Encephalopathy acute- (present on admission)  Assessment & Plan  secondary to infectious etiology  Avoid Benzodiazepines and Anticholinergics  Frequent orientation  Open window blinds during the day  Avoid naps during the day  Family at bedside whenever possible  Ensure adequate sleep at night - use Melatonin preferably  Avoid early morning labs  Avoid vital signs during sleep  Ambulate if possible  Provide adequate pain control  Monitor for urinary retention  Prevent and manage constipation  Discontinue IVs, Catheters, Tubes, Lines, Cardiac monitors, SCDs if possible    Sepsis (HCC)- (present on admission)  Assessment & Plan  This is Sepsis Not present on admission  SIRS criteria identified on my evaluation include: Fever, with temperature greater than 101 deg F, Tachycardia, with heart rate greater than 90 BPM and Leukocytosis, with WBC greater than 12,000  Source -MRSA bacteremia  Sepsis protocol initiated  Fluid resuscitation ordered per protocol  Crystalloid Fluid  Administration: Patient has advanced or end-stage heart failure (with documentation of NYHA class III or symptoms with minimal exertion, Or NYHA class IV or symptoms at rest or with activity), for this/these reason(s) it is unsafe for this patient to receive 30 mL/kg of fluid  Partial Fluid Dose Given: 10 mL/kg per current or ideal body weight, patient to be reassessed shortly after completion of partial fluid bolus to ensure adequacy of resuscitation  IV antibiotics as appropriate for source of sepsis  Reassessment: I have reassessed the patient's hemodynamic status          Pneumonia due to COVID-19 virus- (present on admission)  Assessment & Plan  Finished course of Decadron    Anemia- (present on admission)  Assessment & Plan  Follow CBC    Thrombophlebitis- (present on admission)  Assessment & Plan  IV vancomycin    Acute respiratory failure with hypoxia (HCC)- (present on admission)  Assessment & Plan  Due to COVID 19  RT protocol    Metabolic acidosis, normal anion gap (NAG)- (present on admission)  Assessment & Plan  Resolved    Frailty syndrome in geriatric patient- (present on admission)  Assessment & Plan  Consult palliative care    Non-traumatic rhabdomyolysis- (present on admission)  Assessment & Plan  resolved    Elevated LFTs- (present on admission)  Assessment & Plan  Follow CMP    Hypokalemia- (present on admission)  Assessment & Plan  Follow bmp    Hypomagnesemia- (present on admission)  Assessment & Plan  Follow level    Hypophosphatemia- (present on admission)  Assessment & Plan  Follow level    Hyponatremia- (present on admission)  Assessment & Plan  Follow BMP       VTE prophylaxis: therapeutic anticoagulation with Eliquis    I have performed a physical exam and reviewed and updated ROS and Plan today (12/6/2022). In review of yesterday's note (12/5/2022), there are no changes except as documented above.

## 2022-12-06 NOTE — CARE PLAN
The patient is Stable - Low risk of patient condition declining or worsening    Shift Goals  Clinical Goals: q2 turns, mobility  Patient Goals: rest  Family Goals: RICARDO    Progress made toward(s) clinical / shift goals:      Patient's skin integrity is maintained with frequent assessments, perineal care, and linen changes as needed for incontinence. Pain is controlled with PRN medication.     Problem: Skin Integrity  Goal: Skin integrity is maintained or improved  Outcome: Progressing     Problem: Pain - Standard  Goal: Alleviation of pain or a reduction in pain to the patient’s comfort goal  Outcome: Progressing

## 2022-12-06 NOTE — DISCHARGE PLANNING
Agency/Facility Name: [facility]   Spoke To: Chi   Outcome: Per Chi, will contact this DPA for updates on referral.

## 2022-12-06 NOTE — DISCHARGE PLANNING
Care Transition Team Assessment      Pt lives alone in  a house in [facility].    Pt has been accepted at  [facility] once medically cleared. Pt prefers [facility].     Pt has been discussed in IDT rounds and the plan is [facility] once scheduled.      Pt's supports are Sister in [place], Marielena Slade and brother Carlos. Pt has Medicare.         Information Source  Orientation Level: Oriented X4  Information Given By: Patient  Who is responsible for making decisions for patient? : Patient    Readmission Evaluation  Is this a readmission?: No    Elopement Risk  Legal Hold: No  Ambulatory or Self Mobile in Wheelchair: No-Not an Elopement Risk  Elopement Risk: Not at Risk for Elopement    Interdisciplinary Discharge Planning  Lives with - Patient's Self Care Capacity: Alone and Able to Care For Self  Patient or legal guardian wants to designate a caregiver: No  Support Systems: None  Housing / Facility: [address]  Do You Take your Prescribed Medications Regularly: Yes  Able to Return to Previous ADL's: Future Time w/Therapy  Mobility Issues: Yes  Prior Services: None  Assistance Needed: Yes  Durable Medical Equipment: Not Applicable    Discharge Preparedness  What is your plan after discharge?: Skilled nursing facility  What are your discharge supports?: Sibling  Prior Functional Level: Ambulatory  Difficulity with ADLs: Toileting, Walking    Functional Assesment  Prior Functional Level: Ambulatory    Finances  Financial Barriers to Discharge: No  Prescription Coverage: Yes    Vision / Hearing Impairment  Hearing Impairment : No         Advance Directive  Advance Directive?: None    Domestic Abuse  Have you ever been the victim of abuse or violence?: No  Physical Abuse or Sexual Abuse: No  Verbal Abuse or Emotional Abuse: No  Possible Abuse/Neglect Reported to:: Not Applicable    Psychological Assessment  History of Substance Abuse: None  History of Psychiatric Problems: No  Non-compliant with Treatment: No  Newly  Diagnosed Illness: Yes    Discharge Risks or Barriers  Discharge risks or barriers?: Other (comment)  Patient risk factors: No PCP, Other (comment)    Anticipated Discharge Information  Discharge Disposition: D/T to SNF with medicare cert w/planned hosp IP readmit (83)

## 2022-12-06 NOTE — PROGRESS NOTES
Bedside report received.  Assessment complete.  A&O x 4. Patient calls appropriately.  Patient ambulates with max assist. Q2 turns in place. Bed alarm on.   Patient has 2/10 pain. Pain managed with repositioning.  Denies N&V. Tolerating regular diet.  L upper arm red and swollen. Mepilex placed to heels.  + void, + flatus, - BM.  Patient denies SOB.  Review plan with of care with patient. Call light and personal belongings within reach. Hourly rounding in place. All needs met at this time.

## 2022-12-06 NOTE — CARE PLAN
The patient is Stable - Low risk of patient condition declining or worsening    Shift Goals  Clinical Goals: q2 turns, rest  Patient Goals: rest    Progress made toward(s) clinical / shift goals:  Q2 turns in place and PRN for patient comfort. Pt has been resting comfortably during the shift.      Problem: Knowledge Deficit - Standard  Goal: Patient and family/care givers will demonstrate understanding of plan of care, disease process/condition, diagnostic tests and medications  Outcome: Progressing     Problem: Skin Integrity  Goal: Skin integrity is maintained or improved  Outcome: Progressing     Problem: Fall Risk  Goal: Patient will remain free from falls  Outcome: Progressing     Problem: Pain - Standard  Goal: Alleviation of pain or a reduction in pain to the patient’s comfort goal  Outcome: Progressing       Patient is not progressing towards the following goals:

## 2022-12-06 NOTE — THERAPY
"Occupational Therapy  Daily Treatment     Patient Name: Paulina Castellanos  Age:  77 y.o., Sex:  female  Medical Record #: [id number]  Today's Date: 12/5/2022       Precautions: Fall Risk  Comments: covid recovered    Assessment    Pt seen for OT tx.  Pt did well with encouragement.  Pt required Mod A for supine to sit EOB.  Pt tends to moan a lot with generalized pain but it did improve by the end of tx session.  Pt stood with Min A & transferred to bedside chair with Mod A.  Pt completed seated grooming & sponge bath with Mod A.  Pt reported feeling much better after tx.  Pt encouraged to be OOB for meals.  Pt will need Post Acute OT services once medically cleared.     Plan    Continue current treatment plan.    DC Equipment Recommendations: Unable to determine at this time  Discharge Recommendations: Recommend post-acute placement for additional occupational therapy services prior to discharge home    Subjective    \"I feel like a Queen with all this tx\"     Objective       12/05/22 1508   Pain 0 - 10 Group   Location Generalized   Description Aching   Therapist Pain Assessment During Activity;Nurse Notified;6   Cognition    Cognition / Consciousness X   Speech/ Communication Delayed Responses   Level of Consciousness Alert   Safety Awareness Impaired   New Learning Impaired   Attention Impaired   Sequencing Impaired   Initiation Impaired   Comments Pt is slow moving, moans throughout tx session but was pleasant & co-operative & did well with encouragement   Strength Upper Body   Gross Strength Generalized Weakness, Equal Bilaterally.    Balance   Sitting Balance (Static) Fair -   Sitting Balance (Dynamic) Poor +   Standing Balance (Static) Poor   Standing Balance (Dynamic) Trace +   Weight Shift Sitting Fair   Weight Shift Standing Poor   Comments pt needed cues for upright posture   Bed Mobility    Supine to Sit Maximal Assist   Sit to Supine   (pt left sitting up in chair)   Scooting Moderate Assist   Rolling Moderate " Assist to Rt.   Activities of Daily Living   Eating Minimal Assist   Grooming Minimal Assist;Seated   Bathing Moderate Assist  (sponge bathing seated in chair)   Upper Body Dressing Moderate Assist   Lower Body Dressing Maximal Assist   Toileting Maximal Assist   Comments Pt moves slowly & did well with encouragement   Functional Mobility   Sit to Stand Minimal Assist   Bed, Chair, Wheelchair Transfer Moderate Assist   Transfer Method Stand Pivot   Patient / Family Goals   Patient / Family Goal #1 to go home   Goal #1 Outcome Goal not met   Short Term Goals   Short Term Goal # 1 pt will complete grooming seated w/set up   Goal Outcome # 1 Progressing slower than expected   Short Term Goal # 2 pt will complete txf to BSC w/min A   Goal Outcome # 2 Progressing as expected   Short Term Goal # 3 pt will complete UB dressing w/set u p   Goal Outcome # 3 Progressing as expected

## 2022-12-07 ENCOUNTER — ANESTHESIA EVENT (OUTPATIENT)
Dept: CARDIOLOGY | Facility: MEDICAL CENTER | Age: 77
DRG: 308 | End: 2022-12-07
Payer: MEDICARE

## 2022-12-07 ENCOUNTER — ANESTHESIA (OUTPATIENT)
Dept: CARDIOLOGY | Facility: MEDICAL CENTER | Age: 77
DRG: 308 | End: 2022-12-07
Payer: MEDICARE

## 2022-12-07 ENCOUNTER — APPOINTMENT (OUTPATIENT)
Dept: CARDIOLOGY | Facility: MEDICAL CENTER | Age: 77
DRG: 308 | End: 2022-12-07
Attending: NURSE PRACTITIONER
Payer: MEDICARE

## 2022-12-07 PROBLEM — R13.10 DYSPHAGIA: Status: ACTIVE | Noted: 2022-12-07

## 2022-12-07 LAB
ALBUMIN SERPL BCP-MCNC: 2.4 G/DL (ref 3.2–4.9)
ALBUMIN/GLOB SERPL: 0.8 G/DL
ALP SERPL-CCNC: 53 U/L (ref 30–99)
ALT SERPL-CCNC: 13 U/L (ref 2–50)
ANION GAP SERPL CALC-SCNC: 6 MMOL/L (ref 7–16)
AST SERPL-CCNC: 12 U/L (ref 12–45)
BILIRUB SERPL-MCNC: 0.3 MG/DL (ref 0.1–1.5)
BUN SERPL-MCNC: 19 MG/DL (ref 8–22)
CALCIUM SERPL-MCNC: 8.3 MG/DL (ref 8.5–10.5)
CHLORIDE SERPL-SCNC: 104 MMOL/L (ref 96–112)
CO2 SERPL-SCNC: 23 MMOL/L (ref 20–33)
CREAT SERPL-MCNC: 0.73 MG/DL (ref 0.5–1.4)
ERYTHROCYTE [DISTWIDTH] IN BLOOD BY AUTOMATED COUNT: 49.3 FL (ref 35.9–50)
GFR SERPLBLD CREATININE-BSD FMLA CKD-EPI: 85 ML/MIN/1.73 M 2
GLOBULIN SER CALC-MCNC: 3 G/DL (ref 1.9–3.5)
GLUCOSE SERPL-MCNC: 106 MG/DL (ref 65–99)
HCT VFR BLD AUTO: 30 % (ref 37–47)
HGB BLD-MCNC: 10 G/DL (ref 12–16)
MAGNESIUM SERPL-MCNC: 1.9 MG/DL (ref 1.5–2.5)
MCH RBC QN AUTO: 30.8 PG (ref 27–33)
MCHC RBC AUTO-ENTMCNC: 33.3 G/DL (ref 33.6–35)
MCV RBC AUTO: 92.3 FL (ref 81.4–97.8)
PHOSPHATE SERPL-MCNC: 2.8 MG/DL (ref 2.5–4.5)
PLATELET # BLD AUTO: 211 K/UL (ref 164–446)
PMV BLD AUTO: 9.4 FL (ref 9–12.9)
POTASSIUM SERPL-SCNC: 4.3 MMOL/L (ref 3.6–5.5)
PROT SERPL-MCNC: 5.4 G/DL (ref 6–8.2)
RBC # BLD AUTO: 3.25 M/UL (ref 4.2–5.4)
SODIUM SERPL-SCNC: 133 MMOL/L (ref 135–145)
VANCOMYCIN PEAK SERPL-MCNC: 32 UG/ML (ref 20–40)
VANCOMYCIN TROUGH SERPL-MCNC: 17.4 UG/ML (ref 10–20)
WBC # BLD AUTO: 7.7 K/UL (ref 4.8–10.8)

## 2022-12-07 PROCEDURE — A9270 NON-COVERED ITEM OR SERVICE: HCPCS | Performed by: PHYSICIAN ASSISTANT

## 2022-12-07 PROCEDURE — 160036 HCHG PACU - EA ADDL 30 MINS PHASE I

## 2022-12-07 PROCEDURE — 770020 HCHG ROOM/CARE - TELE (206)

## 2022-12-07 PROCEDURE — 160002 HCHG RECOVERY MINUTES (STAT)

## 2022-12-07 PROCEDURE — 700111 HCHG RX REV CODE 636 W/ 250 OVERRIDE (IP): Performed by: ANESTHESIOLOGY

## 2022-12-07 PROCEDURE — 99233 SBSQ HOSP IP/OBS HIGH 50: CPT | Performed by: INTERNAL MEDICINE

## 2022-12-07 PROCEDURE — 01922 ANES N-INVAS IMG/RADJ THER: CPT | Performed by: ANESTHESIOLOGY

## 2022-12-07 PROCEDURE — 93312 ECHO TRANSESOPHAGEAL: CPT

## 2022-12-07 PROCEDURE — 700111 HCHG RX REV CODE 636 W/ 250 OVERRIDE (IP): Performed by: HOSPITALIST

## 2022-12-07 PROCEDURE — 84100 ASSAY OF PHOSPHORUS: CPT

## 2022-12-07 PROCEDURE — 80053 COMPREHEN METABOLIC PANEL: CPT

## 2022-12-07 PROCEDURE — 80202 ASSAY OF VANCOMYCIN: CPT

## 2022-12-07 PROCEDURE — 83735 ASSAY OF MAGNESIUM: CPT

## 2022-12-07 PROCEDURE — 99232 SBSQ HOSP IP/OBS MODERATE 35: CPT | Performed by: FAMILY MEDICINE

## 2022-12-07 PROCEDURE — 93312 ECHO TRANSESOPHAGEAL: CPT | Mod: 26 | Performed by: INTERNAL MEDICINE

## 2022-12-07 PROCEDURE — 700105 HCHG RX REV CODE 258: Performed by: HOSPITALIST

## 2022-12-07 PROCEDURE — 85027 COMPLETE CBC AUTOMATED: CPT

## 2022-12-07 PROCEDURE — 700111 HCHG RX REV CODE 636 W/ 250 OVERRIDE (IP): Performed by: FAMILY MEDICINE

## 2022-12-07 PROCEDURE — 99100 ANES PT EXTEME AGE<1 YR&>70: CPT | Performed by: ANESTHESIOLOGY

## 2022-12-07 PROCEDURE — 160035 HCHG PACU - 1ST 60 MINS PHASE I

## 2022-12-07 PROCEDURE — 36415 COLL VENOUS BLD VENIPUNCTURE: CPT

## 2022-12-07 PROCEDURE — 700102 HCHG RX REV CODE 250 W/ 637 OVERRIDE(OP): Performed by: STUDENT IN AN ORGANIZED HEALTH CARE EDUCATION/TRAINING PROGRAM

## 2022-12-07 PROCEDURE — 700101 HCHG RX REV CODE 250: Performed by: ANESTHESIOLOGY

## 2022-12-07 PROCEDURE — A9270 NON-COVERED ITEM OR SERVICE: HCPCS | Performed by: STUDENT IN AN ORGANIZED HEALTH CARE EDUCATION/TRAINING PROGRAM

## 2022-12-07 PROCEDURE — 700102 HCHG RX REV CODE 250 W/ 637 OVERRIDE(OP): Performed by: PHYSICIAN ASSISTANT

## 2022-12-07 RX ORDER — MAGNESIUM SULFATE HEPTAHYDRATE 40 MG/ML
2 INJECTION, SOLUTION INTRAVENOUS ONCE
Status: COMPLETED | OUTPATIENT
Start: 2022-12-07 | End: 2022-12-07

## 2022-12-07 RX ORDER — IPRATROPIUM BROMIDE AND ALBUTEROL SULFATE 2.5; .5 MG/3ML; MG/3ML
3 SOLUTION RESPIRATORY (INHALATION)
Status: DISCONTINUED | OUTPATIENT
Start: 2022-12-07 | End: 2022-12-07 | Stop reason: HOSPADM

## 2022-12-07 RX ORDER — LIDOCAINE HYDROCHLORIDE 20 MG/ML
INJECTION, SOLUTION EPIDURAL; INFILTRATION; INTRACAUDAL; PERINEURAL PRN
Status: DISCONTINUED | OUTPATIENT
Start: 2022-12-07 | End: 2022-12-07 | Stop reason: SURG

## 2022-12-07 RX ORDER — OXYCODONE HCL 5 MG/5 ML
10 SOLUTION, ORAL ORAL
Status: DISCONTINUED | OUTPATIENT
Start: 2022-12-07 | End: 2022-12-07 | Stop reason: HOSPADM

## 2022-12-07 RX ORDER — SODIUM CHLORIDE, SODIUM LACTATE, POTASSIUM CHLORIDE, CALCIUM CHLORIDE 600; 310; 30; 20 MG/100ML; MG/100ML; MG/100ML; MG/100ML
INJECTION, SOLUTION INTRAVENOUS CONTINUOUS
Status: DISCONTINUED | OUTPATIENT
Start: 2022-12-07 | End: 2022-12-07 | Stop reason: HOSPADM

## 2022-12-07 RX ORDER — ONDANSETRON 2 MG/ML
4 INJECTION INTRAMUSCULAR; INTRAVENOUS
Status: DISCONTINUED | OUTPATIENT
Start: 2022-12-07 | End: 2022-12-07 | Stop reason: HOSPADM

## 2022-12-07 RX ORDER — HALOPERIDOL 5 MG/ML
1 INJECTION INTRAMUSCULAR
Status: DISCONTINUED | OUTPATIENT
Start: 2022-12-07 | End: 2022-12-07 | Stop reason: HOSPADM

## 2022-12-07 RX ORDER — OXYCODONE HCL 5 MG/5 ML
5 SOLUTION, ORAL ORAL
Status: DISCONTINUED | OUTPATIENT
Start: 2022-12-07 | End: 2022-12-07 | Stop reason: HOSPADM

## 2022-12-07 RX ADMIN — GUAIFENESIN SYRUP AND DEXTROMETHORPHAN 5 ML: 100; 10 SYRUP ORAL at 09:17

## 2022-12-07 RX ADMIN — METOPROLOL TARTRATE 12.5 MG: 25 TABLET, FILM COATED ORAL at 04:39

## 2022-12-07 RX ADMIN — PROPOFOL 30 MG: 10 INJECTION, EMULSION INTRAVENOUS at 14:55

## 2022-12-07 RX ADMIN — GLYCOPYRROLATE 0.1 MG: 0.2 INJECTION INTRAMUSCULAR; INTRAVENOUS at 14:53

## 2022-12-07 RX ADMIN — PROPOFOL 50 MG: 10 INJECTION, EMULSION INTRAVENOUS at 14:53

## 2022-12-07 RX ADMIN — VANCOMYCIN HYDROCHLORIDE 1750 MG: 500 INJECTION, POWDER, LYOPHILIZED, FOR SOLUTION INTRAVENOUS at 17:01

## 2022-12-07 RX ADMIN — APIXABAN 5 MG: 5 TABLET, FILM COATED ORAL at 17:49

## 2022-12-07 RX ADMIN — LIDOCAINE HYDROCHLORIDE 100 MG: 20 INJECTION, SOLUTION EPIDURAL; INFILTRATION; INTRACAUDAL at 14:53

## 2022-12-07 RX ADMIN — GUAIFENESIN SYRUP AND DEXTROMETHORPHAN 5 ML: 100; 10 SYRUP ORAL at 17:09

## 2022-12-07 RX ADMIN — GUAIFENESIN SYRUP AND DEXTROMETHORPHAN 5 ML: 100; 10 SYRUP ORAL at 20:48

## 2022-12-07 RX ADMIN — PROPOFOL 30 MG: 10 INJECTION, EMULSION INTRAVENOUS at 15:00

## 2022-12-07 RX ADMIN — MAGNESIUM SULFATE HEPTAHYDRATE 2 G: 40 INJECTION, SOLUTION INTRAVENOUS at 20:49

## 2022-12-07 RX ADMIN — METOPROLOL TARTRATE 12.5 MG: 25 TABLET, FILM COATED ORAL at 17:49

## 2022-12-07 ASSESSMENT — ENCOUNTER SYMPTOMS
HEARTBURN: 0
DIAPHORESIS: 0
BLURRED VISION: 0
NERVOUS/ANXIOUS: 0
WEIGHT LOSS: 0
FEVER: 0
ABDOMINAL PAIN: 0
DIZZINESS: 0
VOMITING: 0
FOCAL WEAKNESS: 0
WHEEZING: 0
WEAKNESS: 1
COUGH: 1
MYALGIAS: 1
MYALGIAS: 0
SENSORY CHANGE: 0
BACK PAIN: 0
PALPITATIONS: 0
SHORTNESS OF BREATH: 0
DIARRHEA: 0
SPEECH CHANGE: 0
NAUSEA: 0
SORE THROAT: 0
CHILLS: 0
FLANK PAIN: 0
NECK PAIN: 0
HEADACHES: 0

## 2022-12-07 ASSESSMENT — PAIN DESCRIPTION - PAIN TYPE
TYPE: ACUTE PAIN

## 2022-12-07 ASSESSMENT — PAIN SCALES - GENERAL: PAIN_LEVEL: 0

## 2022-12-07 NOTE — ANESTHESIA PREPROCEDURE EVALUATION
Date/Time: 12/07/22 1300    Scheduled providers: Jack Delarosa M.D.; Jorge L Shelton M.D.    Procedure: EC-TEODORO W/O CONT    Diagnosis:             Atrial fibrillation with rapid ventricular response (HCC) [I48.91]            Atrial fibrillation with rapid ventricular response (HCC) [I48.91]    Location: [facility] - ECHOCARDIOLOGY [facility]      Bacteremia.     AF with RVR. Altered mental status.     NPO.     No angina, dyspnea, GERD.     Relevant Problems   PULMONARY   (positive) Pneumonia due to COVID-19 virus      CARDIAC   (positive) Atrial fibrillation with rapid ventricular response (HCC)   (positive) Thrombophlebitis       Physical Exam    Airway   Mallampati: II  TM distance: >3 FB  Neck ROM: full       Cardiovascular - normal exam  Rhythm: regular  Rate: normal  (-) murmur     Dental - normal exam      Very poor dentition   Pulmonary - normal exam  Breath sounds clear to auscultation     Abdominal    Neurological - normal exam                 Anesthesia Plan    ASA 3   ASA physical status 3 criteria: other (comment)    Plan - general       Airway plan will be natural airway          Induction: intravenous    Postoperative Plan: Postoperative administration of opioids is intended.    Pertinent diagnostic labs and testing reviewed    Informed Consent:    Anesthetic plan and risks discussed with patient.    Use of blood products discussed with: patient whom consented to blood products.

## 2022-12-07 NOTE — PROGRESS NOTES
Hospital Medicine Daily Progress Note    Date of Service  12/7/2022    Chief Complaint  Paulina Castellanos is a 77 y.o. female admitted 11/26/2022 with atrial fibrillation with rapid ventricular rate.      Hospital Course  Admitted for Atrial fibrillation with rapid ventricular rate, COVID-19 with pneumonia and Respiratory failure with hypoxia, mild rhabdomyolysis. She was started on Decadron, oxygen supplementation, and supportive measures.  She was placed on Metoprolol for rate control, and Eliquis for anticoagulation.  She apparently developed fever and encephalopathy, and was noted to have left arm thrombophlebitis.  Work-up further showed MRSA bacteremia.  Infectious disease was consulted on the case.  Patient was started on empiric coverage with IV vancomycin.  Repeat blood cultures have remained positive for MRSA.    Interval Problem Update  Bacteremia - repeat cultures positive for MRSA, for TEODORO today  A. Fib - currently sinus  Encephalopathy - more alert and oriented  Dysphagia - noted by RN having some trouble swallowing pills  Low magnesium    I have discussed this patient's plan of care and discharge plan at IDT rounds today with Case Management, Nursing, Nursing leadership, and other members of the IDT team.    Consultants/Specialty  cardiology and infectious disease    Code Status  Full Code    Disposition  Patient is not medically cleared for discharge.   Anticipate discharge to to skilled nursing facility.  I have placed the appropriate orders for post-discharge needs.    Review of Systems  Review of Systems   Constitutional:  Positive for malaise/fatigue. Negative for chills, diaphoresis, fever and weight loss.   HENT:  Negative for congestion, hearing loss and sore throat.    Eyes:  Negative for blurred vision.   Respiratory:  Positive for cough. Negative for shortness of breath and wheezing.    Cardiovascular:  Negative for chest pain, palpitations and leg swelling.   Gastrointestinal:  Negative for  abdominal pain, diarrhea, heartburn, nausea and vomiting.   Genitourinary:  Negative for dysuria, flank pain and hematuria.   Musculoskeletal:  Negative for back pain, joint pain, myalgias and neck pain.   Skin:  Negative for rash.   Neurological:  Positive for weakness. Negative for dizziness, sensory change, speech change, focal weakness and headaches.   Psychiatric/Behavioral:  The patient is not nervous/anxious.       Physical Exam  Temp:  [36 °C (96.8 °F)-36.7 °C (98 °F)] 36.5 °C (97.7 °F)  Pulse:  [] 85  Resp:  [18] 18  BP: (100-122)/(58-66) 117/63  SpO2:  [94 %-98 %] 97 %    Physical Exam  Vitals and nursing note reviewed.   HENT:      Head: Normocephalic and atraumatic.      Nose: No congestion.      Mouth/Throat:      Mouth: Mucous membranes are moist.   Eyes:      Extraocular Movements: Extraocular movements intact.      Conjunctiva/sclera: Conjunctivae normal.   Cardiovascular:      Rate and Rhythm: Normal rate and regular rhythm.   Pulmonary:      Effort: Pulmonary effort is normal.      Breath sounds: Normal breath sounds.   Abdominal:      General: There is no distension.      Tenderness: There is no abdominal tenderness. There is no guarding or rebound.   Musculoskeletal:         General: Swelling (left arm) present.      Cervical back: No tenderness.      Right lower leg: Edema present.      Left lower leg: Edema present.   Skin:     General: Skin is warm and dry.   Neurological:      General: No focal deficit present.      Mental Status: She is alert and oriented to person, place, and time.      Cranial Nerves: No cranial nerve deficit.       Fluids  No intake or output data in the 24 hours ending 12/07/22 1325      Laboratory  Recent Labs     12/05/22  0036 12/06/22  0020 12/07/22  0740   WBC 9.4 9.7 7.7   RBC 3.48* 3.31* 3.25*   HEMOGLOBIN 10.8* 10.1* 10.0*   HEMATOCRIT 31.7* 30.5* 30.0*   MCV 91.1 92.1 92.3   MCH 31.0 30.5 30.8   MCHC 34.1 33.1* 33.3*   RDW 47.2 48.3 49.3   PLATELETCT 155*  177 211   MPV 10.4 10.1 9.4     Recent Labs     12/05/22  0036 12/06/22  0020 12/07/22  0740   SODIUM 131* 131* 133*   POTASSIUM 4.0 4.0 4.3   CHLORIDE 101 101 104   CO2 20 20 23   GLUCOSE 92 95 106*   BUN 30* 27* 19   CREATININE 0.87 0.92 0.73   CALCIUM 8.4* 8.3* 8.3*                   Imaging  DX-CHEST-PORTABLE (1 VIEW)   Final Result      1.  Curvilinear opacities in the lung bases likely representing atelectasis, less likely pneumonitis.      US-EXTREMITY VENOUS UPPER UNILAT LEFT   Final Result      EC-ECHOCARDIOGRAM COMPLETE W/O CONT   Final Result      OUTSIDE IMAGES-CT CHEST   Final Result      EC-TEODORO W/O CONT    (Results Pending)        Assessment/Plan  * Atrial fibrillation with rapid ventricular response (HCC)- (present on admission)  Assessment & Plan  Metoprolol, Eliquis  [id number] -  3    MRSA bacteremia- (present on admission)  Assessment & Plan  IV Vancomycin  Follow blood cultures  For TEODORO today    Encephalopathy acute- (present on admission)  Assessment & Plan  secondary to infectious etiology  Avoid Benzodiazepines and Anticholinergics  Frequent orientation  Open window blinds during the day  Avoid naps during the day  Family at bedside whenever possible  Ensure adequate sleep at night - use Melatonin preferably  Avoid early morning labs  Avoid vital signs during sleep  Ambulate if possible  Provide adequate pain control  Monitor for urinary retention  Prevent and manage constipation  Discontinue IVs, Catheters, Tubes, Lines, Cardiac monitors, SCDs if possible    Sepsis (HCC)- (present on admission)  Assessment & Plan  This is Sepsis Not present on admission  SIRS criteria identified on my evaluation include: Fever, with temperature greater than 101 deg F, Tachycardia, with heart rate greater than 90 BPM and Leukocytosis, with WBC greater than 12,000  Source -MRSA bacteremia  Sepsis protocol initiated  Fluid resuscitation ordered per protocol  Crystalloid Fluid Administration: Patient has advanced  or end-stage heart failure (with documentation of NYHA class III or symptoms with minimal exertion, Or NYHA class IV or symptoms at rest or with activity), for this/these reason(s) it is unsafe for this patient to receive 30 mL/kg of fluid  Partial Fluid Dose Given: 10 mL/kg per current or ideal body weight, patient to be reassessed shortly after completion of partial fluid bolus to ensure adequacy of resuscitation  IV antibiotics as appropriate for source of sepsis  Reassessment: I have reassessed the patient's hemodynamic status          Pneumonia due to COVID-19 virus- (present on admission)  Assessment & Plan  Finished course of Decadron    Dysphagia  Assessment & Plan  SLP evaluation  Aspiration precautions  Change Diet to Level 7, Liquid level 0, crush pills    Anemia- (present on admission)  Assessment & Plan  Follow CBC    Hypomagnesemia- (present on admission)  Assessment & Plan  IV Mg 2 g  Follow level    Hypophosphatemia- (present on admission)  Assessment & Plan  Follow level    Hyponatremia- (present on admission)  Assessment & Plan  Follow BMP    Thrombophlebitis- (present on admission)  Assessment & Plan  IV Vancomycin    Acute respiratory failure with hypoxia (HCC)- (present on admission)  Assessment & Plan  due to COVID 19  RT protocol    Metabolic acidosis, normal anion gap (NAG)- (present on admission)  Assessment & Plan  Resolved    Frailty syndrome in geriatric patient- (present on admission)  Assessment & Plan  Consulted Palliative care    Non-traumatic rhabdomyolysis- (present on admission)  Assessment & Plan  resolved    Elevated LFTs- (present on admission)  Assessment & Plan  Follow CMP    Hypokalemia- (present on admission)  Assessment & Plan  Follow bmp       VTE prophylaxis: therapeutic anticoagulation with Eliquis    I have performed a physical exam and reviewed and updated ROS and Plan today (12/7/2022). In review of yesterday's note (12/6/2022), there are no changes except as  documented above.

## 2022-12-07 NOTE — PROGRESS NOTES
Patient down to TEODORO procedure with transport via gurney. Remote tele monitoring removed for transport.

## 2022-12-07 NOTE — CARE PLAN
The patient is Stable - Low risk of patient condition declining or worsening    Shift Goals  Clinical Goals: maintain skin integrity  Patient Goals: rest  Family Goals: RICARDO    Progress made toward(s) clinical / shift goals:  Bed alarm on, non-slip socks in place, yellow armband on, bed locked and in lowest position, call light and personal belongings within reach. Fall risk education provided to pt, pt verbalized understanding, and calls appropriately.  Patient scores as moderate risk for skin breakdown on North scale. Q2hr turns, TAPS system, barrier paste, barrier cream, barrier wipes, and mepilex in place. Waffle overlay on mattress.    Problem: Knowledge Deficit - Standard  Goal: Patient and family/care givers will demonstrate understanding of plan of care, disease process/condition, diagnostic tests and medications  Outcome: Progressing     Problem: Skin Integrity  Goal: Skin integrity is maintained or improved  Outcome: Progressing     Problem: Fall Risk  Goal: Patient will remain free from falls  Outcome: Progressing       Patient is not progressing towards the following goals:

## 2022-12-07 NOTE — ANESTHESIA POSTPROCEDURE EVALUATION
Patient: Paulina Castellanos    Procedure Summary     Date: 12/07/22 Room / Location: [facility] - ECHOCARDIOLOGY [facility]    Anesthesia Start: 1450 Anesthesia Stop: 1514    Procedure: EC-TEODORO W/O CONT Diagnosis:             Atrial fibrillation with rapid ventricular response (HCC)            Atrial fibrillation with rapid ventricular response (HCC)    Scheduled Providers: Jack Delarosa M.D.; Jorge L Shelton M.D. Responsible Provider: Jorge L Shelton M.D.    Anesthesia Type: general ASA Status: 3          Final Anesthesia Type: general  Last vitals  BP   Blood Pressure : 117/63    Temp   36.5 °C (97.7 °F)    Pulse   85   Resp   18    SpO2   97 %      Anesthesia Post Evaluation    Patient location during evaluation: PACU  Patient participation: complete - patient participated  Level of consciousness: sleepy but conscious  Pain score: 0    Airway patency: patent  Anesthetic complications: no  Cardiovascular status: hemodynamically stable  Respiratory status: acceptable  Hydration status: euvolemic    PONV: none          No notable events documented.     Nurse Pain Score: 0 (NPRS)

## 2022-12-07 NOTE — PROGRESS NOTES
Infectious Disease Progress Note    Author: Ramesh Tee M.D. Date & Time of service: 2022  9:45 AM    Chief Complaint:  MRSA bacteremia    Interval History:  77 y.o. female admitted 2022. For Afib with RVR and rhabdomyolysis +COVID  Developed fever and AMS dueing hosp-blood cultures +MRSA   AF WBC 13.9 Oriented to person and place c/o LUE pain No dyspnea or CP. No new neck, back, joint pain   patient remains afebrile, white count is resolved now, down to 9.4, tolerating vancomycin.  Patient states she is tired but overall better.   left arm   patient remains afebrile, white count is 9.7, tolerating vancomycin.  Plan as below   patient remains afebrile, white count 7.7, tolerating vancomycin, no new events overnight.    Labs Reviewed, Medications Reviewed, and Radiology Reviewed.    Review of Systems:  Review of Systems   Constitutional:  Positive for malaise/fatigue. Negative for fever.   Gastrointestinal:  Negative for abdominal pain, diarrhea, nausea and vomiting.   Musculoskeletal:  Positive for myalgias.   Skin:  Negative for itching and rash.   All other systems reviewed and are negative.    Hemodynamics:  Temp (24hrs), Av.5 °C (97.7 °F), Min:36 °C (96.8 °F), Max:36.7 °C (98 °F)  Temperature: 36.6 °C (97.9 °F)  Pulse  Av.1  Min: 71  Max: 136   Blood Pressure : 115/63       Physical Exam:  Physical Exam  Vitals and nursing note reviewed.   Constitutional:       General: She is not in acute distress.     Appearance: She is ill-appearing. She is not toxic-appearing or diaphoretic.   HENT:      Mouth/Throat:      Pharynx: No oropharyngeal exudate.      Comments: Fair dentition  Eyes:      General: No scleral icterus.     Extraocular Movements: Extraocular movements intact.      Pupils: Pupils are equal, round, and reactive to light.   Pulmonary:      Effort: Pulmonary effort is normal. No respiratory distress.      Breath sounds: No stridor.   Abdominal:       General: There is no distension.      Palpations: Abdomen is soft.      Tenderness: There is no abdominal tenderness.   Musculoskeletal:         General: Swelling and tenderness present.      Cervical back: Neck supple. No rigidity.   Skin:     Coloration: Skin is pale. Skin is not jaundiced.      Findings: Bruising and erythema present.      Comments: IV site left AC oozing LUE with erythema palpable cord no fluctuance   Neurological:      General: No focal deficit present.      Mental Status: She is alert. She is disoriented.       Meds:    Current Facility-Administered Medications:     metoprolol tartrate    vancomycin    MD Alert...Vancomycin per Pharmacy    Respiratory Therapy Consult    LR    guaiFENesin dextromethorphan    benzonatate    senna-docusate **AND** polyethylene glycol/lytes **AND** magnesium hydroxide **AND** bisacodyl    acetaminophen    hydrALAZINE    Metoprolol Tartrate    apixaban    Labs:  Recent Labs     12/05/22  0036 12/06/22  0020 12/07/22  0740   WBC 9.4 9.7 7.7   RBC 3.48* 3.31* 3.25*   HEMOGLOBIN 10.8* 10.1* 10.0*   HEMATOCRIT 31.7* 30.5* 30.0*   MCV 91.1 92.1 92.3   MCH 31.0 30.5 30.8   RDW 47.2 48.3 49.3   PLATELETCT 155* 177 211   MPV 10.4 10.1 9.4       Recent Labs     12/05/22  0036 12/06/22  0020 12/07/22  0740   SODIUM 131* 131* 133*   POTASSIUM 4.0 4.0 4.3   CHLORIDE 101 101 104   CO2 20 20 23   GLUCOSE 92 95 106*   BUN 30* 27* 19       Recent Labs     12/05/22  0036 12/06/22  0020 12/07/22  0740   ALBUMIN  --   --  2.4*   TBILIRUBIN  --   --  0.3   ALKPHOSPHAT  --   --  53   TOTPROTEIN  --   --  5.4*   ALTSGPT  --   --  13   ASTSGOT  --   --  12   CREATININE 0.87 0.92 0.73         Imaging:  DX-CHEST-PORTABLE (1 VIEW)    Result Date: 12/2/2022 12/2/2022 12:15 PM HISTORY/REASON FOR EXAM:  Shortness of Breath. TECHNIQUE/EXAM DESCRIPTION AND NUMBER OF VIEWS: Single portable view of the chest. COMPARISON: None FINDINGS: Heart size is within normal limits. There are curvilinear  opacities in the lung bases. No pleural abnormalities are noted.     1.  Curvilinear opacities in the lung bases likely representing atelectasis, less likely pneumonitis.    US-EXTREMITY VENOUS UPPER UNILAT LEFT    Result Date: 2022   Upper Extremity  Venous Duplex Report  Vascular Laboratory  CONCLUSIONS  Left upper extremity venous duplex imaging.  No evidence of deep venous thrombosis.  Evidence of acute to subacute superficial venous thrombosis in the LEFT  cephalic vein from the mid to distal bicep.  MASSIEL SILVA  Exam Date:     2022 13:29  Room #:     Inpatient  Priority:     Routine  Ht (in):             Wt (lb):  Ordering Physician:        EWELINA WOOD  Referring Physician:       NINA Mcknight  Sonographer:               Giovana Moran RVT  Study Type:                Complete Unilateral  Technical Quality:         Adequate  Age:    77    Gender:     F  MRN:    [id number]  :    1945      BSA:  Indications:     Localized swelling, mass and lump, unspecified upper limb,                   Edema, unspecified  CPT Codes:       86819  ICD Codes:       R22.30  R60.9  History:         Left upper extremity swelling/edema. No prior exams.  Limitations:  PROCEDURES:  Left upper extremity venous duplex imaging.  The following venous structures were evaluated: internal jugular,  subclavian, axillary, brachial, cephalic, and basilic veins.  Serial compression, color, and spectral Doppler flow evaluations were  performed.  FINDINGS:  Left upper extremity-  No evidence of deep venous thrombosis.  Evidence of acute to subacute superficial venous thrombosis in the cephalic  vein from the mid to distal bicep.  All other veins demonstrate complete color filling and compressibility with  normal venous flow dynamics including spontaneous flow and respiratory  phasicity.  Flow was evaluated in the contralateral subclavian vein and normal venous  flow dynamics including spontaneous flow and respiratory phasic  variation  were demonstrated.  Brittney N To  (Electronically Signed)  Final Date:      2022                   16:24    EC-ECHOCARDIOGRAM COMPLETE W/O CONT    Result Date: 2022  Transthoracic Echo Report Echocardiography Laboratory CONCLUSIONS No prior study is available for comparison. Normal left ventricular chamber size. Mild concentric left ventricular hypertrophy. The left ventricular ejection fraction is visually estimated to be 65%. Trace mitral regurgitation. MASISEL SILVA Exam Date:         2022                    13:31 Exam Location:     Inpatient Priority:          Routine Ordering Physician:        BETH GIL Referring Physician:       [id number] Ibarra Sonographer:               Frank ARANDA Age:    77     Gender:    F MRN:    [id number] :    1945 BSA:    1.89   Ht (in):    67     Wt (lb):    170 Exam Type:     Complete Indications:     Atrial Fibrillation, Shortness of breath ICD Codes:       427.31  786.05 CPT Codes:       49572 BP:   121    /   65     HR: Technical Quality:       Poor MEASUREMENTS  (Male / Female) Normal Values 2D ECHO LV Diastolic Diameter PLAX        4.2 cm                4.2 - 5.9 / 3.9 - 5.3 cm LV Systolic Diameter PLAX         2.7 cm                2.1 - 4.0 cm IVS Diastolic Thickness           1.1 cm                LVPW Diastolic Thickness          1.1 cm                LVOT Diameter                     1.8 cm                Estimated LV Ejection Fraction    65 %                  LV Ejection Fraction MOD BP       73.1 %                >= 55  % LV Ejection Fraction MOD 4C       78.7 %                LV Ejection Fraction MOD 2C       73 %                  DOPPLER AV Peak Velocity                  1.1 m/s               AV Peak Gradient                  5.3 mmHg              AV Mean Gradient                  2.9 mmHg              LVOT Peak Velocity                1.1 m/s               AV Area Cont Eq vti               2.6 cm2               MV  Velocity Time Integral         21 cm                 Mitral E Point Velocity           0.86 m/s              Mitral E to A Ratio               0.75                  Mitral A Duration                 156 ms                MV Pressure Half Time             31.9 ms               MV Area PHT                       6.9 cm2               MV Deceleration Time              85.8 ms               * Indicates values subject to auto-interpretation LV EF:  65    % FINDINGS Left Ventricle Normal left ventricular chamber size. Mild concentric left ventricular hypertrophy. Normal left ventricular systolic function. The left ventricular ejection fraction is visually estimated to be 65%. Normal regional wall motion. Right Ventricle The right ventricle is not well visualized. Right Atrium The right atrium is not well visualized. Left Atrium Normal left atrial size. Left atrial volume index is 24 mL/sq m. Mitral Valve The mitral valve is not well visualized. No mitral stenosis. Trace mitral regurgitation. Aortic Valve The aortic valve is not well visualized. Tricuspid Valve The tricuspid valve is not well visualized. No stenosis or regurgitation seen. Pulmonic Valve The pulmonic valve is not well visualized. No stenosis or regurgitation seen. Pericardium No pericardial effusion. Aorta Normal aortic root for body surface area. The ascending aorta diameter is 3.1 cm. Zafar Bailon M.D. (Electronically Signed) Final Date:     27 November 2022                 20:23      Micro:  Results       Procedure Component Value Units Date/Time    BLOOD CULTURE [[id number]]  (Abnormal) Collected: 12/05/22 1048    Order Status: Completed Specimen: Blood from Peripheral Updated: 12/06/22 0430     Significant Indicator POS     Source BLD     Site PERIPHERAL     Culture Result Growth detected by Bactec instrument. 12/06/2022  04:29  Gram Stain: Gram positive cocci: Possible Staphylococcus sp.      Narrative:      CALL  [facility]  141 tel. [phone],  CALLED   "141 tel. [phone] 12/06/2022, 04:30, RB PERF. RESULTS CALLED TO:RN  [phone]  Contact  Per Hospital Policy: Only change Specimen Src: to \"Line\" if  specified by physician order.  Right Forearm/Arm    BLOOD CULTURE [[id number]]  (Abnormal) Collected: 12/05/22 1048    Order Status: Completed Specimen: Blood from Peripheral Updated: 12/06/22 0334     Significant Indicator POS     Source BLD     Site PERIPHERAL     Culture Result Growth detected by Bactec instrument. 12/06/2022  03:32  Gram Stain: Gram positive cocci: Possible Staphylococcus sp.      Narrative:      CALL  [facility]  141 tel. [phone],  CALLED  141 tel. [phone] 12/06/2022, 03:33, RB PERF. RESULTS CALLED TO: RN  [phone]  Contact  Per Hospital Policy: Only change Specimen Src: to \"Line\" if  specified by physician order.  Left Hand    BLOOD CULTURE [[id number]]  (Abnormal) Collected: 12/03/22 1233    Order Status: Completed Specimen: Blood from Peripheral Updated: 12/05/22 0801     Significant Indicator POS     Source BLD     Site PERIPHERAL     Culture Result Growth detected by Bactec instrument. 12/04/2022  10:42      Methicillin Resistant Staphylococcus aureus  See previous culture for sensitivity report.      Narrative:      CALL  [facility]  141 tel. [phone],  CALLED  141 tel. [phone] 12/04/2022, 10:41, RB PERF. RESULTS CALLED  TO:Mark LOYD [phone]  Enhanced Droplet, Contact, and Eye Protection  Per Hospital Policy: Only change Specimen Src: to \"Line\" if  specified by physician order.  Left Hand    BLOOD CULTURE [[id number]]  (Abnormal) Collected: 12/03/22 1233    Order Status: Completed Specimen: Blood from Peripheral Updated: 12/05/22 0759     Significant Indicator POS     Source BLD     Site PERIPHERAL     Culture Result Growth detected by Bactec instrument. 12/04/2022  17:48      Methicillin Resistant Staphylococcus aureus  See previous culture for sensitivity report.      Narrative:      Enhanced Droplet, Contact, and Eye Protection  Per Hospital Policy: " "Only change Specimen Src: to \"Line\" if  specified by physician order.  Right Hand    BLOOD CULTURE [[id number]]  (Abnormal) Collected: 12/02/22 0514    Order Status: Completed Specimen: Blood from Peripheral Updated: 12/05/22 0730     Significant Indicator POS     Source BLD     Site PERIPHERAL     Culture Result Growth detected by Bactec instrument. 12/03/2022  01:14      Methicillin Resistant Staphylococcus aureus  See previous culture for sensitivity report.      Narrative:      Enhanced Droplet, Contact, and Eye Protection  Per Hospital Policy: Only change Specimen Src: to \"Line\" if  specified by physician order.  Right AC    BLOOD CULTURE [[id number]]  (Abnormal)  (Susceptibility) Collected: 12/02/22 0514    Order Status: Completed Specimen: Blood from Peripheral Updated: 12/05/22 0729     Significant Indicator POS     Source BLD     Site PERIPHERAL     Culture Result Growth detected by Bactec instrument. 12/03/2022  01:14  Methicillin Resistant Staphylococcus aureus (MRSA)  detected by PCR.        Methicillin Resistant Staphylococcus aureus    Narrative:      CALL  [facility]  171 tel. [phone],  CALLED  171 tel. [phone] 12/03/2022, 03:18, RB PERF. RESULTS CALLED  TO:[phone] (Maksim)  Enhanced Droplet, Contact, and Eye Protection  Per Hospital Policy: Only change Specimen Src: to \"Line\" if  specified by physician order.  Right Hand    Susceptibility       Methicillin resistant staphylococcus aureus (1)       Antibiotic Interpretation Microscan   Method Status    Azithromycin Resistant >4 mcg/mL JADIEL Final    Clindamycin Sensitive 0.5 mcg/mL JADIEL Final    Cefazolin Resistant <=8 mcg/mL JADIEL Final    Cefepime Resistant >16 mcg/mL JADIEL Final    Ceftaroline Sensitive <=0.5 mcg/mL JADIEL Final    Daptomycin Sensitive 1 mcg/mL JADIEL Final    Ampicillin/sulbactam Resistant >16/8 mcg/mL JADIEL Final    Erythromycin Resistant >4 mcg/mL JADIEL Final    Vancomycin Sensitive 1 mcg/mL JADIEL Final    Oxacillin Resistant >2 mcg/mL JADIEL Final    " Trimeth/Sulfa Sensitive <=0.5/9.5 mcg/mL JADIEL Final    Tetracycline Sensitive <=4 mcg/mL JADIEL Final                       MRSA By PCR (Amp) [[id number]] Collected: 12/02/22 1220    Order Status: Completed Specimen: Respirate from Nares Updated: 12/02/22 1704     MRSA by PCR Negative    Narrative:      Enhanced Droplet, Contact, and Eye Protection  Collected By: [id number] RENETTA QUINTERO    URINALYSIS [[id number]]     Order Status: No result Specimen: Urine, Clean Catch             Assessment:  This is a 77-year-old female patient who was transferred from an outside facility, slid off her bed at home and was unable to get up for multiple days, found by a friend, was found to be in A. fib with RVR and with mild rhabdomyolysis.  She was also noted to have COVID-19 requiring nasal cannula oxygen, resolved.  On 12/1, she developed right arm thrombophlebitis and the following day had worsening leukocytosis and procalcitonin, blood cultures positive for MRSA.    Hospital onset sepsis with MRSA bacteremia, likely secondary to thrombophlebitis that may be seeded with infection based on exam findings..  IV line has been removed.    Pertinent Diagnoses:  MRSA bacteremia, persistent  SO, resolved  Recent COVID-19  Thrombophlebitis     PLAN:   MRSA bacteremia  Thrombophlebitis, likely seeded with infection  Low grade fever and leukocytosis resolved  BCxs + MRSA 12/2 and 12/3, 12/5  Continue vancomycin. Monitor vanco trough and renal function.  Last vancomycin peak of 47.5 on 12/6  Suspicion for seeding of infection in left upper extremity clot.  Follow along clinically.  Warm compresses.  If no significant improvement, may need imaging to ensure no abscess development  TTE 11/27 with no obvious valve vegetations but was not septic at the time.  Repeat TTE ordered, discontinued and TEODORO has been ordered, pending, expected this week.  We will follow along    Disposition: Unable to determine at this time  Need for PICC line: Unable to  determine at this time    Discussed with

## 2022-12-07 NOTE — ANESTHESIA TIME REPORT
Anesthesia Start and Stop Event Times     Date Time Event    12/7/2022 1443 Ready for Procedure     1450 Anesthesia Start     1514 Anesthesia Stop        Responsible Staff  12/07/22    Name Role Begin End    Jorge L Shelton M.D. Anesth 1450 1514        Overtime Reason:  overtime    Comments:

## 2022-12-07 NOTE — PROGRESS NOTES
Received report from previous shift RN. Assumed care of patient at 1900.  Assessment complete.  Patient A&O x 4. Patient calls appropriately.  Patient on remote telemetry.   Patient ambulates with max assist. Bed alarm on.   Patient declines pain.   Denies N&V. Tolerating diet, NPO at midnight for procedure. Pt verbalized understanding of NPO status.   + void, + flatus, + BM. (Pt incontinent of urine and stool). Brief and disposable chucks in place.   TAPS system, Q2hr turns, and waffle mattress in place.   Skin intact with bruising to arms, chest, and blanchable redness to sacrum/groin.   Patient on RA, denies SOB.     Reviewed plan of care with patient. Call light and personal belongings within reach. Hourly rounding in place. All needs met at this time.

## 2022-12-07 NOTE — CONSULTS
"Reason for PC Consult: Advance Care Planning    Consulted by:   Dr. Palacios    Assessment:  General:   77 year old female admitted for a-fib on 11/26/22. Pt has a history of a-fib. Pt presented to [facility] after being found down by her neighbor. Pt was then transported to [facility] ED for further management.     Prior PC Consult:   None on File    Social:   Pt lives at home alone. She reports being independent at home, she does have a house keeper come to help clean. Her neighbor does check on her and visits, provides her with fruit and vegetables from their garden.     Dyspnea: No, 97% on RA  Last BM: 12/07/22   Pain: No, Medicated per MAR  Depression: Mood appropriate for situation   Dementia: No      Spiritual:  Is Buddhism or spirituality important for coping with this illness? Yes, Placed order in EPIC  Has a  or spiritual provider visit been requested? Yes    Palliative Performance Scale: 40%    Advance Directive: None on File    DPOA: None on File    POLST: None on File    To locate the AD/POLST, please hover the cursor over the patient's code status to find all linked ACP documents.    Code Status: Full     Outcome:  PC RN visited pt at bedside, introduced self and role of PC. Discussed her understanding of clinical picture, she verbalized good insight and understanding. She expressed concern about being able to return home. She verbalized understanding that where she lives it remote and \"we live differently there\". She understands that there are limited resources and people generally rely on neighbors for help. She then explained how her younger sister  her brother in law and how even further he lives. She knows they are all getting older and he might have to go into a home because he doesn't have neighbors close by.    Discussed SNF, pt verbalized agreement. She is hoping that she can return to her home and remain independent. She does have aging family members, right now her sister " Marielena is paying her bills for her. She confirmed that sometimes she does have trouble with this and needs help.    Advanced Care Planning Documents: Discussed advanced directive, addressed questions and concerns. Educated on the importance of completing ACP documents to appoint someone the patient trusts to be an advocate as well as establish healthcare preferences. POLST form discussed, code status discussed and educated on potential situation. Framed discussion within current clinical setting, explored pt's goals with CPR and discussed how interventions and treatments are intended to bridge pt into wellness.     Pt agreeable to appoint sister Marielena Slade as DPOA and 1st [employer] [facility] Castellanos, pt choose not to choose statements of desires, rather she wants decisions to be made in accordance with her Yazidi rowena. She is OK with suffering as it is part of the human experience. AD awaiting notary.     POLST form completed for DNR, selective treatment and no feeding tubes. POLST completed, will scan a copy into EMR.     To locate the AD/POLST, please hover the cursor over the patient's code status to find all linked ACP documents.    Active listening, education, validation, normalization, therapeutic touch, and emotional support provided throughout encounter.    Updated:   Dr. Palacios    Plan:   AD and POLST completion, pt agreeable with SNF.     Thank you for allowing Palliative Care to participate in this patient's care. Please feel free to call [phone] with any questions or concerns.

## 2022-12-07 NOTE — PROGRESS NOTES
4 Eyes Skin Assessment Complet ed by EVERT Gutierrez and EVERT Doherty.    Head WDL  Ears WDL  Nose WDL  Mouth WDL  Neck WDL  Breast/Chest WDL  Shoulder Blades WDL  Spine WDL  (R) Arm/Elbow/Hand WDL  (L) Arm/Elbow/Hand Swelling, Discoloration, and Edema  Abdomen Redness and Abrasion  Groin Redness and Blanching  Scrotum/Coccyx/Buttocks Redness and Blanching  (R) Leg WDL  (L) Leg WDL  (R) Heel/Foot/Toe WDL  (L) Heel/Foot/Toe WDL          Devices In Places Tele Box      Interventions In Place Heel Mepilex, Waffle Overlay, TAP System, Pillows, Q2 Turns, Low Air Loss Mattress, Barrier Cream, and Heels Loaded W/Pillows    Possible Skin Injury No    Pictures Uploaded Into Epic Yes  Wound Consult Placed N/A  RN Wound Prevention Protocol Ordered Yes

## 2022-12-07 NOTE — PROGRESS NOTES
Bedside report received.  Assessment complete.  A&O x 4. Patient calls appropriately.  Patient ambulates with max assist. Bed alarm on.   Patient has 5/10 pain. Pain managed with prescribed medications.  Denies N&V. Tolerating NPO diet for TEODORO procedure scheduled today.   + void, + flatus, + BM.  Patient denies SOB.  SCD's refused.  Review plan with of care with patient. Call light and personal belongings within reach. Hourly rounding in place. All needs met at this time.

## 2022-12-07 NOTE — PROGRESS NOTES
4 Eyes Skin Assessment Completed by EVERT Gutierrez and EVERT Doherty.    Head WDL  Ears WDL  Nose WDL  Mouth WDL  Neck WDL  Breast/Chest WDL  Shoulder Blades WDL  Spine WDL  (R) Arm/Elbow/Hand WDL  (L) Arm/Elbow/Hand Swelling, Discoloration, and Edema  Abdomen Redness refer to photo  Groin Redness and Blanching  Scrotum/Coccyx/Buttocks Redness and Blanching  (R) Leg WDL  (L) Leg WDL  (R) Heel/Foot/Toe WDL  (L) Heel/Foot/Toe WDL          Devices In Places Tele Box      Interventions In Place Heel Mepilex, Waffle Overlay, TAP System, Pillows, Q2 Turns, Barrier Cream, Heels Loaded W/Pillows, and Pressure Redistribution Mattress    Possible Skin Injury No    Pictures Uploaded Into Epic Yes  Wound Consult Placed N/A  RN Wound Prevention Protocol Ordered Yes

## 2022-12-07 NOTE — DISCHARGE PLANNING
Case Management Discharge Planning    Admission Date: 11/26/2022  GMLOS: 3.4  ALOS: 11    6-Clicks ADL Score: 14  6-Clicks Mobility Score: 7  PT and/or OT Eval ordered: Yes  Post-acute Referrals Ordered: Yes  Post-acute Choice Obtained: Yes  Has referral(s) been sent to post-acute provider:  Yes      Anticipated Discharge Dispo: Discharge Disposition: D/T to SNF with medicare cert w/planned hosp IP readmit (83)    DME Needed: No    Action(s) Taken: Updated Provider/Nurse on Discharge Plan  Pt was discussed in IDT rounds today and plan is TEODORO today.    [facility]  [facility] on Dapto but not on Vancomycin.  Informed Dr Palacios .   Dr Palacios to inform I.D.     Escalations Completed: None    Medically Clear: No    Next Steps:   This RN CM to continue to assist Pt with discharge as needed    Barriers to Discharge: Medical clearance    Is the patient up for discharge tomorrow: No      Addendum: 12/08/22    This RN CM received a call from Marielena Slade, Pt;s Sister today and provided her Pt's discharge update as Marielena requested.     Marielena is aware that the package she sent was given to Pt.

## 2022-12-07 NOTE — PROGRESS NOTES
4 Eyes Skin Assessment Completed by EVERT Gaona and EVERT Young.    Head WDL  Ears WDL  Nose WDL  Mouth WDL  Neck WDL  Breast/Chest Redness, Bruising, and Blanching  Shoulder Blades Redness and Blanching  Spine Redness and Blanching  (R) Arm/Elbow/Hand Bruising  (L) Arm/Elbow/Hand WDL  Abdomen WDL  Groin Redness and Blanching  Scrotum/Coccyx/Buttocks Redness and Blanching  (R) Leg WDL  (L) Leg WDL  (R) Heel/Foot/Toe WDL RICARDO heels due to mepilex in place  (L) Heel/Foot/Toe WDL RICARDO heel due to mepilex in place          Devices In Places Tele Box and Pulse Ox      Interventions In Place Heel Mepilex, Waffle Overlay, TAP System, Pillows, Q2 Turns, and Pressure Redistribution Mattress    Possible Skin Injury No    Pictures Uploaded Into Epic N/A  Wound Consult Placed N/A  RN Wound Prevention Protocol Ordered Yes (already ordered previous to shift)

## 2022-12-07 NOTE — DISCHARGE PLANNING
Agency/Facility Name: [facility]   Outcome: DPA left a voicemail regarding referral. DPA waiting on a call back for updates.    1209  Agency[facility] Name: [facility]   Spoke To: Chi   Outcome: SNF requested if Pt can change to [facility]. DPA to follow up for updates.    RN CM notified

## 2022-12-08 PROBLEM — L03.90 CELLULITIS: Status: ACTIVE | Noted: 2022-12-08

## 2022-12-08 PROBLEM — I38 ENDOCARDITIS: Status: ACTIVE | Noted: 2022-12-08

## 2022-12-08 LAB
ANION GAP SERPL CALC-SCNC: 8 MMOL/L (ref 7–16)
BACTERIA BLD CULT: ABNORMAL
BUN SERPL-MCNC: 19 MG/DL (ref 8–22)
CALCIUM SERPL-MCNC: 8.5 MG/DL (ref 8.5–10.5)
CHLORIDE SERPL-SCNC: 101 MMOL/L (ref 96–112)
CO2 SERPL-SCNC: 23 MMOL/L (ref 20–33)
CREAT SERPL-MCNC: 0.75 MG/DL (ref 0.5–1.4)
ERYTHROCYTE [DISTWIDTH] IN BLOOD BY AUTOMATED COUNT: 49.1 FL (ref 35.9–50)
GFR SERPLBLD CREATININE-BSD FMLA CKD-EPI: 82 ML/MIN/1.73 M 2
GLUCOSE SERPL-MCNC: 107 MG/DL (ref 65–99)
HCT VFR BLD AUTO: 32.2 % (ref 37–47)
HGB BLD-MCNC: 10.7 G/DL (ref 12–16)
MAGNESIUM SERPL-MCNC: 2.3 MG/DL (ref 1.5–2.5)
MCH RBC QN AUTO: 31 PG (ref 27–33)
MCHC RBC AUTO-ENTMCNC: 33.2 G/DL (ref 33.6–35)
MCV RBC AUTO: 93.3 FL (ref 81.4–97.8)
PLATELET # BLD AUTO: 260 K/UL (ref 164–446)
PMV BLD AUTO: 9.1 FL (ref 9–12.9)
POTASSIUM SERPL-SCNC: 4.5 MMOL/L (ref 3.6–5.5)
RBC # BLD AUTO: 3.45 M/UL (ref 4.2–5.4)
SIGNIFICANT IND 70042: ABNORMAL
SIGNIFICANT IND 70042: ABNORMAL
SITE SITE: ABNORMAL
SITE SITE: ABNORMAL
SODIUM SERPL-SCNC: 132 MMOL/L (ref 135–145)
SOURCE SOURCE: ABNORMAL
SOURCE SOURCE: ABNORMAL
VANCOMYCIN TROUGH SERPL-MCNC: 17.8 UG/ML (ref 10–20)
WBC # BLD AUTO: 8.5 K/UL (ref 4.8–10.8)

## 2022-12-08 PROCEDURE — 97530 THERAPEUTIC ACTIVITIES: CPT

## 2022-12-08 PROCEDURE — 80202 ASSAY OF VANCOMYCIN: CPT

## 2022-12-08 PROCEDURE — 99233 SBSQ HOSP IP/OBS HIGH 50: CPT | Performed by: INTERNAL MEDICINE

## 2022-12-08 PROCEDURE — 83735 ASSAY OF MAGNESIUM: CPT

## 2022-12-08 PROCEDURE — 36415 COLL VENOUS BLD VENIPUNCTURE: CPT

## 2022-12-08 PROCEDURE — 700102 HCHG RX REV CODE 250 W/ 637 OVERRIDE(OP): Performed by: PHYSICIAN ASSISTANT

## 2022-12-08 PROCEDURE — 80048 BASIC METABOLIC PNL TOTAL CA: CPT

## 2022-12-08 PROCEDURE — 700102 HCHG RX REV CODE 250 W/ 637 OVERRIDE(OP): Performed by: STUDENT IN AN ORGANIZED HEALTH CARE EDUCATION/TRAINING PROGRAM

## 2022-12-08 PROCEDURE — 92610 EVALUATE SWALLOWING FUNCTION: CPT

## 2022-12-08 PROCEDURE — 700111 HCHG RX REV CODE 636 W/ 250 OVERRIDE (IP): Performed by: HOSPITALIST

## 2022-12-08 PROCEDURE — A9270 NON-COVERED ITEM OR SERVICE: HCPCS | Performed by: STUDENT IN AN ORGANIZED HEALTH CARE EDUCATION/TRAINING PROGRAM

## 2022-12-08 PROCEDURE — A9270 NON-COVERED ITEM OR SERVICE: HCPCS | Performed by: PHYSICIAN ASSISTANT

## 2022-12-08 PROCEDURE — 85027 COMPLETE CBC AUTOMATED: CPT

## 2022-12-08 PROCEDURE — 770020 HCHG ROOM/CARE - TELE (206)

## 2022-12-08 PROCEDURE — 700105 HCHG RX REV CODE 258: Performed by: HOSPITALIST

## 2022-12-08 PROCEDURE — 97110 THERAPEUTIC EXERCISES: CPT

## 2022-12-08 PROCEDURE — 99232 SBSQ HOSP IP/OBS MODERATE 35: CPT | Performed by: FAMILY MEDICINE

## 2022-12-08 RX ADMIN — GUAIFENESIN SYRUP AND DEXTROMETHORPHAN 5 ML: 100; 10 SYRUP ORAL at 15:04

## 2022-12-08 RX ADMIN — VANCOMYCIN HYDROCHLORIDE 1750 MG: 500 INJECTION, POWDER, LYOPHILIZED, FOR SOLUTION INTRAVENOUS at 17:15

## 2022-12-08 RX ADMIN — GUAIFENESIN SYRUP AND DEXTROMETHORPHAN 5 ML: 100; 10 SYRUP ORAL at 09:41

## 2022-12-08 RX ADMIN — APIXABAN 5 MG: 5 TABLET, FILM COATED ORAL at 04:22

## 2022-12-08 RX ADMIN — APIXABAN 5 MG: 5 TABLET, FILM COATED ORAL at 17:14

## 2022-12-08 RX ADMIN — METOPROLOL TARTRATE 12.5 MG: 25 TABLET, FILM COATED ORAL at 04:22

## 2022-12-08 RX ADMIN — METOPROLOL TARTRATE 12.5 MG: 25 TABLET, FILM COATED ORAL at 17:14

## 2022-12-08 RX ADMIN — GUAIFENESIN SYRUP AND DEXTROMETHORPHAN 5 ML: 100; 10 SYRUP ORAL at 20:55

## 2022-12-08 ASSESSMENT — PAIN DESCRIPTION - PAIN TYPE: TYPE: ACUTE PAIN

## 2022-12-08 ASSESSMENT — COGNITIVE AND FUNCTIONAL STATUS - GENERAL
STANDING UP FROM CHAIR USING ARMS: A LOT
TURNING FROM BACK TO SIDE WHILE IN FLAT BAD: A LOT
MOBILITY SCORE: 10
CLIMB 3 TO 5 STEPS WITH RAILING: TOTAL
MOVING FROM LYING ON BACK TO SITTING ON SIDE OF FLAT BED: A LOT
MOVING TO AND FROM BED TO CHAIR: A LOT
SUGGESTED CMS G CODE MODIFIER MOBILITY: CL
WALKING IN HOSPITAL ROOM: TOTAL

## 2022-12-08 ASSESSMENT — GAIT ASSESSMENTS: GAIT LEVEL OF ASSIST: UNABLE TO PARTICIPATE

## 2022-12-08 ASSESSMENT — ENCOUNTER SYMPTOMS
SORE THROAT: 0
MYALGIAS: 0
BACK PAIN: 0
FLANK PAIN: 0
HEARTBURN: 0
SHORTNESS OF BREATH: 0
DIZZINESS: 0
CHILLS: 0
WEAKNESS: 1
COUGH: 1
DIAPHORESIS: 0
SPEECH CHANGE: 0
WHEEZING: 0
WEIGHT LOSS: 0
DIARRHEA: 0
NECK PAIN: 0
FEVER: 0
ABDOMINAL PAIN: 0
SENSORY CHANGE: 0
FOCAL WEAKNESS: 0
NERVOUS/ANXIOUS: 0
BLURRED VISION: 0
MYALGIAS: 1
PALPITATIONS: 0
NAUSEA: 0
VOMITING: 0
HEADACHES: 0

## 2022-12-08 NOTE — PROGRESS NOTES
Infectious Disease Progress Note    Author: Ramesh Tee M.D. Date & Time of service: 2022  9:00 AM    Chief Complaint:  MRSA bacteremia    Interval History:  77 y.o. female admitted 2022. For Afib with RVR and rhabdomyolysis +COVID  Developed fever and AMS dueing hosp-blood cultures +MRSA   AF WBC 13.9 Oriented to person and place c/o LUE pain No dyspnea or CP. No new neck, back, joint pain   patient remains afebrile, white count is resolved now, down to 9.4, tolerating vancomycin.  Patient states she is tired but overall better.   left arm   patient remains afebrile, white count is 9.7, tolerating vancomycin.  Plan as below   patient remains afebrile, white count 7.7, tolerating vancomycin, no new events overnight.   patient remains afebrile, white count is 8.5, tolerating vancomycin.  TEODORO positive for endocarditis    Labs Reviewed, Medications Reviewed, and Radiology Reviewed.    Review of Systems:  Review of Systems   Constitutional:  Positive for malaise/fatigue. Negative for fever.   Gastrointestinal:  Negative for abdominal pain, diarrhea, nausea and vomiting.   Musculoskeletal:  Positive for myalgias.   Skin:  Negative for itching and rash.   All other systems reviewed and are negative.    Hemodynamics:  Temp (24hrs), Av.7 °C (98 °F), Min:36.2 °C (97.1 °F), Max:37 °C (98.6 °F)  Temperature: 36.8 °C (98.2 °F)  Pulse  Av.7  Min: 71  Max: 136   Blood Pressure : 112/63       Physical Exam:  Physical Exam  Vitals and nursing note reviewed.   Constitutional:       General: She is not in acute distress.     Appearance: She is ill-appearing. She is not toxic-appearing or diaphoretic.   HENT:      Mouth/Throat:      Pharynx: No oropharyngeal exudate.      Comments: Fair dentition  Eyes:      General: No scleral icterus.     Extraocular Movements: Extraocular movements intact.      Pupils: Pupils are equal, round, and reactive to light.   Pulmonary:      Effort:  Pulmonary effort is normal. No respiratory distress.      Breath sounds: No stridor.   Abdominal:      General: There is no distension.      Palpations: Abdomen is soft.      Tenderness: There is no abdominal tenderness.   Musculoskeletal:         General: Swelling and tenderness present.      Cervical back: Neck supple. No rigidity.   Skin:     Coloration: Skin is pale. Skin is not jaundiced.      Findings: Bruising and erythema present.      Comments: IV site left AC oozing LUE with erythema palpable cord no fluctuance   Neurological:      General: No focal deficit present.      Mental Status: She is alert. She is disoriented.       Meds:    Current Facility-Administered Medications:     metoprolol tartrate    vancomycin    MD Alert...Vancomycin per Pharmacy    Respiratory Therapy Consult    LR    guaiFENesin dextromethorphan    benzonatate    senna-docusate **AND** polyethylene glycol/lytes **AND** magnesium hydroxide **AND** bisacodyl    acetaminophen    hydrALAZINE    Metoprolol Tartrate    apixaban    Labs:  Recent Labs     12/06/22  0020 12/07/22  0740 12/08/22  0750   WBC 9.7 7.7 8.5   RBC 3.31* 3.25* 3.45*   HEMOGLOBIN 10.1* 10.0* 10.7*   HEMATOCRIT 30.5* 30.0* 32.2*   MCV 92.1 92.3 93.3   MCH 30.5 30.8 31.0   RDW 48.3 49.3 49.1   PLATELETCT 177 211 260   MPV 10.1 9.4 9.1       Recent Labs     12/06/22  0020 12/07/22  0740 12/08/22  0750   SODIUM 131* 133* 132*   POTASSIUM 4.0 4.3 4.5   CHLORIDE 101 104 101   CO2 20 23 23   GLUCOSE 95 106* 107*   BUN 27* 19 19       Recent Labs     12/06/22  0020 12/07/22  0740 12/08/22  0750   ALBUMIN  --  2.4*  --    TBILIRUBIN  --  0.3  --    ALKPHOSPHAT  --  53  --    TOTPROTEIN  --  5.4*  --    ALTSGPT  --  13  --    ASTSGOT  --  12  --    CREATININE 0.92 0.73 0.75         Imaging:  DX-CHEST-PORTABLE (1 VIEW)    Result Date: 12/2/2022 12/2/2022 12:15 PM HISTORY/REASON FOR EXAM:  Shortness of Breath. TECHNIQUE/EXAM DESCRIPTION AND NUMBER OF VIEWS: Single portable view  of the chest. COMPARISON: None FINDINGS: Heart size is within normal limits. There are curvilinear opacities in the lung bases. No pleural abnormalities are noted.     1.  Curvilinear opacities in the lung bases likely representing atelectasis, less likely pneumonitis.    US-EXTREMITY VENOUS UPPER UNILAT LEFT    Result Date: 2022   Upper Extremity  Venous Duplex Report  Vascular Laboratory  CONCLUSIONS  Left upper extremity venous duplex imaging.  No evidence of deep venous thrombosis.  Evidence of acute to subacute superficial venous thrombosis in the LEFT  cephalic vein from the mid to distal bicep.  MASSIEL SILVA  Exam Date:     2022 13:29  Room #:     Inpatient  Priority:     Routine  Ht (in):             Wt (lb):  Ordering Physician:        EWELINA WOOD  Referring Physician:       NINA Mcknight  Sonographer:               Giovana Moran RVT  Study Type:                Complete Unilateral  Technical Quality:         Adequate  Age:    77    Gender:     F  MRN:    [id number]  :    1945      BSA:  Indications:     Localized swelling, mass and lump, unspecified upper limb,                   Edema, unspecified  CPT Codes:       67941  ICD Codes:       R22.30  R60.9  History:         Left upper extremity swelling/edema. No prior exams.  Limitations:  PROCEDURES:  Left upper extremity venous duplex imaging.  The following venous structures were evaluated: internal jugular,  subclavian, axillary, brachial, cephalic, and basilic veins.  Serial compression, color, and spectral Doppler flow evaluations were  performed.  FINDINGS:  Left upper extremity-  No evidence of deep venous thrombosis.  Evidence of acute to subacute superficial venous thrombosis in the cephalic  vein from the mid to distal bicep.  All other veins demonstrate complete color filling and compressibility with  normal venous flow dynamics including spontaneous flow and respiratory  phasicity.  Flow was evaluated in the contralateral  subclavian vein and normal venous  flow dynamics including spontaneous flow and respiratory phasic variation  were demonstrated.  Brittney MUNOZ To  (Electronically Signed)  Final Date:      2022                   16:24    EC-ECHOCARDIOGRAM COMPLETE W/O CONT    Result Date: 2022  Transthoracic Echo Report Echocardiography Laboratory CONCLUSIONS No prior study is available for comparison. Normal left ventricular chamber size. Mild concentric left ventricular hypertrophy. The left ventricular ejection fraction is visually estimated to be 65%. Trace mitral regurgitation. MASSIEL SILVA Exam Date:         2022                    13:31 Exam Location:     Inpatient Priority:          Routine Ordering Physician:        BETH GIL Referring Physician:       [id number] Rose Sonographer:               Frank ARANDA Age:    77     Gender:    F MRN:    [id number] :    1945 BSA:    1.89   Ht (in):    67     Wt (lb):    170 Exam Type:     Complete Indications:     Atrial Fibrillation, Shortness of breath ICD Codes:       427.31  786.05 CPT Codes:       34592 BP:   121    /   65     HR: Technical Quality:       Poor MEASUREMENTS  (Male / Female) Normal Values 2D ECHO LV Diastolic Diameter PLAX        4.2 cm                4.2 - 5.9 / 3.9 - 5.3 cm LV Systolic Diameter PLAX         2.7 cm                2.1 - 4.0 cm IVS Diastolic Thickness           1.1 cm                LVPW Diastolic Thickness          1.1 cm                LVOT Diameter                     1.8 cm                Estimated LV Ejection Fraction    65 %                  LV Ejection Fraction MOD BP       73.1 %                >= 55  % LV Ejection Fraction MOD 4C       78.7 %                LV Ejection Fraction MOD 2C       73 %                  DOPPLER AV Peak Velocity                  1.1 m/s               AV Peak Gradient                  5.3 mmHg              AV Mean Gradient                  2.9 mmHg              LVOT Peak  Velocity                1.1 m/s               AV Area Cont Eq vti               2.6 cm2               MV Velocity Time Integral         21 cm                 Mitral E Point Velocity           0.86 m/s              Mitral E to A Ratio               0.75                  Mitral A Duration                 156 ms                MV Pressure Half Time             31.9 ms               MV Area PHT                       6.9 cm2               MV Deceleration Time              85.8 ms               * Indicates values subject to auto-interpretation LV EF:  65    % FINDINGS Left Ventricle Normal left ventricular chamber size. Mild concentric left ventricular hypertrophy. Normal left ventricular systolic function. The left ventricular ejection fraction is visually estimated to be 65%. Normal regional wall motion. Right Ventricle The right ventricle is not well visualized. Right Atrium The right atrium is not well visualized. Left Atrium Normal left atrial size. Left atrial volume index is 24 mL/sq m. Mitral Valve The mitral valve is not well visualized. No mitral stenosis. Trace mitral regurgitation. Aortic Valve The aortic valve is not well visualized. Tricuspid Valve The tricuspid valve is not well visualized. No stenosis or regurgitation seen. Pulmonic Valve The pulmonic valve is not well visualized. No stenosis or regurgitation seen. Pericardium No pericardial effusion. Aorta Normal aortic root for body surface area. The ascending aorta diameter is 3.1 cm. Zafar Bailon M.D. (Electronically Signed) Final Date:     27 November 2022                 20:23      Micro:  Results       Procedure Component Value Units Date/Time    BLOOD CULTURE [[id number]]  (Abnormal) Collected: 12/05/22 1048    Order Status: Completed Specimen: Blood from Peripheral Updated: 12/08/22 0747     Significant Indicator POS     Source BLD     Site PERIPHERAL     Culture Result Growth detected by Bactec instrument. 12/06/2022  03:32      Methicillin  "Resistant Staphylococcus aureus  Methicillin resistant by screening method  See previous culture for sensitivity report.      Narrative:      CALL  [facility]  141 tel. [phone],  CALLED  141 tel. [phone] 12/06/2022, 03:33, RB PERF. RESULTS CALLED TO: RN  [phone]  Contact  Per Hospital Policy: Only change Specimen Src: to \"Line\" if  specified by physician order.  Left Hand    BLOOD CULTURE [[id number]]  (Abnormal)  (Susceptibility) Collected: 12/05/22 1048    Order Status: Completed Specimen: Blood from Peripheral Updated: 12/08/22 0747     Significant Indicator POS     Source BLD     Site PERIPHERAL     Culture Result Growth detected by Bactec instrument. 12/06/2022  04:29      Methicillin Resistant Staphylococcus aureus    Narrative:      CALL  [facility]  141 tel. [phone],  CALLED  141 tel. [phone] 12/06/2022, 04:30, RB PERF. RESULTS CALLED TO:RN  [phone]  Contact  Per Hospital Policy: Only change Specimen Src: to \"Line\" if  specified by physician order.  Right Forearm/Arm    Susceptibility       Methicillin resistant staphylococcus aureus (1)       Antibiotic Interpretation Microscan   Method Status    Azithromycin Resistant >4 mcg/mL JADIEL Final    Clindamycin Sensitive 0.5 mcg/mL JADIEL Final    Cefazolin Resistant 16 mcg/mL JADIEL Final    Cefepime Resistant >16 mcg/mL JADIEL Final    Ceftaroline Sensitive <=0.5 mcg/mL JADIEL Final    Daptomycin Sensitive 1 mcg/mL JADIEL Final    Ampicillin/sulbactam Resistant 16/8 mcg/mL JADIEL Final    Erythromycin Resistant >4 mcg/mL JADIEL Final    Vancomycin Sensitive 1 mcg/mL JADIEL Final    Oxacillin Resistant >2 mcg/mL JADIEL Final    Trimeth/Sulfa Sensitive <=0.5/9.5 mcg/mL JADIEL Final    Tetracycline Sensitive <=4 mcg/mL JADIEL Final                       BLOOD CULTURE [[id number]]  (Abnormal) Collected: 12/03/22 1233    Order Status: Completed Specimen: Blood from Peripheral Updated: 12/05/22 0801     Significant Indicator POS     Source BLD     Site PERIPHERAL     Culture Result Growth detected by Bactec " "instrument. 12/04/2022  10:42      Methicillin Resistant Staphylococcus aureus  See previous culture for sensitivity report.      Narrative:      CALL  [facility]  141 tel. [phone],  CALLED  141 tel. [phone] 12/04/2022, 10:41, RB PERF. RESULTS CALLED  TO:Mark LOYD [phone]  Enhanced Droplet, Contact, and Eye Protection  Per Hospital Policy: Only change Specimen Src: to \"Line\" if  specified by physician order.  Left Hand    BLOOD CULTURE [[id number]]  (Abnormal) Collected: 12/03/22 1233    Order Status: Completed Specimen: Blood from Peripheral Updated: 12/05/22 0759     Significant Indicator POS     Source BLD     Site PERIPHERAL     Culture Result Growth detected by Bactec instrument. 12/04/2022  17:48      Methicillin Resistant Staphylococcus aureus  See previous culture for sensitivity report.      Narrative:      Enhanced Droplet, Contact, and Eye Protection  Per Hospital Policy: Only change Specimen Src: to \"Line\" if  specified by physician order.  Right Hand    BLOOD CULTURE [[id number]]  (Abnormal) Collected: 12/02/22 0514    Order Status: Completed Specimen: Blood from Peripheral Updated: 12/05/22 0730     Significant Indicator POS     Source BLD     Site PERIPHERAL     Culture Result Growth detected by Bactec instrument. 12/03/2022  01:14      Methicillin Resistant Staphylococcus aureus  See previous culture for sensitivity report.      Narrative:      Enhanced Droplet, Contact, and Eye Protection  Per Hospital Policy: Only change Specimen Src: to \"Line\" if  specified by physician order.  Right AC    BLOOD CULTURE [[id number]]  (Abnormal)  (Susceptibility) Collected: 12/02/22 0514    Order Status: Completed Specimen: Blood from Peripheral Updated: 12/05/22 0729     Significant Indicator POS     Source BLD     Site PERIPHERAL     Culture Result Growth detected by Bactec instrument. 12/03/2022  01:14  Methicillin Resistant Staphylococcus aureus (MRSA)  detected by PCR.        Methicillin Resistant Staphylococcus " "aureus    Narrative:      CALL  [facility]  171 tel. [phone],  CALLED  171 tel. [phone] 12/03/2022, 03:18, RB PERF. RESULTS CALLED  TO:[phone] (Maksim)  Enhanced Droplet, Contact, and Eye Protection  Per Hospital Policy: Only change Specimen Src: to \"Line\" if  specified by physician order.  Right Hand    Susceptibility       Methicillin resistant staphylococcus aureus (1)       Antibiotic Interpretation Microscan   Method Status    Azithromycin Resistant >4 mcg/mL JADIEL Final    Clindamycin Sensitive 0.5 mcg/mL JADIEL Final    Cefazolin Resistant <=8 mcg/mL JADIEL Final    Cefepime Resistant >16 mcg/mL JADIEL Final    Ceftaroline Sensitive <=0.5 mcg/mL JADIEL Final    Daptomycin Sensitive 1 mcg/mL JADIEL Final    Ampicillin/sulbactam Resistant >16/8 mcg/mL JADIEL Final    Erythromycin Resistant >4 mcg/mL JADIEL Final    Vancomycin Sensitive 1 mcg/mL JADIEL Final    Oxacillin Resistant >2 mcg/mL JADIEL Final    Trimeth/Sulfa Sensitive <=0.5/9.5 mcg/mL JADIEL Final    Tetracycline Sensitive <=4 mcg/mL JADIEL Final                       MRSA By PCR (Amp) [[id number]] Collected: 12/02/22 1220    Order Status: Completed Specimen: Respirate from Nares Updated: 12/02/22 1704     MRSA by PCR Negative    Narrative:      Enhanced Droplet, Contact, and Eye Protection  Collected By: [id number] RENETTA QUINETRO    URINALYSIS [[id number]]     Order Status: No result Specimen: Urine, Clean Catch             Assessment:  This is a 77-year-old female patient who was transferred from an outside facility, slid off her bed at home and was unable to get up for multiple days, found by a friend, was found to be in A. fib with RVR and with mild rhabdomyolysis.  She was also noted to have COVID-19 requiring nasal cannula oxygen, resolved.  On 12/1, she developed right arm thrombophlebitis and the following day had worsening leukocytosis and procalcitonin, blood cultures positive for MRSA.    Hospital onset sepsis with MRSA bacteremia, likely secondary to thrombophlebitis that may " be seeded with infection based on exam findings. IV line has been removed.  TEODORO positive for infective endocarditis    Pertinent Diagnoses:  Native mitral valve infective endocarditis  MRSA bacteremia, persistent  SO, resolved  Recent COVID-19  Thrombophlebitis     PLAN:   MRSA bacteremia  Native mitral valve infective endocarditis  Thrombophlebitis, likely seeded with infection  BCxs + MRSA 12/2 and 12/3, 12/5  We will repeat blood cultures x2 in a.m.  TEODORO formal report pending  Continue vancomycin. Monitor vanco trough and renal function.  Last vancomycin peak of 47.5 on 12/6  Suspicion for seeding of infection in left upper extremity clot.  Follow along clinically.  Warm compresses.  If no significant improvement, may need imaging to ensure no abscess development    Disposition: Anticipate likely eventual discharge to a facility on 6 weeks of IV antibiotics once blood cultures clear  Need for PICC line: Eventually yes, once blood cultures clear    Discussed with Dr. Palacios

## 2022-12-08 NOTE — THERAPY
Speech Language Pathology   Clinical Swallow Evaluation     Patient Name: Paulina Castellanos  AGE:  77 y.o., SEX:  female  Medical Record #: [id number]  Today's Date: 12/8/2022     Precautions  Precautions: (P) Fall Risk  Comments: covid recovered    Assessment    Patient is 77 y.o. female with atrial fibrillation with rapid ventricular rate, COVID-19 with pneumonia and Respiratory failure with hypoxia, mild rhabdomyolysis    Level of Consciousness: Alert, Awake  Affect/Behavior: Appropriate, Calm, Cooperative  Follows Directives: Yes  Orientation: Oriented x 4  Hearing: Functional hearing  Vision: Functional vision    Prior Living Situation & Level of Function:  Patient denies any dysphagia. Per RN, had trouble swallowing medications.     Oral Mechanism Evaluation  Facial Symmetry: Equal  Facial Sensation: Equal  Labial Observations: WFL  Lingual Observations: Midline  Dentition: Good  Comments:    Voice  Quality: WFL  Resonance: WFL  Intensity: Appropriate  Cough: WFL  Comments:    Current Method of Nutrition   Oral diet (easy to chew thin liquids)    Assessment  Positioning: Arriaga's (60-90 degrees)  Bolus Administration: SLP  Oxygen Requirements: Room Air  Factor(s) Affecting Performance: None    Swallowing Trials  Ice: WFL  Thin Liquid (TN0): WFL  Soft & Bite Sized (SB6): WFL  Regular (RG7): WFL    Comments:    Patient with adequate oral acceptance and containment. Adequate bite and mastication of presented bolus. Presumed timely AP transit and timely swallow trigger. No overt s/sx of aspiration across consistencies tested. Patient with dry vocal quality and no s/sx of respiratory distress upon evaluation.      Clinical Impressions    Oropharyngeal dysphagia not suspected based on clinical bedside evaluation. Patient appears appropriate to resume a regular texture diet.      Recommendations  1.  Easy to chew thin liquids  2.  Instrumentation: None indicated at this time  3.  Swallowing Instructions & Precautions:    Supervision: Independent  Positioning: Fully upright and midline during oral intake  Medication: Whole with puree, One pill at a time, Crush with applesauce or puree, as appropriate  Strategies: Small bites/sips  Oral Care: BID    Plan    Recommend Speech Therapy for Evaluation only for the following treatments:  Dysphagia Training.    Discharge Recommendations: Anticipate that the patient will have no further speech therapy needs after discharge from the hospital     Objective       12/08/22 1001   Precautions   Precautions Fall Risk   Vitals   O2 Delivery Device None - Room Air   Pain 0 - 10 Group   Therapist Pain Assessment Post Activity Pain Same as Prior to Activity;Nurse Notified;0   Prior Living Situation   Lives with - Patient's Self Care Capacity Alone and Able to Care For Self   Prior Level Of Function   Patient's Primary Language English   Anticipated Discharge Needs   Discharge Recommendations Anticipate that the patient will have no further speech therapy needs after discharge from the hospital   Therapy Recommendations Upon DC Not Indicated

## 2022-12-08 NOTE — ASSESSMENT & PLAN NOTE
[facility]  CT Surgery recommendation - continued medical therapy and against operative intervention in the form of MVR at this time

## 2022-12-08 NOTE — PROGRESS NOTES
Hospital Medicine Daily Progress Note    Date of Service  12/8/2022    Chief Complaint  Paulina Castellanos is a 77 y.o. female admitted 11/26/2022 with atrial fibrillation with rapid ventricular rate.      Hospital Course  Admitted for Atrial fibrillation with rapid ventricular rate, COVID-19 with pneumonia and Respiratory failure with hypoxia, mild rhabdomyolysis. She was started on Decadron, oxygen supplementation, and supportive measures.  She was placed on Metoprolol for rate control, and Eliquis for anticoagulation.  She apparently developed fever and encephalopathy, and was noted to have left arm thrombophlebitis.  Work-up further showed MRSA bacteremia.  Infectious disease was consulted on the case.  Patient was started on empiric coverage with IV vancomycin.  Repeat blood cultures remained positive for MRSA.     Interval Problem Update  Bacteremia - repeat cultures positive for MRSA, TEODORO yesterday showed mitral valve vegetation, discussed case with VERITO Gracia - currently sinus  Encephalopathy - resolved  Dysphagia -SLP evaluation pending    I have discussed this patient's plan of care and discharge plan at IDT rounds today with Case Management, Nursing, Nursing leadership, and other members of the IDT team.    Consultants/Specialty  cardiology, infectious disease, and palliative care    Code Status  DNAR/DNI    Disposition  Patient is not medically cleared for discharge.   Anticipate discharge to to skilled nursing facility.  I have placed the appropriate orders for post-discharge needs.    Review of Systems  Review of Systems   Constitutional:  Positive for malaise/fatigue. Negative for chills, diaphoresis, fever and weight loss.   HENT:  Negative for congestion, hearing loss and sore throat.    Eyes:  Negative for blurred vision.   Respiratory:  Positive for cough. Negative for shortness of breath and wheezing.    Cardiovascular:  Negative for chest pain, palpitations and leg swelling.   Gastrointestinal:   Negative for abdominal pain, diarrhea, heartburn, nausea and vomiting.   Genitourinary:  Negative for dysuria, flank pain and hematuria.   Musculoskeletal:  Negative for back pain, joint pain, myalgias and neck pain.   Skin:  Negative for rash.   Neurological:  Positive for weakness. Negative for dizziness, sensory change, speech change, focal weakness and headaches.   Psychiatric/Behavioral:  The patient is not nervous/anxious.       Physical Exam  Temp:  [36.1 °C (97 °F)-37 °C (98.6 °F)] 36.1 °C (97 °F)  Pulse:  [] 89  Resp:  [16-20] 17  BP: (107-133)/(52-64) 115/52  SpO2:  [95 %-100 %] 95 %    Physical Exam  Vitals and nursing note reviewed.   HENT:      Head: Normocephalic and atraumatic.      Nose: No congestion.      Mouth/Throat:      Mouth: Mucous membranes are moist.   Eyes:      Extraocular Movements: Extraocular movements intact.      Conjunctiva/sclera: Conjunctivae normal.   Cardiovascular:      Rate and Rhythm: Normal rate and regular rhythm.      Heart sounds: Murmur heard.   Pulmonary:      Effort: Pulmonary effort is normal.      Breath sounds: Normal breath sounds.   Abdominal:      General: There is no distension.      Tenderness: There is no abdominal tenderness. There is no guarding or rebound.   Musculoskeletal:         General: Swelling (left arm) present.      Cervical back: No tenderness.      Right lower leg: Edema present.      Left lower leg: Edema present.   Skin:     Findings: Erythema (epigastric area) present.   Neurological:      General: No focal deficit present.      Mental Status: She is alert and oriented to person, place, and time.      Cranial Nerves: No cranial nerve deficit.       Fluids    Intake/Output Summary (Last 24 hours) at 12/8/2022 1126  Last data filed at 12/8/2022 0745  Gross per 24 hour   Intake 364.92 ml   Output 450 ml   Net -85.08 ml         Laboratory  Recent Labs     12/06/22  0020 12/07/22  0740 12/08/22  0750   WBC 9.7 7.7 8.5   RBC 3.31* 3.25* 3.45*    HEMOGLOBIN 10.1* 10.0* 10.7*   HEMATOCRIT 30.5* 30.0* 32.2*   MCV 92.1 92.3 93.3   MCH 30.5 30.8 31.0   MCHC 33.1* 33.3* 33.2*   RDW 48.3 49.3 49.1   PLATELETCT 177 211 260   MPV 10.1 9.4 9.1     Recent Labs     12/06/22  0020 12/07/22  0740 12/08/22  0750   SODIUM 131* 133* 132*   POTASSIUM 4.0 4.3 4.5   CHLORIDE 101 104 101   CO2 20 23 23   GLUCOSE 95 106* 107*   BUN 27* 19 19   CREATININE 0.92 0.73 0.75   CALCIUM 8.3* 8.3* 8.5                   Imaging  EC-TEODORO W/O CONT         DX-CHEST-PORTABLE (1 VIEW)   Final Result      1.  Curvilinear opacities in the lung bases likely representing atelectasis, less likely pneumonitis.      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)  Assessment & Plan  IV Vancomycin    MRSA bacteremia- (present on admission)  Assessment & Plan  IV Vancomycin  repeat blood cultures tomorrow      Encephalopathy acute- (present on admission)  Assessment & Plan  secondary to infectious etiology  Avoid Benzodiazepines and Anticholinergics  Frequent orientation  Open window blinds during the day  Avoid naps during the day  Family at bedside whenever possible  Ensure adequate sleep at night - use Melatonin preferably  Avoid early morning labs  Avoid vital signs during sleep  Ambulate if possible  Provide adequate pain control  Monitor for urinary retention  Prevent and manage constipation  Discontinue IVs, Catheters, Tubes, Lines, Cardiac monitors, SCDs if possible    Sepsis (HCC)- (present on admission)  Assessment & Plan  This is Sepsis Not present on admission  SIRS criteria identified on my evaluation include: Fever, with temperature greater than 101 deg F, Tachycardia, with heart  rate greater than 90 BPM and Leukocytosis, with WBC greater than 12,000  Source -MRSA bacteremia  Sepsis protocol initiated  Fluid resuscitation ordered per protocol  Crystalloid Fluid Administration: Patient has advanced or end-stage heart failure (with documentation of NYHA class III or symptoms with minimal exertion, Or NYHA class IV or symptoms at rest or with activity), for this/these reason(s) it is unsafe for this patient to receive 30 mL/kg of fluid  Partial Fluid Dose Given: 10 mL/kg per current or ideal body weight, patient to be reassessed shortly after completion of partial fluid bolus to ensure adequacy of resuscitation  IV antibiotics as appropriate for source of sepsis  Reassessment: I have reassessed the patient's hemodynamic status          Pneumonia due to COVID-19 virus- (present on admission)  Assessment & Plan  Finished course of Decadron    Cellulitis- (present on admission)  Assessment & Plan  IV Vancomycin    Dysphagia  Assessment & Plan  SLP evaluation  Aspiration precautions  Level 7, Liquid level 0, crush pills    Anemia- (present on admission)  Assessment & Plan  Follow CBC    Hypomagnesemia- (present on admission)  Assessment & Plan  IV Mg given  Follow level    Hypophosphatemia- (present on admission)  Assessment & Plan  Follow level    Hyponatremia- (present on admission)  Assessment & Plan  Follow BMP    Thrombophlebitis- (present on admission)  Assessment & Plan  IV Vancomycin    Acute respiratory failure with hypoxia (HCC)- (present on admission)  Assessment & Plan  due to COVID 19  RT protocol    Metabolic acidosis, normal anion gap (NAG)- (present on admission)  Assessment & Plan  Resolved    Frailty syndrome in geriatric patient- (present on admission)  Assessment & Plan  Palliative care following    Non-traumatic rhabdomyolysis- (present on admission)  Assessment & Plan  resolved    Elevated LFTs- (present on admission)  Assessment & Plan  Follow CMP    Hypokalemia- (present on  admission)  Assessment & Plan  Follow bmp       VTE prophylaxis: therapeutic anticoagulation with Eliquis    I have performed a physical exam and reviewed and updated ROS and Plan today (12/8/2022). In review of yesterday's note (12/7/2022), there are no changes except as documented above.

## 2022-12-08 NOTE — CARE PLAN
The patient is Stable - Low risk of patient condition declining or worsening    Shift Goals  Clinical Goals: Q2 turns, skin integrity, swallow eval  Patient Goals: comfort, rest  Family Goals: RICARDO    Progress made toward(s) clinical / shift goals:      Patient's skin integrity remains intact with Q2 turns and frequent perineal care for incontinence. Patient remains free from falls with frequent rounding and addressing needs sufficiently.     Problem: Skin Integrity  Goal: Skin integrity is maintained or improved  Outcome: Progressing     Problem: Fall Risk  Goal: Patient will remain free from falls  Outcome: Progressing

## 2022-12-08 NOTE — THERAPY
Physical Therapy   Daily Treatment     Patient Name: Paulina Castellanos  Age:  77 y.o., Sex:  female  Medical Record #: [id number]  Today's Date: 12/8/2022     Precautions  Precautions: Fall Risk    Assessment    Pt received in bed, pleasant and agreeable to participate in PT session. Pt demonstrates improved functional mobility, requires mod A for bed mobility with HOB elevated and heavy use of rails. Pt requires CGA for STS, was able to maintain standing position for clean-up of BM for approx. 3 min. Pt attempted to facilitate taking lateral steps to HOB, was unable to lift foot off ground and instead slid it towards HOB, was then assisted back to bed due to fatigue. Will continue to follow while in the acute setting. Recommend SNF placement for additional physical therapy services prior to discharge home.     Plan    Continue current treatment plan.    DC Equipment Recommendations: Unable to determine at this time  Discharge Recommendations: Recommend post-acute placement for additional physical therapy services prior to discharge home      Abridged Subjective/Objective     12/08/22 1112   Cognition    Cognition / Consciousness X   Level of Consciousness Alert   Comments Pt pleasant and agreeable to PT session, very tangential throughout session   Sitting Lower Body Exercises   Sitting Lower Body Exercises Yes   Long Arc Quad 1 set of 10;Bilateral   Balance   Sitting Balance (Static) Fair   Sitting Balance (Dynamic) Fair -   Standing Balance (Static) Fair -   Standing Balance (Dynamic) Poor +   Weight Shift Sitting Fair   Weight Shift Standing Poor   Skilled Intervention Verbal Cuing;Sequencing;Postural Facilitation;Facilitation   Comments standing balance with FWW   Gait Analysis   Gait Level Of Assist Unable to Participate   Comments attempted to faciliate weight shifting for lateral steps at HOB, pt slightly slides foot towards HOB, unable to take steps   Bed Mobility    Supine to Sit Moderate Assist   Sit to Supine  Minimal Assist   Scooting Minimal Assist   Rolling Contact Guard Assist   Skilled Intervention Verbal Cuing;Tactile Cuing;Sequencing   Comments HOB elevated, heavy use of rails   Functional Mobility   Sit to Stand Contact Guard Assist   Skilled Intervention Tactile Cuing;Verbal Cuing;Postural Facilitation   Comments STS at EOB with FWW, able to maintain standing for 2-3 min for cleanup of BM   Patient / Family Goals    Patient / Family Goal #1 go home   Goal #1 Outcome Progressing slower than expected   Short Term Goals    Short Term Goal # 1 pt will be able to complete supine<>sitting with SPV in 6tx in order to dc home   Goal Outcome # 1 Progressing slower than expected   Short Term Goal # 2 pt will be able to complete functional transfers with FWW and min assist in 6tx in order to progress home   Goal Outcome # 2 Goal not met   Short Term Goal # 3 pt will be able to ambulate 25ft with FWW and min assist in 6tx in order to progress to home   Goal Outcome # 3 Goal not met   Short Term Goal # 4 pt will be able to negotiate 4 steps with min assist in 6tx in order to progress to home   Goal Outcome # 4 Goal not met   Education Group   Education Provided Role of Physical Therapist   Role of Physical Therapist Patient Response Patient;Acceptance;Demonstration;Action Demonstration

## 2022-12-08 NOTE — PROGRESS NOTES
Monitor Summary  Rhythm: SR  Rate: 86-99  Ectopy: No ectopy  .19 / .07 / .34

## 2022-12-08 NOTE — PROGRESS NOTES
4 Eyes Skin Assessment Completed by EVERT Gaona and EVERT Young.    Head WDL  Ears WDL  Nose WDL  Mouth WDL  Neck WDL  Breast/Chest Redness, Bruising, and Blanching  Shoulder Blades Redness and Blanching  Spine Redness and Blanching  (R) Arm/Elbow/Hand Bruising  (L) Arm/Elbow/Hand Bruising  Abdomen Bruising  Groin Redness, Blanching, and Excoriation  Scrotum/Coccyx/Buttocks Redness and Blanching  (R) Leg WDL  (L) Leg WDL  (R) Heel/Foot/Toe Redness, Blanching, and Swelling  (L) Heel/Foot/Toe Redness, Blanching, and Swelling (heel slow to bebo)           Devices In Places Tele Box, Pulse Ox, and Nasal Cannula, Purewick       Interventions In Place NC W/Ear Foams, Heel Mepilex, Waffle Overlay, TAP System, Pillows, Q2 Turns, Barrier Cream, and Pressure Redistribution Mattress    Possible Skin Injury Yes    Pictures Uploaded Into Epic N/A  Wound Consult Placed N/A  RN Wound Prevention Protocol Ordered Yes already ordered

## 2022-12-08 NOTE — PROGRESS NOTES
Received report from previous shift RN. Assumed care of patient at 1900.  Assessment complete.  Patient A&O x 4. Patient calls appropriately.  Patient on remote telemetry.   Patient ambulates with max assist. Bed alarm on.   Patient declines pain.   Denies N&V. Tolerating diet.  + void, + flatus, +12/7 BM. (Pt incontinent of urine and stool). Brief and disposable chucks in place.   TAPS system, Q2hr turns, and waffle mattress in place.   See skin note for 2 RN skin check and interventions.   Patient on RA, denies SOB.     Reviewed plan of care with patient. Call light and personal belongings within reach. Hourly rounding in place. All needs met at this time

## 2022-12-09 LAB
ANION GAP SERPL CALC-SCNC: 8 MMOL/L (ref 7–16)
BUN SERPL-MCNC: 17 MG/DL (ref 8–22)
CALCIUM SERPL-MCNC: 8.4 MG/DL (ref 8.5–10.5)
CHLORIDE SERPL-SCNC: 102 MMOL/L (ref 96–112)
CO2 SERPL-SCNC: 22 MMOL/L (ref 20–33)
CREAT SERPL-MCNC: 0.74 MG/DL (ref 0.5–1.4)
ERYTHROCYTE [DISTWIDTH] IN BLOOD BY AUTOMATED COUNT: 48.9 FL (ref 35.9–50)
GFR SERPLBLD CREATININE-BSD FMLA CKD-EPI: 84 ML/MIN/1.73 M 2
GLUCOSE SERPL-MCNC: 105 MG/DL (ref 65–99)
HCT VFR BLD AUTO: 28.6 % (ref 37–47)
HGB BLD-MCNC: 9.6 G/DL (ref 12–16)
MCH RBC QN AUTO: 31 PG (ref 27–33)
MCHC RBC AUTO-ENTMCNC: 33.6 G/DL (ref 33.6–35)
MCV RBC AUTO: 92.3 FL (ref 81.4–97.8)
PLATELET # BLD AUTO: 271 K/UL (ref 164–446)
PMV BLD AUTO: 9 FL (ref 9–12.9)
POTASSIUM SERPL-SCNC: 4.3 MMOL/L (ref 3.6–5.5)
RBC # BLD AUTO: 3.1 M/UL (ref 4.2–5.4)
SODIUM SERPL-SCNC: 132 MMOL/L (ref 135–145)
WBC # BLD AUTO: 7.6 K/UL (ref 4.8–10.8)

## 2022-12-09 PROCEDURE — 700111 HCHG RX REV CODE 636 W/ 250 OVERRIDE (IP): Performed by: FAMILY MEDICINE

## 2022-12-09 PROCEDURE — 99232 SBSQ HOSP IP/OBS MODERATE 35: CPT | Performed by: FAMILY MEDICINE

## 2022-12-09 PROCEDURE — 700105 HCHG RX REV CODE 258: Performed by: FAMILY MEDICINE

## 2022-12-09 PROCEDURE — 36415 COLL VENOUS BLD VENIPUNCTURE: CPT

## 2022-12-09 PROCEDURE — 700102 HCHG RX REV CODE 250 W/ 637 OVERRIDE(OP): Performed by: PHYSICIAN ASSISTANT

## 2022-12-09 PROCEDURE — 87077 CULTURE AEROBIC IDENTIFY: CPT

## 2022-12-09 PROCEDURE — 80048 BASIC METABOLIC PNL TOTAL CA: CPT

## 2022-12-09 PROCEDURE — 700102 HCHG RX REV CODE 250 W/ 637 OVERRIDE(OP): Performed by: STUDENT IN AN ORGANIZED HEALTH CARE EDUCATION/TRAINING PROGRAM

## 2022-12-09 PROCEDURE — 99233 SBSQ HOSP IP/OBS HIGH 50: CPT | Performed by: INTERNAL MEDICINE

## 2022-12-09 PROCEDURE — 85027 COMPLETE CBC AUTOMATED: CPT

## 2022-12-09 PROCEDURE — 87040 BLOOD CULTURE FOR BACTERIA: CPT | Mod: 91

## 2022-12-09 PROCEDURE — A9270 NON-COVERED ITEM OR SERVICE: HCPCS | Performed by: STUDENT IN AN ORGANIZED HEALTH CARE EDUCATION/TRAINING PROGRAM

## 2022-12-09 PROCEDURE — 87147 CULTURE TYPE IMMUNOLOGIC: CPT | Mod: 91

## 2022-12-09 PROCEDURE — A9270 NON-COVERED ITEM OR SERVICE: HCPCS | Performed by: PHYSICIAN ASSISTANT

## 2022-12-09 PROCEDURE — 770020 HCHG ROOM/CARE - TELE (206)

## 2022-12-09 RX ADMIN — METOPROLOL TARTRATE 12.5 MG: 25 TABLET, FILM COATED ORAL at 04:44

## 2022-12-09 RX ADMIN — VANCOMYCIN HYDROCHLORIDE 1500 MG: 500 INJECTION, POWDER, LYOPHILIZED, FOR SOLUTION INTRAVENOUS at 18:13

## 2022-12-09 RX ADMIN — APIXABAN 5 MG: 5 TABLET, FILM COATED ORAL at 18:10

## 2022-12-09 RX ADMIN — METOPROLOL TARTRATE 12.5 MG: 25 TABLET, FILM COATED ORAL at 18:10

## 2022-12-09 RX ADMIN — GUAIFENESIN SYRUP AND DEXTROMETHORPHAN 5 ML: 100; 10 SYRUP ORAL at 09:00

## 2022-12-09 RX ADMIN — GUAIFENESIN SYRUP AND DEXTROMETHORPHAN 5 ML: 100; 10 SYRUP ORAL at 15:00

## 2022-12-09 RX ADMIN — APIXABAN 5 MG: 5 TABLET, FILM COATED ORAL at 04:44

## 2022-12-09 ASSESSMENT — ENCOUNTER SYMPTOMS
SHORTNESS OF BREATH: 0
HEARTBURN: 0
WHEEZING: 0
FOCAL WEAKNESS: 0
BLURRED VISION: 0
MYALGIAS: 0
MYALGIAS: 1
FLANK PAIN: 0
COUGH: 1
PALPITATIONS: 0
WEIGHT LOSS: 0
NECK PAIN: 0
VOMITING: 0
SPEECH CHANGE: 0
CHILLS: 0
SORE THROAT: 0
NERVOUS/ANXIOUS: 0
NAUSEA: 0
ABDOMINAL PAIN: 0
HEADACHES: 0
BACK PAIN: 0
DIARRHEA: 0
WEAKNESS: 1
SENSORY CHANGE: 0
FEVER: 0
DIZZINESS: 0
DIAPHORESIS: 0

## 2022-12-09 ASSESSMENT — PAIN DESCRIPTION - PAIN TYPE: TYPE: ACUTE PAIN

## 2022-12-09 NOTE — PROGRESS NOTES
4 Eyes Skin Assessment Completed by EVERT Araujo and EVERT Jules.    Head WDL  Ears WDL  Nose WDL  Mouth WDL  Neck WDL  Breast/Chest WDL  Shoulder Blades WDL  Spine WDL  (R) Arm/Elbow/Hand WDL  (L) Arm/Elbow/Hand Redness and Edema  Abdomen Bruising  Groin Redness  Scrotum/Coccyx/Buttocks Redness and Blanching  (R) Leg Edema  (L) Leg Edema  (R) Heel/Foot/Toe Redness, Blanching, and Boggy  (L) Heel/Foot/Toe Redness, Blanching, and Boggy          Devices In Places Nasal Cannula, tele box      Interventions In Place NC W/Ear Foams, Heel Mepilex, Waffle Overlay, TAP System, Pillows, Q2 Turns, Barrier Cream, and Pressure Redistribution Mattress    Possible Skin Injury No    Pictures Uploaded Into Epic N/A  Wound Consult Placed N/A  RN Wound Prevention Protocol Ordered No

## 2022-12-09 NOTE — PROGRESS NOTES
Infectious Disease Progress Note    Author: Ramesh Tee M.D. Date & Time of service: 2022  12:37 PM    Chief Complaint:  MRSA bacteremia    Interval History:  77 y.o. female admitted 2022. For Afib with RVR and rhabdomyolysis +COVID  Developed fever and AMS dueing hosp-blood cultures +MRSA   AF WBC 13.9 Oriented to person and place c/o LUE pain No dyspnea or CP. No new neck, back, joint pain   patient remains afebrile, white count is resolved now, down to 9.4, tolerating vancomycin.  Patient states she is tired but overall better.   left arm   patient remains afebrile, white count is 9.7, tolerating vancomycin.  Plan as below   patient remains afebrile, white count 7.7, tolerating vancomycin, no new events overnight.   patient remains afebrile, white count is 8.5, tolerating vancomycin.  TEODORO positive for endocarditis   patient remains afebrile, count 7.6, repeat cultures as below.  Patient notes improvement in left arm pain and swelling    Labs Reviewed, Medications Reviewed, and Radiology Reviewed.    Review of Systems:  Review of Systems   Constitutional:  Positive for malaise/fatigue. Negative for fever.   Gastrointestinal:  Negative for abdominal pain, diarrhea, nausea and vomiting.   Musculoskeletal:  Positive for myalgias.   Skin:  Negative for itching and rash.   All other systems reviewed and are negative.    Hemodynamics:  Temp (24hrs), Av.3 °C (97.3 °F), Min:35.8 °C (96.5 °F), Max:36.8 °C (98.2 °F)  Temperature: 36.1 °C (97 °F)  Pulse  Av.1  Min: 54  Max: 136   Blood Pressure : 120/56       Physical Exam:  Physical Exam  Vitals and nursing note reviewed.   Constitutional:       General: She is not in acute distress.     Appearance: She is ill-appearing. She is not toxic-appearing or diaphoretic.   HENT:      Mouth/Throat:      Pharynx: No oropharyngeal exudate.      Comments: Fair dentition  Eyes:      General: No scleral icterus.     Extraocular  Movements: Extraocular movements intact.      Pupils: Pupils are equal, round, and reactive to light.   Pulmonary:      Effort: Pulmonary effort is normal. No respiratory distress.      Breath sounds: No stridor.   Abdominal:      General: There is no distension.      Palpations: Abdomen is soft.      Tenderness: There is no abdominal tenderness.   Musculoskeletal:         General: Swelling and tenderness present.      Cervical back: Neck supple. No rigidity.   Skin:     Coloration: Skin is pale. Skin is not jaundiced.      Findings: Bruising and erythema present.      Comments: Left arm thrombophlebitis with significant worsening tenderness and edema   Neurological:      General: No focal deficit present.      Mental Status: She is alert and oriented to person, place, and time.   Psychiatric:         Mood and Affect: Mood normal.         Behavior: Behavior normal.       Meds:    Current Facility-Administered Medications:     vancomycin    metoprolol tartrate    MD Alert...Vancomycin per Pharmacy    Respiratory Therapy Consult    LR    guaiFENesin dextromethorphan    benzonatate    senna-docusate **AND** polyethylene glycol/lytes **AND** magnesium hydroxide **AND** bisacodyl    acetaminophen    hydrALAZINE    Metoprolol Tartrate    apixaban    Labs:  Recent Labs     12/07/22  0740 12/08/22 0750 12/09/22 0223   WBC 7.7 8.5 7.6   RBC 3.25* 3.45* 3.10*   HEMOGLOBIN 10.0* 10.7* 9.6*   HEMATOCRIT 30.0* 32.2* 28.6*   MCV 92.3 93.3 92.3   MCH 30.8 31.0 31.0   RDW 49.3 49.1 48.9   PLATELETCT 211 260 271   MPV 9.4 9.1 9.0       Recent Labs     12/07/22  0740 12/08/22  0750 12/09/22 0223   SODIUM 133* 132* 132*   POTASSIUM 4.3 4.5 4.3   CHLORIDE 104 101 102   CO2 23 23 22   GLUCOSE 106* 107* 105*   BUN 19 19 17       Recent Labs     12/07/22  0740 12/08/22  0750 12/09/22 0223   ALBUMIN 2.4*  --   --    TBILIRUBIN 0.3  --   --    ALKPHOSPHAT 53  --   --    TOTPROTEIN 5.4*  --   --    ALTSGPT 13  --   --    ASTSGOT 12  --    --    CREATININE 0.73 0.75 0.74         Imaging:  DX-CHEST-PORTABLE (1 VIEW)    Result Date: 2022 12:15 PM HISTORY/REASON FOR EXAM:  Shortness of Breath. TECHNIQUE/EXAM DESCRIPTION AND NUMBER OF VIEWS: Single portable view of the chest. COMPARISON: None FINDINGS: Heart size is within normal limits. There are curvilinear opacities in the lung bases. No pleural abnormalities are noted.     1.  Curvilinear opacities in the lung bases likely representing atelectasis, less likely pneumonitis.    US-EXTREMITY VENOUS UPPER UNILAT LEFT    Result Date: 2022   Upper Extremity  Venous Duplex Report  Vascular Laboratory  CONCLUSIONS  Left upper extremity venous duplex imaging.  No evidence of deep venous thrombosis.  Evidence of acute to subacute superficial venous thrombosis in the LEFT  cephalic vein from the mid to distal bicep.  MASSIEL SILVA  Exam Date:     2022 13:29  Room #:     Inpatient  Priority:     Routine  Ht (in):             Wt (lb):  Ordering Physician:        EWELINA WOOD  Referring Physician:       NINA Mcknight  Sonographer:               Giovana Moran RVKAROLINA  Study Type:                Complete Unilateral  Technical Quality:         Adequate  Age:    77    Gender:     F  MRN:    [id number]  :    1945      BSA:  Indications:     Localized swelling, mass and lump, unspecified upper limb,                   Edema, unspecified  CPT Codes:       87536  ICD Codes:       R22.30  R60.9  History:         Left upper extremity swelling/edema. No prior exams.  Limitations:  PROCEDURES:  Left upper extremity venous duplex imaging.  The following venous structures were evaluated: internal jugular,  subclavian, axillary, brachial, cephalic, and basilic veins.  Serial compression, color, and spectral Doppler flow evaluations were  performed.  FINDINGS:  Left upper extremity-  No evidence of deep venous thrombosis.  Evidence of acute to subacute superficial venous thrombosis in the cephalic   vein from the mid to distal bicep.  All other veins demonstrate complete color filling and compressibility with  normal venous flow dynamics including spontaneous flow and respiratory  phasicity.  Flow was evaluated in the contralateral subclavian vein and normal venous  flow dynamics including spontaneous flow and respiratory phasic variation  were demonstrated.  Brittney MUNOZ To  (Electronically Signed)  Final Date:      2022                   16:24    EC-ECHOCARDIOGRAM COMPLETE W/O CONT    Result Date: 2022  Transthoracic Echo Report Echocardiography Laboratory CONCLUSIONS No prior study is available for comparison. Normal left ventricular chamber size. Mild concentric left ventricular hypertrophy. The left ventricular ejection fraction is visually estimated to be 65%. Trace mitral regurgitation. MASSIEL SILVA Exam Date:         2022                    13:31 Exam Location:     Inpatient Priority:          Routine Ordering Physician:        BETH GIL Referring Physician:       [id number] Rose Sonographer:               Frank ARANDA Age:    77     Gender:    F MRN:    [id number] :    1945 BSA:    1.89   Ht (in):    67     Wt (lb):    170 Exam Type:     Complete Indications:     Atrial Fibrillation, Shortness of breath ICD Codes:       427.31  786.05 CPT Codes:       18947 BP:   121    /   65     HR: Technical Quality:       Poor MEASUREMENTS  (Male / Female) Normal Values 2D ECHO LV Diastolic Diameter PLAX        4.2 cm                4.2 - 5.9 / 3.9 - 5.3 cm LV Systolic Diameter PLAX         2.7 cm                2.1 - 4.0 cm IVS Diastolic Thickness           1.1 cm                LVPW Diastolic Thickness          1.1 cm                LVOT Diameter                     1.8 cm                Estimated LV Ejection Fraction    65 %                  LV Ejection Fraction MOD BP       73.1 %                >= 55  % LV Ejection Fraction MOD 4C       78.7 %                LV Ejection  Fraction MOD 2C       73 %                  DOPPLER AV Peak Velocity                  1.1 m/s               AV Peak Gradient                  5.3 mmHg              AV Mean Gradient                  2.9 mmHg              LVOT Peak Velocity                1.1 m/s               AV Area Cont Eq vti               2.6 cm2               MV Velocity Time Integral         21 cm                 Mitral E Point Velocity           0.86 m/s              Mitral E to A Ratio               0.75                  Mitral A Duration                 156 ms                MV Pressure Half Time             31.9 ms               MV Area PHT                       6.9 cm2               MV Deceleration Time              85.8 ms               * Indicates values subject to auto-interpretation LV EF:  65    % FINDINGS Left Ventricle Normal left ventricular chamber size. Mild concentric left ventricular hypertrophy. Normal left ventricular systolic function. The left ventricular ejection fraction is visually estimated to be 65%. Normal regional wall motion. Right Ventricle The right ventricle is not well visualized. Right Atrium The right atrium is not well visualized. Left Atrium Normal left atrial size. Left atrial volume index is 24 mL/sq m. Mitral Valve The mitral valve is not well visualized. No mitral stenosis. Trace mitral regurgitation. Aortic Valve The aortic valve is not well visualized. Tricuspid Valve The tricuspid valve is not well visualized. No stenosis or regurgitation seen. Pulmonic Valve The pulmonic valve is not well visualized. No stenosis or regurgitation seen. Pericardium No pericardial effusion. Aorta Normal aortic root for body surface area. The ascending aorta diameter is 3.1 cm. Zafar Bailon M.D. (Electronically Signed) Final Date:     27 November 2022                 20:23      Micro:  Results       Procedure Component Value Units Date/Time    BLOOD CULTURE [[id number]] Collected: 12/09/22 0306    Order Status: Sent  "Specimen: Blood from Peripheral Updated: 12/09/22 0348    Narrative:      Contact  Per Hospital Policy: Only change Specimen Src: to \"Line\" if  specified by physician order.    BLOOD CULTURE [[id number]] Collected: 12/09/22 0223    Order Status: Sent Specimen: Blood from Peripheral Updated: 12/09/22 0254    Narrative:      Contact  Per Hospital Policy: Only change Specimen Src: to \"Line\" if  specified by physician order.    BLOOD CULTURE [[id number]]  (Abnormal) Collected: 12/05/22 1048    Order Status: Completed Specimen: Blood from Peripheral Updated: 12/08/22 0747     Significant Indicator POS     Source BLD     Site PERIPHERAL     Culture Result Growth detected by Bactec instrument. 12/06/2022  03:32      Methicillin Resistant Staphylococcus aureus  Methicillin resistant by screening method  See previous culture for sensitivity report.      Narrative:      CALL  [facility]  141 tel. [phone],  CALLED  141 tel. [phone] 12/06/2022, 03:33, RB PERF. RESULTS CALLED TO: RN  [phone]  Contact  Per Hospital Policy: Only change Specimen Src: to \"Line\" if  specified by physician order.  Left Hand    BLOOD CULTURE [[id number]]  (Abnormal)  (Susceptibility) Collected: 12/05/22 1048    Order Status: Completed Specimen: Blood from Peripheral Updated: 12/08/22 0747     Significant Indicator POS     Source BLD     Site PERIPHERAL     Culture Result Growth detected by Bactec instrument. 12/06/2022  04:29      Methicillin Resistant Staphylococcus aureus    Narrative:      CALL  [facility]  141 tel. [phone],  CALLED  141 tel. [phone] 12/06/2022, 04:30, RB PERF. RESULTS CALLED TO:RN  [phone]  Contact  Per Hospital Policy: Only change Specimen Src: to \"Line\" if  specified by physician order.  Right Forearm/Arm    Susceptibility       Methicillin resistant staphylococcus aureus (1)       Antibiotic Interpretation Microscan   Method Status    Azithromycin Resistant >4 mcg/mL JADIEL Final    Clindamycin Sensitive 0.5 mcg/mL JADIEL Final    Cefazolin " "Resistant 16 mcg/mL JADIEL Final    Cefepime Resistant >16 mcg/mL JADIEL Final    Ceftaroline Sensitive <=0.5 mcg/mL JADIEL Final    Daptomycin Sensitive 1 mcg/mL JADIEL Final    Ampicillin/sulbactam Resistant 16/8 mcg/mL JADIEL Final    Erythromycin Resistant >4 mcg/mL JADIEL Final    Vancomycin Sensitive 1 mcg/mL JADIEL Final    Oxacillin Resistant >2 mcg/mL JADIEL Final    Trimeth/Sulfa Sensitive <=0.5/9.5 mcg/mL JADIEL Final    Tetracycline Sensitive <=4 mcg/mL JADIEL Final                       BLOOD CULTURE [[id number]]  (Abnormal) Collected: 12/03/22 1233    Order Status: Completed Specimen: Blood from Peripheral Updated: 12/05/22 0801     Significant Indicator POS     Source BLD     Site PERIPHERAL     Culture Result Growth detected by Bactec instrument. 12/04/2022  10:42      Methicillin Resistant Staphylococcus aureus  See previous culture for sensitivity report.      Narrative:      CALL  [facility]  141 tel. [phone],  CALLED  141 tel. [phone] 12/04/2022, 10:41, RB PERF. RESULTS CALLED  TO:Mark LOYD [phone]  Enhanced Droplet, Contact, and Eye Protection  Per Hospital Policy: Only change Specimen Src: to \"Line\" if  specified by physician order.  Left Hand    BLOOD CULTURE [[id number]]  (Abnormal) Collected: 12/03/22 1233    Order Status: Completed Specimen: Blood from Peripheral Updated: 12/05/22 0759     Significant Indicator POS     Source BLD     Site PERIPHERAL     Culture Result Growth detected by Bactec instrument. 12/04/2022  17:48      Methicillin Resistant Staphylococcus aureus  See previous culture for sensitivity report.      Narrative:      Enhanced Droplet, Contact, and Eye Protection  Per Hospital Policy: Only change Specimen Src: to \"Line\" if  specified by physician order.  Right Hand    BLOOD CULTURE [[id number]]  (Abnormal) Collected: 12/02/22 0514    Order Status: Completed Specimen: Blood from Peripheral Updated: 12/05/22 0730     Significant Indicator POS     Source BLD     Site PERIPHERAL     Culture Result Growth " "detected by Bactec instrument. 12/03/2022  01:14      Methicillin Resistant Staphylococcus aureus  See previous culture for sensitivity report.      Narrative:      Enhanced Droplet, Contact, and Eye Protection  Per Hospital Policy: Only change Specimen Src: to \"Line\" if  specified by physician order.  Right AC    BLOOD CULTURE [[id number]]  (Abnormal)  (Susceptibility) Collected: 12/02/22 0514    Order Status: Completed Specimen: Blood from Peripheral Updated: 12/05/22 0729     Significant Indicator POS     Source BLD     Site PERIPHERAL     Culture Result Growth detected by Bactec instrument. 12/03/2022  01:14  Methicillin Resistant Staphylococcus aureus (MRSA)  detected by PCR.        Methicillin Resistant Staphylococcus aureus    Narrative:      CALL  [facility]  171 tel. [phone],  CALLED  171 tel. [phone] 12/03/2022, 03:18, RB PERF. RESULTS CALLED  TO:[phone] (Maksim)  Enhanced Droplet, Contact, and Eye Protection  Per Hospital Policy: Only change Specimen Src: to \"Line\" if  specified by physician order.  Right Hand    Susceptibility       Methicillin resistant staphylococcus aureus (1)       Antibiotic Interpretation Microscan   Method Status    Azithromycin Resistant >4 mcg/mL JADIEL Final    Clindamycin Sensitive 0.5 mcg/mL JADIEL Final    Cefazolin Resistant <=8 mcg/mL JADIEL Final    Cefepime Resistant >16 mcg/mL JADIEL Final    Ceftaroline Sensitive <=0.5 mcg/mL JADIEL Final    Daptomycin Sensitive 1 mcg/mL JADIEL Final    Ampicillin/sulbactam Resistant >16/8 mcg/mL JADIEL Final    Erythromycin Resistant >4 mcg/mL JADIEL Final    Vancomycin Sensitive 1 mcg/mL JADIEL Final    Oxacillin Resistant >2 mcg/mL JADIEL Final    Trimeth/Sulfa Sensitive <=0.5/9.5 mcg/mL JADIEL Final    Tetracycline Sensitive <=4 mcg/mL JADIEL Final                       MRSA By PCR (Amp) [[id number]] Collected: 12/02/22 1220    Order Status: Completed Specimen: Respirate from Nares Updated: 12/02/22 1704     MRSA by PCR Negative    Narrative:      Enhanced Droplet, " Contact, and Eye Protection  Collected By: [id number] RENETTA QUINTERO            Assessment:  This is a 77-year-old female patient who was transferred from an outside facility, slid off her bed at home and was unable to get up for multiple days, found by a friend, was found to be in A. fib with RVR and with mild rhabdomyolysis.  She was also noted to have COVID-19 requiring nasal cannula oxygen, resolved.  On 12/1, she developed right arm thrombophlebitis and the following day had worsening leukocytosis and procalcitonin, blood cultures positive for MRSA.    Hospital onset sepsis with MRSA bacteremia, likely secondary to thrombophlebitis that may be seeded with infection based on exam findings. IV line has been removed.  TEODORO positive for infective endocarditis    Pertinent Diagnoses:  Native mitral valve infective endocarditis  MRSA bacteremia, persistent  SO, resolved  Recent COVID-19  Thrombophlebitis     PLAN:   MRSA bacteremia  Native mitral valve infective endocarditis  Thrombophlebitis  BCxs + MRSA 12/2 and 12/3, 12/5  Follow repeat blood cultures x2 from 12/9, pending  TEODORO formal report pending  Continue vancomycin. Monitor vanco trough and renal function.  Last vancomycin peak of 47.5 on 12/6    Disposition: Anticipate likely eventual discharge to a facility on 6 weeks of IV antibiotics once blood cultures clear  Need for PICC line: Eventually yes, once blood cultures clear    Discussed with Dr. Palacios

## 2022-12-09 NOTE — PROGRESS NOTES
Hospital Medicine Daily Progress Note    Date of Service  12/9/2022    Chief Complaint  Paulina Castellanos is a 77 y.o. female admitted 11/26/2022 with atrial fibrillation with rapid ventricular rate.      Hospital Course  Admitted for Atrial fibrillation with rapid ventricular rate, COVID-19 with pneumonia and Respiratory failure with hypoxia, mild rhabdomyolysis. She was started on Decadron, oxygen supplementation, and supportive measures.  She was placed on Metoprolol for rate control, and Eliquis for anticoagulation.  She apparently developed fever and encephalopathy, and was noted to have left arm thrombophlebitis.  Work-up further showed MRSA bacteremia.  Infectious disease was consulted on the case.  Patient was started on empiric coverage with IV vancomycin.  Repeat blood cultures remained positive for MRSA.  Cardiology was consulted on the case for a TEODORO, which showed mitral valve vegetation/endocarditis    Interval Problem Update  Bacteremia -blood cultures today  A. Fib - currently sinus  Encephalopathy - resolved  Dysphagia - SLP evaluation noted    I have discussed this patient's plan of care and discharge plan at IDT rounds today with Case Management, Nursing, Nursing leadership, and other members of the IDT team.    Consultants/Specialty  cardiology, infectious disease, and palliative care    Code Status  DNAR/DNI    Disposition  Patient is not medically cleared for discharge.   Anticipate discharge to to skilled nursing facility.  I have placed the appropriate orders for post-discharge needs.    Review of Systems  Review of Systems   Constitutional:  Positive for malaise/fatigue. Negative for chills, diaphoresis, fever and weight loss.   HENT:  Negative for congestion, hearing loss and sore throat.    Eyes:  Negative for blurred vision.   Respiratory:  Positive for cough. Negative for shortness of breath and wheezing.    Cardiovascular:  Negative for chest pain, palpitations and leg swelling.    Gastrointestinal:  Negative for abdominal pain, diarrhea, heartburn, nausea and vomiting.   Genitourinary:  Negative for dysuria, flank pain and hematuria.   Musculoskeletal:  Negative for back pain, joint pain, myalgias and neck pain.   Skin:  Negative for rash.   Neurological:  Positive for weakness. Negative for dizziness, sensory change, speech change, focal weakness and headaches.   Psychiatric/Behavioral:  The patient is not nervous/anxious.       Physical Exam  Temp:  [35.8 °C (96.5 °F)-36.8 °C (98.2 °F)] 36.1 °C (97 °F)  Pulse:  [69-93] 73  Resp:  [16-20] 16  BP: (105-123)/(53-68) 120/56  SpO2:  [94 %-97 %] 94 %    Physical Exam  Vitals and nursing note reviewed.   HENT:      Head: Normocephalic and atraumatic.      Nose: No congestion.      Mouth/Throat:      Mouth: Mucous membranes are moist.   Eyes:      Extraocular Movements: Extraocular movements intact.      Conjunctiva/sclera: Conjunctivae normal.   Cardiovascular:      Rate and Rhythm: Normal rate and regular rhythm.      Heart sounds: Murmur heard.   Pulmonary:      Effort: Pulmonary effort is normal.      Breath sounds: Normal breath sounds.   Abdominal:      General: There is no distension.      Tenderness: There is no abdominal tenderness. There is no guarding or rebound.   Musculoskeletal:         General: Swelling (left arm) present.      Cervical back: No tenderness.      Right lower leg: Edema present.      Left lower leg: Edema present.   Skin:     Findings: Erythema (epigastric area) present.   Neurological:      General: No focal deficit present.      Mental Status: She is alert and oriented to person, place, and time.      Cranial Nerves: No cranial nerve deficit.       Fluids    Intake/Output Summary (Last 24 hours) at 12/9/2022 1204  Last data filed at 12/9/2022 0819  Gross per 24 hour   Intake 918.62 ml   Output --   Net 918.62 ml         Laboratory  Recent Labs     12/07/22  0740 12/08/22  0750 12/09/22  0223   WBC 7.7 8.5 7.6   RBC  3.25* 3.45* 3.10*   HEMOGLOBIN 10.0* 10.7* 9.6*   HEMATOCRIT 30.0* 32.2* 28.6*   MCV 92.3 93.3 92.3   MCH 30.8 31.0 31.0   MCHC 33.3* 33.2* 33.6   RDW 49.3 49.1 48.9   PLATELETCT 211 260 271   MPV 9.4 9.1 9.0     Recent Labs     12/07/22  0740 12/08/22  0750 12/09/22  0223   SODIUM 133* 132* 132*   POTASSIUM 4.3 4.5 4.3   CHLORIDE 104 101 102   CO2 23 23 22   GLUCOSE 106* 107* 105*   BUN 19 19 17   CREATININE 0.73 0.75 0.74   CALCIUM 8.3* 8.5 8.4*                   Imaging  EC-TEODORO W/O CONT         DX-CHEST-PORTABLE (1 VIEW)   Final Result      1.  Curvilinear opacities in the lung bases likely representing atelectasis, less likely pneumonitis.      US-EXTREMITY VENOUS UPPER UNILAT LEFT   Final Result      EC-ECHOCARDIOGRAM COMPLETE W/O CONT   Final Result      OUTSIDE IMAGES-CT CHEST   Final Result           Assessment/Plan  * Atrial fibrillation with rapid ventricular response (HCC)- (present on admission)  Assessment & Plan  Metoprolol, Eliquis  [id number] -  3    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)  Assessment & Plan  IV Vancomycin    MRSA bacteremia- (present on admission)  Assessment & Plan  IV Vancomycin  repeat blood cultures today      Encephalopathy acute- (present on admission)  Assessment & Plan  secondary to infectious etiology  Avoid Benzodiazepines and Anticholinergics  Frequent orientation  Open window blinds during the day  Avoid naps during the day  Family at bedside whenever possible  Ensure adequate sleep at night - use Melatonin preferably  Avoid early morning labs  Avoid vital signs during sleep  Ambulate if possible  Provide adequate pain control  Monitor for urinary retention  Prevent and manage constipation  Discontinue IVs, Catheters, Tubes, Lines, Cardiac monitors, SCDs if possible    Sepsis (HCC)- (present on admission)  Assessment & Plan  This is Sepsis Not present on admission  SIRS criteria identified on my evaluation include: Fever, with temperature greater than 101 deg F,  Tachycardia, with heart rate greater than 90 BPM and Leukocytosis, with WBC greater than 12,000  Source -MRSA bacteremia  Sepsis protocol initiated  Fluid resuscitation ordered per protocol  Crystalloid Fluid Administration: Patient has advanced or end-stage heart failure (with documentation of NYHA class III or symptoms with minimal exertion, Or NYHA class IV or symptoms at rest or with activity), for this/these reason(s) it is unsafe for this patient to receive 30 mL/kg of fluid  Partial Fluid Dose Given: 10 mL/kg per current or ideal body weight, patient to be reassessed shortly after completion of partial fluid bolus to ensure adequacy of resuscitation  IV antibiotics as appropriate for source of sepsis  Reassessment: I have reassessed the patient's hemodynamic status          Thrombophlebitis- (present on admission)  Assessment & Plan  IV Vancomycin  Eliquis    Pneumonia due to COVID-19 virus- (present on admission)  Assessment & Plan  Finished course of Decadron    Cellulitis- (present on admission)  Assessment & Plan  IV Vancomycin    Dysphagia  Assessment & Plan  Level 7, Liquid level 0, crush pills  Aspiration precautions    Anemia- (present on admission)  Assessment & Plan  Follow CBC    Hypomagnesemia- (present on admission)  Assessment & Plan  IV Mg given  Follow level    Hypophosphatemia- (present on admission)  Assessment & Plan  Follow level    Hyponatremia- (present on admission)  Assessment & Plan  Follow BMP    Acute respiratory failure with hypoxia (HCC)- (present on admission)  Assessment & Plan  due to COVID 19  RT protocol    Metabolic acidosis, normal anion gap (NAG)- (present on admission)  Assessment & Plan  Resolved    Frailty syndrome in geriatric patient- (present on admission)  Assessment & Plan  Palliative care following    Non-traumatic rhabdomyolysis- (present on admission)  Assessment & Plan  resolved    Elevated LFTs- (present on admission)  Assessment & Plan  Follow  CMP    Hypokalemia- (present on admission)  Assessment & Plan  Follow bmp       VTE prophylaxis: therapeutic anticoagulation with Eliquis    I have performed a physical exam and reviewed and updated ROS and Plan today (12/9/2022). In review of yesterday's note (12/8/2022), there are no changes except as documented above.

## 2022-12-09 NOTE — PROGRESS NOTES
Bedside report received from day shift nurse.  Pt A&Ox4  Tolerating easy to chew diet, denies n/v. Normoactive bowel sounds, passing flatus, LBM 12/8/22. IV access through 20g RFA that is running TKO.  Groin redness, barrier cream applied.   Saturating >90% on 2L NC.  Pt ambulates max assist.  Pain is controlled through medication orders. Updated on plan of care. Safety education provided. Bed locked in low. Call light within reach. Rounding in place.

## 2022-12-09 NOTE — PROGRESS NOTES
"Pharmacy Vancomycin Kinetics Note for 12/9/2022     77 y.o. female on Vancomycin day # 8     Vancomycin Indication (Two level/Trough based Dosing): Bacteremia (goal trough 15-20)    Provider specified end date: 12/17/22    Active Antibiotics (From admission, onward)      Ordered     Ordering Provider       Sat Dec 3, 2022 10:12 AM    12/03/22 1012  MD Alert...Vancomycin per Pharmacy  PHARMACY TO DOSE        Note to Pharmacy: Dose per Pharmacokinetic Protocol   Question:  Indication(s) for vancomycin?  Answer:  Staphylococcus aureus bacteremia    Linwood Silva M.D.    12/03/22 1012  vancomycin (VANCOCIN) 1,750 mg in  mL IVPB  (vancomycin (VANCOCIN) IV (LD + Maintenance))  EVERY 24 HOURS        Note to Pharmacy: Dose per Pharmacokinetic Protocol    Linwood Silva M.D.            Dosing Weight: 81.3 kg (179 lb 3.7 oz)      Admission History: Admitted on 11/26/2022 for Atrial fibrillation with rapid ventricular response (HCC) [I48.91]    Pertinent history: 77-year-old female admitted 11/26 after being found down. On 12/1, patient developed right arm thrombophlebitis, with worsening leukocytosis and blood cultures positive for MRSA likely 2' thrombophlebitis that may be seeded with infection. TEODORO was positive for endocarditis. 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     Allergies:     Patient has no known allergies.     Pertinent cultures to date:     Results       Procedure Component Value Units Date/Time    BLOOD CULTURE [[id number]] Collected: 12/09/22 0306    Order Status: Sent Specimen: Blood from Peripheral Updated: 12/09/22 0348    Narrative:      Contact  Per Hospital Policy: Only change Specimen Src: to \"Line\" if  specified by physician order.    BLOOD CULTURE [[id number]] Collected: 12/09/22 0223    Order Status: Sent Specimen: Blood from Peripheral Updated: 12/09/22 0254    Narrative:      Contact  Per Hospital Policy: Only change Specimen Src: to \"Line\" if  specified by " "physician order.    BLOOD CULTURE [[id number]]  (Abnormal) Collected: 12/05/22 1048    Order Status: Completed Specimen: Blood from Peripheral Updated: 12/08/22 0747     Significant Indicator POS     Source BLD     Site PERIPHERAL     Culture Result Growth detected by Bactec instrument. 12/06/2022  03:32      Methicillin Resistant Staphylococcus aureus  Methicillin resistant by screening method  See previous culture for sensitivity report.      Narrative:      CALL  [facility]  141 tel. [phone],  CALLED  141 tel. [phone] 12/06/2022, 03:33, RB PERF. RESULTS CALLED TO: RN  [phone]  Contact  Per Hospital Policy: Only change Specimen Src: to \"Line\" if  specified by physician order.  Left Hand    BLOOD CULTURE [[id number]]  (Abnormal)  (Susceptibility) Collected: 12/05/22 1048    Order Status: Completed Specimen: Blood from Peripheral Updated: 12/08/22 0747     Significant Indicator POS     Source BLD     Site PERIPHERAL     Culture Result Growth detected by Bactec instrument. 12/06/2022  04:29      Methicillin Resistant Staphylococcus aureus    Narrative:      CALL  [facility]  141 tel. [phone],  CALLED  141 tel. [phone] 12/06/2022, 04:30, RB PERF. RESULTS CALLED TO:RN  [phone]  Contact  Per Hospital Policy: Only change Specimen Src: to \"Line\" if  specified by physician order.  Right Forearm/Arm    BLOOD CULTURE [[id number]]  (Abnormal) Collected: 12/03/22 1233    Order Status: Completed Specimen: Blood from Peripheral Updated: 12/05/22 0801     Significant Indicator POS     Source BLD     Site PERIPHERAL     Culture Result Growth detected by Bactec instrument. 12/04/2022  10:42      Methicillin Resistant Staphylococcus aureus  See previous culture for sensitivity report.      Narrative:      CALL  [facility]  141 tel. [phone],  CALLED  141 tel. [phone] 12/04/2022, 10:41, RB PERF. RESULTS CALLED  TO:Mark LOYD [phone]  Enhanced Droplet, Contact, and Eye Protection  Per Hospital Policy: Only change Specimen Src: to \"Line\" " "if  specified by physician order.  Left Hand    BLOOD CULTURE [[id number]]  (Abnormal) Collected: 12/03/22 1233    Order Status: Completed Specimen: Blood from Peripheral Updated: 12/05/22 0759     Significant Indicator POS     Source BLD     Site PERIPHERAL     Culture Result Growth detected by Bactec instrument. 12/04/2022  17:48      Methicillin Resistant Staphylococcus aureus  See previous culture for sensitivity report.      Narrative:      Enhanced Droplet, Contact, and Eye Protection  Per Hospital Policy: Only change Specimen Src: to \"Line\" if  specified by physician order.  Right Hand    BLOOD CULTURE [[id number]]  (Abnormal) Collected: 12/02/22 0514    Order Status: Completed Specimen: Blood from Peripheral Updated: 12/05/22 0730     Significant Indicator POS     Source BLD     Site PERIPHERAL     Culture Result Growth detected by Bactec instrument. 12/03/2022  01:14      Methicillin Resistant Staphylococcus aureus  See previous culture for sensitivity report.      Narrative:      Enhanced Droplet, Contact, and Eye Protection  Per Hospital Policy: Only change Specimen Src: to \"Line\" if  specified by physician order.  Right AC    BLOOD CULTURE [[id number]]  (Abnormal)  (Susceptibility) Collected: 12/02/22 0514    Order Status: Completed Specimen: Blood from Peripheral Updated: 12/05/22 0729     Significant Indicator POS     Source BLD     Site PERIPHERAL     Culture Result Growth detected by Bactec instrument. 12/03/2022  01:14  Methicillin Resistant Staphylococcus aureus (MRSA)  detected by PCR.        Methicillin Resistant Staphylococcus aureus    Narrative:      CALL  [facility]  171 tel. [phone],  CALLED  171 tel. [phone] 12/03/2022, 03:18, RB PERF. RESULTS CALLED  TO:[phone] (Maksim)  Enhanced Droplet, Contact, and Eye Protection  Per Hospital Policy: Only change Specimen Src: to \"Line\" if  specified by physician order.  Right Hand    MRSA By PCR (Amp) [[id number]] Collected: 12/02/22 1220    Order Status: " "Completed Specimen: Respirate from Nares Updated: 22 1704     MRSA by PCR Negative    Narrative:      Enhanced Droplet, Contact, and Eye Protection  Collected By: [id number] RENETTA QUINTERO    URINALYSIS [[id number]]     Order Status: Canceled Specimen: Urine, Clean Catch     URINALYSIS [[id number]]  (Abnormal) Collected: 22 0331    Order Status: Completed Specimen: Urine, Clean Catch Updated: 22 0831     Color Yellow     Character Clear     Specific Gravity >=1.045     Ph 5.5     Glucose Negative mg/dL      Ketones 40 mg/dL      Protein Negative mg/dL      Bilirubin Negative     Urobilinogen, Urine 1.0     Nitrite Negative     Leukocyte Esterase Negative     Occult Blood Small     Micro Urine Req Microscopic    Narrative:      Enhanced Droplet, Contact, and Eye Protection            Labs:     Estimated Creatinine Clearance: 63.9 mL/min (by C-G formula based on SCr of 0.74 mg/dL).  Recent Labs     22  0740 22   WBC 7.7 8.5 7.6     Recent Labs     22  0740 22  0750 22   BUN 19 19 17   CREATININE 0.73 0.75 0.74   ALBUMIN 2.4*  --   --        Intake/Output Summary (Last 24 hours) at 2022 0915  Last data filed at 2022 1915  Gross per 24 hour   Intake 798.62 ml   Output --   Net 798.62 ml      /61   Pulse 69   Temp 36.2 °C (97.2 °F) (Temporal)   Resp 18   Ht 1.575 m (5' 2\")   Wt 81.3 kg (179 lb 3.7 oz)   SpO2 97%  Temp (24hrs), Av.3 °C (97.4 °F), Min:35.8 °C (96.5 °F), Max:36.8 °C (98.2 °F)      List concerns for Vancomycin clearance:     Age;BUN/Scr ratio greater than 20:1;Obesity    Pharmacokinetics:     Two level kinetics:   Ke (hr ^-1): 0.0291  Half life: 23.8  Vd: Steady state Vd : 102.032  Calculated AUC: 589 mg·hr/L    Trough kinetics:   Recent Labs     22  1623   EMILY  --  17.8   TERENCE 32.0  --        A/P:     -  Vancomycin dose: 1500 mg IV every 24 hours     -  Next vancomycin " level(s): At steady state after fourth dose (~12/12) unless otherwise indicated (not yet ordered)     -  Predicted vancomycin AUC from two level test calculator: 505 mg·hr/L    -  Comments: Calculated AUC borderline supra therapeutic, but still within goal. Considering above concerns for clearance, will opt to decrease the dose to get AUC closer to 500 to prevent ADRs. Blood cultures have been positive x3. Repeat blood culture from today (12/9) in process.    Thank you,  Janae De La Torre, PharmD

## 2022-12-09 NOTE — CARE PLAN
The patient is Stable - Low risk of patient condition declining or worsening    Shift Goals  Clinical Goals: Q2 turns, increase mobility  Patient Goals: comfort, rest  Family Goals: RICARDO    Progress made toward(s) clinical / shift goals:  Q2 turns in place, frequent incontinence of bowel and bladder, Barrier cream in use, attempted to mobilize with PT today-able to stand, but no walk today. Worked on feet stretches with pt.      Patient is not progressing towards the following goals:

## 2022-12-09 NOTE — CARE PLAN
The patient is Stable - Low risk of patient condition declining or worsening    Shift Goals  Clinical Goals: Turns, skin integrity, rest  Patient Goals: Comfort, rest  Family Goals: RICARDO    Progress made toward(s) clinical / shift goals:  Pt resting. Q2 turns in place. Barrier cream utilized.     Patient is not progressing towards the following goals:

## 2022-12-10 PROCEDURE — 700111 HCHG RX REV CODE 636 W/ 250 OVERRIDE (IP): Performed by: FAMILY MEDICINE

## 2022-12-10 PROCEDURE — A9270 NON-COVERED ITEM OR SERVICE: HCPCS | Performed by: PHYSICIAN ASSISTANT

## 2022-12-10 PROCEDURE — A9270 NON-COVERED ITEM OR SERVICE: HCPCS | Performed by: STUDENT IN AN ORGANIZED HEALTH CARE EDUCATION/TRAINING PROGRAM

## 2022-12-10 PROCEDURE — 700102 HCHG RX REV CODE 250 W/ 637 OVERRIDE(OP): Performed by: STUDENT IN AN ORGANIZED HEALTH CARE EDUCATION/TRAINING PROGRAM

## 2022-12-10 PROCEDURE — 99232 SBSQ HOSP IP/OBS MODERATE 35: CPT | Performed by: FAMILY MEDICINE

## 2022-12-10 PROCEDURE — 770020 HCHG ROOM/CARE - TELE (206)

## 2022-12-10 PROCEDURE — 99233 SBSQ HOSP IP/OBS HIGH 50: CPT | Performed by: INTERNAL MEDICINE

## 2022-12-10 PROCEDURE — 700105 HCHG RX REV CODE 258: Performed by: FAMILY MEDICINE

## 2022-12-10 PROCEDURE — 700102 HCHG RX REV CODE 250 W/ 637 OVERRIDE(OP): Performed by: PHYSICIAN ASSISTANT

## 2022-12-10 PROCEDURE — 770001 HCHG ROOM/CARE - MED/SURG/GYN PRIV*

## 2022-12-10 RX ADMIN — VANCOMYCIN HYDROCHLORIDE 1500 MG: 500 INJECTION, POWDER, LYOPHILIZED, FOR SOLUTION INTRAVENOUS at 16:59

## 2022-12-10 RX ADMIN — METOPROLOL TARTRATE 12.5 MG: 25 TABLET, FILM COATED ORAL at 05:04

## 2022-12-10 RX ADMIN — APIXABAN 5 MG: 5 TABLET, FILM COATED ORAL at 05:04

## 2022-12-10 RX ADMIN — APIXABAN 5 MG: 5 TABLET, FILM COATED ORAL at 17:58

## 2022-12-10 RX ADMIN — GUAIFENESIN SYRUP AND DEXTROMETHORPHAN 5 ML: 100; 10 SYRUP ORAL at 15:00

## 2022-12-10 RX ADMIN — METOPROLOL TARTRATE 12.5 MG: 25 TABLET, FILM COATED ORAL at 17:58

## 2022-12-10 ASSESSMENT — ENCOUNTER SYMPTOMS
WEAKNESS: 1
ABDOMINAL PAIN: 0
FEVER: 0
WHEEZING: 0
DIAPHORESIS: 0
COUGH: 1
NAUSEA: 0
VOMITING: 0
SHORTNESS OF BREATH: 0
WEIGHT LOSS: 0
DIARRHEA: 0
MYALGIAS: 0
BLURRED VISION: 0
NERVOUS/ANXIOUS: 0
SPEECH CHANGE: 0
MYALGIAS: 1
FLANK PAIN: 0
HEARTBURN: 0
SENSORY CHANGE: 0
FOCAL WEAKNESS: 0
HEADACHES: 0
SORE THROAT: 0
PALPITATIONS: 0
DIZZINESS: 0
NECK PAIN: 0
CHILLS: 0
BACK PAIN: 0

## 2022-12-10 ASSESSMENT — PAIN DESCRIPTION - PAIN TYPE: TYPE: ACUTE PAIN

## 2022-12-10 NOTE — PROGRESS NOTES
Bedside report received from day shift nurse.  Pt A&Ox4  Tolerating easy to chew diet, denies n/v. Normoactive bowel sounds, passing flatus, LBM 12/9/22. IV access through 20g RFA that is running TKO.  Groin redness, barrier cream applied.   Saturating >90% on 2L NC.  Pt ambulates max assist. Q2 turns in place.   Pain is controlled through medication orders. Updated on plan of care. Safety education provided. Bed locked in low. Call light within reach. Rounding in place.

## 2022-12-10 NOTE — CARE PLAN
The patient is Stable - Low risk of patient condition declining or worsening    Shift Goals  Clinical Goals: [id number], skin care  Patient Goals: Comfort, rest  Family Goals: RICARDO    Progress made toward(s) clinical / shift goals:  Q2 turns in place, pt getting stronger to assist with turns as compared with yesterday.  Pt incontinent multiple times, using barrier cream for groin and buttock redness.    Patient is not progressing towards the following goals:

## 2022-12-10 NOTE — PROGRESS NOTES
Hospital Medicine Daily Progress Note    Date of Service  12/10/2022    Chief Complaint  Paulina Castellanos is a 77 y.o. female admitted 11/26/2022 with atrial fibrillation with rapid ventricular rate.      Hospital Course  Admitted for Atrial fibrillation with rapid ventricular rate, COVID-19 with pneumonia and Respiratory failure with hypoxia, mild rhabdomyolysis. She was started on Decadron, oxygen supplementation, and supportive measures.  She was placed on Metoprolol for rate control, and Eliquis for anticoagulation.  She apparently developed fever and encephalopathy, and was noted to have left arm thrombophlebitis.  Work-up further showed MRSA bacteremia.  Infectious disease was consulted on the case.  Patient was started on empiric coverage with IV vancomycin.  Repeat blood cultures remained positive for MRSA.  Cardiology was consulted on the case for a TEODORO, which showed mitral valve vegetation/endocarditis.     Interval Problem Update  Bacteremia -repeat blood cultures pending, but 1 out of 2 bottles may be positive  A. Fib - currently sinus    I have discussed this patient's plan of care and discharge plan at IDT rounds today with Case Management, Nursing, Nursing leadership, and other members of the IDT team.    Consultants/Specialty  cardiology, infectious disease, and palliative care    Code Status  DNAR/DNI    Disposition  Patient is not medically cleared for discharge.   Anticipate discharge to to skilled nursing facility.  I have placed the appropriate orders for post-discharge needs.    Review of Systems  Review of Systems   Constitutional:  Positive for malaise/fatigue. Negative for chills, diaphoresis, fever and weight loss.   HENT:  Negative for congestion, hearing loss and sore throat.    Eyes:  Negative for blurred vision.   Respiratory:  Positive for cough. Negative for shortness of breath and wheezing.    Cardiovascular:  Negative for chest pain, palpitations and leg swelling.   Gastrointestinal:   Negative for abdominal pain, diarrhea, heartburn, nausea and vomiting.   Genitourinary:  Negative for dysuria, flank pain and hematuria.   Musculoskeletal:  Negative for back pain, joint pain, myalgias and neck pain.   Skin:  Negative for rash.   Neurological:  Positive for weakness. Negative for dizziness, sensory change, speech change, focal weakness and headaches.   Psychiatric/Behavioral:  The patient is not nervous/anxious.       Physical Exam  Temp:  [36.1 °C (97 °F)-37.2 °C (98.9 °F)] 37.1 °C (98.8 °F)  Pulse:  [73-98] 84  Resp:  [16-20] 16  BP: ()/(56-63) 99/56  SpO2:  [91 %-100 %] 91 %    Physical Exam  Vitals and nursing note reviewed.   HENT:      Head: Normocephalic and atraumatic.      Nose: No congestion.      Mouth/Throat:      Mouth: Mucous membranes are moist.   Eyes:      Extraocular Movements: Extraocular movements intact.      Conjunctiva/sclera: Conjunctivae normal.   Cardiovascular:      Rate and Rhythm: Normal rate and regular rhythm.      Heart sounds: Murmur heard.   Pulmonary:      Effort: Pulmonary effort is normal.      Breath sounds: Normal breath sounds.   Abdominal:      General: There is no distension.      Tenderness: There is no abdominal tenderness. There is no guarding or rebound.   Musculoskeletal:         General: Swelling (left arm) present.      Cervical back: No tenderness.      Right lower leg: Edema present.      Left lower leg: Edema present.   Skin:     Findings: Erythema (epigastric area) present.   Neurological:      General: No focal deficit present.      Mental Status: She is alert and oriented to person, place, and time.      Cranial Nerves: No cranial nerve deficit.       Fluids    Intake/Output Summary (Last 24 hours) at 12/10/2022 1053  Last data filed at 12/9/2022 1153  Gross per 24 hour   Intake 120 ml   Output --   Net 120 ml           Laboratory  Recent Labs     12/08/22  0750 12/09/22  0223   WBC 8.5 7.6   RBC 3.45* 3.10*   HEMOGLOBIN 10.7* 9.6*    HEMATOCRIT 32.2* 28.6*   MCV 93.3 92.3   MCH 31.0 31.0   MCHC 33.2* 33.6   RDW 49.1 48.9   PLATELETCT 260 271   MPV 9.1 9.0       Recent Labs     12/08/22  0750 12/09/22  0223   SODIUM 132* 132*   POTASSIUM 4.5 4.3   CHLORIDE 101 102   CO2 23 22   GLUCOSE 107* 105*   BUN 19 17   CREATININE 0.75 0.74   CALCIUM 8.5 8.4*                     Imaging  EC-TEODORO W/O CONT         DX-CHEST-PORTABLE (1 VIEW)   Final Result      1.  Curvilinear opacities in the lung bases likely representing atelectasis, less likely pneumonitis.      US-EXTREMITY VENOUS UPPER UNILAT LEFT   Final Result      EC-ECHOCARDIOGRAM COMPLETE W/O CONT   Final Result      OUTSIDE IMAGES-CT CHEST   Final Result             Assessment/Plan  * Atrial fibrillation with rapid ventricular response (HCC)- (present on admission)  Assessment & Plan  Metoprolol, Eliquis  [id number] -  3    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)  Assessment & Plan  IV Vancomycin    MRSA bacteremia- (present on admission)  Assessment & Plan  IV Vancomycin  Follow repeat blood cultures       Encephalopathy acute- (present on admission)  Assessment & Plan  secondary to infectious etiology  Avoid Benzodiazepines and Anticholinergics  Frequent orientation  Open window blinds during the day  Avoid naps during the day  Family at bedside whenever possible  Ensure adequate sleep at night - use Melatonin preferably  Avoid early morning labs  Avoid vital signs during sleep  Ambulate if possible  Provide adequate pain control  Monitor for urinary retention  Prevent and manage constipation  Discontinue IVs, Catheters, Tubes, Lines, Cardiac monitors, SCDs if possible    Sepsis (HCC)- (present on admission)  Assessment & Plan  This is Sepsis Not present on admission  SIRS criteria identified on my evaluation include: Fever, with temperature greater than 101 deg F, Tachycardia, with heart rate greater than 90 BPM and Leukocytosis, with WBC greater than 12,000  Source -MRSA bacteremia  Sepsis  protocol initiated  Fluid resuscitation ordered per protocol  Crystalloid Fluid Administration: Patient has advanced or end-stage heart failure (with documentation of NYHA class III or symptoms with minimal exertion, Or NYHA class IV or symptoms at rest or with activity), for this/these reason(s) it is unsafe for this patient to receive 30 mL/kg of fluid  Partial Fluid Dose Given: 10 mL/kg per current or ideal body weight, patient to be reassessed shortly after completion of partial fluid bolus to ensure adequacy of resuscitation  IV antibiotics as appropriate for source of sepsis  Reassessment: I have reassessed the patient's hemodynamic status          Thrombophlebitis- (present on admission)  Assessment & Plan  IV Vancomycin  Eliquis    Pneumonia due to COVID-19 virus- (present on admission)  Assessment & Plan  Finished course of Decadron    Cellulitis- (present on admission)  Assessment & Plan  IV Vancomycin    Dysphagia  Assessment & Plan  Level 7, Liquid level 0, crush pills  Aspiration precautions    Anemia- (present on admission)  Assessment & Plan  Follow CBC    Hypomagnesemia- (present on admission)  Assessment & Plan  IV Mg given  Follow level    Hypophosphatemia- (present on admission)  Assessment & Plan  Follow level    Hyponatremia- (present on admission)  Assessment & Plan  Follow BMP    Acute respiratory failure with hypoxia (HCC)- (present on admission)  Assessment & Plan  due to COVID 19  RT protocol    Metabolic acidosis, normal anion gap (NAG)- (present on admission)  Assessment & Plan  Resolved    Frailty syndrome in geriatric patient- (present on admission)  Assessment & Plan  Palliative care following    Non-traumatic rhabdomyolysis- (present on admission)  Assessment & Plan  resolved    Elevated LFTs- (present on admission)  Assessment & Plan  Follow CMP    Hypokalemia- (present on admission)  Assessment & Plan  Follow bmp         VTE prophylaxis: therapeutic anticoagulation with Eliquis    I  have performed a physical exam and reviewed and updated ROS and Plan today (12/10/2022). In review of yesterday's note (12/9/2022), there are no changes except as documented above.

## 2022-12-10 NOTE — PROGRESS NOTES
Infectious Disease Progress Note    Author: Ramesh Tee M.D. Date & Time of service: 12/10/2022  10:06 AM    Chief Complaint:  MRSA bacteremia    Interval History:  77 y.o. female admitted 2022. For Afib with RVR and rhabdomyolysis +COVID  Developed fever and AMS dueing hosp-blood cultures +MRSA   AF WBC 13.9 Oriented to person and place c/o LUE pain No dyspnea or CP. No new neck, back, joint pain   patient remains afebrile, white count is resolved now, down to 9.4, tolerating vancomycin.  Patient states she is tired but overall better.   left arm   patient remains afebrile, white count is 9.7, tolerating vancomycin.  Plan as below   patient remains afebrile, white count 7.7, tolerating vancomycin, no new events overnight.   patient remains afebrile, white count is 8.5, tolerating vancomycin.  TEODORO positive for endocarditis   patient remains afebrile, count 7.6, repeat cultures as below.  Patient notes improvement in left arm pain and swelling  12/10 patient remains afebrile, no CBC this morning, tolerating vancomycin    Labs Reviewed, Medications Reviewed, and Radiology Reviewed.    Review of Systems:  Review of Systems   Constitutional:  Positive for malaise/fatigue. Negative for fever.   Gastrointestinal:  Negative for abdominal pain, diarrhea, nausea and vomiting.   Musculoskeletal:  Positive for myalgias.   Skin:  Negative for itching and rash.   All other systems reviewed and are negative.    Hemodynamics:  Temp (24hrs), Av.7 °C (98.1 °F), Min:36.1 °C (97 °F), Max:37.2 °C (98.9 °F)  Temperature: 37.1 °C (98.8 °F)  Pulse  Av.9  Min: 54  Max: 136   Blood Pressure : (!) 99/56       Physical Exam:  Physical Exam  Vitals and nursing note reviewed.   Constitutional:       General: She is not in acute distress.     Appearance: She is ill-appearing. She is not toxic-appearing or diaphoretic.   HENT:      Mouth/Throat:      Pharynx: No oropharyngeal exudate.       Comments: Fair dentition  Eyes:      General: No scleral icterus.     Extraocular Movements: Extraocular movements intact.      Pupils: Pupils are equal, round, and reactive to light.   Pulmonary:      Effort: Pulmonary effort is normal. No respiratory distress.      Breath sounds: No stridor.   Abdominal:      General: There is no distension.      Palpations: Abdomen is soft.      Tenderness: There is no abdominal tenderness.   Musculoskeletal:         General: Swelling and tenderness present.      Cervical back: Neck supple. No rigidity.   Skin:     Coloration: Skin is pale. Skin is not jaundiced.      Findings: Bruising and erythema present.      Comments: Left arm thrombophlebitis with significant worsening tenderness and edema   Neurological:      General: No focal deficit present.      Mental Status: She is alert and oriented to person, place, and time.   Psychiatric:         Mood and Affect: Mood normal.         Behavior: Behavior normal.       Meds:    Current Facility-Administered Medications:     vancomycin    metoprolol tartrate    MD Alert...Vancomycin per Pharmacy    Respiratory Therapy Consult    LR    guaiFENesin dextromethorphan    benzonatate    senna-docusate **AND** polyethylene glycol/lytes **AND** magnesium hydroxide **AND** bisacodyl    acetaminophen    hydrALAZINE    Metoprolol Tartrate    apixaban    Labs:  Recent Labs     12/08/22  0750 12/09/22 0223   WBC 8.5 7.6   RBC 3.45* 3.10*   HEMOGLOBIN 10.7* 9.6*   HEMATOCRIT 32.2* 28.6*   MCV 93.3 92.3   MCH 31.0 31.0   RDW 49.1 48.9   PLATELETCT 260 271   MPV 9.1 9.0       Recent Labs     12/08/22  0750 12/09/22 0223   SODIUM 132* 132*   POTASSIUM 4.5 4.3   CHLORIDE 101 102   CO2 23 22   GLUCOSE 107* 105*   BUN 19 17       Recent Labs     12/08/22  0750 12/09/22 0223   CREATININE 0.75 0.74         Imaging:  DX-CHEST-PORTABLE (1 VIEW)    Result Date: 12/2/2022 12/2/2022 12:15 PM HISTORY/REASON FOR EXAM:  Shortness of Breath. TECHNIQUE/EXAM  DESCRIPTION AND NUMBER OF VIEWS: Single portable view of the chest. COMPARISON: None FINDINGS: Heart size is within normal limits. There are curvilinear opacities in the lung bases. No pleural abnormalities are noted.     1.  Curvilinear opacities in the lung bases likely representing atelectasis, less likely pneumonitis.    US-EXTREMITY VENOUS UPPER UNILAT LEFT    Result Date: 2022   Upper Extremity  Venous Duplex Report  Vascular Laboratory  CONCLUSIONS  Left upper extremity venous duplex imaging.  No evidence of deep venous thrombosis.  Evidence of acute to subacute superficial venous thrombosis in the LEFT  cephalic vein from the mid to distal bicep.  MASSIEL SILVA  Exam Date:     2022 13:29  Room #:     Inpatient  Priority:     Routine  Ht (in):             Wt (lb):  Ordering Physician:        EWELINA WOOD  Referring Physician:       NINA Mcknight  Sonographer:               Giovana Moran RVKAROLINA  Study Type:                Complete Unilateral  Technical Quality:         Adequate  Age:    77    Gender:     F  MRN:    [id number]  :    1945      BSA:  Indications:     Localized swelling, mass and lump, unspecified upper limb,                   Edema, unspecified  CPT Codes:       62110  ICD Codes:       R22.30  R60.9  History:         Left upper extremity swelling/edema. No prior exams.  Limitations:  PROCEDURES:  Left upper extremity venous duplex imaging.  The following venous structures were evaluated: internal jugular,  subclavian, axillary, brachial, cephalic, and basilic veins.  Serial compression, color, and spectral Doppler flow evaluations were  performed.  FINDINGS:  Left upper extremity-  No evidence of deep venous thrombosis.  Evidence of acute to subacute superficial venous thrombosis in the cephalic  vein from the mid to distal bicep.  All other veins demonstrate complete color filling and compressibility with  normal venous flow dynamics including spontaneous flow and respiratory   phasicity.  Flow was evaluated in the contralateral subclavian vein and normal venous  flow dynamics including spontaneous flow and respiratory phasic variation  were demonstrated.  Brittney N To  (Electronically Signed)  Final Date:      2022                   16:24    EC-ECHOCARDIOGRAM COMPLETE W/O CONT    Result Date: 2022  Transthoracic Echo Report Echocardiography Laboratory CONCLUSIONS No prior study is available for comparison. Normal left ventricular chamber size. Mild concentric left ventricular hypertrophy. The left ventricular ejection fraction is visually estimated to be 65%. Trace mitral regurgitation. MASSIEL SILVA Exam Date:         2022                    13:31 Exam Location:     Inpatient Priority:          Routine Ordering Physician:        BETH GIL Referring Physician:       [id number] Ibarra Sonographer:               Frank ARANDA Age:    77     Gender:    F MRN:    [id number] :    1945 BSA:    1.89   Ht (in):    67     Wt (lb):    170 Exam Type:     Complete Indications:     Atrial Fibrillation, Shortness of breath ICD Codes:       427.31  786.05 CPT Codes:       25289 BP:   121    /   65     HR: Technical Quality:       Poor MEASUREMENTS  (Male / Female) Normal Values 2D ECHO LV Diastolic Diameter PLAX        4.2 cm                4.2 - 5.9 / 3.9 - 5.3 cm LV Systolic Diameter PLAX         2.7 cm                2.1 - 4.0 cm IVS Diastolic Thickness           1.1 cm                LVPW Diastolic Thickness          1.1 cm                LVOT Diameter                     1.8 cm                Estimated LV Ejection Fraction    65 %                  LV Ejection Fraction MOD BP       73.1 %                >= 55  % LV Ejection Fraction MOD 4C       78.7 %                LV Ejection Fraction MOD 2C       73 %                  DOPPLER AV Peak Velocity                  1.1 m/s               AV Peak Gradient                  5.3 mmHg              AV Mean Gradient                   2.9 mmHg              LVOT Peak Velocity                1.1 m/s               AV Area Cont Eq vti               2.6 cm2               MV Velocity Time Integral         21 cm                 Mitral E Point Velocity           0.86 m/s              Mitral E to A Ratio               0.75                  Mitral A Duration                 156 ms                MV Pressure Half Time             31.9 ms               MV Area PHT                       6.9 cm2               MV Deceleration Time              85.8 ms               * Indicates values subject to auto-interpretation LV EF:  65    % FINDINGS Left Ventricle Normal left ventricular chamber size. Mild concentric left ventricular hypertrophy. Normal left ventricular systolic function. The left ventricular ejection fraction is visually estimated to be 65%. Normal regional wall motion. Right Ventricle The right ventricle is not well visualized. Right Atrium The right atrium is not well visualized. Left Atrium Normal left atrial size. Left atrial volume index is 24 mL/sq m. Mitral Valve The mitral valve is not well visualized. No mitral stenosis. Trace mitral regurgitation. Aortic Valve The aortic valve is not well visualized. Tricuspid Valve The tricuspid valve is not well visualized. No stenosis or regurgitation seen. Pulmonic Valve The pulmonic valve is not well visualized. No stenosis or regurgitation seen. Pericardium No pericardial effusion. Aorta Normal aortic root for body surface area. The ascending aorta diameter is 3.1 cm. Zafar Bailon M.D. (Electronically Signed) Final Date:     27 November 2022                 20:23      Micro:  Results       Procedure Component Value Units Date/Time    BLOOD CULTURE [[id number]] Collected: 12/09/22 0223    Order Status: Completed Specimen: Blood from Peripheral Updated: 12/10/22 0729     Significant Indicator NEG     Source BLD     Site PERIPHERAL     Culture Result No Growth  Note: Blood cultures are incubated  "for 5 days and  are monitored continuously.Positive blood cultures  are called to the RN and reported as soon as  they are identified.      Narrative:      Contact  Per Hospital Policy: Only change Specimen Src: to \"Line\" if  specified by physician order.  Right Hand    BLOOD CULTURE [[id number]]  (Abnormal) Collected: 12/09/22 0306    Order Status: Completed Specimen: Blood from Peripheral Updated: 12/10/22 0643     Significant Indicator POS     Source BLD     Site PERIPHERAL     Culture Result Growth detected by Bactec instrument. 12/10/2022  06:43  Gram Stain: Gram positive cocci: Possible Staphylococcus sp.      Narrative:      Contact  Per Hospital Policy: Only change Specimen Src: to \"Line\" if  specified by physician order.  Right Hand    BLOOD CULTURE [[id number]]  (Abnormal) Collected: 12/05/22 1048    Order Status: Completed Specimen: Blood from Peripheral Updated: 12/08/22 0747     Significant Indicator POS     Source BLD     Site PERIPHERAL     Culture Result Growth detected by Bactec instrument. 12/06/2022  03:32      Methicillin Resistant Staphylococcus aureus  Methicillin resistant by screening method  See previous culture for sensitivity report.      Narrative:      CALL  [facility]  141 tel. [phone],  CALLED  141 tel. [phone] 12/06/2022, 03:33, RB PERF. RESULTS CALLED TO: RN  [phone]  Contact  Per Hospital Policy: Only change Specimen Src: to \"Line\" if  specified by physician order.  Left Hand    BLOOD CULTURE [[id number]]  (Abnormal)  (Susceptibility) Collected: 12/05/22 1048    Order Status: Completed Specimen: Blood from Peripheral Updated: 12/08/22 0747     Significant Indicator POS     Source BLD     Site PERIPHERAL     Culture Result Growth detected by Bactec instrument. 12/06/2022  04:29      Methicillin Resistant Staphylococcus aureus    Narrative:      CALL  [facility]  141 tel. [phone],  CALLED  141 tel. [phone] 12/06/2022, 04:30, RB PERF. RESULTS CALLED TO:RN  [phone]  Contact  Per Hospital " "Policy: Only change Specimen Src: to \"Line\" if  specified by physician order.  Right Forearm/Arm    Susceptibility       Methicillin resistant staphylococcus aureus (1)       Antibiotic Interpretation Microscan   Method Status    Azithromycin Resistant >4 mcg/mL JADIEL Final    Clindamycin Sensitive 0.5 mcg/mL JADIEL Final    Cefazolin Resistant 16 mcg/mL JADIEL Final    Cefepime Resistant >16 mcg/mL JADIEL Final    Ceftaroline Sensitive <=0.5 mcg/mL JADIEL Final    Daptomycin Sensitive 1 mcg/mL JADIEL Final    Ampicillin/sulbactam Resistant 16/8 mcg/mL JADIEL Final    Erythromycin Resistant >4 mcg/mL JADIEL Final    Vancomycin Sensitive 1 mcg/mL JADIEL Final    Oxacillin Resistant >2 mcg/mL JADIEL Final    Trimeth/Sulfa Sensitive <=0.5/9.5 mcg/mL JADIEL Final    Tetracycline Sensitive <=4 mcg/mL JADIEL Final                       BLOOD CULTURE [[id number]]  (Abnormal) Collected: 12/03/22 1233    Order Status: Completed Specimen: Blood from Peripheral Updated: 12/05/22 0801     Significant Indicator POS     Source BLD     Site PERIPHERAL     Culture Result Growth detected by Bactec instrument. 12/04/2022  10:42      Methicillin Resistant Staphylococcus aureus  See previous culture for sensitivity report.      Narrative:      CALL  [facility]  141 tel. [phone],  CALLED  141 tel. [phone] 12/04/2022, 10:41, RB PERF. RESULTS CALLED  TO:Mark LOYD [phone]  Enhanced Droplet, Contact, and Eye Protection  Per Hospital Policy: Only change Specimen Src: to \"Line\" if  specified by physician order.  Left Hand    BLOOD CULTURE [[id number]]  (Abnormal) Collected: 12/03/22 1233    Order Status: Completed Specimen: Blood from Peripheral Updated: 12/05/22 0759     Significant Indicator POS     Source BLD     Site PERIPHERAL     Culture Result Growth detected by Bactec instrument. 12/04/2022  17:48      Methicillin Resistant Staphylococcus aureus  See previous culture for sensitivity report.      Narrative:      Enhanced Droplet, Contact, and Eye Protection  Per Hospital " "Policy: Only change Specimen Src: to \"Line\" if  specified by physician order.  Right Hand    BLOOD CULTURE [[id number]]  (Abnormal) Collected: 12/02/22 0514    Order Status: Completed Specimen: Blood from Peripheral Updated: 12/05/22 0730     Significant Indicator POS     Source BLD     Site PERIPHERAL     Culture Result Growth detected by Bactec instrument. 12/03/2022  01:14      Methicillin Resistant Staphylococcus aureus  See previous culture for sensitivity report.      Narrative:      Enhanced Droplet, Contact, and Eye Protection  Per Hospital Policy: Only change Specimen Src: to \"Line\" if  specified by physician order.  Right AC    BLOOD CULTURE [[id number]]  (Abnormal)  (Susceptibility) Collected: 12/02/22 0514    Order Status: Completed Specimen: Blood from Peripheral Updated: 12/05/22 0729     Significant Indicator POS     Source BLD     Site PERIPHERAL     Culture Result Growth detected by Bactec instrument. 12/03/2022  01:14  Methicillin Resistant Staphylococcus aureus (MRSA)  detected by PCR.        Methicillin Resistant Staphylococcus aureus    Narrative:      CALL  [facility]  171 tel. [phone],  CALLED  171 tel. [phone] 12/03/2022, 03:18, RB PERF. RESULTS CALLED  TO:[phone] (Maksim)  Enhanced Droplet, Contact, and Eye Protection  Per Hospital Policy: Only change Specimen Src: to \"Line\" if  specified by physician order.  Right Hand    Susceptibility       Methicillin resistant staphylococcus aureus (1)       Antibiotic Interpretation Microscan   Method Status    Azithromycin Resistant >4 mcg/mL JADIEL Final    Clindamycin Sensitive 0.5 mcg/mL JADIEL Final    Cefazolin Resistant <=8 mcg/mL JADIEL Final    Cefepime Resistant >16 mcg/mL JDAIEL Final    Ceftaroline Sensitive <=0.5 mcg/mL JADIEL Final    Daptomycin Sensitive 1 mcg/mL JADIEL Final    Ampicillin/sulbactam Resistant >16/8 mcg/mL JADIEL Final    Erythromycin Resistant >4 mcg/mL JADIEL Final    Vancomycin Sensitive 1 mcg/mL JADIEL Final    Oxacillin Resistant >2 mcg/mL JADIEL " Final    Trimeth/Sulfa Sensitive <=0.5/9.5 mcg/mL JADIEL Final    Tetracycline Sensitive <=4 mcg/mL JADIEL Final                               Assessment:  This is a 77-year-old female patient who was transferred from an outside facility, slid off her bed at home and was unable to get up for multiple days, found by a friend, was found to be in A. fib with RVR and with mild rhabdomyolysis.  She was also noted to have COVID-19 requiring nasal cannula oxygen, resolved.  On 12/1, she developed right arm thrombophlebitis and the following day had worsening leukocytosis and procalcitonin, blood cultures positive for MRSA.    Hospital onset sepsis with MRSA bacteremia, likely secondary to thrombophlebitis that may be seeded with infection based on exam findings. IV line has been removed.  TEODORO positive for infective endocarditis    Pertinent Diagnoses:  Native mitral valve infective endocarditis  MRSA bacteremia, persistent  SO, resolved  Recent COVID-19  Thrombophlebitis     PLAN:   MRSA bacteremia  Native mitral valve infective endocarditis  Thrombophlebitis  BCxs + MRSA 12/2 and 12/3, 12/5  Follow repeat blood cultures x2 from 12/9, pending  TEODORO formal report pending  Continue vancomycin. Monitor vanco trough and renal function.  Last vancomycin trough 17.8 on 12/8    Disposition: Anticipate likely eventual discharge to a facility on 6 weeks of IV antibiotics once blood cultures clear  Need for PICC line: Eventually yes, once blood cultures clear    Discussed with Dr. Palacios

## 2022-12-10 NOTE — CARE PLAN
The patient is Stable - Low risk of patient condition declining or worsening    Shift Goals  Clinical Goals: Skin integrity, rest  Patient Goals: Rest  Family Goals: RICARDO    Progress made toward(s) clinical / shift goals:  Pt resting. Q2 turns in place. Barrier cream applied to groin area.     Patient is not progressing towards the following goals:

## 2022-12-11 LAB
BACTERIA BLD CULT: ABNORMAL
BACTERIA BLD CULT: ABNORMAL
SIGNIFICANT IND 70042: ABNORMAL
SITE SITE: ABNORMAL
SOURCE SOURCE: ABNORMAL

## 2022-12-11 PROCEDURE — A9270 NON-COVERED ITEM OR SERVICE: HCPCS | Performed by: STUDENT IN AN ORGANIZED HEALTH CARE EDUCATION/TRAINING PROGRAM

## 2022-12-11 PROCEDURE — 770001 HCHG ROOM/CARE - MED/SURG/GYN PRIV*

## 2022-12-11 PROCEDURE — 700111 HCHG RX REV CODE 636 W/ 250 OVERRIDE (IP): Performed by: FAMILY MEDICINE

## 2022-12-11 PROCEDURE — 99233 SBSQ HOSP IP/OBS HIGH 50: CPT | Performed by: INTERNAL MEDICINE

## 2022-12-11 PROCEDURE — 99232 SBSQ HOSP IP/OBS MODERATE 35: CPT | Performed by: FAMILY MEDICINE

## 2022-12-11 PROCEDURE — 700105 HCHG RX REV CODE 258: Performed by: FAMILY MEDICINE

## 2022-12-11 PROCEDURE — A9270 NON-COVERED ITEM OR SERVICE: HCPCS | Performed by: PHYSICIAN ASSISTANT

## 2022-12-11 PROCEDURE — 700102 HCHG RX REV CODE 250 W/ 637 OVERRIDE(OP): Performed by: PHYSICIAN ASSISTANT

## 2022-12-11 PROCEDURE — 700102 HCHG RX REV CODE 250 W/ 637 OVERRIDE(OP): Performed by: STUDENT IN AN ORGANIZED HEALTH CARE EDUCATION/TRAINING PROGRAM

## 2022-12-11 PROCEDURE — 770020 HCHG ROOM/CARE - TELE (206)

## 2022-12-11 RX ADMIN — APIXABAN 5 MG: 5 TABLET, FILM COATED ORAL at 17:29

## 2022-12-11 RX ADMIN — GUAIFENESIN SYRUP AND DEXTROMETHORPHAN 5 ML: 100; 10 SYRUP ORAL at 15:00

## 2022-12-11 RX ADMIN — VANCOMYCIN HYDROCHLORIDE 1500 MG: 500 INJECTION, POWDER, LYOPHILIZED, FOR SOLUTION INTRAVENOUS at 17:46

## 2022-12-11 RX ADMIN — METOPROLOL TARTRATE 12.5 MG: 25 TABLET, FILM COATED ORAL at 05:37

## 2022-12-11 RX ADMIN — APIXABAN 5 MG: 5 TABLET, FILM COATED ORAL at 05:37

## 2022-12-11 RX ADMIN — GUAIFENESIN SYRUP AND DEXTROMETHORPHAN 5 ML: 100; 10 SYRUP ORAL at 10:22

## 2022-12-11 RX ADMIN — METOPROLOL TARTRATE 12.5 MG: 25 TABLET, FILM COATED ORAL at 17:43

## 2022-12-11 ASSESSMENT — ENCOUNTER SYMPTOMS
DIAPHORESIS: 0
VOMITING: 0
FEVER: 0
WEAKNESS: 1
BACK PAIN: 0
DIZZINESS: 0
HEADACHES: 0
NAUSEA: 0
NECK PAIN: 0
ABDOMINAL PAIN: 0
FOCAL WEAKNESS: 0
HEARTBURN: 0
SPEECH CHANGE: 0
BLURRED VISION: 0
SENSORY CHANGE: 0
NERVOUS/ANXIOUS: 0
CHILLS: 0
WEIGHT LOSS: 0
FLANK PAIN: 0
SHORTNESS OF BREATH: 0
MYALGIAS: 0
PALPITATIONS: 0
DIARRHEA: 0
WHEEZING: 0
SORE THROAT: 0
MYALGIAS: 1
COUGH: 1

## 2022-12-11 ASSESSMENT — PAIN DESCRIPTION - PAIN TYPE: TYPE: ACUTE PAIN

## 2022-12-11 NOTE — CARE PLAN
The patient is Stable - Low risk of patient condition declining or worsening    Shift Goals  Clinical Goals: skin care, Q2 turns  Patient Goals: Rest  Family Goals: RICARDO    Progress made toward(s) clinical / shift goals:  Q2 turns in place, waffle mattress in use, barrier cream used, frequent incontinence changes.  Pt states she's not always incontinent of stool-educated to call for bed pan when needed.  Pt sat EOB today, declined to stand.    Patient is not progressing towards the following goals:       IV intact/Arm band on

## 2022-12-11 NOTE — PROGRESS NOTES
4 Eyes Skin Assessment Completed by EVERT Araujo and EVERT Araujo.    Head WDL  Ears WDL  Nose WDL  Mouth WDL  Neck WDL  Breast/Chest WDL  Shoulder Blades WDL  Spine WDL  (R) Arm/Elbow/Hand WDL  (L) Arm/Elbow/Hand Redness and Edema  Abdomen Bruising red bruising to RUQ   Groin Redness and Rash  Scrotum/Coccyx/Buttocks Redness and Blanching  (R) Leg Edema  (L) Leg Edema  (R) Heel/Foot/Toe Redness, Blanching, Boggy, and Edema  (L) Heel/Foot/Toe Redness, Blanching, Boggy, and Edema          Devices In Places Pulse Ox      Interventions In Place Heel Mepilex, Waffle Overlay, TAP System, Pillows, Q2 Turns, Barrier Cream, Dri-Tariq Pads, Heels Loaded W/Pillows, and Pressure Redistribution Mattress    Possible Skin Injury No    Pictures Uploaded Into Epic N/A  Wound Consult Placed N/A  RN Wound Prevention Protocol Ordered No

## 2022-12-11 NOTE — PROGRESS NOTES
AA&Ox4. Denies CP/SOB.  No reports of pain  Educated patient regarding pharmacologic and non pharmacologic modalities for pain management.  Skin per flowsheet.  Tolerating easy to chew diet. Denies N/V.  + void. Last BM 12/10.  Pt is max assist .  All needs met at this time. Call light within reach. Pt calls appropriately. Bed low and locked, non skid socks in place. Hourly rounding in place.

## 2022-12-11 NOTE — CARE PLAN
The patient is Stable - Low risk of patient condition declining or worsening    Shift Goals  Clinical Goals: Skin integrity, rest  Patient Goals: Rest  Family Goals: RICARDO    Progress made toward(s) clinical / shift goals:  Pt is resting. Q2 turns in place, barrier cream applied.    Patient is not progressing towards the following goals:

## 2022-12-11 NOTE — PROGRESS NOTES
Hospital Medicine Daily Progress Note    Date of Service  12/11/2022    Chief Complaint  Paulina Castellanos is a 77 y.o. female admitted 11/26/2022 with atrial fibrillation with rapid ventricular rate.      Hospital Course  Admitted for Atrial fibrillation with rapid ventricular rate, COVID-19 with pneumonia and Respiratory failure with hypoxia, mild rhabdomyolysis. She was started on Decadron, oxygen supplementation, and supportive measures.  She was placed on Metoprolol for rate control, and Eliquis for anticoagulation.  She apparently developed fever and encephalopathy, and was noted to have left arm thrombophlebitis.  Work-up further showed MRSA bacteremia.  Infectious disease was consulted on the case.  Patient was started on empiric coverage with IV vancomycin.  Repeat blood cultures remained positive for MRSA.  Cardiology was consulted on the case for a TEODORO, which showed mitral valve vegetation/endocarditis.     Interval Problem Update  Bacteremia -repeat blood cultures positive 1 out of 2 bottles  A. Fib - currently sinus    I have discussed this patient's plan of care and discharge plan at IDT rounds today with Case Management, Nursing, Nursing leadership, and other members of the IDT team.    Consultants/Specialty  cardiology, infectious disease, and palliative care    Code Status  DNAR/DNI    Disposition  Patient is not medically cleared for discharge.   Anticipate discharge to to skilled nursing facility.  I have placed the appropriate orders for post-discharge needs.    Review of Systems  Review of Systems   Constitutional:  Positive for malaise/fatigue. Negative for chills, diaphoresis, fever and weight loss.   HENT:  Negative for congestion, hearing loss and sore throat.    Eyes:  Negative for blurred vision.   Respiratory:  Positive for cough. Negative for shortness of breath and wheezing.    Cardiovascular:  Negative for chest pain, palpitations and leg swelling.   Gastrointestinal:  Negative for abdominal  pain, diarrhea, heartburn, nausea and vomiting.   Genitourinary:  Negative for dysuria, flank pain and hematuria.   Musculoskeletal:  Negative for back pain, joint pain, myalgias and neck pain.   Skin:  Negative for rash.   Neurological:  Positive for weakness. Negative for dizziness, sensory change, speech change, focal weakness and headaches.   Psychiatric/Behavioral:  The patient is not nervous/anxious.       Physical Exam  Temp:  [35.9 °C (96.6 °F)-36.9 °C (98.4 °F)] 36.9 °C (98.4 °F)  Pulse:  [] 89  Resp:  [16-17] 16  BP: (102-156)/(58-82) 112/61  SpO2:  [90 %-95 %] 93 %    Physical Exam  Vitals and nursing note reviewed.   HENT:      Head: Normocephalic and atraumatic.      Nose: No congestion.      Mouth/Throat:      Mouth: Mucous membranes are moist.   Eyes:      Extraocular Movements: Extraocular movements intact.      Conjunctiva/sclera: Conjunctivae normal.   Cardiovascular:      Rate and Rhythm: Normal rate and regular rhythm.      Heart sounds: Murmur heard.   Pulmonary:      Effort: Pulmonary effort is normal.      Breath sounds: Normal breath sounds.   Abdominal:      General: There is no distension.      Tenderness: There is no abdominal tenderness. There is no guarding or rebound.   Musculoskeletal:         General: Swelling (left arm) present.      Cervical back: No tenderness.      Right lower leg: Edema present.      Left lower leg: Edema present.   Skin:     Findings: Erythema (epigastric area) present.   Neurological:      General: No focal deficit present.      Mental Status: She is alert and oriented to person, place, and time.      Cranial Nerves: No cranial nerve deficit.       Fluids    Intake/Output Summary (Last 24 hours) at 12/11/2022 1126  Last data filed at 12/11/2022 0527  Gross per 24 hour   Intake 552.08 ml   Output 325 ml   Net 227.08 ml           Laboratory  Recent Labs     12/09/22  0223   WBC 7.6   RBC 3.10*   HEMOGLOBIN 9.6*   HEMATOCRIT 28.6*   MCV 92.3   MCH 31.0    MCHC 33.6   RDW 48.9   PLATELETCT 271   MPV 9.0       Recent Labs     12/09/22  0223   SODIUM 132*   POTASSIUM 4.3   CHLORIDE 102   CO2 22   GLUCOSE 105*   BUN 17   CREATININE 0.74   CALCIUM 8.4*                     Imaging  EC-TEODORO W/O CONT         DX-CHEST-PORTABLE (1 VIEW)   Final Result      1.  Curvilinear opacities in the lung bases likely representing atelectasis, less likely pneumonitis.      US-EXTREMITY VENOUS UPPER UNILAT LEFT   Final Result      EC-ECHOCARDIOGRAM COMPLETE W/O CONT   Final Result      OUTSIDE IMAGES-CT CHEST   Final Result             Assessment/Plan  * Atrial fibrillation with rapid ventricular response (HCC)- (present on admission)  Assessment & Plan  Metoprolol, Eliquis  [id number] -  3    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)  Assessment & Plan  IV Vancomycin    MRSA bacteremia- (present on admission)  Assessment & Plan  IV Vancomycin  repeat blood cultures tomorrow      Encephalopathy acute- (present on admission)  Assessment & Plan  secondary to infectious etiology  Avoid Benzodiazepines and Anticholinergics  Frequent orientation  Open window blinds during the day  Avoid naps during the day  Family at bedside whenever possible  Ensure adequate sleep at night - use Melatonin preferably  Avoid early morning labs  Avoid vital signs during sleep  Ambulate if possible  Provide adequate pain control  Monitor for urinary retention  Prevent and manage constipation  Discontinue IVs, Catheters, Tubes, Lines, Cardiac monitors, SCDs if possible    Sepsis (HCC)- (present on admission)  Assessment & Plan  This is Sepsis Not present on admission  SIRS criteria identified on my evaluation include: Fever, with temperature greater than 101 deg F, Tachycardia, with heart rate greater than 90 BPM and Leukocytosis, with WBC greater than 12,000  Source -MRSA bacteremia  Sepsis protocol initiated  Fluid resuscitation ordered per protocol  Crystalloid Fluid Administration: Patient has advanced or end-stage  heart failure (with documentation of NYHA class III or symptoms with minimal exertion, Or NYHA class IV or symptoms at rest or with activity), for this/these reason(s) it is unsafe for this patient to receive 30 mL/kg of fluid  Partial Fluid Dose Given: 10 mL/kg per current or ideal body weight, patient to be reassessed shortly after completion of partial fluid bolus to ensure adequacy of resuscitation  IV antibiotics as appropriate for source of sepsis  Reassessment: I have reassessed the patient's hemodynamic status          Thrombophlebitis- (present on admission)  Assessment & Plan  IV Vancomycin  Eliquis    Pneumonia due to COVID-19 virus- (present on admission)  Assessment & Plan  Finished course of Decadron    Cellulitis- (present on admission)  Assessment & Plan  IV Vancomycin    Dysphagia  Assessment & Plan  Level 7, Liquid level 0, crush pills  Aspiration precautions    Anemia- (present on admission)  Assessment & Plan  Follow CBC    Hypomagnesemia- (present on admission)  Assessment & Plan  IV Mg given  Follow level    Hypophosphatemia- (present on admission)  Assessment & Plan  Follow level    Hyponatremia- (present on admission)  Assessment & Plan  Follow BMP    Acute respiratory failure with hypoxia (HCC)- (present on admission)  Assessment & Plan  due to COVID 19  RT protocol    Metabolic acidosis, normal anion gap (NAG)- (present on admission)  Assessment & Plan  Resolved    Frailty syndrome in geriatric patient- (present on admission)  Assessment & Plan  Palliative care following    Non-traumatic rhabdomyolysis- (present on admission)  Assessment & Plan  resolved    Elevated LFTs- (present on admission)  Assessment & Plan  Follow CMP    Hypokalemia- (present on admission)  Assessment & Plan  Follow bmp         VTE prophylaxis: therapeutic anticoagulation with Eliquis    I have performed a physical exam and reviewed and updated ROS and Plan today (12/11/2022). In review of yesterday's note  (12/10/2022), there are no changes except as documented above.

## 2022-12-11 NOTE — PROGRESS NOTES
Infectious Disease Progress Note    Author: Ramesh Tee M.D. Date & Time of service: 2022  8:36 AM    Chief Complaint:  MRSA bacteremia    Interval History:  77 y.o. female admitted 2022. For Afib with RVR and rhabdomyolysis +COVID  Developed fever and AMS dueing hosp-blood cultures +MRSA   AF WBC 13.9 Oriented to person and place c/o LUE pain No dyspnea or CP. No new neck, back, joint pain   patient remains afebrile, white count is resolved now, down to 9.4, tolerating vancomycin.  Patient states she is tired but overall better.   left arm   patient remains afebrile, white count is 9.7, tolerating vancomycin.  Plan as below   patient remains afebrile, white count 7.7, tolerating vancomycin, no new events overnight.   patient remains afebrile, white count is 8.5, tolerating vancomycin.  TEODORO positive for endocarditis   patient remains afebrile, count 7.6, repeat cultures as below.  Patient notes improvement in left arm pain and swelling  12/10 patient remains afebrile, no CBC this morning, tolerating vancomycin   patient remains afebrile, no CBC this morning, tolerating IV vancomycin.  Repeat blood cultures as below.     Labs Reviewed, Medications Reviewed, and Radiology Reviewed.    Review of Systems:  Review of Systems   Constitutional:  Positive for malaise/fatigue. Negative for fever.   Gastrointestinal:  Negative for abdominal pain, diarrhea, nausea and vomiting.   Musculoskeletal:  Positive for myalgias.   Skin:  Negative for itching and rash.   All other systems reviewed and are negative.    Hemodynamics:  Temp (24hrs), Av.3 °C (97.4 °F), Min:35.9 °C (96.6 °F), Max:36.7 °C (98.1 °F)  Temperature: 36.2 °C (97.2 °F)  Pulse  Av.2  Min: 54  Max: 136   Blood Pressure : 108/74       Physical Exam:  Physical Exam  Vitals and nursing note reviewed.   Constitutional:       General: She is not in acute distress.     Appearance: She is ill-appearing. She  is not toxic-appearing or diaphoretic.   HENT:      Mouth/Throat:      Pharynx: No oropharyngeal exudate.      Comments: Fair dentition  Eyes:      General: No scleral icterus.     Extraocular Movements: Extraocular movements intact.      Pupils: Pupils are equal, round, and reactive to light.   Pulmonary:      Effort: Pulmonary effort is normal. No respiratory distress.      Breath sounds: No stridor.   Abdominal:      General: There is no distension.      Palpations: Abdomen is soft.      Tenderness: There is no abdominal tenderness.   Musculoskeletal:         General: Swelling and tenderness present.      Cervical back: Neck supple. No rigidity.   Skin:     Coloration: Skin is pale. Skin is not jaundiced.      Findings: Bruising and erythema present.      Comments: Left arm thrombophlebitis with significant improvement of tenderness and edema, redness   Neurological:      General: No focal deficit present.      Mental Status: She is alert and oriented to person, place, and time.   Psychiatric:         Mood and Affect: Mood normal.         Behavior: Behavior normal.       Meds:    Current Facility-Administered Medications:     vancomycin    metoprolol tartrate    MD Alert...Vancomycin per Pharmacy    Respiratory Therapy Consult    LR    guaiFENesin dextromethorphan    benzonatate    senna-docusate **AND** polyethylene glycol/lytes **AND** magnesium hydroxide **AND** bisacodyl    acetaminophen    hydrALAZINE    Metoprolol Tartrate    apixaban    Labs:  Recent Labs     12/09/22 0223   WBC 7.6   RBC 3.10*   HEMOGLOBIN 9.6*   HEMATOCRIT 28.6*   MCV 92.3   MCH 31.0   RDW 48.9   PLATELETCT 271   MPV 9.0       Recent Labs     12/09/22 0223   SODIUM 132*   POTASSIUM 4.3   CHLORIDE 102   CO2 22   GLUCOSE 105*   BUN 17       Recent Labs     12/09/22 0223   CREATININE 0.74         Imaging:  DX-CHEST-PORTABLE (1 VIEW)    Result Date: 12/2/2022 12/2/2022 12:15 PM HISTORY/REASON FOR EXAM:  Shortness of Breath.  TECHNIQUE/EXAM DESCRIPTION AND NUMBER OF VIEWS: Single portable view of the chest. COMPARISON: None FINDINGS: Heart size is within normal limits. There are curvilinear opacities in the lung bases. No pleural abnormalities are noted.     1.  Curvilinear opacities in the lung bases likely representing atelectasis, less likely pneumonitis.    US-EXTREMITY VENOUS UPPER UNILAT LEFT    Result Date: 2022   Upper Extremity  Venous Duplex Report  Vascular Laboratory  CONCLUSIONS  Left upper extremity venous duplex imaging.  No evidence of deep venous thrombosis.  Evidence of acute to subacute superficial venous thrombosis in the LEFT  cephalic vein from the mid to distal bicep.  MASSIEL SILVA  Exam Date:     2022 13:29  Room #:     Inpatient  Priority:     Routine  Ht (in):             Wt (lb):  Ordering Physician:        EWELINA WOOD  Referring Physician:       NINA Mcknight  Sonographer:               Giovana Moran RVKAROLINA  Study Type:                Complete Unilateral  Technical Quality:         Adequate  Age:    77    Gender:     F  MRN:    [id number]  :    1945      BSA:  Indications:     Localized swelling, mass and lump, unspecified upper limb,                   Edema, unspecified  CPT Codes:       67391  ICD Codes:       R22.30  R60.9  History:         Left upper extremity swelling/edema. No prior exams.  Limitations:  PROCEDURES:  Left upper extremity venous duplex imaging.  The following venous structures were evaluated: internal jugular,  subclavian, axillary, brachial, cephalic, and basilic veins.  Serial compression, color, and spectral Doppler flow evaluations were  performed.  FINDINGS:  Left upper extremity-  No evidence of deep venous thrombosis.  Evidence of acute to subacute superficial venous thrombosis in the cephalic  vein from the mid to distal bicep.  All other veins demonstrate complete color filling and compressibility with  normal venous flow dynamics including spontaneous flow  and respiratory  phasicity.  Flow was evaluated in the contralateral subclavian vein and normal venous  flow dynamics including spontaneous flow and respiratory phasic variation  were demonstrated.  Brittney N To  (Electronically Signed)  Final Date:      2022                   16:24    EC-ECHOCARDIOGRAM COMPLETE W/O CONT    Result Date: 2022  Transthoracic Echo Report Echocardiography Laboratory CONCLUSIONS No prior study is available for comparison. Normal left ventricular chamber size. Mild concentric left ventricular hypertrophy. The left ventricular ejection fraction is visually estimated to be 65%. Trace mitral regurgitation. MASSIEL SILVA Exam Date:         2022                    13:31 Exam Location:     Inpatient Priority:          Routine Ordering Physician:        BETH GIL Referring Physician:       [id number] Rose Sonographer:               Frank ARANDA Age:    77     Gender:    F MRN:    [id number] :    1945 BSA:    1.89   Ht (in):    67     Wt (lb):    170 Exam Type:     Complete Indications:     Atrial Fibrillation, Shortness of breath ICD Codes:       427.31  786.05 CPT Codes:       71597 BP:   121    /   65     HR: Technical Quality:       Poor MEASUREMENTS  (Male / Female) Normal Values 2D ECHO LV Diastolic Diameter PLAX        4.2 cm                4.2 - 5.9 / 3.9 - 5.3 cm LV Systolic Diameter PLAX         2.7 cm                2.1 - 4.0 cm IVS Diastolic Thickness           1.1 cm                LVPW Diastolic Thickness          1.1 cm                LVOT Diameter                     1.8 cm                Estimated LV Ejection Fraction    65 %                  LV Ejection Fraction MOD BP       73.1 %                >= 55  % LV Ejection Fraction MOD 4C       78.7 %                LV Ejection Fraction MOD 2C       73 %                  DOPPLER AV Peak Velocity                  1.1 m/s               AV Peak Gradient                  5.3 mmHg              AV  Mean Gradient                  2.9 mmHg              LVOT Peak Velocity                1.1 m/s               AV Area Cont Eq vti               2.6 cm2               MV Velocity Time Integral         21 cm                 Mitral E Point Velocity           0.86 m/s              Mitral E to A Ratio               0.75                  Mitral A Duration                 156 ms                MV Pressure Half Time             31.9 ms               MV Area PHT                       6.9 cm2               MV Deceleration Time              85.8 ms               * Indicates values subject to auto-interpretation LV EF:  65    % FINDINGS Left Ventricle Normal left ventricular chamber size. Mild concentric left ventricular hypertrophy. Normal left ventricular systolic function. The left ventricular ejection fraction is visually estimated to be 65%. Normal regional wall motion. Right Ventricle The right ventricle is not well visualized. Right Atrium The right atrium is not well visualized. Left Atrium Normal left atrial size. Left atrial volume index is 24 mL/sq m. Mitral Valve The mitral valve is not well visualized. No mitral stenosis. Trace mitral regurgitation. Aortic Valve The aortic valve is not well visualized. Tricuspid Valve The tricuspid valve is not well visualized. No stenosis or regurgitation seen. Pulmonic Valve The pulmonic valve is not well visualized. No stenosis or regurgitation seen. Pericardium No pericardial effusion. Aorta Normal aortic root for body surface area. The ascending aorta diameter is 3.1 cm. Zafar Bailon M.D. (Electronically Signed) Final Date:     27 November 2022                 20:23      Micro:  Results       Procedure Component Value Units Date/Time    BLOOD CULTURE [[id number]] Collected: 12/09/22 0223    Order Status: Completed Specimen: Blood from Peripheral Updated: 12/10/22 0729     Significant Indicator NEG     Source BLD     Site PERIPHERAL     Culture Result No Growth  Note: Blood  "cultures are incubated for 5 days and  are monitored continuously.Positive blood cultures  are called to the RN and reported as soon as  they are identified.      Narrative:      Contact  Per Hospital Policy: Only change Specimen Src: to \"Line\" if  specified by physician order.  Right Hand    BLOOD CULTURE [[id number]]  (Abnormal) Collected: 12/09/22 0306    Order Status: Completed Specimen: Blood from Peripheral Updated: 12/10/22 0643     Significant Indicator POS     Source BLD     Site PERIPHERAL     Culture Result Growth detected by Bactec instrument. 12/10/2022  06:43  Gram Stain: Gram positive cocci: Possible Staphylococcus sp.      Narrative:      Contact  Per Hospital Policy: Only change Specimen Src: to \"Line\" if  specified by physician order.  Right Hand    BLOOD CULTURE [[id number]]  (Abnormal) Collected: 12/05/22 1048    Order Status: Completed Specimen: Blood from Peripheral Updated: 12/08/22 0747     Significant Indicator POS     Source BLD     Site PERIPHERAL     Culture Result Growth detected by Bactec instrument. 12/06/2022  03:32      Methicillin Resistant Staphylococcus aureus  Methicillin resistant by screening method  See previous culture for sensitivity report.      Narrative:      CALL  [facility]  141 tel. [phone],  CALLED  141 tel. [phone] 12/06/2022, 03:33, RB PERF. RESULTS CALLED TO: RN  [phone]  Contact  Per Hospital Policy: Only change Specimen Src: to \"Line\" if  specified by physician order.  Left Hand    BLOOD CULTURE [[id number]]  (Abnormal)  (Susceptibility) Collected: 12/05/22 1048    Order Status: Completed Specimen: Blood from Peripheral Updated: 12/08/22 0747     Significant Indicator POS     Source BLD     Site PERIPHERAL     Culture Result Growth detected by Bactec instrument. 12/06/2022  04:29      Methicillin Resistant Staphylococcus aureus    Narrative:      CALL  [facility]  141 tel. [phone],  CALLED  141 tel. [phone] 12/06/2022, 04:30, RB PERF. RESULTS CALLED " "TO:RN  [phone]  Contact  Per Hospital Policy: Only change Specimen Src: to \"Line\" if  specified by physician order.  Right Forearm/Arm    Susceptibility       Methicillin resistant staphylococcus aureus (1)       Antibiotic Interpretation Microscan   Method Status    Azithromycin Resistant >4 mcg/mL JADIEL Final    Clindamycin Sensitive 0.5 mcg/mL JADIEL Final    Cefazolin Resistant 16 mcg/mL JADIEL Final    Cefepime Resistant >16 mcg/mL JADIEL Final    Ceftaroline Sensitive <=0.5 mcg/mL JADIEL Final    Daptomycin Sensitive 1 mcg/mL JADIEL Final    Ampicillin/sulbactam Resistant 16/8 mcg/mL JADIEL Final    Erythromycin Resistant >4 mcg/mL JADIEL Final    Vancomycin Sensitive 1 mcg/mL JADIEL Final    Oxacillin Resistant >2 mcg/mL JADIEL Final    Trimeth/Sulfa Sensitive <=0.5/9.5 mcg/mL JADIEL Final    Tetracycline Sensitive <=4 mcg/mL JADIEL Final                       BLOOD CULTURE [[id number]]  (Abnormal) Collected: 12/03/22 1233    Order Status: Completed Specimen: Blood from Peripheral Updated: 12/05/22 0801     Significant Indicator POS     Source BLD     Site PERIPHERAL     Culture Result Growth detected by Bactec instrument. 12/04/2022  10:42      Methicillin Resistant Staphylococcus aureus  See previous culture for sensitivity report.      Narrative:      CALL  [facility]  141 tel. [phone],  CALLED  141 tel. [phone] 12/04/2022, 10:41, RB PERF. RESULTS CALLED  TO:Mark LOYD [phone]  Enhanced Droplet, Contact, and Eye Protection  Per Hospital Policy: Only change Specimen Src: to \"Line\" if  specified by physician order.  Left Hand    BLOOD CULTURE [[id number]]  (Abnormal) Collected: 12/03/22 1233    Order Status: Completed Specimen: Blood from Peripheral Updated: 12/05/22 0759     Significant Indicator POS     Source BLD     Site PERIPHERAL     Culture Result Growth detected by Bactec instrument. 12/04/2022  17:48      Methicillin Resistant Staphylococcus aureus  See previous culture for sensitivity report.      Narrative:      Enhanced Droplet, Contact, " "and Eye Protection  Per Hospital Policy: Only change Specimen Src: to \"Line\" if  specified by physician order.  Right Hand    BLOOD CULTURE [[id number]]  (Abnormal) Collected: 12/02/22 0514    Order Status: Completed Specimen: Blood from Peripheral Updated: 12/05/22 0730     Significant Indicator POS     Source BLD     Site PERIPHERAL     Culture Result Growth detected by Bactec instrument. 12/03/2022  01:14      Methicillin Resistant Staphylococcus aureus  See previous culture for sensitivity report.      Narrative:      Enhanced Droplet, Contact, and Eye Protection  Per Hospital Policy: Only change Specimen Src: to \"Line\" if  specified by physician order.  Right AC    BLOOD CULTURE [[id number]]  (Abnormal)  (Susceptibility) Collected: 12/02/22 0514    Order Status: Completed Specimen: Blood from Peripheral Updated: 12/05/22 0729     Significant Indicator POS     Source BLD     Site PERIPHERAL     Culture Result Growth detected by Bactec instrument. 12/03/2022  01:14  Methicillin Resistant Staphylococcus aureus (MRSA)  detected by PCR.        Methicillin Resistant Staphylococcus aureus    Narrative:      CALL  [facility]  171 tel. [phone],  CALLED  171 tel. [phone] 12/03/2022, 03:18, RB PERF. RESULTS CALLED  TO:[phone] (Maksim)  Enhanced Droplet, Contact, and Eye Protection  Per Hospital Policy: Only change Specimen Src: to \"Line\" if  specified by physician order.  Right Hand    Susceptibility       Methicillin resistant staphylococcus aureus (1)       Antibiotic Interpretation Microscan   Method Status    Azithromycin Resistant >4 mcg/mL JADIEL Final    Clindamycin Sensitive 0.5 mcg/mL JADIEL Final    Cefazolin Resistant <=8 mcg/mL JADIEL Final    Cefepime Resistant >16 mcg/mL JADIEL Final    Ceftaroline Sensitive <=0.5 mcg/mL JADIEL Final    Daptomycin Sensitive 1 mcg/mL JADIEL Final    Ampicillin/sulbactam Resistant >16/8 mcg/mL JADIEL Final    Erythromycin Resistant >4 mcg/mL JADIEL Final    Vancomycin Sensitive 1 mcg/mL JADIEL Final    " Oxacillin Resistant >2 mcg/mL JADIEL Final    Trimeth/Sulfa Sensitive <=0.5/9.5 mcg/mL JADIEL Final    Tetracycline Sensitive <=4 mcg/mL JADIEL Final                               Assessment:  This is a 77-year-old female patient who was transferred from an outside facility, slid off her bed at home and was unable to get up for multiple days, found by a friend, was found to be in A. fib with RVR and with mild rhabdomyolysis.  She was also noted to have COVID-19 requiring nasal cannula oxygen, resolved.  On 12/1, she developed right arm thrombophlebitis and the following day had worsening leukocytosis and procalcitonin, blood cultures positive for MRSA.    Hospital onset sepsis with MRSA bacteremia, likely secondary to thrombophlebitis that may be seeded with infection based on exam findings. IV line has been removed.  TEODORO positive for infective endocarditis    Pertinent Diagnoses:  Native mitral valve infective endocarditis  MRSA bacteremia, persistent  SO, resolved  Recent COVID-19  Thrombophlebitis     PLAN:   MRSA bacteremia  Native mitral valve infective endocarditis  Thrombophlebitis  BCxs + MRSA 12/2 and 12/3, 12/5  Follow repeat blood cultures x2 from 12/9, 1 out of 2 positive  TEODORO formal report pending  Repeat blood cultures x2 in a.m.  Continue vancomycin. Monitor vanco trough and renal function.  Last vancomycin trough 17.8 on 12/8    Disposition: Anticipate likely eventual discharge to a facility on 6 weeks of IV antibiotics once blood cultures clear  Need for PICC line: Eventually yes, once blood cultures clear    Discussed with Dr. Palacios

## 2022-12-12 LAB
ANION GAP SERPL CALC-SCNC: 9 MMOL/L (ref 7–16)
BUN SERPL-MCNC: 16 MG/DL (ref 8–22)
CALCIUM SERPL-MCNC: 8.6 MG/DL (ref 8.5–10.5)
CHLORIDE SERPL-SCNC: 100 MMOL/L (ref 96–112)
CO2 SERPL-SCNC: 22 MMOL/L (ref 20–33)
CREAT SERPL-MCNC: 0.69 MG/DL (ref 0.5–1.4)
ERYTHROCYTE [DISTWIDTH] IN BLOOD BY AUTOMATED COUNT: 47.3 FL (ref 35.9–50)
GFR SERPLBLD CREATININE-BSD FMLA CKD-EPI: 90 ML/MIN/1.73 M 2
GLUCOSE SERPL-MCNC: 102 MG/DL (ref 65–99)
HCT VFR BLD AUTO: 31.5 % (ref 37–47)
HGB BLD-MCNC: 10.3 G/DL (ref 12–16)
MAGNESIUM SERPL-MCNC: 1.7 MG/DL (ref 1.5–2.5)
MCH RBC QN AUTO: 30.4 PG (ref 27–33)
MCHC RBC AUTO-ENTMCNC: 32.7 G/DL (ref 33.6–35)
MCV RBC AUTO: 92.9 FL (ref 81.4–97.8)
PLATELET # BLD AUTO: 260 K/UL (ref 164–446)
PMV BLD AUTO: 9 FL (ref 9–12.9)
POTASSIUM SERPL-SCNC: 4 MMOL/L (ref 3.6–5.5)
RBC # BLD AUTO: 3.39 M/UL (ref 4.2–5.4)
SODIUM SERPL-SCNC: 131 MMOL/L (ref 135–145)
WBC # BLD AUTO: 7.9 K/UL (ref 4.8–10.8)

## 2022-12-12 PROCEDURE — 36415 COLL VENOUS BLD VENIPUNCTURE: CPT

## 2022-12-12 PROCEDURE — 85027 COMPLETE CBC AUTOMATED: CPT

## 2022-12-12 PROCEDURE — 83735 ASSAY OF MAGNESIUM: CPT

## 2022-12-12 PROCEDURE — 700111 HCHG RX REV CODE 636 W/ 250 OVERRIDE (IP): Performed by: FAMILY MEDICINE

## 2022-12-12 PROCEDURE — 700102 HCHG RX REV CODE 250 W/ 637 OVERRIDE(OP): Performed by: PHYSICIAN ASSISTANT

## 2022-12-12 PROCEDURE — 700102 HCHG RX REV CODE 250 W/ 637 OVERRIDE(OP): Performed by: STUDENT IN AN ORGANIZED HEALTH CARE EDUCATION/TRAINING PROGRAM

## 2022-12-12 PROCEDURE — 99233 SBSQ HOSP IP/OBS HIGH 50: CPT | Performed by: INTERNAL MEDICINE

## 2022-12-12 PROCEDURE — 97530 THERAPEUTIC ACTIVITIES: CPT

## 2022-12-12 PROCEDURE — 700105 HCHG RX REV CODE 258: Performed by: FAMILY MEDICINE

## 2022-12-12 PROCEDURE — 97112 NEUROMUSCULAR REEDUCATION: CPT

## 2022-12-12 PROCEDURE — 770001 HCHG ROOM/CARE - MED/SURG/GYN PRIV*

## 2022-12-12 PROCEDURE — 80048 BASIC METABOLIC PNL TOTAL CA: CPT

## 2022-12-12 PROCEDURE — A9270 NON-COVERED ITEM OR SERVICE: HCPCS | Performed by: STUDENT IN AN ORGANIZED HEALTH CARE EDUCATION/TRAINING PROGRAM

## 2022-12-12 PROCEDURE — 99232 SBSQ HOSP IP/OBS MODERATE 35: CPT | Performed by: FAMILY MEDICINE

## 2022-12-12 PROCEDURE — A9270 NON-COVERED ITEM OR SERVICE: HCPCS | Performed by: PHYSICIAN ASSISTANT

## 2022-12-12 PROCEDURE — 87040 BLOOD CULTURE FOR BACTERIA: CPT | Mod: 91

## 2022-12-12 RX ADMIN — APIXABAN 5 MG: 5 TABLET, FILM COATED ORAL at 05:41

## 2022-12-12 RX ADMIN — ACETAMINOPHEN 650 MG: 325 TABLET, FILM COATED ORAL at 20:34

## 2022-12-12 RX ADMIN — VANCOMYCIN HYDROCHLORIDE 1500 MG: 500 INJECTION, POWDER, LYOPHILIZED, FOR SOLUTION INTRAVENOUS at 17:53

## 2022-12-12 RX ADMIN — APIXABAN 5 MG: 5 TABLET, FILM COATED ORAL at 17:52

## 2022-12-12 RX ADMIN — METOPROLOL TARTRATE 12.5 MG: 25 TABLET, FILM COATED ORAL at 17:52

## 2022-12-12 RX ADMIN — GUAIFENESIN SYRUP AND DEXTROMETHORPHAN 5 ML: 100; 10 SYRUP ORAL at 20:25

## 2022-12-12 RX ADMIN — METOPROLOL TARTRATE 12.5 MG: 25 TABLET, FILM COATED ORAL at 05:41

## 2022-12-12 RX ADMIN — ACETAMINOPHEN 650 MG: 325 TABLET, FILM COATED ORAL at 05:41

## 2022-12-12 RX ADMIN — GUAIFENESIN SYRUP AND DEXTROMETHORPHAN 5 ML: 100; 10 SYRUP ORAL at 17:55

## 2022-12-12 ASSESSMENT — COGNITIVE AND FUNCTIONAL STATUS - GENERAL
PERSONAL GROOMING: A LITTLE
STANDING UP FROM CHAIR USING ARMS: A LOT
MOBILITY SCORE: 10
EATING MEALS: A LITTLE
CLIMB 3 TO 5 STEPS WITH RAILING: TOTAL
WALKING IN HOSPITAL ROOM: TOTAL
DRESSING REGULAR UPPER BODY CLOTHING: A LITTLE
TURNING FROM BACK TO SIDE WHILE IN FLAT BAD: A LOT
DAILY ACTIVITIY SCORE: 15
MOVING FROM LYING ON BACK TO SITTING ON SIDE OF FLAT BED: A LOT
DRESSING REGULAR LOWER BODY CLOTHING: A LOT
MOVING TO AND FROM BED TO CHAIR: A LOT
SUGGESTED CMS G CODE MODIFIER DAILY ACTIVITY: CK
SUGGESTED CMS G CODE MODIFIER MOBILITY: CL
TOILETING: A LOT
HELP NEEDED FOR BATHING: A LOT

## 2022-12-12 ASSESSMENT — ENCOUNTER SYMPTOMS
BLURRED VISION: 0
PALPITATIONS: 0
DIAPHORESIS: 0
MYALGIAS: 0
MYALGIAS: 1
VOMITING: 0
COUGH: 1
SORE THROAT: 0
FLANK PAIN: 0
ABDOMINAL PAIN: 0
NAUSEA: 0
NERVOUS/ANXIOUS: 0
WEAKNESS: 1
FOCAL WEAKNESS: 0
DIARRHEA: 0
BACK PAIN: 0
SPEECH CHANGE: 0
HEARTBURN: 0
WEIGHT LOSS: 0
DIZZINESS: 0
FEVER: 0
HEADACHES: 0
WHEEZING: 0
CHILLS: 0
SENSORY CHANGE: 0
SHORTNESS OF BREATH: 0
NECK PAIN: 0

## 2022-12-12 ASSESSMENT — PAIN DESCRIPTION - PAIN TYPE
TYPE: ACUTE PAIN
TYPE: ACUTE PAIN

## 2022-12-12 ASSESSMENT — GAIT ASSESSMENTS: GAIT LEVEL OF ASSIST: UNABLE TO PARTICIPATE

## 2022-12-12 NOTE — CARE PLAN
The patient is Stable - Low risk of patient condition declining or worsening    Shift Goals  Clinical Goals: skin integrity; rest  Patient Goals: comfort; rest  Family Goals: RICARDO    Progress made toward(s) clinical / shift goals:  Q2 turns and waffle mattress in place    Patient is not progressing towards the following goals:

## 2022-12-12 NOTE — PROGRESS NOTES
Infectious Disease Progress Note    Author: Ramesh Tee M.D. Date & Time of service: 2022  9:40 AM    Chief Complaint:  MRSA bacteremia    Interval History:  77 y.o. female admitted 2022. For Afib with RVR and rhabdomyolysis +COVID  Developed fever and AMS dueing hosp-blood cultures +MRSA   AF WBC 13.9 Oriented to person and place c/o LUE pain No dyspnea or CP. No new neck, back, joint pain   patient remains afebrile, white count is resolved now, down to 9.4, tolerating vancomycin.  Patient states she is tired but overall better.   left arm   patient remains afebrile, white count is 9.7, tolerating vancomycin.  Plan as below   patient remains afebrile, white count 7.7, tolerating vancomycin, no new events overnight.   patient remains afebrile, white count is 8.5, tolerating vancomycin.  TEODORO positive for endocarditis   patient remains afebrile, count 7.6, repeat cultures as below.  Patient notes improvement in left arm pain and swelling  12/10 patient remains afebrile, no CBC this morning, tolerating vancomycin   patient remains afebrile, no CBC this morning, tolerating IV vancomycin.  Repeat blood cultures as below.    T-max 98.9, remains afebrile, white count today 7.9, tolerating vancomycin, repeat blood cultures    Labs Reviewed, Medications Reviewed, and Radiology Reviewed.    Review of Systems:  Review of Systems   Constitutional:  Positive for malaise/fatigue. Negative for fever.   Gastrointestinal:  Negative for abdominal pain, diarrhea, nausea and vomiting.   Musculoskeletal:  Positive for myalgias.   Skin:  Negative for itching and rash.   All other systems reviewed and are negative.    Hemodynamics:  Temp (24hrs), Av.7 °C (98.1 °F), Min:35.9 °C (96.6 °F), Max:37.2 °C (98.9 °F)  Temperature: 37.2 °C (98.9 °F)  Pulse  Av.1  Min: 54  Max: 136   Blood Pressure : 110/62       Physical Exam:  Physical Exam  Vitals and nursing note reviewed.    Constitutional:       General: She is not in acute distress.     Appearance: She is ill-appearing. She is not toxic-appearing or diaphoretic.   HENT:      Mouth/Throat:      Pharynx: No oropharyngeal exudate.      Comments: Fair dentition  Eyes:      General: No scleral icterus.     Extraocular Movements: Extraocular movements intact.      Pupils: Pupils are equal, round, and reactive to light.   Pulmonary:      Effort: Pulmonary effort is normal. No respiratory distress.      Breath sounds: No stridor.   Abdominal:      General: There is no distension.      Palpations: Abdomen is soft.      Tenderness: There is no abdominal tenderness.   Musculoskeletal:         General: Swelling and tenderness present.      Cervical back: Neck supple. No rigidity.   Skin:     Coloration: Skin is pale. Skin is not jaundiced.      Findings: Bruising and erythema present.      Comments: Left arm thrombophlebitis with significant improvement of tenderness and edema, redness   Neurological:      General: No focal deficit present.      Mental Status: She is alert and oriented to person, place, and time.   Psychiatric:         Mood and Affect: Mood normal.         Behavior: Behavior normal.       Meds:    Current Facility-Administered Medications:     vancomycin    metoprolol tartrate    MD Alert...Vancomycin per Pharmacy    Respiratory Therapy Consult    LR    guaiFENesin dextromethorphan    benzonatate    senna-docusate **AND** polyethylene glycol/lytes **AND** magnesium hydroxide **AND** bisacodyl    acetaminophen    hydrALAZINE    Metoprolol Tartrate    apixaban    Labs:  Recent Labs     12/12/22  0308   WBC 7.9   RBC 3.39*   HEMOGLOBIN 10.3*   HEMATOCRIT 31.5*   MCV 92.9   MCH 30.4   RDW 47.3   PLATELETCT 260   MPV 9.0       Recent Labs     12/12/22  0308   SODIUM 131*   POTASSIUM 4.0   CHLORIDE 100   CO2 22   GLUCOSE 102*   BUN 16       Recent Labs     12/12/22  0308   CREATININE 0.69         Imaging:  DX-CHEST-PORTABLE (1  VIEW)    Result Date: 2022 12:15 PM HISTORY/REASON FOR EXAM:  Shortness of Breath. TECHNIQUE/EXAM DESCRIPTION AND NUMBER OF VIEWS: Single portable view of the chest. COMPARISON: None FINDINGS: Heart size is within normal limits. There are curvilinear opacities in the lung bases. No pleural abnormalities are noted.     1.  Curvilinear opacities in the lung bases likely representing atelectasis, less likely pneumonitis.    US-EXTREMITY VENOUS UPPER UNILAT LEFT    Result Date: 2022   Upper Extremity  Venous Duplex Report  Vascular Laboratory  CONCLUSIONS  Left upper extremity venous duplex imaging.  No evidence of deep venous thrombosis.  Evidence of acute to subacute superficial venous thrombosis in the LEFT  cephalic vein from the mid to distal bicep.  MASSIEL SILVA  Exam Date:     2022 13:29  Room #:     Inpatient  Priority:     Routine  Ht (in):             Wt (lb):  Ordering Physician:        EWELINA WOOD  Referring Physician:       NINA Mcknight  Sonographer:               Giovana Moran RVT  Study Type:                Complete Unilateral  Technical Quality:         Adequate  Age:    77    Gender:     F  MRN:    [id number]  :    1945      BSA:  Indications:     Localized swelling, mass and lump, unspecified upper limb,                   Edema, unspecified  CPT Codes:       55059  ICD Codes:       R22.30  R60.9  History:         Left upper extremity swelling/edema. No prior exams.  Limitations:  PROCEDURES:  Left upper extremity venous duplex imaging.  The following venous structures were evaluated: internal jugular,  subclavian, axillary, brachial, cephalic, and basilic veins.  Serial compression, color, and spectral Doppler flow evaluations were  performed.  FINDINGS:  Left upper extremity-  No evidence of deep venous thrombosis.  Evidence of acute to subacute superficial venous thrombosis in the cephalic  vein from the mid to distal bicep.  All other veins demonstrate  complete color filling and compressibility with  normal venous flow dynamics including spontaneous flow and respiratory  phasicity.  Flow was evaluated in the contralateral subclavian vein and normal venous  flow dynamics including spontaneous flow and respiratory phasic variation  were demonstrated.  Brittney MUNOZ To  (Electronically Signed)  Final Date:      2022                   16:24    EC-ECHOCARDIOGRAM COMPLETE W/O CONT    Result Date: 2022  Transthoracic Echo Report Echocardiography Laboratory CONCLUSIONS No prior study is available for comparison. Normal left ventricular chamber size. Mild concentric left ventricular hypertrophy. The left ventricular ejection fraction is visually estimated to be 65%. Trace mitral regurgitation. MASSIEL SILVA Exam Date:         2022                    13:31 Exam Location:     Inpatient Priority:          Routine Ordering Physician:        BETH GIL Referring Physician:       [id number] Ibarra Sonographer:               Frank ARANDA Age:    77     Gender:    F MRN:    [id number] :    1945 BSA:    1.89   Ht (in):    67     Wt (lb):    170 Exam Type:     Complete Indications:     Atrial Fibrillation, Shortness of breath ICD Codes:       427.31  786.05 CPT Codes:       05679 BP:   121    /   65     HR: Technical Quality:       Poor MEASUREMENTS  (Male / Female) Normal Values 2D ECHO LV Diastolic Diameter PLAX        4.2 cm                4.2 - 5.9 / 3.9 - 5.3 cm LV Systolic Diameter PLAX         2.7 cm                2.1 - 4.0 cm IVS Diastolic Thickness           1.1 cm                LVPW Diastolic Thickness          1.1 cm                LVOT Diameter                     1.8 cm                Estimated LV Ejection Fraction    65 %                  LV Ejection Fraction MOD BP       73.1 %                >= 55  % LV Ejection Fraction MOD 4C       78.7 %                LV Ejection Fraction MOD 2C       73 %                  DOPPLER AV Peak  Velocity                  1.1 m/s               AV Peak Gradient                  5.3 mmHg              AV Mean Gradient                  2.9 mmHg              LVOT Peak Velocity                1.1 m/s               AV Area Cont Eq vti               2.6 cm2               MV Velocity Time Integral         21 cm                 Mitral E Point Velocity           0.86 m/s              Mitral E to A Ratio               0.75                  Mitral A Duration                 156 ms                MV Pressure Half Time             31.9 ms               MV Area PHT                       6.9 cm2               MV Deceleration Time              85.8 ms               * Indicates values subject to auto-interpretation LV EF:  65    % FINDINGS Left Ventricle Normal left ventricular chamber size. Mild concentric left ventricular hypertrophy. Normal left ventricular systolic function. The left ventricular ejection fraction is visually estimated to be 65%. Normal regional wall motion. Right Ventricle The right ventricle is not well visualized. Right Atrium The right atrium is not well visualized. Left Atrium Normal left atrial size. Left atrial volume index is 24 mL/sq m. Mitral Valve The mitral valve is not well visualized. No mitral stenosis. Trace mitral regurgitation. Aortic Valve The aortic valve is not well visualized. Tricuspid Valve The tricuspid valve is not well visualized. No stenosis or regurgitation seen. Pulmonic Valve The pulmonic valve is not well visualized. No stenosis or regurgitation seen. Pericardium No pericardial effusion. Aorta Normal aortic root for body surface area. The ascending aorta diameter is 3.1 cm. Zafar Bailon M.D. (Electronically Signed) Final Date:     27 November 2022                 20:23      Micro:  Results       Procedure Component Value Units Date/Time    BLOOD CULTURE [[id number]] Collected: 12/12/22 0308    Order Status: Sent Specimen: Blood from Peripheral Updated: 12/12/22 0327     "Narrative:      Contact  Per Hospital Policy: Only change Specimen Src: to \"Line\" if  specified by physician order.    BLOOD CULTURE [[id number]] Collected: 12/12/22 0308    Order Status: Sent Specimen: Blood from Peripheral Updated: 12/12/22 0327    Narrative:      Contact  Per Hospital Policy: Only change Specimen Src: to \"Line\" if  specified by physician order.    BLOOD CULTURE [[id number]]  (Abnormal) Collected: 12/09/22 0306    Order Status: Completed Specimen: Blood from Peripheral Updated: 12/11/22 1208     Significant Indicator POS     Source BLD     Site PERIPHERAL     Culture Result Growth detected by Bactec instrument. 12/10/2022  06:43      Methicillin Resistant Staphylococcus aureus  See previous culture for sensitivity report.      Narrative:      CALL  [facility]  141 tel. [phone],  CALLED  141 tel. [phone] 12/10/2022, 06:46, RB PERF. RESULTS CALLED TO:  [id number] RN  Contact  Per Hospital Policy: Only change Specimen Src: to \"Line\" if  specified by physician order.  Right Hand    BLOOD CULTURE [[id number]] Collected: 12/09/22 0223    Order Status: Completed Specimen: Blood from Peripheral Updated: 12/10/22 0729     Significant Indicator NEG     Source BLD     Site PERIPHERAL     Culture Result No Growth  Note: Blood cultures are incubated for 5 days and  are monitored continuously.Positive blood cultures  are called to the RN and reported as soon as  they are identified.      Narrative:      Contact  Per Hospital Policy: Only change Specimen Src: to \"Line\" if  specified by physician order.  Right Hand    BLOOD CULTURE [[id number]]  (Abnormal) Collected: 12/05/22 1048    Order Status: Completed Specimen: Blood from Peripheral Updated: 12/08/22 0747     Significant Indicator POS     Source BLD     Site PERIPHERAL     Culture Result Growth detected by Bactec instrument. 12/06/2022  03:32      Methicillin Resistant Staphylococcus aureus  Methicillin resistant by screening method  See previous culture for " "sensitivity report.      Narrative:      CALL  [facility]  141 tel. [phone],  CALLED  141 tel. [phone] 12/06/2022, 03:33, RB PERF. RESULTS CALLED TO: RN  [phone]  Contact  Per Hospital Policy: Only change Specimen Src: to \"Line\" if  specified by physician order.  Left Hand    BLOOD CULTURE [[id number]]  (Abnormal)  (Susceptibility) Collected: 12/05/22 1048    Order Status: Completed Specimen: Blood from Peripheral Updated: 12/08/22 0747     Significant Indicator POS     Source BLD     Site PERIPHERAL     Culture Result Growth detected by Bactec instrument. 12/06/2022  04:29      Methicillin Resistant Staphylococcus aureus    Narrative:      CALL  [facility]  141 tel. [phone],  CALLED  141 tel. [phone] 12/06/2022, 04:30, RB PERF. RESULTS CALLED TO:RN  [phone]  Contact  Per Hospital Policy: Only change Specimen Src: to \"Line\" if  specified by physician order.  Right Forearm/Arm    Susceptibility       Methicillin resistant staphylococcus aureus (1)       Antibiotic Interpretation Microscan   Method Status    Azithromycin Resistant >4 mcg/mL JADIEL Final    Clindamycin Sensitive 0.5 mcg/mL JADIEL Final    Cefazolin Resistant 16 mcg/mL JADIEL Final    Cefepime Resistant >16 mcg/mL JADIEL Final    Ceftaroline Sensitive <=0.5 mcg/mL JADIEL Final    Daptomycin Sensitive 1 mcg/mL JADIEL Final    Ampicillin/sulbactam Resistant 16/8 mcg/mL JADIEL Final    Erythromycin Resistant >4 mcg/mL JADIEL Final    Vancomycin Sensitive 1 mcg/mL JADIEL Final    Oxacillin Resistant >2 mcg/mL JADIEL Final    Trimeth/Sulfa Sensitive <=0.5/9.5 mcg/mL JADIEL Final    Tetracycline Sensitive <=4 mcg/mL JADIEL Final                               Assessment:  This is a 77-year-old female patient who was transferred from an outside facility, slid off her bed at home and was unable to get up for multiple days, found by a friend, was found to be in A. fib with RVR and with mild rhabdomyolysis.  She was also noted to have COVID-19 requiring nasal cannula oxygen, resolved.  On 12/1, she " developed right arm thrombophlebitis and the following day had worsening leukocytosis and procalcitonin, blood cultures positive for MRSA.    Hospital onset sepsis with MRSA bacteremia, likely secondary to thrombophlebitis that may be seeded with infection based on exam findings. IV line has been removed.  TEODORO positive for infective endocarditis    Pertinent Diagnoses:  Native mitral valve infective endocarditis  MRSA bacteremia, persistent  SO, resolved  Recent COVID-19  Thrombophlebitis     PLAN:   MRSA bacteremia  Native mitral valve infective endocarditis  Thrombophlebitis  BCxs + MRSA 12/2 and 12/3, 12/5  Follow repeat blood cultures x2 from 12/9, 1 out of 2 positive  TEODORO formal report pending  Repeat blood cultures x2 in a.m. pending  Continue vancomycin. Monitor vanco trough and renal function.  Last vancomycin trough 17.8 on 12/8    Disposition: Anticipate likely eventual discharge to a facility on 6 weeks of IV antibiotics once blood cultures clear  Need for PICC line: Eventually yes, once blood cultures clear    Discussed with Dr. Palacios

## 2022-12-12 NOTE — THERAPY
"Physical Therapy   Daily Treatment     Patient Name: Paulina Castellanos  Age:  77 y.o., Sex:  female  Medical Record #: [id number]  Today's Date: 12/12/2022     Precautions  Precautions: Fall Risk  Comments: covid recovered    Assessment    Pt received in bed and agreeable to PT session. Pt reported having an incontinent episode just prior to arrival, so required multiple sit>stands from EOB for cleaning. Pt tolerated standing ~1 min at a time. Pt then required max A for stand pivot to chair due to pt not moving LE well to step transfer. Pt continues to be unable to attempt ambulation due to LE weakness, likely in combination with fear of falling. Will continue to follow for acute PT to progress as able. Recommending placement for further rehab prior to return home.    Plan    Continue current treatment plan.    DC Equipment Recommendations: Unable to determine at this time  Discharge Recommendations: Recommend post-acute placement for additional physical therapy services prior to discharge home      Subjective    \"Today is the feast day of [facility]\"     Objective       12/12/22 0905   Precautions   Precautions Fall Risk   Comments covid recovered   Pain 0 - 10 Group   Therapist Pain Assessment Post Activity Pain Same as Prior to Activity;Nurse Notified;0   Cognition    Level of Consciousness Alert   Comments Pt pleasant and cooperative. Continues to moan during session without cause.   Balance   Sitting Balance (Static) Fair   Sitting Balance (Dynamic) Fair -   Standing Balance (Static) Fair -   Standing Balance (Dynamic) Poor +   Weight Shift Sitting Fair   Weight Shift Standing Poor   Skilled Intervention Verbal Cuing;Tactile Cuing;Sequencing;Facilitation   Comments FWW with standing   Gait Analysis   Gait Level Of Assist Unable to Participate   Comments Pt unable to take full steps. Appears to be component of fear of falling   Bed Mobility    Supine to Sit Moderate Assist   Scooting Minimal Assist   Rolling " Contact Guard Assist   Skilled Intervention Sequencing;Verbal Cuing   Comments HOB elevated, use of rails, extra time   Functional Mobility   Sit to Stand Contact Guard Assist   Bed, Chair, Wheelchair Transfer Maximal Assist   Transfer Method Stand Pivot   Mobility bed>chair   Skilled Intervention Verbal Cuing;Facilitation   Comments STS at EOB x4 for stacy care with FWW. Stand pivot done without FWW as pt had difficulty managing.   How much difficulty does the patient currently have...   Turning over in bed (including adjusting bedclothes, sheets and blankets)? 2   Sitting down on and standing up from a chair with arms (e.g., wheelchair, bedside commode, etc.) 2   Moving from lying on back to sitting on the side of the bed? 2   How much help from another person does the patient currently need...   Moving to and from a bed to a chair (including a wheelchair)? 2   Need to walk in a hospital room? 1   Climbing 3-5 steps with a railing? 1   6 clicks Mobility Score 10   Short Term Goals    Short Term Goal # 1 pt will be able to complete supine<>sitting with SPV in 6tx in order to dc home   Goal Outcome # 1 Progressing slower than expected   Short Term Goal # 2 pt will be able to complete functional transfers with FWW and min assist in 6tx in order to progress home   Goal Outcome # 2 Goal not met   Short Term Goal # 3 pt will be able to ambulate 25ft with FWW and min assist in 6tx in order to progress to home   Goal Outcome # 3 Goal not met   Short Term Goal # 4 pt will be able to negotiate 4 steps with min assist in 6tx in order to progress to home   Goal Outcome # 4 Goal not met   Anticipated Discharge Equipment and Recommendations   DC Equipment Recommendations Unable to determine at this time   Discharge Recommendations Recommend post-acute placement for additional physical therapy services prior to discharge home   Interdisciplinary Plan of Care Collaboration   IDT Collaboration with  Nursing   Patient Position at  End of Therapy Seated;Chair Alarm On;Call Light within Reach;Tray Table within Reach;Phone within Reach   Collaboration Comments RN updated   Session Information   Date / Session Number  12/12-5(1/3, 12/17)

## 2022-12-12 NOTE — CARE PLAN
The patient is Stable - Low risk of patient condition declining or worsening    Shift Goals  Clinical Goals: Skin integrity, rest  Patient Goals: Rest  Family Goals: RICARDO    Progress made toward(s) clinical / shift goals:  Pt resting. Q2 turns in place, barrier cream applied.    Patient is not progressing towards the following goals:

## 2022-12-12 NOTE — PROGRESS NOTES
Hospital Medicine Daily Progress Note    Date of Service  12/12/2022    Chief Complaint  Paulina Castellanos is a 77 y.o. female admitted 11/26/2022 with atrial fibrillation with rapid ventricular rate.      Hospital Course  Admitted for Atrial fibrillation with rapid ventricular rate, COVID-19 with pneumonia and Respiratory failure with hypoxia, mild rhabdomyolysis. She was started on Decadron, oxygen supplementation, and supportive measures.  She was placed on Metoprolol for rate control, and Eliquis for anticoagulation.  She apparently developed fever and encephalopathy, and was noted to have left arm thrombophlebitis.  Work-up further showed MRSA bacteremia.  Infectious disease was consulted on the case.  Patient was started on empiric coverage with IV vancomycin.  Repeat blood cultures remained positive for MRSA.  Cardiology was consulted on the case for a TEODORO, which showed mitral valve vegetation/endocarditis.     Interval Problem Update  Bacteremia -repeat blood cultures positive 1 out of 2 bottles, for repeat cultures today  A. Fib - currently sinus    I have discussed this patient's plan of care and discharge plan at IDT rounds today with Case Management, Nursing, Nursing leadership, and other members of the IDT team.    Consultants/Specialty  cardiology, infectious disease, and palliative care    Code Status  DNAR/DNI    Disposition  Patient is not medically cleared for discharge.   Anticipate discharge to to skilled nursing facility.  I have placed the appropriate orders for post-discharge needs.    Review of Systems  Review of Systems   Constitutional:  Positive for malaise/fatigue. Negative for chills, diaphoresis, fever and weight loss.   HENT:  Negative for congestion, hearing loss and sore throat.    Eyes:  Negative for blurred vision.   Respiratory:  Positive for cough. Negative for shortness of breath and wheezing.    Cardiovascular:  Negative for chest pain, palpitations and leg swelling.    Gastrointestinal:  Negative for abdominal pain, diarrhea, heartburn, nausea and vomiting.   Genitourinary:  Negative for dysuria, flank pain and hematuria.   Musculoskeletal:  Negative for back pain, joint pain, myalgias and neck pain.   Skin:  Negative for rash.   Neurological:  Positive for weakness. Negative for dizziness, sensory change, speech change, focal weakness and headaches.   Psychiatric/Behavioral:  The patient is not nervous/anxious.       Physical Exam  Temp:  [35.9 °C (96.6 °F)-37.2 °C (98.9 °F)] 37.2 °C (98.9 °F)  Pulse:  [] 94  Resp:  [17-18] 18  BP: (110-130)/(62-85) 110/62  SpO2:  [94 %-96 %] 94 %    Physical Exam  Vitals and nursing note reviewed.   HENT:      Head: Normocephalic and atraumatic.      Nose: No congestion.      Mouth/Throat:      Mouth: Mucous membranes are moist.   Eyes:      Extraocular Movements: Extraocular movements intact.      Conjunctiva/sclera: Conjunctivae normal.   Cardiovascular:      Rate and Rhythm: Normal rate and regular rhythm.      Heart sounds: Murmur heard.   Pulmonary:      Effort: Pulmonary effort is normal.      Breath sounds: Normal breath sounds.   Abdominal:      General: There is no distension.      Tenderness: There is no abdominal tenderness. There is no guarding or rebound.   Musculoskeletal:         General: Swelling (left arm) present.      Cervical back: No tenderness.      Right lower leg: Edema present.      Left lower leg: Edema present.   Skin:     Findings: Erythema (epigastric area) present.   Neurological:      General: No focal deficit present.      Mental Status: She is alert and oriented to person, place, and time.      Cranial Nerves: No cranial nerve deficit.       Fluids    Intake/Output Summary (Last 24 hours) at 12/12/2022 1409  Last data filed at 12/12/2022 0536  Gross per 24 hour   Intake 0 ml   Output 700 ml   Net -700 ml           Laboratory  Recent Labs     12/12/22  0308   WBC 7.9   RBC 3.39*   HEMOGLOBIN 10.3*    HEMATOCRIT 31.5*   MCV 92.9   MCH 30.4   MCHC 32.7*   RDW 47.3   PLATELETCT 260   MPV 9.0       Recent Labs     12/12/22  0308   SODIUM 131*   POTASSIUM 4.0   CHLORIDE 100   CO2 22   GLUCOSE 102*   BUN 16   CREATININE 0.69   CALCIUM 8.6                     Imaging  EC-TEODORO W/O CONT         DX-CHEST-PORTABLE (1 VIEW)   Final Result      1.  Curvilinear opacities in the lung bases likely representing atelectasis, less likely pneumonitis.      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)  Assessment & Plan  IV Vancomycin    MRSA bacteremia- (present on admission)  Assessment & Plan  IV Vancomycin  repeat blood cultures today      Encephalopathy acute- (present on admission)  Assessment & Plan  secondary to infectious etiology  Avoid Benzodiazepines and Anticholinergics  Frequent orientation  Open window blinds during the day  Avoid naps during the day  Family at bedside whenever possible  Ensure adequate sleep at night - use Melatonin preferably  Avoid early morning labs  Avoid vital signs during sleep  Ambulate if possible  Provide adequate pain control  Monitor for urinary retention  Prevent and manage constipation  Discontinue IVs, Catheters, Tubes, Lines, Cardiac monitors, SCDs if possible    Sepsis (HCC)- (present on admission)  Assessment & Plan  This is Sepsis Not present on admission  SIRS criteria identified on my evaluation include: Fever, with temperature greater than 101 deg F, Tachycardia, with heart rate greater than 90 BPM and Leukocytosis, with WBC greater than 12,000  Source -MRSA bacteremia  Sepsis protocol initiated  Fluid resuscitation ordered per protocol  Crystalloid Fluid  Administration: Patient has advanced or end-stage heart failure (with documentation of NYHA class III or symptoms with minimal exertion, Or NYHA class IV or symptoms at rest or with activity), for this/these reason(s) it is unsafe for this patient to receive 30 mL/kg of fluid  Partial Fluid Dose Given: 10 mL/kg per current or ideal body weight, patient to be reassessed shortly after completion of partial fluid bolus to ensure adequacy of resuscitation  IV antibiotics as appropriate for source of sepsis  Reassessment: I have reassessed the patient's hemodynamic status          Thrombophlebitis- (present on admission)  Assessment & Plan  IV Vancomycin  Eliquis    Pneumonia due to COVID-19 virus- (present on admission)  Assessment & Plan  Finished course of Decadron    Cellulitis- (present on admission)  Assessment & Plan  IV Vancomycin    Dysphagia  Assessment & Plan  Level 7, Liquid level 0, crush pills  Aspiration precautions    Anemia- (present on admission)  Assessment & Plan  Follow CBC    Hypomagnesemia- (present on admission)  Assessment & Plan  IV Mg given  Follow level    Hypophosphatemia- (present on admission)  Assessment & Plan  Follow level    Hyponatremia- (present on admission)  Assessment & Plan  Follow BMP    Acute respiratory failure with hypoxia (HCC)- (present on admission)  Assessment & Plan  due to COVID 19  RT protocol    Metabolic acidosis, normal anion gap (NAG)- (present on admission)  Assessment & Plan  Resolved    Frailty syndrome in geriatric patient- (present on admission)  Assessment & Plan  Palliative care following    Non-traumatic rhabdomyolysis- (present on admission)  Assessment & Plan  resolved    Elevated LFTs- (present on admission)  Assessment & Plan  Follow CMP    Hypokalemia- (present on admission)  Assessment & Plan  Follow bmp         VTE prophylaxis: therapeutic anticoagulation with Eliquis    I have performed a physical exam and reviewed and updated ROS and Plan today  (12/12/2022). In review of yesterday's note (12/11/2022), there are no changes except as documented above.

## 2022-12-12 NOTE — PROGRESS NOTES
Bedside report received.  Assessment complete.  A&O x 4. Patient calls appropriately.  Patient ambulates with max assist. Bed alarm on.   Patient has 4/10 pain. Pain managed with prescribed medications.  Denies N&V. Tolerating diet.  + void, + flatus, + BM, frequent bowel movements.  Patient denies SOB.  SCD's off.  Patient incontinent of bowel and bladder. Unable to notify staff prior to being incontinent but calls appropriately.   Review plan with of care with patient. Call light and personal belongings within reach. Hourly rounding in place. All needs met at this time.

## 2022-12-12 NOTE — THERAPY
"Occupational Therapy  Daily Treatment     Patient Name: Paulina Castellanos  Age:  77 y.o., Sex:  female  Medical Record #: [id number]  Today's Date: 12/12/2022       Precautions: Fall Risk  Comments: covid recovered    Assessment    Pt seen for OT tx.  Pt remains pleasant & co-operative but requires a lot of time to sequence a task & perform basic ADL's.  Pt is Mod A with sit -stand from chair height.  Pt is fearful & tends to have a post lean in standing which increases her fall risk.  Pt encouraged to be OOB for meals & utilize BSC for toileting.  Pt will need Post acute services once medically cleared.    Plan    Continue current treatment plan.    DC Equipment Recommendations: Unable to determine at this time  Discharge Recommendations: Recommend post-acute placement for additional occupational therapy services prior to discharge home    Subjective    \"Please don't leave me, I need your help\"     Objective       12/12/22 1142   Cognition    Cognition / Consciousness X   Speech/ Communication Delayed Responses   Level of Consciousness Alert   Safety Awareness Impaired   New Learning Impaired   Attention Impaired   Sequencing Impaired   Initiation Impaired   Comments Pt is pleasant & co-operative but has poor carryover from previous tx sessions   Strength Upper Body   Gross Strength Generalized Weakness, Equal Bilaterally.    Comments pt strongly encouraged to use BUE to assist with sit-stand & transfers   Neuro-Muscular Treatments   Comments Pt requires a significant amount of time to perform basic mobility & to sequence a task.  Pt needs a lot of help to lean forward & maintain an upright posture as she has a strong Post lean with multiple LOB post requiring a skilled therapist to prevent a fall.  Pt encouraged frequently to stand fully upright & use BUE on FWW for balance   Other Treatments   Other Treatments Provided Pt encouraged to be OOB for meals, utilize BSC for toileting.  Pt expressed a desire to write out " [place] cards, pt was encouraged to do that sitting EOB or up in chair   Balance   Sitting Balance (Static) Fair   Sitting Balance (Dynamic) Fair -   Standing Balance (Static) Poor   Standing Balance (Dynamic) Trace +   Weight Shift Sitting Fair   Weight Shift Standing Poor   Comments Pt tends to lean post in standing   Bed Mobility    Supine to Sit   (up in chair on arrival)   Sit to Supine Minimal Assist   Scooting Moderate Assist   Rolling Minimal Assist to Rt.   Comments Pt was fatigued from sitting up in chair earlier   Activities of Daily Living   Eating Supervision   Grooming Minimal Assist;Seated   Lower Body Dressing Moderate Assist   Toileting Maximal Assist   Comments with extra time as pt moves slowly   Functional Mobility   Sit to Stand Moderate Assist   Bed, Chair, Wheelchair Transfer Moderate Assist   Transfer Method Stand Step  (with FWW)   Patient / Family Goals   Patient / Family Goal #1 to go home   Goal #1 Outcome Goal not met   Short Term Goals   Short Term Goal # 1 pt will complete grooming seated w/set up   Goal Outcome # 1 Progressing slower than expected   Short Term Goal # 2 pt will complete txf to BSC w/min A   Goal Outcome # 2 Progressing as expected   Short Term Goal # 3 pt will complete UB dressing w/set u p   Goal Outcome # 3 Progressing as expected

## 2022-12-13 PROCEDURE — 99233 SBSQ HOSP IP/OBS HIGH 50: CPT | Performed by: INTERNAL MEDICINE

## 2022-12-13 PROCEDURE — 700102 HCHG RX REV CODE 250 W/ 637 OVERRIDE(OP): Performed by: STUDENT IN AN ORGANIZED HEALTH CARE EDUCATION/TRAINING PROGRAM

## 2022-12-13 PROCEDURE — A9270 NON-COVERED ITEM OR SERVICE: HCPCS | Performed by: INTERNAL MEDICINE

## 2022-12-13 PROCEDURE — A9270 NON-COVERED ITEM OR SERVICE: HCPCS | Performed by: STUDENT IN AN ORGANIZED HEALTH CARE EDUCATION/TRAINING PROGRAM

## 2022-12-13 PROCEDURE — 99232 SBSQ HOSP IP/OBS MODERATE 35: CPT | Performed by: INTERNAL MEDICINE

## 2022-12-13 PROCEDURE — 700111 HCHG RX REV CODE 636 W/ 250 OVERRIDE (IP): Performed by: FAMILY MEDICINE

## 2022-12-13 PROCEDURE — 700102 HCHG RX REV CODE 250 W/ 637 OVERRIDE(OP): Performed by: INTERNAL MEDICINE

## 2022-12-13 PROCEDURE — A9270 NON-COVERED ITEM OR SERVICE: HCPCS | Performed by: PHYSICIAN ASSISTANT

## 2022-12-13 PROCEDURE — 700105 HCHG RX REV CODE 258: Performed by: FAMILY MEDICINE

## 2022-12-13 PROCEDURE — 700102 HCHG RX REV CODE 250 W/ 637 OVERRIDE(OP): Performed by: PHYSICIAN ASSISTANT

## 2022-12-13 PROCEDURE — 770001 HCHG ROOM/CARE - MED/SURG/GYN PRIV*

## 2022-12-13 RX ORDER — ACETAMINOPHEN 500 MG
1000 TABLET ORAL EVERY 6 HOURS
Status: DISPENSED | OUTPATIENT
Start: 2022-12-13 | End: 2022-12-18

## 2022-12-13 RX ORDER — OXYCODONE HYDROCHLORIDE 5 MG/1
5 TABLET ORAL
Status: DISCONTINUED | OUTPATIENT
Start: 2022-12-13 | End: 2022-12-23 | Stop reason: HOSPADM

## 2022-12-13 RX ORDER — ACETAMINOPHEN 500 MG
1000 TABLET ORAL EVERY 6 HOURS PRN
Status: DISCONTINUED | OUTPATIENT
Start: 2022-12-18 | End: 2022-12-23 | Stop reason: HOSPADM

## 2022-12-13 RX ORDER — NYSTATIN 100000 [USP'U]/G
POWDER TOPICAL 2 TIMES DAILY
Status: DISPENSED | OUTPATIENT
Start: 2022-12-13 | End: 2022-12-17

## 2022-12-13 RX ORDER — HYDROMORPHONE HYDROCHLORIDE 1 MG/ML
0.25 INJECTION, SOLUTION INTRAMUSCULAR; INTRAVENOUS; SUBCUTANEOUS
Status: DISCONTINUED | OUTPATIENT
Start: 2022-12-13 | End: 2022-12-23 | Stop reason: HOSPADM

## 2022-12-13 RX ORDER — OXYCODONE HYDROCHLORIDE 5 MG/1
2.5 TABLET ORAL
Status: DISCONTINUED | OUTPATIENT
Start: 2022-12-13 | End: 2022-12-23 | Stop reason: HOSPADM

## 2022-12-13 RX ADMIN — VANCOMYCIN HYDROCHLORIDE 1500 MG: 500 INJECTION, POWDER, LYOPHILIZED, FOR SOLUTION INTRAVENOUS at 17:08

## 2022-12-13 RX ADMIN — DOCUSATE SODIUM 50 MG AND SENNOSIDES 8.6 MG 2 TABLET: 8.6; 5 TABLET, FILM COATED ORAL at 05:04

## 2022-12-13 RX ADMIN — METOPROLOL TARTRATE 12.5 MG: 25 TABLET, FILM COATED ORAL at 17:03

## 2022-12-13 RX ADMIN — METOPROLOL TARTRATE 12.5 MG: 25 TABLET, FILM COATED ORAL at 05:04

## 2022-12-13 RX ADMIN — APIXABAN 5 MG: 5 TABLET, FILM COATED ORAL at 17:03

## 2022-12-13 RX ADMIN — OXYCODONE 5 MG: 5 TABLET ORAL at 10:24

## 2022-12-13 RX ADMIN — GUAIFENESIN SYRUP AND DEXTROMETHORPHAN 5 ML: 100; 10 SYRUP ORAL at 14:37

## 2022-12-13 RX ADMIN — APIXABAN 5 MG: 5 TABLET, FILM COATED ORAL at 05:04

## 2022-12-13 RX ADMIN — ACETAMINOPHEN 650 MG: 325 TABLET, FILM COATED ORAL at 05:04

## 2022-12-13 RX ADMIN — GUAIFENESIN SYRUP AND DEXTROMETHORPHAN 5 ML: 100; 10 SYRUP ORAL at 20:19

## 2022-12-13 RX ADMIN — ACETAMINOPHEN 1000 MG: 500 TABLET ORAL at 17:03

## 2022-12-13 RX ADMIN — ACETAMINOPHEN 1000 MG: 500 TABLET ORAL at 23:24

## 2022-12-13 RX ADMIN — GUAIFENESIN SYRUP AND DEXTROMETHORPHAN 5 ML: 100; 10 SYRUP ORAL at 08:05

## 2022-12-13 RX ADMIN — ACETAMINOPHEN 1000 MG: 500 TABLET ORAL at 12:09

## 2022-12-13 ASSESSMENT — ENCOUNTER SYMPTOMS
MYALGIAS: 1
HEADACHES: 0
BLURRED VISION: 0
DIARRHEA: 0
FLANK PAIN: 0
FEVER: 0
BACK PAIN: 1
MYALGIAS: 0
COUGH: 1
DIAPHORESIS: 0
VOMITING: 0
SENSORY CHANGE: 0
SHORTNESS OF BREATH: 0
DIZZINESS: 0
FOCAL WEAKNESS: 0
NAUSEA: 0
WEAKNESS: 1
NERVOUS/ANXIOUS: 0
SPEECH CHANGE: 0
NECK PAIN: 0
ABDOMINAL PAIN: 0

## 2022-12-13 ASSESSMENT — PAIN DESCRIPTION - PAIN TYPE
TYPE: ACUTE PAIN
TYPE: ACUTE PAIN;SURGICAL PAIN
TYPE: ACUTE PAIN
TYPE: ACUTE PAIN

## 2022-12-13 NOTE — PROGRESS NOTES
Infectious Disease Progress Note    Author: Ramesh Tee M.D. Date & Time of service: 2022  9:03 AM    Chief Complaint:  MRSA bacteremia    Interval History:  77 y.o. female admitted 2022. For Afib with RVR and rhabdomyolysis +COVID  Developed fever and AMS dueing hosp-blood cultures +MRSA   AF WBC 13.9 Oriented to person and place c/o LUE pain No dyspnea or CP. No new neck, back, joint pain   patient remains afebrile, white count is resolved now, down to 9.4, tolerating vancomycin.  Patient states she is tired but overall better.   left arm   patient remains afebrile, white count is 9.7, tolerating vancomycin.  Plan as below   patient remains afebrile, white count 7.7, tolerating vancomycin, no new events overnight.   patient remains afebrile, white count is 8.5, tolerating vancomycin.  TEODORO positive for endocarditis   patient remains afebrile, count 7.6, repeat cultures as below.  Patient notes improvement in left arm pain and swelling  12/10 patient remains afebrile, no CBC this morning, tolerating vancomycin   patient remains afebrile, no CBC this morning, tolerating IV vancomycin.  Repeat blood cultures as below.    T-max 98.9, remains afebrile, white count today 7.9, tolerating vancomycin, repeat blood cultures   patient remains afebrile, no CBC this morning, tolerating vancomycin.  Repeat blood cultures no growth to date    Labs Reviewed, Medications Reviewed, and Radiology Reviewed.    Review of Systems:  Review of Systems   Constitutional:  Positive for malaise/fatigue. Negative for fever.   Gastrointestinal:  Negative for abdominal pain, diarrhea, nausea and vomiting.   Musculoskeletal:  Positive for myalgias.   Skin:  Negative for itching and rash.   All other systems reviewed and are negative.    Hemodynamics:  Temp (24hrs), Av.2 °C (97.1 °F), Min:35.8 °C (96.5 °F), Max:36.5 °C (97.7 °F)  Temperature: 36.1 °C (97 °F)  Pulse  Av   Min: 54  Max: 136   Blood Pressure : 102/53       Physical Exam:  Physical Exam  Vitals and nursing note reviewed.   Constitutional:       General: She is not in acute distress.     Appearance: She is ill-appearing. She is not toxic-appearing or diaphoretic.   HENT:      Mouth/Throat:      Pharynx: No oropharyngeal exudate.      Comments: Fair dentition  Eyes:      General: No scleral icterus.     Extraocular Movements: Extraocular movements intact.      Pupils: Pupils are equal, round, and reactive to light.   Pulmonary:      Effort: Pulmonary effort is normal. No respiratory distress.      Breath sounds: No stridor.   Abdominal:      General: There is no distension.      Palpations: Abdomen is soft.      Tenderness: There is no abdominal tenderness.   Musculoskeletal:         General: Swelling and tenderness present.      Cervical back: Neck supple. No rigidity.   Skin:     Coloration: Skin is pale. Skin is not jaundiced.      Findings: Bruising and erythema present.      Comments: Left arm thrombophlebitis with significant improvement of tenderness and edema, redness   Neurological:      General: No focal deficit present.      Mental Status: She is alert and oriented to person, place, and time.   Psychiatric:         Mood and Affect: Mood normal.         Behavior: Behavior normal.       Meds:    Current Facility-Administered Medications:     vancomycin    metoprolol tartrate    MD Alert...Vancomycin per Pharmacy    Respiratory Therapy Consult    LR    guaiFENesin dextromethorphan    benzonatate    senna-docusate **AND** polyethylene glycol/lytes **AND** magnesium hydroxide **AND** bisacodyl    acetaminophen    hydrALAZINE    Metoprolol Tartrate    apixaban    Labs:  Recent Labs     12/12/22  0308   WBC 7.9   RBC 3.39*   HEMOGLOBIN 10.3*   HEMATOCRIT 31.5*   MCV 92.9   MCH 30.4   RDW 47.3   PLATELETCT 260   MPV 9.0       Recent Labs     12/12/22  0308   SODIUM 131*   POTASSIUM 4.0   CHLORIDE 100   CO2 22   GLUCOSE  102*   BUN 16       Recent Labs     22  0308   CREATININE 0.69         Imaging:  DX-CHEST-PORTABLE (1 VIEW)    Result Date: 2022 12:15 PM HISTORY/REASON FOR EXAM:  Shortness of Breath. TECHNIQUE/EXAM DESCRIPTION AND NUMBER OF VIEWS: Single portable view of the chest. COMPARISON: None FINDINGS: Heart size is within normal limits. There are curvilinear opacities in the lung bases. No pleural abnormalities are noted.     1.  Curvilinear opacities in the lung bases likely representing atelectasis, less likely pneumonitis.    US-EXTREMITY VENOUS UPPER UNILAT LEFT    Result Date: 2022   Upper Extremity  Venous Duplex Report  Vascular Laboratory  CONCLUSIONS  Left upper extremity venous duplex imaging.  No evidence of deep venous thrombosis.  Evidence of acute to subacute superficial venous thrombosis in the LEFT  cephalic vein from the mid to distal bicep.  MASSIEL SILVA  Exam Date:     2022 13:29  Room #:     Inpatient  Priority:     Routine  Ht (in):             Wt (lb):  Ordering Physician:        EWELINA WOOD  Referring Physician:       [id number] Hanna  Sonographer:               Giovana Moran RVKAROLINA  Study Type:                Complete Unilateral  Technical Quality:         Adequate  Age:    77    Gender:     F  MRN:    [id number]  :    1945      BSA:  Indications:     Localized swelling, mass and lump, unspecified upper limb,                   Edema, unspecified  CPT Codes:       91377  ICD Codes:       R22.30  R60.9  History:         Left upper extremity swelling/edema. No prior exams.  Limitations:  PROCEDURES:  Left upper extremity venous duplex imaging.  The following venous structures were evaluated: internal jugular,  subclavian, axillary, brachial, cephalic, and basilic veins.  Serial compression, color, and spectral Doppler flow evaluations were  performed.  FINDINGS:  Left upper extremity-  No evidence of deep venous thrombosis.  Evidence of acute to subacute  superficial venous thrombosis in the cephalic  vein from the mid to distal bicep.  All other veins demonstrate complete color filling and compressibility with  normal venous flow dynamics including spontaneous flow and respiratory  phasicity.  Flow was evaluated in the contralateral subclavian vein and normal venous  flow dynamics including spontaneous flow and respiratory phasic variation  were demonstrated.  Brittney Howard  (Electronically Signed)  Final Date:      2022                   16:24    EC-ECHOCARDIOGRAM COMPLETE W/O CONT    Result Date: 2022  Transthoracic Echo Report Echocardiography Laboratory CONCLUSIONS No prior study is available for comparison. Normal left ventricular chamber size. Mild concentric left ventricular hypertrophy. The left ventricular ejection fraction is visually estimated to be 65%. Trace mitral regurgitation. MASSIEL SILVA Exam Date:         2022                    13:31 Exam Location:     Inpatient Priority:          Routine Ordering Physician:        BETH GIL Referring Physician:       [id number] Rose Sonographer:               Frank ARANDA Age:    77     Gender:    F MRN:    [id number] :    1945 BSA:    1.89   Ht (in):    67     Wt (lb):    170 Exam Type:     Complete Indications:     Atrial Fibrillation, Shortness of breath ICD Codes:       427.31  786.05 CPT Codes:       64930 BP:   121    /   65     HR: Technical Quality:       Poor MEASUREMENTS  (Male / Female) Normal Values 2D ECHO LV Diastolic Diameter PLAX        4.2 cm                4.2 - 5.9 / 3.9 - 5.3 cm LV Systolic Diameter PLAX         2.7 cm                2.1 - 4.0 cm IVS Diastolic Thickness           1.1 cm                LVPW Diastolic Thickness          1.1 cm                LVOT Diameter                     1.8 cm                Estimated LV Ejection Fraction    65 %                  LV Ejection Fraction MOD BP       73.1 %                >= 55  % LV Ejection Fraction  MOD 4C       78.7 %                LV Ejection Fraction MOD 2C       73 %                  DOPPLER AV Peak Velocity                  1.1 m/s               AV Peak Gradient                  5.3 mmHg              AV Mean Gradient                  2.9 mmHg              LVOT Peak Velocity                1.1 m/s               AV Area Cont Eq vti               2.6 cm2               MV Velocity Time Integral         21 cm                 Mitral E Point Velocity           0.86 m/s              Mitral E to A Ratio               0.75                  Mitral A Duration                 156 ms                MV Pressure Half Time             31.9 ms               MV Area PHT                       6.9 cm2               MV Deceleration Time              85.8 ms               * Indicates values subject to auto-interpretation LV EF:  65    % FINDINGS Left Ventricle Normal left ventricular chamber size. Mild concentric left ventricular hypertrophy. Normal left ventricular systolic function. The left ventricular ejection fraction is visually estimated to be 65%. Normal regional wall motion. Right Ventricle The right ventricle is not well visualized. Right Atrium The right atrium is not well visualized. Left Atrium Normal left atrial size. Left atrial volume index is 24 mL/sq m. Mitral Valve The mitral valve is not well visualized. No mitral stenosis. Trace mitral regurgitation. Aortic Valve The aortic valve is not well visualized. Tricuspid Valve The tricuspid valve is not well visualized. No stenosis or regurgitation seen. Pulmonic Valve The pulmonic valve is not well visualized. No stenosis or regurgitation seen. Pericardium No pericardial effusion. Aorta Normal aortic root for body surface area. The ascending aorta diameter is 3.1 cm. Zafar Bailon M.D. (Electronically Signed) Final Date:     27 November 2022                 20:23      Micro:  Results       Procedure Component Value Units Date/Time    BLOOD CULTURE [[id number]]   "(Abnormal) Collected: 12/09/22 0223    Order Status: Completed Specimen: Blood from Peripheral Updated: 12/12/22 1043     Significant Indicator POS     Source BLD     Site PERIPHERAL     Culture Result Growth detected by Bactec instrument. 12/12/2022  10:39  Gram Stain: Gram positive cocci: Possible Staphylococcus sp.      Narrative:      CALL  [facility]  141 tel. [phone],  CALLED  141 tel. [phone] 12/12/2022, 10:43, RB PERF. RESULTS CALLED TO: RN  Suzie Wellington [phone]  Contact  Per Hospital Policy: Only change Specimen Src: to \"Line\" if  specified by physician order.  Right Hand    BLOOD CULTURE [[id number]] Collected: 12/12/22 0308    Order Status: Sent Specimen: Blood from Peripheral Updated: 12/12/22 0327    Narrative:      Contact  Per Hospital Policy: Only change Specimen Src: to \"Line\" if  specified by physician order.    BLOOD CULTURE [[id number]] Collected: 12/12/22 0308    Order Status: Sent Specimen: Blood from Peripheral Updated: 12/12/22 0327    Narrative:      Contact  Per Hospital Policy: Only change Specimen Src: to \"Line\" if  specified by physician order.    BLOOD CULTURE [[id number]]  (Abnormal) Collected: 12/09/22 0306    Order Status: Completed Specimen: Blood from Peripheral Updated: 12/11/22 1208     Significant Indicator POS     Source BLD     Site PERIPHERAL     Culture Result Growth detected by Bactec instrument. 12/10/2022  06:43      Methicillin Resistant Staphylococcus aureus  See previous culture for sensitivity report.      Narrative:      CALL  [facility]  141 tel. [phone],  CALLED  141 tel. [phone] 12/10/2022, 06:46, RB PERF. RESULTS CALLED TO:  [phone] RN  Contact  Per Hospital Policy: Only change Specimen Src: to \"Line\" if  specified by physician order.  Right Hand    BLOOD CULTURE [[id number]]  (Abnormal) Collected: 12/05/22 1048    Order Status: Completed Specimen: Blood from Peripheral Updated: 12/08/22 0747     Significant Indicator POS     Source BLD     Site PERIPHERAL     Culture " "Result Growth detected by Bactec instrument. 12/06/2022  03:32      Methicillin Resistant Staphylococcus aureus  Methicillin resistant by screening method  See previous culture for sensitivity report.      Narrative:      CALL  [facility]  141 tel. [phone],  CALLED  141 tel. [phone] 12/06/2022, 03:33, RB PERF. RESULTS CALLED TO: RN  [phone]  Contact  Per Hospital Policy: Only change Specimen Src: to \"Line\" if  specified by physician order.  Left Hand    BLOOD CULTURE [[id number]]  (Abnormal)  (Susceptibility) Collected: 12/05/22 1048    Order Status: Completed Specimen: Blood from Peripheral Updated: 12/08/22 0747     Significant Indicator POS     Source BLD     Site PERIPHERAL     Culture Result Growth detected by Bactec instrument. 12/06/2022  04:29      Methicillin Resistant Staphylococcus aureus    Narrative:      CALL  [facility]  141 tel. [phone],  CALLED  141 tel. [phone] 12/06/2022, 04:30, RB PERF. RESULTS CALLED TO:RN  [phone]  Contact  Per Hospital Policy: Only change Specimen Src: to \"Line\" if  specified by physician order.  Right Forearm/Arm    Susceptibility       Methicillin resistant staphylococcus aureus (1)       Antibiotic Interpretation Microscan   Method Status    Azithromycin Resistant >4 mcg/mL JADIEL Final    Clindamycin Sensitive 0.5 mcg/mL JADIEL Final    Cefazolin Resistant 16 mcg/mL JADIEL Final    Cefepime Resistant >16 mcg/mL JADIEL Final    Ceftaroline Sensitive <=0.5 mcg/mL JADIEL Final    Daptomycin Sensitive 1 mcg/mL JADIEL Final    Ampicillin/sulbactam Resistant 16/8 mcg/mL JADIEL Final    Erythromycin Resistant >4 mcg/mL JADIEL Final    Vancomycin Sensitive 1 mcg/mL JADIEL Final    Oxacillin Resistant >2 mcg/mL JADIEL Final    Trimeth/Sulfa Sensitive <=0.5/9.5 mcg/mL JADIEL Final    Tetracycline Sensitive <=4 mcg/mL JADIEL Final                               Assessment:  This is a 77-year-old female patient who was transferred from an outside facility, slid off her bed at home and was unable to get up for multiple days, " found by a friend, was found to be in A. fib with RVR and with mild rhabdomyolysis.  She was also noted to have COVID-19 requiring nasal cannula oxygen, resolved.  On 12/1, she developed right arm thrombophlebitis and the following day had worsening leukocytosis and procalcitonin, blood cultures positive for MRSA.    Hospital onset sepsis with MRSA bacteremia, likely secondary to thrombophlebitis that may be seeded with infection based on exam findings. IV line has been removed.  TEODORO positive for infective endocarditis    Pertinent Diagnoses:  Native mitral valve infective endocarditis  MRSA bacteremia, persistent  SO, resolved  Recent COVID-19  Thrombophlebitis     PLAN:   MRSA bacteremia  Native mitral valve infective endocarditis  Thrombophlebitis  BCxs + MRSA 12/2 and 12/3, 12/5  Follow repeat blood cultures x2 from 12/9, 1 out of 2 positive  TEODORO formal report pending  Repeat blood cultures x2 from 12/12 pending  Continue vancomycin. Monitor vanco trough and renal function.  Last vancomycin trough 17.8 on 12/8    Disposition: Anticipate likely eventual discharge to a facility on 6 weeks of IV antibiotics once blood cultures clear  Need for PICC line: Eventually yes, once blood cultures clear    Discussed with Dr. Palacios

## 2022-12-13 NOTE — PROGRESS NOTES
4 Eyes Skin Assessment Completed by EVERT Young and EVERT Kerr     Head WDL  Ears WDL  Nose WDL  Mouth WDL  Neck WDL  Breast/Chest WDL   Shoulder Blades WDL   Spine  WDL  (R) Arm/Elbow/Hand WDL, PIV in place  (L) Arm/Elbow/Hand WDL  Abdomen WDL  Groin WDL  Scrotum/Coccyx/Buttocks WDL  (R) Leg: Inner thigh red rash  (L) Leg: inner thigh red rash  (R) Heel/Foot/Toe WDL  (L) Heel/Foot/Toe WDL              Devices In Places n/a        Interventions In Place Q2H turns, waffle mattress, barrier paste        Pictures Uploaded Into Epic n/a  Wound Consult Placed n/a  RN Wound Prevention Protocol Ordered n/a

## 2022-12-13 NOTE — PROGRESS NOTES
Hospital Medicine Daily Progress Note    Date of Service  12/13/2022    Chief Complaint  Paulina Castellanos is a 77 y.o. female admitted 11/26/2022 with atrial fibrillation with rapid ventricular rate.      Hospital Course  Admitted for Atrial fibrillation with rapid ventricular rate, COVID-19 with pneumonia and Respiratory failure with hypoxia, mild rhabdomyolysis. She was started on Decadron, oxygen supplementation, and supportive measures.  She was placed on Metoprolol for rate control, and Eliquis for anticoagulation.  She apparently developed fever and encephalopathy, and was noted to have left arm thrombophlebitis.  Work-up further showed MRSA bacteremia.  Infectious disease was consulted on the case.  Patient was started on empiric coverage with IV vancomycin.  Repeat blood cultures remained positive for MRSA.  Cardiology was consulted on the case for a TEODORO, which showed mitral valve vegetation/endocarditis.     Interval Problem Update  Bacteremia -rmost recent blood cultures remain ngtd. Remains afebrile. Complains of some back pain. Afebrile.   Encephalopathy-mild.      I have discussed this patient's plan of care and discharge plan at IDT rounds today with Case Management, Nursing, Nursing leadership, and other members of the IDT team.    Consultants/Specialty  cardiology, infectious disease, and palliative care    Code Status  DNAR/DNI    Disposition  Patient is not medically cleared for discharge.   Anticipate discharge to to skilled nursing facility.  I have placed the appropriate orders for post-discharge needs.    Review of Systems  Review of Systems   Constitutional:  Positive for malaise/fatigue. Negative for diaphoresis.        No clear improvement   HENT:  Negative for congestion and hearing loss.    Eyes:  Negative for blurred vision.   Respiratory:  Positive for cough. Negative for shortness of breath.    Cardiovascular:  Negative for leg swelling.   Gastrointestinal:  Negative for abdominal pain,  diarrhea, nausea and vomiting.   Genitourinary:  Negative for dysuria and flank pain.   Musculoskeletal:  Positive for back pain. Negative for joint pain, myalgias and neck pain.   Skin:  Negative for rash.   Neurological:  Positive for weakness. Negative for dizziness, sensory change, speech change, focal weakness and headaches.   Psychiatric/Behavioral:  The patient is not nervous/anxious.    All other systems reviewed and are negative.     Physical Exam  Temp:  [35.8 °C (96.5 °F)-36.5 °C (97.7 °F)] 36.1 °C (97 °F)  Pulse:  [] 90  Resp:  [18] 18  BP: (102-115)/(53-71) 102/53  SpO2:  [92 %-93 %] 92 %    Physical Exam  Vitals and nursing note reviewed.   Constitutional:       Comments: Slow to answer, but appropriate   HENT:      Head: Normocephalic and atraumatic.      Nose: No congestion.      Mouth/Throat:      Mouth: Mucous membranes are moist.   Eyes:      Extraocular Movements: Extraocular movements intact.      Conjunctiva/sclera: Conjunctivae normal.   Cardiovascular:      Rate and Rhythm: Normal rate and regular rhythm.      Heart sounds: Murmur heard.   Pulmonary:      Effort: Pulmonary effort is normal.      Breath sounds: Normal breath sounds.   Abdominal:      General: There is no distension.      Tenderness: There is no abdominal tenderness.   Musculoskeletal:         General: Swelling (left arm, mild) and tenderness (mild R forearm) present.      Cervical back: No tenderness.      Right lower leg: Edema present.      Left lower leg: Edema present.   Skin:     Findings: Erythema (epigastric area) present.   Neurological:      General: No focal deficit present.      Mental Status: She is alert and oriented to person, place, and time.      Cranial Nerves: No cranial nerve deficit.       Fluids    Intake/Output Summary (Last 24 hours) at 12/13/2022 8988  Last data filed at 12/13/2022 1000  Gross per 24 hour   Intake 978.07 ml   Output --   Net 978.07 ml         Laboratory  Recent Labs      12/12/22  0308   WBC 7.9   RBC 3.39*   HEMOGLOBIN 10.3*   HEMATOCRIT 31.5*   MCV 92.9   MCH 30.4   MCHC 32.7*   RDW 47.3   PLATELETCT 260   MPV 9.0     Recent Labs     12/12/22  0308   SODIUM 131*   POTASSIUM 4.0   CHLORIDE 100   CO2 22   GLUCOSE 102*   BUN 16   CREATININE 0.69   CALCIUM 8.6                   Imaging  EC-TEODORO W/O CONT         DX-CHEST-PORTABLE (1 VIEW)   Final Result      1.  Curvilinear opacities in the lung bases likely representing atelectasis, less likely pneumonitis.      US-EXTREMITY VENOUS UPPER UNILAT LEFT   Final Result      EC-ECHOCARDIOGRAM COMPLETE W/O CONT   Final Result      OUTSIDE IMAGES-CT CHEST   Final Result             Assessment/Plan  * Atrial fibrillation with rapid ventricular response (HCC)- (present on admission)  Assessment & Plan  Metoprolol, Eliquis  [id number] -  3  Remains in rate controlled    Cellulitis- (present on admission)  Assessment & Plan  IV Vancomycin    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)  Assessment & Plan  IV Vancomycin  TTE with MV involvement  Voalte texted Dr Mtz of cards to have TEODORO done on 12/7 released, awaiting response at this time  No clear e/o clinical CHF at this time    Dysphagia- (present on admission)  Assessment & Plan  Level 7, Liquid level 0, crush pills  Aspiration precautions    Anemia- (present on admission)  Assessment & Plan  Follow CBC    Hypomagnesemia- (present on admission)  Assessment & Plan  IV Mg given  Follow level    Hypophosphatemia- (present on admission)  Assessment & Plan  Follow level    Hyponatremia- (present on admission)  Assessment & Plan  Follow BMP    MRSA bacteremia- (present on admission)  Assessment & Plan  IV Vancomycin  repeat blood cultures 12/12 ngtd, if negative again tomorrow, can order pICC and then [facility]  Remains clinically stable    Encephalopathy acute- (present on admission)  Assessment & Plan  secondary to infectious etiology, improving slowly  Avoid Benzodiazepines and  Anticholinergics  Frequent orientation  Open window blinds during the day  Avoid naps during the day  Family at bedside whenever possible  Ensure adequate sleep at night - use Melatonin preferably  Avoid early morning labs  Avoid vital signs during sleep  Ambulate if possible  Provide adequate pain control  Monitor for urinary retention  Prevent and manage constipation  Discontinue IVs, Catheters, Tubes, Lines, Cardiac monitors, SCDs if possible    Sepsis (HCC)- (present on admission)  Assessment & Plan  Resolved  ID following  See elsehwere      Thrombophlebitis- (present on admission)  Assessment & Plan  IV Vancomycin  Eliquis    Acute respiratory failure with hypoxia (HCC)- (present on admission)  Assessment & Plan  due to COVID 19  RT protocol  Resolved    Metabolic acidosis, normal anion gap (NAG)- (present on admission)  Assessment & Plan  Resolved    Frailty syndrome in geriatric patient- (present on admission)  Assessment & Plan  Palliative care following    Non-traumatic rhabdomyolysis- (present on admission)  Assessment & Plan  resolved    Elevated LFTs- (present on admission)  Assessment & Plan  Follow CMP    Pneumonia due to COVID-19 virus- (present on admission)  Assessment & Plan  Finished course of Decadron    Hypokalemia- (present on admission)  Assessment & Plan  Follow bmp       VTE prophylaxis: therapeutic anticoagulation with Eliquis    I have performed a physical exam and reviewed and updated ROS and Plan today (12/13/2022). In review of yesterday's note (12/12/2022), there are no changes except as documented above.

## 2022-12-13 NOTE — CARE PLAN
The patient is Stable - Low risk of patient condition declining or worsening    Shift Goals  Clinical Goals: skin integrity, pain management  Patient Goals: rest  Family Goals: RICARDO    Progress made toward(s) clinical / shift goals:  pt verbalizes understanding of plan of care. Pain frequently assessed. Medicated per MAR. Pt calls for assistance when getting oob. Q2 turns in place.     Problem: Knowledge Deficit - Standard  Goal: Patient and family/care givers will demonstrate understanding of plan of care, disease process/condition, diagnostic tests and medications  Outcome: Progressing     Problem: Skin Integrity  Goal: Skin integrity is maintained or improved  Outcome: Progressing     Problem: Fall Risk  Goal: Patient will remain free from falls  Outcome: Progressing       Patient is not progressing towards the following goals:

## 2022-12-13 NOTE — PROGRESS NOTES
Report received from dayshift RN, assumed care at 1900.  Assessment complete.  A&O x 4. Patient calls appropriately.  Patient ambulates with max assist, bed alarm on.  Patient has 3/10 pain. Pain managed with prescribed medications.  Denies N&V. Tolerating level 7 easy to chew diet.  + void, + flatus, + BM. Incontinent of both  Patient denies SOB. On room air.  Patient calm, pleasant, and cooperative.  Review plan with of care with patient. Call light and personal belongings within reach. Hourly rounding in place. All needs met at this time.

## 2022-12-13 NOTE — PROGRESS NOTES
Received report from previous shift RN  Assessment complete.  A&O x 4. Patient calls appropriately.  Patient ambulates with max assist. Bed alarm on.   Patient has 6/10 pain. Pt receiving PRN tylenol for pain.  Denies N&V. Tolerating  diet.  Skin per flowsheets. TAPs system and Q2 turns in place.  + void, + flatus, + BM.  Patient denies SOB.  SCD's refused.  Patient pleasant and cooperative with plan of care.  Review plan with of care with patient. Call light and personal belongings with in reach. Hourly rounding in place. All needs met at this time.

## 2022-12-13 NOTE — CARE PLAN
The patient is Stable - Low risk of patient condition declining or worsening    Shift Goals  Clinical Goals: Skin integrity, pulmonary hygiene  Patient Goals: Rest  Family Goals: RICARDO    Progress made toward(s) clinical / shift goals:  Q2H turns in place, moisture barrier cream applied. Educated patient to TCDB and oral care    Patient is not progressing towards the following goals:

## 2022-12-13 NOTE — PROGRESS NOTES
Assumed care of pt this morning, resting comfortably, no questions at this time    Call light and personal items within reach

## 2022-12-14 LAB
ANION GAP SERPL CALC-SCNC: 6 MMOL/L (ref 7–16)
BUN SERPL-MCNC: 21 MG/DL (ref 8–22)
CALCIUM SERPL-MCNC: 9 MG/DL (ref 8.5–10.5)
CHLORIDE SERPL-SCNC: 102 MMOL/L (ref 96–112)
CK SERPL-CCNC: 17 U/L (ref 0–154)
CO2 SERPL-SCNC: 25 MMOL/L (ref 20–33)
CREAT SERPL-MCNC: 1.01 MG/DL (ref 0.5–1.4)
GFR SERPLBLD CREATININE-BSD FMLA CKD-EPI: 58 ML/MIN/1.73 M 2
GLUCOSE SERPL-MCNC: 93 MG/DL (ref 65–99)
POTASSIUM SERPL-SCNC: 4.5 MMOL/L (ref 3.6–5.5)
SODIUM SERPL-SCNC: 133 MMOL/L (ref 135–145)
VANCOMYCIN SERPL-MCNC: 22.3 UG/ML

## 2022-12-14 PROCEDURE — A9270 NON-COVERED ITEM OR SERVICE: HCPCS | Performed by: STUDENT IN AN ORGANIZED HEALTH CARE EDUCATION/TRAINING PROGRAM

## 2022-12-14 PROCEDURE — A9270 NON-COVERED ITEM OR SERVICE: HCPCS | Performed by: INTERNAL MEDICINE

## 2022-12-14 PROCEDURE — A9270 NON-COVERED ITEM OR SERVICE: HCPCS

## 2022-12-14 PROCEDURE — 80202 ASSAY OF VANCOMYCIN: CPT

## 2022-12-14 PROCEDURE — 36415 COLL VENOUS BLD VENIPUNCTURE: CPT

## 2022-12-14 PROCEDURE — 99223 1ST HOSP IP/OBS HIGH 75: CPT | Performed by: THORACIC SURGERY (CARDIOTHORACIC VASCULAR SURGERY)

## 2022-12-14 PROCEDURE — 700102 HCHG RX REV CODE 250 W/ 637 OVERRIDE(OP)

## 2022-12-14 PROCEDURE — 770001 HCHG ROOM/CARE - MED/SURG/GYN PRIV*

## 2022-12-14 PROCEDURE — A9270 NON-COVERED ITEM OR SERVICE: HCPCS | Performed by: PHYSICIAN ASSISTANT

## 2022-12-14 PROCEDURE — 700102 HCHG RX REV CODE 250 W/ 637 OVERRIDE(OP): Performed by: PHYSICIAN ASSISTANT

## 2022-12-14 PROCEDURE — 99233 SBSQ HOSP IP/OBS HIGH 50: CPT | Performed by: INTERNAL MEDICINE

## 2022-12-14 PROCEDURE — 700102 HCHG RX REV CODE 250 W/ 637 OVERRIDE(OP): Performed by: STUDENT IN AN ORGANIZED HEALTH CARE EDUCATION/TRAINING PROGRAM

## 2022-12-14 PROCEDURE — 99232 SBSQ HOSP IP/OBS MODERATE 35: CPT | Performed by: INTERNAL MEDICINE

## 2022-12-14 PROCEDURE — 80048 BASIC METABOLIC PNL TOTAL CA: CPT

## 2022-12-14 PROCEDURE — 700105 HCHG RX REV CODE 258: Performed by: INTERNAL MEDICINE

## 2022-12-14 PROCEDURE — 82550 ASSAY OF CK (CPK): CPT

## 2022-12-14 PROCEDURE — 97530 THERAPEUTIC ACTIVITIES: CPT

## 2022-12-14 PROCEDURE — 700102 HCHG RX REV CODE 250 W/ 637 OVERRIDE(OP): Performed by: INTERNAL MEDICINE

## 2022-12-14 PROCEDURE — 700111 HCHG RX REV CODE 636 W/ 250 OVERRIDE (IP): Performed by: INTERNAL MEDICINE

## 2022-12-14 PROCEDURE — 97535 SELF CARE MNGMENT TRAINING: CPT

## 2022-12-14 RX ADMIN — APIXABAN 5 MG: 5 TABLET, FILM COATED ORAL at 17:10

## 2022-12-14 RX ADMIN — ACETAMINOPHEN 1000 MG: 500 TABLET ORAL at 12:58

## 2022-12-14 RX ADMIN — NYSTATIN: 100000 POWDER TOPICAL at 17:10

## 2022-12-14 RX ADMIN — ACETAMINOPHEN 1000 MG: 500 TABLET ORAL at 04:32

## 2022-12-14 RX ADMIN — METOPROLOL TARTRATE 12.5 MG: 25 TABLET, FILM COATED ORAL at 04:32

## 2022-12-14 RX ADMIN — ACETAMINOPHEN 1000 MG: 500 TABLET ORAL at 17:10

## 2022-12-14 RX ADMIN — GUAIFENESIN SYRUP AND DEXTROMETHORPHAN 5 ML: 100; 10 SYRUP ORAL at 21:39

## 2022-12-14 RX ADMIN — NYSTATIN: 100000 POWDER TOPICAL at 04:32

## 2022-12-14 RX ADMIN — METOPROLOL TARTRATE 12.5 MG: 25 TABLET, FILM COATED ORAL at 17:10

## 2022-12-14 RX ADMIN — APIXABAN 5 MG: 5 TABLET, FILM COATED ORAL at 04:31

## 2022-12-14 RX ADMIN — DAPTOMYCIN 650 MG: 500 INJECTION, POWDER, LYOPHILIZED, FOR SOLUTION INTRAVENOUS at 17:12

## 2022-12-14 ASSESSMENT — ENCOUNTER SYMPTOMS
PALPITATIONS: 0
MYALGIAS: 1
MYALGIAS: 0
VOMITING: 0
COUGH: 1
SPEECH CHANGE: 0
CHILLS: 0
HEADACHES: 0
DIARRHEA: 0
DIZZINESS: 0
FLANK PAIN: 0
FOCAL WEAKNESS: 0
BLURRED VISION: 0
BACK PAIN: 1
NECK PAIN: 0
DIAPHORESIS: 0
NAUSEA: 0
FEVER: 0
WEAKNESS: 1
ABDOMINAL PAIN: 0
SHORTNESS OF BREATH: 0
SENSORY CHANGE: 0
NERVOUS/ANXIOUS: 0

## 2022-12-14 ASSESSMENT — COGNITIVE AND FUNCTIONAL STATUS - GENERAL
PERSONAL GROOMING: A LITTLE
MOVING FROM LYING ON BACK TO SITTING ON SIDE OF FLAT BED: UNABLE
DRESSING REGULAR LOWER BODY CLOTHING: A LOT
SUGGESTED CMS G CODE MODIFIER DAILY ACTIVITY: CK
MOVING TO AND FROM BED TO CHAIR: UNABLE
STANDING UP FROM CHAIR USING ARMS: A LOT
DAILY ACTIVITIY SCORE: 15
WALKING IN HOSPITAL ROOM: TOTAL
CLIMB 3 TO 5 STEPS WITH RAILING: TOTAL
SUGGESTED CMS G CODE MODIFIER MOBILITY: CM
EATING MEALS: A LITTLE
TURNING FROM BACK TO SIDE WHILE IN FLAT BAD: UNABLE
TOILETING: A LOT
MOBILITY SCORE: 7
HELP NEEDED FOR BATHING: A LOT
DRESSING REGULAR UPPER BODY CLOTHING: A LITTLE

## 2022-12-14 ASSESSMENT — GAIT ASSESSMENTS: GAIT LEVEL OF ASSIST: UNABLE TO PARTICIPATE

## 2022-12-14 ASSESSMENT — PAIN DESCRIPTION - PAIN TYPE: TYPE: ACUTE PAIN;SURGICAL PAIN

## 2022-12-14 NOTE — DISCHARGE PLANNING
Spoke To: Cecile  Agency/Facility Name: [facility]  Plan or Request: Per Cecile, she will review open beds for Friday and check if she can take another dapto abx.

## 2022-12-14 NOTE — PROGRESS NOTES
Infectious Disease Progress Note    Author: Ramesh Tee M.D. Date & Time of service: 2022  10:58 AM    Chief Complaint:  MRSA bacteremia    Interval History:  77 y.o. female admitted 2022. For Afib with RVR and rhabdomyolysis +COVID  Developed fever and AMS dueing hosp-blood cultures +MRSA   AF WBC 13.9 Oriented to person and place c/o LUE pain No dyspnea or CP. No new neck, back, joint pain   patient remains afebrile, white count is resolved now, down to 9.4, tolerating vancomycin.  Patient states she is tired but overall better.   left arm   patient remains afebrile, white count is 9.7, tolerating vancomycin.  Plan as below   patient remains afebrile, white count 7.7, tolerating vancomycin, no new events overnight.   patient remains afebrile, white count is 8.5, tolerating vancomycin.  TEODORO positive for endocarditis   patient remains afebrile, count 7.6, repeat cultures as below.  Patient notes improvement in left arm pain and swelling  12/10 patient remains afebrile, no CBC this morning, tolerating vancomycin   patient remains afebrile, no CBC this morning, tolerating IV vancomycin.  Repeat blood cultures as below.    T-max 98.9, remains afebrile, white count today 7.9, tolerating vancomycin, repeat blood cultures   patient remains afebrile, no CBC this morning, tolerating vancomycin.  Repeat blood cultures no growth to date   patient remains afebrile, no CBC this morning, tolerating vancomycin. Repeat blood cultures pending    Labs Reviewed, Medications Reviewed, and Radiology Reviewed.    Review of Systems:  Review of Systems   Constitutional:  Positive for malaise/fatigue. Negative for fever.   Gastrointestinal:  Negative for abdominal pain, diarrhea, nausea and vomiting.   Musculoskeletal:  Positive for myalgias.   Skin:  Negative for itching and rash.   All other systems reviewed and are negative.    Hemodynamics:  Temp (24hrs), Av.7  °C (98 °F), Min:36.2 °C (97.1 °F), Max:36.9 °C (98.4 °F)  Temperature: 36.9 °C (98.4 °F)  Pulse  Av.5  Min: 54  Max: 136   Blood Pressure : 118/58       Physical Exam:  Physical Exam  Vitals and nursing note reviewed.   Constitutional:       General: She is not in acute distress.     Appearance: She is ill-appearing. She is not toxic-appearing or diaphoretic.   HENT:      Mouth/Throat:      Pharynx: No oropharyngeal exudate.      Comments: Fair dentition  Eyes:      General: No scleral icterus.     Extraocular Movements: Extraocular movements intact.      Pupils: Pupils are equal, round, and reactive to light.   Pulmonary:      Effort: Pulmonary effort is normal. No respiratory distress.      Breath sounds: No stridor.   Abdominal:      General: There is no distension.      Palpations: Abdomen is soft.      Tenderness: There is no abdominal tenderness.   Musculoskeletal:         General: Swelling and tenderness present.      Cervical back: Neck supple. No rigidity.   Skin:     Coloration: Skin is pale. Skin is not jaundiced.      Findings: Bruising and erythema present.      Comments: Left arm thrombophlebitis with significant improvement of tenderness and edema, redness   Neurological:      General: No focal deficit present.      Mental Status: She is alert and oriented to person, place, and time.   Psychiatric:         Mood and Affect: Mood normal.         Behavior: Behavior normal.       Meds:    Current Facility-Administered Medications:     Pharmacy Consult Request    acetaminophen **FOLLOWED BY** [START ON 2022] acetaminophen    oxyCODONE immediate-release **OR** oxyCODONE immediate-release **OR** HYDROmorphone    nystatin    vancomycin    metoprolol tartrate    MD Alert...Vancomycin per Pharmacy    Respiratory Therapy Consult    LR    guaiFENesin dextromethorphan    benzonatate    senna-docusate **AND** polyethylene glycol/lytes **AND** magnesium hydroxide **AND** bisacodyl    hydrALAZINE     Metoprolol Tartrate    apixaban    Labs:  Recent Labs     22  030   WBC 7.9   RBC 3.39*   HEMOGLOBIN 10.3*   HEMATOCRIT 31.5*   MCV 92.9   MCH 30.4   RDW 47.3   PLATELETCT 260   MPV 9.0       Recent Labs     22  0308 22  0740   SODIUM 131* 133*   POTASSIUM 4.0 4.5   CHLORIDE 100 102   CO2 22 25   GLUCOSE 102* 93   BUN 16 21       Recent Labs     22  0308 22  0740   CREATININE 0.69 1.01         Imaging:  DX-CHEST-PORTABLE (1 VIEW)    Result Date: 2022 12:15 PM HISTORY/REASON FOR EXAM:  Shortness of Breath. TECHNIQUE/EXAM DESCRIPTION AND NUMBER OF VIEWS: Single portable view of the chest. COMPARISON: None FINDINGS: Heart size is within normal limits. There are curvilinear opacities in the lung bases. No pleural abnormalities are noted.     1.  Curvilinear opacities in the lung bases likely representing atelectasis, less likely pneumonitis.    US-EXTREMITY VENOUS UPPER UNILAT LEFT    Result Date: 2022   Upper Extremity  Venous Duplex Report  Vascular Laboratory  CONCLUSIONS  Left upper extremity venous duplex imaging.  No evidence of deep venous thrombosis.  Evidence of acute to subacute superficial venous thrombosis in the LEFT  cephalic vein from the mid to distal bicep.  MASSIEL SILVA  Exam Date:     2022 13:29  Room #:     Inpatient  Priority:     Routine  Ht (in):             Wt (lb):  Ordering Physician:        EWELINA WOOD  Referring Physician:       NINA Mcknight  Sonographer:               Giovana Moran RVKAROLINA  Study Type:                Complete Unilateral  Technical Quality:         Adequate  Age:    77    Gender:     F  MRN:    [id number]  :    1945      BSA:  Indications:     Localized swelling, mass and lump, unspecified upper limb,                   Edema, unspecified  CPT Codes:       58366  ICD Codes:       R22.30  R60.9  History:         Left upper extremity swelling/edema. No prior exams.  Limitations:  PROCEDURES:  Left upper  extremity venous duplex imaging.  The following venous structures were evaluated: internal jugular,  subclavian, axillary, brachial, cephalic, and basilic veins.  Serial compression, color, and spectral Doppler flow evaluations were  performed.  FINDINGS:  Left upper extremity-  No evidence of deep venous thrombosis.  Evidence of acute to subacute superficial venous thrombosis in the cephalic  vein from the mid to distal bicep.  All other veins demonstrate complete color filling and compressibility with  normal venous flow dynamics including spontaneous flow and respiratory  phasicity.  Flow was evaluated in the contralateral subclavian vein and normal venous  flow dynamics including spontaneous flow and respiratory phasic variation  were demonstrated.  Brittney MUNOZ To  (Electronically Signed)  Final Date:      2022                   16:24    EC-ECHOCARDIOGRAM COMPLETE W/O CONT    Result Date: 2022  Transthoracic Echo Report Echocardiography Laboratory CONCLUSIONS No prior study is available for comparison. Normal left ventricular chamber size. Mild concentric left ventricular hypertrophy. The left ventricular ejection fraction is visually estimated to be 65%. Trace mitral regurgitation. MASSIEL SILVA Exam Date:         2022                    13:31 Exam Location:     Inpatient Priority:          Routine Ordering Physician:        BETH GIL Referring Physician:       [id number] Rose Sonographer:               Frank ARANDA Age:    77     Gender:    F MRN:    [id number] :    1945 BSA:    1.89   Ht (in):    67     Wt (lb):    170 Exam Type:     Complete Indications:     Atrial Fibrillation, Shortness of breath ICD Codes:       427.31  786.05 CPT Codes:       19113 BP:   121    /   65     HR: Technical Quality:       Poor MEASUREMENTS  (Male / Female) Normal Values 2D ECHO LV Diastolic Diameter PLAX        4.2 cm                4.2 - 5.9 / 3.9 - 5.3 cm LV Systolic Diameter PLAX          2.7 cm                2.1 - 4.0 cm IVS Diastolic Thickness           1.1 cm                LVPW Diastolic Thickness          1.1 cm                LVOT Diameter                     1.8 cm                Estimated LV Ejection Fraction    65 %                  LV Ejection Fraction MOD BP       73.1 %                >= 55  % LV Ejection Fraction MOD 4C       78.7 %                LV Ejection Fraction MOD 2C       73 %                  DOPPLER AV Peak Velocity                  1.1 m/s               AV Peak Gradient                  5.3 mmHg              AV Mean Gradient                  2.9 mmHg              LVOT Peak Velocity                1.1 m/s               AV Area Cont Eq vti               2.6 cm2               MV Velocity Time Integral         21 cm                 Mitral E Point Velocity           0.86 m/s              Mitral E to A Ratio               0.75                  Mitral A Duration                 156 ms                MV Pressure Half Time             31.9 ms               MV Area PHT                       6.9 cm2               MV Deceleration Time              85.8 ms               * Indicates values subject to auto-interpretation LV EF:  65    % FINDINGS Left Ventricle Normal left ventricular chamber size. Mild concentric left ventricular hypertrophy. Normal left ventricular systolic function. The left ventricular ejection fraction is visually estimated to be 65%. Normal regional wall motion. Right Ventricle The right ventricle is not well visualized. Right Atrium The right atrium is not well visualized. Left Atrium Normal left atrial size. Left atrial volume index is 24 mL/sq m. Mitral Valve The mitral valve is not well visualized. No mitral stenosis. Trace mitral regurgitation. Aortic Valve The aortic valve is not well visualized. Tricuspid Valve The tricuspid valve is not well visualized. No stenosis or regurgitation seen. Pulmonic Valve The pulmonic valve is not well visualized. No stenosis  "or regurgitation seen. Pericardium No pericardial effusion. Aorta Normal aortic root for body surface area. The ascending aorta diameter is 3.1 cm. Zafar Bailon M.D. (Electronically Signed) Final Date:     27 November 2022                 20:23      Micro:  Results       Procedure Component Value Units Date/Time    BLOOD CULTURE [[id number]]  (Abnormal) Collected: 12/09/22 0223    Order Status: Completed Specimen: Blood from Peripheral Updated: 12/13/22 1028     Significant Indicator POS     Source BLD     Site PERIPHERAL     Culture Result Growth detected by Bactec instrument. 12/12/2022  10:39      Methicillin Resistant Staphylococcus aureus  Methicillin resistant by screening method  See previous culture for sensitivity report.      Narrative:      CALL  [facility]  141 tel. [phone],  CALLED  141 tel. [phone] 12/12/2022, 10:43, RB PERF. RESULTS CALLED TO: EVERT [place] [id number]  Contact  Per Hospital Policy: Only change Specimen Src: to \"Line\" if  specified by physician order.  Right Hand    BLOOD CULTURE [[id number]] Collected: 12/12/22 0308    Order Status: Completed Specimen: Blood from Peripheral Updated: 12/13/22 0932     Significant Indicator NEG     Source BLD     Site PERIPHERAL     Culture Result No Growth  Note: Blood cultures are incubated for 5 days and  are monitored continuously.Positive blood cultures  are called to the RN and reported as soon as  they are identified.      Narrative:      Contact  Per Hospital Policy: Only change Specimen Src: to \"Line\" if  specified by physician order.  Left Hand    BLOOD CULTURE [[id number]] Collected: 12/12/22 0308    Order Status: Completed Specimen: Blood from Peripheral Updated: 12/13/22 0932     Significant Indicator NEG     Source BLD     Site PERIPHERAL     Culture Result No Growth  Note: Blood cultures are incubated for 5 days and  are monitored continuously.Positive blood cultures  are called to the RN and reported as soon as  they are identified.      " "Narrative:      Contact  Per Hospital Policy: Only change Specimen Src: to \"Line\" if  specified by physician order.  Right Hand    BLOOD CULTURE [[id number]]  (Abnormal) Collected: 12/09/22 0306    Order Status: Completed Specimen: Blood from Peripheral Updated: 12/11/22 1208     Significant Indicator POS     Source BLD     Site PERIPHERAL     Culture Result Growth detected by Bactec instrument. 12/10/2022  06:43      Methicillin Resistant Staphylococcus aureus  See previous culture for sensitivity report.      Narrative:      CALL  [facility]  141 tel. [phone],  CALLED  141 tel. [phone] 12/10/2022, 06:46, RB PERF. RESULTS CALLED TO:  [phone] RN  Contact  Per Hospital Policy: Only change Specimen Src: to \"Line\" if  specified by physician order.  Right Hand    BLOOD CULTURE [[id number]]  (Abnormal) Collected: 12/05/22 1048    Order Status: Completed Specimen: Blood from Peripheral Updated: 12/08/22 0747     Significant Indicator POS     Source BLD     Site PERIPHERAL     Culture Result Growth detected by Bactec instrument. 12/06/2022  03:32      Methicillin Resistant Staphylococcus aureus  Methicillin resistant by screening method  See previous culture for sensitivity report.      Narrative:      CALL  [facility]  141 tel. [phone],  CALLED  141 tel. [phone] 12/06/2022, 03:33, RB PERF. RESULTS CALLED TO: RN  [phone]  Contact  Per Hospital Policy: Only change Specimen Src: to \"Line\" if  specified by physician order.  Left Hand    BLOOD CULTURE [[id number]]  (Abnormal)  (Susceptibility) Collected: 12/05/22 1048    Order Status: Completed Specimen: Blood from Peripheral Updated: 12/08/22 0747     Significant Indicator POS     Source BLD     Site PERIPHERAL     Culture Result Growth detected by Bactec instrument. 12/06/2022  04:29      Methicillin Resistant Staphylococcus aureus    Narrative:      CALL  [facility]  141 tel. [phone],  CALLED  141 tel. [phone] 12/06/2022, 04:30, RB PERF. RESULTS CALLED TO:RN  [phone]  Contact  Per " "Hospital Policy: Only change Specimen Src: to \"Line\" if  specified by physician order.  Right Forearm/Arm    Susceptibility       Methicillin resistant staphylococcus aureus (1)       Antibiotic Interpretation Microscan   Method Status    Azithromycin Resistant >4 mcg/mL JADIEL Final    Clindamycin Sensitive 0.5 mcg/mL JADIEL Final    Cefazolin Resistant 16 mcg/mL JADIEL Final    Cefepime Resistant >16 mcg/mL JADIEL Final    Ceftaroline Sensitive <=0.5 mcg/mL JADIEL Final    Daptomycin Sensitive 1 mcg/mL JADIEL Final    Ampicillin/sulbactam Resistant 16/8 mcg/mL JADIEL Final    Erythromycin Resistant >4 mcg/mL JADIEL Final    Vancomycin Sensitive 1 mcg/mL JADIEL Final    Oxacillin Resistant >2 mcg/mL JADIEL Final    Trimeth/Sulfa Sensitive <=0.5/9.5 mcg/mL JADIEL Final    Tetracycline Sensitive <=4 mcg/mL JADIEL Final                               Assessment:  This is a 77-year-old female patient who was transferred from an outside facility, slid off her bed at home and was unable to get up for multiple days, found by a friend, was found to be in A. fib with RVR and with mild rhabdomyolysis.  She was also noted to have COVID-19 requiring nasal cannula oxygen, resolved.  On 12/1, she developed right arm thrombophlebitis and the following day had worsening leukocytosis and procalcitonin, blood cultures positive for MRSA.    Hospital onset sepsis with MRSA bacteremia, likely secondary to thrombophlebitis that may be seeded with infection based on exam findings. IV line has been removed.  TEODORO positive for infective endocarditis    Pertinent Diagnoses:  Native mitral valve infective endocarditis  MRSA bacteremia, persistent  SO, resolved  Recent COVID-19  Thrombophlebitis     PLAN:   MRSA bacteremia  Native mitral valve infective endocarditis  Thrombophlebitis  BCxs + MRSA 12/2 and 12/3, 12/5  Follow repeat blood cultures x2 from 12/9, 1 out of 2 positive  TEODORO with a large freely mobile mass 1.4 x 1 cm on the mitral valve.  Recommend CT surgery evaluation " to weigh pros and cons of surgery given risk of continued embolization to systemic circulation  Repeat blood cultures x2 from 12/12 pending, no growth till date  Continue vancomycin. Monitor vanco trough and renal function.  Last vancomycin trough 17.8 on 12/8    Disposition: Anticipate likely eventual discharge to a facility on 6 weeks of IV antibiotics once blood cultures clear  Need for PICC line: Eventually yes, once blood cultures clear    Discussed with Dr. Brannon

## 2022-12-14 NOTE — CONSULTS
REFERRING PHYSICIAN: Sunil Brannon MD.     CONSULTING PHYSICIAN: Hoa Vaughn MD.    CHIEF COMPLAINT: Infective Endocarditis    HISTORY OF PRESENT ILLNESS: The patient is a 77 y.o. female admitted on 11/26/2022 with atrial fibrillation with rapid ventricular rate.  Also positive for COVID-19 with pneumonia and respiratory failure with hypoxia, mild rhabdomyolysis.  Patient developed fever and encephalopathy, and was noted to have left arm thrombophlebitis.  Work-up showed MRSA bacteremia.  Infectious disease was consulted.  Patient was started on empiric coverage with IV vancomycin.  Cardiology was consulted on the case for a TEODORO, which showed mitral valve vegetation endocarditis.    PAST MEDICAL HISTORY:   Unclear medical history as she doesn't see a doctor regularly    PAST SURGICAL HISTORY:   Skin cancer excision    FAMILY HISTORY:   Negative for pertinent conditions    SOCIAL HISTORY:   Social History     Socioeconomic History    Marital status: Single     Spouse name: Not on file    Number of children: Not on file    Years of education: Not on file    Highest education level: Not on file   Occupational History    Not on file   Tobacco Use    Smoking status: Never     Passive exposure: Never    Smokeless tobacco: Never   Substance and Sexual Activity    Alcohol use: Never    Drug use: Never    Sexual activity: Not on file   Other Topics Concern    Not on file   Social History Narrative    Not on file     Social Determinants of Health     Financial Resource Strain: Not on file   Food Insecurity: Not on file   Transportation Needs: Not on file   Physical Activity: Not on file   Stress: Not on file   Social Connections: Not on file   Intimate Partner Violence: Not on file   Housing Stability: Not on file       ALLERGIES:   No Known Allergies     CURRENT MEDICATIONS:     Current Facility-Administered Medications:     Pharmacy Consult Request ...Pain Management Review 1 Each, 1 Each, Other, PHARMACY TO DOSE,  Sunil Brannon M.D.    acetaminophen (TYLENOL) tablet 1,000 mg, 1,000 mg, Oral, Q6HRS, 1,000 mg at 12/14/22 0432 **FOLLOWED BY** [START ON 12/18/2022] acetaminophen (TYLENOL) tablet 1,000 mg, 1,000 mg, Oral, Q6HRS PRN, Sunil Brannon M.D.    oxyCODONE immediate-release (ROXICODONE) tablet 2.5 mg, 2.5 mg, Oral, Q3HRS PRN **OR** oxyCODONE immediate-release (ROXICODONE) tablet 5 mg, 5 mg, Oral, Q3HRS PRN, 5 mg at 12/13/22 1024 **OR** HYDROmorphone (Dilaudid) injection 0.25 mg, 0.25 mg, Intravenous, Q3HRS PRN, Sunil Brannon M.D.    nystatin (MYCOSTATIN) powder, , Topical, BID, Cassy Fisher, A.P.R.N., Given at 12/14/22 0432    vancomycin (VANCOCIN) 1,500 mg in  mL IVPB, 1,500 mg, Intravenous, Q24HR, Sunil Brannon M.D., Stopped at 12/13/22 1913    metoprolol tartrate (LOPRESSOR) tablet 12.5 mg, 12.5 mg, Oral, TWICE DAILY, Michelle Lozano P.A.-C., 12.5 mg at 12/14/22 0432    MD Alert...Vancomycin per Pharmacy, , Other, PHARMACY TO DOSE, Linwood Silva M.D.    Respiratory Therapy Consult, , Nebulization, Continuous RT, Linwood Silva M.D.    lactated ringers infusion (BOLUS), 500 mL, Intravenous, Once PRN, Linwood Silva M.D.    guaiFENesin dextromethorphan (ROBITUSSIN DM) 100-10 MG/5ML syrup 5 mL, 5 mL, Oral, TID, Sathish Juarez M.D., 5 mL at 12/13/22 2019    benzonatate (TESSALON) capsule 100 mg, 100 mg, Oral, TID PRN, Sathish Juarez M.D.    senna-docusate (PERICOLACE or SENOKOT S) 8.6-50 MG per tablet 2 Tablet, 2 Tablet, Oral, BID, 2 Tablet at 12/13/22 0504 **AND** polyethylene glycol/lytes (MIRALAX) PACKET 1 Packet, 1 Packet, Oral, QDAY PRN **AND** magnesium hydroxide (MILK OF MAGNESIA) suspension 30 mL, 30 mL, Oral, QDAY PRN **AND** bisacodyl (DULCOLAX) suppository 10 mg, 10 mg, Rectal, QDAY PRN, Yonis Orlando M.D.    hydrALAZINE (APRESOLINE) injection 10 mg, 10 mg, Intravenous, Q4HRS PRN, Yonis Orlando M.D.    Metoprolol Tartrate (LOPRESSOR) injection 5 mg, 5 mg, Intravenous, Q5 MIN PRN,  Yonis Orlando M.D.    apixaban (ELIQUIS) tablet 5 mg, 5 mg, Oral, BID, Sathish Juarez M.D., 5 mg at 12/14/22 0431     LABS REVIEWED:  Lab Results   Component Value Date/Time    SODIUM 133 (L) 12/14/2022 07:40 AM    POTASSIUM 4.5 12/14/2022 07:40 AM    CHLORIDE 102 12/14/2022 07:40 AM    CO2 25 12/14/2022 07:40 AM    GLUCOSE 93 12/14/2022 07:40 AM    BUN 21 12/14/2022 07:40 AM    CREATININE 1.01 12/14/2022 07:40 AM      No results found for: PROTHROMBTM, INR   Lab Results   Component Value Date/Time    WBC 7.9 12/12/2022 03:08 AM    RBC 3.39 (L) 12/12/2022 03:08 AM    HEMOGLOBIN 10.3 (L) 12/12/2022 03:08 AM    HEMATOCRIT 31.5 (L) 12/12/2022 03:08 AM    MCV 92.9 12/12/2022 03:08 AM    MCH 30.4 12/12/2022 03:08 AM    MCHC 32.7 (L) 12/12/2022 03:08 AM    MPV 9.0 12/12/2022 03:08 AM    NEUTSPOLYS 85.70 (H) 12/04/2022 03:30 AM    LYMPHOCYTES 7.90 (L) 12/04/2022 03:30 AM    MONOCYTES 5.00 12/04/2022 03:30 AM    EOSINOPHILS 0.20 12/04/2022 03:30 AM    BASOPHILS 0.50 12/04/2022 03:30 AM        IMAGING REVIEWED AND INTERPRETED:    ECHOCARDIOGRAM: 11/27/22  Normal left ventricular chamber size.  Mild concentric left ventricular hypertrophy.  The left ventricular ejection fraction is visually estimated to be 65%.  Trace mitral regurgitation.    TEODORO : 12/7/22  CONCLUSIONS  Structurally normal mitral valve without significant stenosis. Mild to   moderate mitral valve regurgitation. Mitral valve freely mobile mass   likely vegetation; 1.4cm x 1cm.    REVIEW OF SYSTEMS:   Review of Systems   Constitutional:  Positive for malaise/fatigue. Negative for fever.   HENT: Negative.     Eyes: Negative.    Respiratory: Negative.     Cardiovascular: Negative.    Gastrointestinal: Negative.    Genitourinary: Negative.    Musculoskeletal:  Positive for joint pain (chronic).   Skin: Negative.    Neurological:  Positive for dizziness. Negative for focal weakness, seizures and loss of consciousness.   Endo/Heme/Allergies:  Bruises/bleeds easily.  "  Psychiatric/Behavioral: Negative.         PHYSICAL EXAMINATION:   Physical Exam  Vitals reviewed.   Constitutional:       General: She is not in acute distress.     Appearance: Normal appearance. She is obese. She is ill-appearing.   HENT:      Head: Normocephalic and atraumatic.      Right Ear: External ear normal.      Left Ear: External ear normal.      Nose: Nose normal. No congestion.   Eyes:      General: No scleral icterus.     Extraocular Movements: Extraocular movements intact.      Conjunctiva/sclera: Conjunctivae normal.   Cardiovascular:      Rate and Rhythm: Normal rate and regular rhythm.   Pulmonary:      Effort: Pulmonary effort is normal. No respiratory distress.   Abdominal:      General: Abdomen is flat. There is no distension.   Musculoskeletal:         General: Normal range of motion.      Cervical back: Normal range of motion.   Skin:     General: Skin is warm and dry.   Neurological:      Mental Status: She is alert and oriented to person, place, and time. Mental status is at baseline.      Cranial Nerves: No cranial nerve deficit.   Psychiatric:         Mood and Affect: Mood normal.         Judgment: Judgment normal.     /58   Pulse 81   Temp 36.9 °C (98.4 °F) (Temporal)   Resp 18   Ht 1.575 m (5' 2\")   Wt 81.3 kg (179 lb 3.7 oz)   SpO2 94%   BMI 32.78 kg/m²        IMPRESSION:  78 yo woman with unclear medical history as she is not compliant with medical follow up admitted with afib/RVR, rhabdomyolysis, COVID19 PNA. 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      PLAN:  I recommend for continued medical therapy and against operative intervention in the form of MVR at this time. The TEODORO showing the vegetations was performed on 12/7 so now over a week has passed and there has been no evidence of embolization while on appropriate antibiotics. Her risk of embolizing now is quite low given the stability of " the lesion for the first week and absence of congestive heart failure. The benefits of the urgent surgery therefore are outweighed by the risks given her advanced age, recent COVID infection and is more consistent with her stated wishes of DNR/DNI.        Findings and recommendations have been discussed with the patient’s hospitalist Dr. Brannon. Thank you for this very challenging consultation and participation in the patient’s care.  I will keep you apprised of all future developments.          Sincerely,       Hoa Vaughn MD.

## 2022-12-14 NOTE — THERAPY
"Physical Therapy   Daily Treatment     Patient Name: Paulina Castellanos  Age:  77 y.o., Sex:  female  Medical Record #: [id number]  Today's Date: 12/14/2022     Precautions  Precautions: Fall Risk    Assessment    Patient performed functional mobility as detailed below. She required increased time and effort for bed mobility and transfer to chair. She demonstrated forward flexed posture in sitting and standing but was able to improve trunk extension somewhat with cueing. Recommend patient mobilize to EOB/chair 3x/day at meal times to progress strength and tolerance. Will continue to follow.    Plan    Continue current treatment plan.    DC Equipment Recommendations: Unable to determine at this time  Discharge Recommendations: Recommend post-acute placement for additional physical therapy services prior to discharge home      Subjective    \"I haven't had my coffee yet because then I need the commode.\"     Objective       12/14/22 0858   Charge Group   Charges  Yes   PT Therapeutic Activities 3  (39 min)   Precautions   Precautions Fall Risk   Vitals   O2 (LPM) 0   O2 Delivery Device None - Room Air   Pain 0 - 10 Group   Therapist Pain Assessment   (no pain complaint during session)   Cognition    Speech/ Communication Delayed Responses   Level of Consciousness Alert   Safety Awareness Impaired   Attention Impaired   Comments pleasant, cognition appears improved from prior but somewhat flat with decreased insight   Balance   Sitting Balance (Static) Fair   Sitting Balance (Dynamic) Fair   Standing Balance (Static) Poor +   Standing Balance (Dynamic) Poor -   Weight Shift Sitting Fair   Weight Shift Standing Poor   Skilled Intervention Verbal Cuing;Compensatory Strategies;Tactile Cuing   Comments forward flexed posture in standing but able to maintain sitting balance at EOB. Standing with FWW and required min A to maintain standing, posterior LOB   Gait Analysis   Gait Level Of Assist Unable to Participate   Bed Mobility  "   Supine to Sit Minimal Assist  (pulled on therapist for trunk to upright)   Sit to Supine   (NT, left in chair)   Scooting Minimal Assist  (seated)   Skilled Intervention Verbal Cuing;Compensatory Strategies;Sequencing;Tactile Cuing   Comments with HOB elevated   Functional Mobility   Sit to Stand Minimal Assist   Bed, Chair, Wheelchair Transfer Moderate Assist   Transfer Method Stand Step   Skilled Intervention Verbal Cuing;Compensatory Strategies;Facilitation;Sequencing   Comments patient required facilitation of FWW and required cues to keep hands on walker   How much difficulty does the patient currently have...   Turning over in bed (including adjusting bedclothes, sheets and blankets)? 1   Sitting down on and standing up from a chair with arms (e.g., wheelchair, bedside commode, etc.) 1   Moving from lying on back to sitting on the side of the bed? 1   How much help from another person does the patient currently need...   Moving to and from a bed to a chair (including a wheelchair)? 2   Need to walk in a hospital room? 1   Climbing 3-5 steps with a railing? 1   6 clicks Mobility Score 7   Activity Tolerance   Sitting in Chair left in chair   Sitting Edge of Bed 20+ min   Standing 2-3 min   Comments limited by weakness, fatigue   Patient / Family Goals    Patient / Family Goal #1 go home   Goal #1 Outcome Goal not met   Short Term Goals    Short Term Goal # 1 pt will be able to complete supine<>sitting with SPV in 6tx in order to dc home   Goal Outcome # 1 goal not met   Short Term Goal # 2 pt will be able to complete functional transfers with FWW and min assist in 6tx in order to progress home   Goal Outcome # 2 Progressing as expected   Short Term Goal # 3 pt will be able to ambulate 25ft with FWW and min assist in 6tx in order to progress to home   Goal Outcome # 3 Goal not met   Short Term Goal # 4 pt will be able to negotiate 4 steps with min assist in 6tx in order to progress to home   Goal Outcome # 4  Goal not met   Anticipated Discharge Equipment and Recommendations   DC Equipment Recommendations Unable to determine at this time   Discharge Recommendations Recommend post-acute placement for additional physical therapy services prior to discharge home   Interdisciplinary Plan of Care Collaboration   IDT Collaboration with  Nursing   Patient Position at End of Therapy Seated;Chair Alarm On;Call Light within Reach;Tray Table within Reach;Phone within Reach   Collaboration Comments RN aware of visit, response   Session Information   Date / Session Number  12/14-6 (2/3, 12/17)

## 2022-12-14 NOTE — CARE PLAN
The patient is Stable - Low risk of patient condition declining or worsening    Shift Goals  Clinical Goals: Skin integrity, pain control  Patient Goals: Rest  Family Goals: RICARDO    Progress made toward(s) clinical / shift goals:  Skin check completed, Q2 H turns in place, nystatin ordered for rash. Pain low and patient taking scheduled meds. Pain controlled as patient able to sleep after meds    Patient is not progressing towards the following goals:

## 2022-12-14 NOTE — PROGRESS NOTES
Hospital Medicine Daily Progress Note    Date of Service  12/14/2022    Chief Complaint  Paulina Castellanos is a 77 y.o. female admitted 11/26/2022 with atrial fibrillation with rapid ventricular rate.      Hospital Course  Admitted for Atrial fibrillation with rapid ventricular rate, COVID-19 with pneumonia and Respiratory failure with hypoxia, mild rhabdomyolysis. She was started on Decadron, oxygen supplementation, and supportive measures.  She was placed on Metoprolol for rate control, and Eliquis for anticoagulation.  She apparently developed fever and encephalopathy, and was noted to have left arm thrombophlebitis.  Work-up further showed MRSA bacteremia.  Infectious disease was consulted on the case.  Patient was started on empiric coverage with IV vancomycin.  Repeat blood cultures remained positive for MRSA.  Cardiology was consulted on the case for a TEODORO, which showed mitral valve vegetation/endocarditis.     Interval Problem Update  Bacteremia -most recent BCX's remain NGTD for 48 hours, order PICC  IE-TEODORO results finally released, shows freely mobile 1X1.3 cm MV mass. Consulted Dr Vaughn of CTS, who will see patient. Patient denies any new symptoms such as worsening CHF or neurological symptoms.       I have discussed this patient's plan of care and discharge plan at IDT rounds today with Case Management, Nursing, Nursing leadership, and other members of the IDT team.    Consultants/Specialty  cardiology, infectious disease, and palliative care  Cardiothoracic surgery    Code Status  DNAR/DNI    Disposition  Patient is not medically cleared for discharge.   Anticipate discharge to to skilled nursing facility.  I have placed the appropriate orders for post-discharge needs.    Review of Systems  Review of Systems   Constitutional:  Positive for malaise/fatigue. Negative for chills, diaphoresis and fever.        Remains mostly unchanged   HENT:  Negative for congestion and hearing loss.    Eyes:  Negative for  blurred vision.   Respiratory:  Positive for cough. Negative for shortness of breath.    Cardiovascular:  Negative for chest pain and palpitations.   Gastrointestinal:  Negative for abdominal pain, nausea and vomiting.   Genitourinary:  Negative for dysuria and flank pain.   Musculoskeletal:  Positive for back pain. Negative for joint pain, myalgias and neck pain.   Skin:  Negative for rash.   Neurological:  Positive for weakness (non focal). Negative for dizziness, sensory change, speech change, focal weakness and headaches.   Psychiatric/Behavioral:  The patient is not nervous/anxious.    All other systems reviewed and are negative.     Physical Exam  Temp:  [36.2 °C (97.1 °F)-36.9 °C (98.4 °F)] 36.9 °C (98.4 °F)  Pulse:  [81-88] 81  Resp:  [18-20] 18  BP: ()/(44-60) 118/58  SpO2:  [91 %-95 %] 94 %    Physical Exam  Vitals and nursing note reviewed.   Constitutional:       Comments: Slow to answer, but appropriate  No changes noted today   HENT:      Head: Normocephalic and atraumatic.      Nose: No congestion.      Mouth/Throat:      Mouth: Mucous membranes are moist.   Eyes:      Extraocular Movements: Extraocular movements intact.      Conjunctiva/sclera: Conjunctivae normal.   Cardiovascular:      Rate and Rhythm: Normal rate and regular rhythm.      Heart sounds: Murmur heard.   Pulmonary:      Effort: Pulmonary effort is normal.      Breath sounds: Normal breath sounds.   Abdominal:      General: There is no distension.      Tenderness: There is no abdominal tenderness.   Musculoskeletal:         General: Swelling (left arm, mild) and tenderness (mild R forearm) present.      Cervical back: No tenderness.      Right lower leg: Edema present.      Left lower leg: Edema present.   Skin:     Findings: Erythema (epigastric area) present.   Neurological:      General: No focal deficit present.      Mental Status: She is alert and oriented to person, place, and time.      Cranial Nerves: No cranial nerve  deficit.       Fluids    Intake/Output Summary (Last 24 hours) at 12/14/2022 1334  Last data filed at 12/14/2022 0442  Gross per 24 hour   Intake 500.07 ml   Output --   Net 500.07 ml         Laboratory  Recent Labs     12/12/22  0308   WBC 7.9   RBC 3.39*   HEMOGLOBIN 10.3*   HEMATOCRIT 31.5*   MCV 92.9   MCH 30.4   MCHC 32.7*   RDW 47.3   PLATELETCT 260   MPV 9.0     Recent Labs     12/12/22  0308 12/14/22  0740   SODIUM 131* 133*   POTASSIUM 4.0 4.5   CHLORIDE 100 102   CO2 22 25   GLUCOSE 102* 93   BUN 16 21   CREATININE 0.69 1.01   CALCIUM 8.6 9.0                   Imaging  EC-TEODORO W/O CONT   Final Result      DX-CHEST-PORTABLE (1 VIEW)   Final Result      1.  Curvilinear opacities in the lung bases likely representing atelectasis, less likely pneumonitis.      US-EXTREMITY VENOUS UPPER UNILAT LEFT   Final Result      EC-ECHOCARDIOGRAM COMPLETE W/O CONT   Final Result      OUTSIDE IMAGES-CT CHEST   Final Result             Assessment/Plan  * Atrial fibrillation with rapid ventricular response (HCC)- (present on admission)  Assessment & Plan  Metoprolol, Eliquis  [id number] -  3  Remains in rate controlled    Cellulitis- (present on admission)  Assessment & Plan  IV Vancomycin    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)  Assessment & Plan  IV Vancomycin  TTE with MV involvement  TEODORO done on 12/7, results finally released on 12/13  Consulted Dr Vaughn of cardiothoracic surgery on AM of 12/14, will evaluate patient, but unlikely to be a surgical candidate  NO clear focal neurological deficits at this time  No clear e/o clinical CHF at this time    Dysphagia- (present on admission)  Assessment & Plan  Level 7, Liquid level 0, crush pills  Aspiration precautions    Anemia- (present on admission)  Assessment & Plan  Follow CBC    Hypomagnesemia- (present on admission)  Assessment & Plan  IV Mg given  Follow level    Hypophosphatemia- (present on admission)  Assessment & Plan  Follow level    Hyponatremia- (present on  admission)  Assessment & Plan  Follow BMP    MRSA bacteremia- (present on admission)  Assessment & Plan  IV Vancomycin  repeat blood cultures 12/12 ngtd, if negative again tomorrow, can order pICC and then send to [facility]  Remains clinically stable  Watch serum Cr closely    Encephalopathy acute- (present on admission)  Assessment & Plan  secondary to infectious etiology, improving slowly and no clear focal neurological deficits  Any change in neuro status, low threshold to get MRI brain given large mobile mass on MV with IE  Avoid Benzodiazepines and Anticholinergics  Frequent orientation  Open window blinds during the day  Avoid naps during the day  Family at bedside whenever possible  Ensure adequate sleep at night - use Melatonin preferably  Avoid early morning labs  Avoid vital signs during sleep  Ambulate if possible  Provide adequate pain control  Monitor for urinary retention  Prevent and manage constipation  Discontinue IVs, Catheters, Tubes, Lines, Cardiac monitors, SCDs if possible    Sepsis (HCC)- (present on admission)  Assessment & Plan  Resolved  ID following  See elsehwere      Thrombophlebitis- (present on admission)  Assessment & Plan  IV Vancomycin  Eliquis    Acute respiratory failure with hypoxia (HCC)- (present on admission)  Assessment & Plan  due to COVID 19  RT protocol  Resolved    Metabolic acidosis, normal anion gap (NAG)- (present on admission)  Assessment & Plan  Resolved    Frailty syndrome in geriatric patient- (present on admission)  Assessment & Plan  Palliative care following    Non-traumatic rhabdomyolysis- (present on admission)  Assessment & Plan  resolved    Elevated LFTs- (present on admission)  Assessment & Plan  Follow CMP    Pneumonia due to COVID-19 virus- (present on admission)  Assessment & Plan  Finished course of Decadron    Hypokalemia- (present on admission)  Assessment & Plan  Follow bmp       VTE prophylaxis: therapeutic anticoagulation with Eliquis    I have  performed a physical exam and reviewed and updated ROS and Plan today (12/14/2022). In review of yesterday's note (12/13/2022), there are no changes except as documented above.

## 2022-12-14 NOTE — PROGRESS NOTES
Report received from derek RN, assumed care at 1900.  Assessment complete.  A&O x 4. Patient calls appropriately.  Patient ambulates with max assist, bed alarm on.  Patient denies pain at this time  Denies N&V. Tolerating level 7 easy to chew diet.  + void, + flatus, + BM. Incontinent of both  Red rash to inner thighs/groin.   Patient denies SOB. On room air.  Patient calm, pleasant, and cooperative.  Review plan with of care with patient. Call light and personal belongings within reach. Hourly rounding in place. All needs met at this time.

## 2022-12-14 NOTE — DISCHARGE PLANNING
Case Management Discharge Planning    Admission Date: 11/26/2022  GMLOS: 3.4  ALOS: 18    6-Clicks ADL Score: 15  6-Clicks Mobility Score: 7  PT and/or OT Eval ordered: Yes  Post-acute Referrals Ordered: Yes  Post-acute Choice Obtained: Yes  Has referral(s) been sent to post-acute provider:  Yes      Anticipated Discharge Dispo: Discharge Disposition: D/T to SNF with medicare cert w/planned hosp IP readmit (83)    DME Needed: No    Action(s) Taken: Updated Provider/Nurse on Discharge Plan    Pt was discussed in IDT rounds today. Pt will need IV Daptomcyin on discharge, Plan  is PICC.  This RN CM requested Aleisha ALLAN  to ask [facility] if they have a bed for Pt this Fri 12/16 and if they can provide transport .    Escalations Completed: None    Medically Clear: No    Next Steps:   This RN CM to continue to assist Pt with discharge as needed    Barriers to Discharge:   Pending medical clearance    Is the patient up for discharge tomorrow: No    Addendum: 12/16/22     Pt was discussed in IDT rounds today. Pt now has PICC . Will verify if Pt needs Vancomycin or Daptomycin on discharge. Requested Laurie ALLAN to ask Intake at [facility].     Per Chart, plan  is to switch from Daptomycin to Vancomycin due to generalized pruritic reaction with end date 1/27/23 per Pharmacist's notes

## 2022-12-14 NOTE — PROGRESS NOTES
4 Eyes Skin Assessment Completed by EVERT Young and EVERT Gaona     Head WDL  Ears WDL  Nose WDL  Mouth WDL  Neck WDL  Breast/Chest WDL   Shoulder Blades WDL   Spine  WDL  (R) Arm/Elbow/Hand WDL, PIV in place  (L) Arm/Elbow/Hand WDL  Abdomen WDL  Groin WDL  Scrotum/Coccyx/Buttocks WDL  (R) Leg: Inner thigh red rash  (L) Leg: inner thigh red rash  (R) Heel/Foot/Toe WDL  (L) Heel/Foot/Toe WDL              Devices In Places n/a        Interventions In Place Q2H turns, waffle mattress, barrier paste, nystatin ordered        Pictures Uploaded Into Epic n/a  Wound Consult Placed n/a  RN Wound Prevention Protocol Ordered n/a

## 2022-12-15 ENCOUNTER — APPOINTMENT (OUTPATIENT)
Dept: RADIOLOGY | Facility: MEDICAL CENTER | Age: 77
DRG: 308 | End: 2022-12-15
Attending: INTERNAL MEDICINE
Payer: MEDICARE

## 2022-12-15 PROCEDURE — A9270 NON-COVERED ITEM OR SERVICE: HCPCS | Performed by: STUDENT IN AN ORGANIZED HEALTH CARE EDUCATION/TRAINING PROGRAM

## 2022-12-15 PROCEDURE — 99233 SBSQ HOSP IP/OBS HIGH 50: CPT | Performed by: INTERNAL MEDICINE

## 2022-12-15 PROCEDURE — 700111 HCHG RX REV CODE 636 W/ 250 OVERRIDE (IP): Performed by: INTERNAL MEDICINE

## 2022-12-15 PROCEDURE — 700102 HCHG RX REV CODE 250 W/ 637 OVERRIDE(OP): Performed by: INTERNAL MEDICINE

## 2022-12-15 PROCEDURE — 700102 HCHG RX REV CODE 250 W/ 637 OVERRIDE(OP): Performed by: STUDENT IN AN ORGANIZED HEALTH CARE EDUCATION/TRAINING PROGRAM

## 2022-12-15 PROCEDURE — 770001 HCHG ROOM/CARE - MED/SURG/GYN PRIV*

## 2022-12-15 PROCEDURE — 99232 SBSQ HOSP IP/OBS MODERATE 35: CPT | Performed by: INTERNAL MEDICINE

## 2022-12-15 PROCEDURE — 700105 HCHG RX REV CODE 258: Performed by: INTERNAL MEDICINE

## 2022-12-15 PROCEDURE — 700102 HCHG RX REV CODE 250 W/ 637 OVERRIDE(OP): Performed by: PHYSICIAN ASSISTANT

## 2022-12-15 PROCEDURE — A9270 NON-COVERED ITEM OR SERVICE: HCPCS | Performed by: PHYSICIAN ASSISTANT

## 2022-12-15 PROCEDURE — A9270 NON-COVERED ITEM OR SERVICE: HCPCS | Performed by: INTERNAL MEDICINE

## 2022-12-15 RX ADMIN — APIXABAN 5 MG: 5 TABLET, FILM COATED ORAL at 04:52

## 2022-12-15 RX ADMIN — METOPROLOL TARTRATE 12.5 MG: 25 TABLET, FILM COATED ORAL at 04:52

## 2022-12-15 RX ADMIN — NYSTATIN: 100000 POWDER TOPICAL at 18:20

## 2022-12-15 RX ADMIN — OXYCODONE 5 MG: 5 TABLET ORAL at 20:55

## 2022-12-15 RX ADMIN — DAPTOMYCIN 650 MG: 500 INJECTION, POWDER, LYOPHILIZED, FOR SOLUTION INTRAVENOUS at 18:15

## 2022-12-15 RX ADMIN — NYSTATIN: 100000 POWDER TOPICAL at 04:53

## 2022-12-15 RX ADMIN — APIXABAN 5 MG: 5 TABLET, FILM COATED ORAL at 18:07

## 2022-12-15 RX ADMIN — GUAIFENESIN SYRUP AND DEXTROMETHORPHAN 5 ML: 100; 10 SYRUP ORAL at 21:00

## 2022-12-15 RX ADMIN — METOPROLOL TARTRATE 12.5 MG: 25 TABLET, FILM COATED ORAL at 18:07

## 2022-12-15 RX ADMIN — ACETAMINOPHEN 1000 MG: 500 TABLET ORAL at 04:52

## 2022-12-15 RX ADMIN — GUAIFENESIN SYRUP AND DEXTROMETHORPHAN 5 ML: 100; 10 SYRUP ORAL at 18:00

## 2022-12-15 RX ADMIN — GUAIFENESIN SYRUP AND DEXTROMETHORPHAN 5 ML: 100; 10 SYRUP ORAL at 09:00

## 2022-12-15 RX ADMIN — ACETAMINOPHEN 1000 MG: 500 TABLET ORAL at 18:06

## 2022-12-15 ASSESSMENT — ENCOUNTER SYMPTOMS
DIAPHORESIS: 0
FEVER: 0
VOMITING: 0
LOSS OF CONSCIOUSNESS: 0
RESPIRATORY NEGATIVE: 1
HEADACHES: 0
DIZZINESS: 1
FOCAL WEAKNESS: 0
BLURRED VISION: 0
SHORTNESS OF BREATH: 0
ABDOMINAL PAIN: 0
FLANK PAIN: 0
NERVOUS/ANXIOUS: 0
SPEECH CHANGE: 0
GASTROINTESTINAL NEGATIVE: 1
WEAKNESS: 1
NECK PAIN: 0
EYES NEGATIVE: 1
DIZZINESS: 0
MYALGIAS: 0
BRUISES/BLEEDS EASILY: 1
COUGH: 1
MYALGIAS: 1
NAUSEA: 0
CARDIOVASCULAR NEGATIVE: 1
SENSORY CHANGE: 0
DIARRHEA: 0
PSYCHIATRIC NEGATIVE: 1
BACK PAIN: 1
SEIZURES: 0

## 2022-12-15 ASSESSMENT — PAIN DESCRIPTION - PAIN TYPE
TYPE: ACUTE PAIN

## 2022-12-15 NOTE — THERAPY
Occupational Therapy  Daily Treatment     Patient Name: Paulina Castellanos  Age:  77 y.o., Sex:  female  Medical Record #: [id number]  Today's Date: 12/14/2022     Precautions  Precautions: Fall Risk  Comments: covid recovered    Assessment    Pt seen for OT treatment. Pt required max A for toileting and min A for seated toothbrushing. Pt received on BSC and required mod A to stand from BSC and max A to transfer back to bed. Pt reported feeling fatigued at end of session. Pt current functional performance limited by impaired cognition, balance, and strength. Pt educated regarding the pathology of bedrest and encouraged to be OOB for all meals. Pt will continue to benefit from skilled OT while admitted to acute care.     Plan    Continue current treatment plan.    DC Equipment Recommendations: Unable to determine at this time  Discharge Recommendations: Recommend post-acute placement for additional occupational therapy services prior to discharge home    Subjective    Pleasant and cooperative, but easily distracted     Objective     12/14/22 1542   Non Verbal Descriptors   Non Verbal Scale  Calm   Cognition    Cognition / Consciousness X   Speech/ Communication Delayed Responses   Level of Consciousness Alert   Safety Awareness Impaired   Attention Impaired   Initiation Impaired   Comments Pleasant and cooperative. Easily distracted.   Other Treatments   Other Treatments Provided Pt educated regarding the pathology of bedrest and encouraged to be out of bed for all meals and to use BSC for toileting.   Balance   Sitting Balance (Static) Fair   Sitting Balance (Dynamic) Fair   Standing Balance (Static) Poor +   Standing Balance (Dynamic) Poor   Weight Shift Sitting Fair   Weight Shift Standing Poor   Skilled Intervention Verbal Cuing;Compensatory Strategies;Tactile Cuing;Postural Facilitation   Comments w/ FWW x1 and w/ HHA x1   Bed Mobility    Supine to Sit   (received on BSC)   Sit to Supine Moderate Assist   Scooting Minimal  Assist   Skilled Intervention Verbal Cuing;Compensatory Strategies;Sequencing;Tactile Cuing   Comments HOB  elevated   Activities of Daily Living   Eating Supervision   Grooming Minimal Assist;Seated  (brushed teeth)   Toileting Maximal Assist  (difficulty reaching)   Skilled Intervention Verbal Cuing;Compensatory Strategies;Facilitation;Tactile Cuing   Functional Mobility   Sit to Stand Moderate Assist   Bed, Chair, Wheelchair Transfer Maximal Assist   Transfer Method Stand Step   Mobility BSC>bed   Skilled Intervention Verbal Cuing;Tactile Cuing;Postural Facilitation;Facilitation   Activity Tolerance   Sitting in Chair received on BSC   Sitting Edge of Bed <5 min   Standing <3 min total   Comments limited by weakness and fatigue   Patient / Family Goals   Patient / Family Goal #1 to go home   Goal #1 Outcome Progressing as expected   Short Term Goals   Short Term Goal # 1 pt will complete grooming seated w/set up   Goal Outcome # 1 Progressing as expected   Short Term Goal # 2 pt will complete txf to BSC w/min A   Goal Outcome # 2 Progressing slower than expected   Short Term Goal # 3 pt will complete UB dressing w/set u p   Goal Outcome # 3 Goal not met  (NT this session, pt reported her gown was changed recently)   Education Group   Education Provided Role of Occupational Therapist;Activities of Daily Living;Pathology of bedrest   Role of Occupational Therapist Patient Response Patient;Acceptance;Explanation;Verbal Demonstration   ADL Patient Response Patient;Acceptance;Explanation;Verbal Demonstration;Demonstration;Action Demonstration   Pathology of Bedrest Patient Response Patient;Acceptance;Explanation;Demonstration;Verbal Demonstration;Action Demonstration

## 2022-12-15 NOTE — PROGRESS NOTES
4 Eyes Skin Assessment Completed by EVERT Young and Carolina RN     Head WDL  Ears WDL  Nose WDL  Mouth WDL  Neck WDL  Breast/Chest: L breast redness  Shoulder Blades WDL   Spine  WDL  Back/flank: Red rash  (R) Arm/Elbow/Hand: red rash  (L) Arm/Elbow/Hand: red rash  Abdomen WDL  Groin WDL  Scrotum/Coccyx/Buttocks WDL  (R) Leg: rash to inner and outer thigh  (L) Leg: rash to inner and outer thigh  (R) Heel/Foot/Toe WDL  (L) Heel/Foot/Toe WDL              Devices In Places n/a        Interventions In Place Q2H turns, waffle mattress, barrier paste, nystatin powder, interdry        Pictures Uploaded Into Epic n/a  Wound Consult Placed n/a  RN Wound Prevention Protocol Ordered n/a

## 2022-12-15 NOTE — DISCHARGE PLANNING
Agency/Facility Name: [facility]  Outcome: SANJANA left a voicemail for Cecile in admissions regarding referral status. SANJANA stated there is an update on Pt medical status. DPA requesting a call back.     1305:  Agency/Facility Name: [facility]   Spoke To: Cecile  Outcome: SANJANA notified SNF Pt won't be medically clear until next week. Cecile states as of now she cannot accept any more Pt's on IV ABX but may be able to next week as there are a lot of d/c's scheduled. Cecile requests DPA to touch base next week on if Pt can still transfer.     RN STACI notified.

## 2022-12-15 NOTE — PROGRESS NOTES
Infectious Disease Progress Note    Author: Ramesh Tee M.D. Date & Time of service: 12/15/2022  8:46 AM    Chief Complaint:  MRSA bacteremia    Interval History:  77 y.o. female admitted 11/26/2022. For Afib with RVR and rhabdomyolysis +COVID  Developed fever and AMS dueing hosp-blood cultures +MRSA  12/4 AF WBC 13.9 Oriented to person and place c/o LUE pain No dyspnea or CP. No new neck, back, joint pain  12/5 patient remains afebrile, white count is resolved now, down to 9.4, tolerating vancomycin.  Patient states she is tired but overall better.   left arm  12/6 patient remains afebrile, white count is 9.7, tolerating vancomycin.  Plan as below  12/7 patient remains afebrile, white count 7.7, tolerating vancomycin, no new events overnight.  12/8 patient remains afebrile, white count is 8.5, tolerating vancomycin.  TEODORO positive for endocarditis  12/9 patient remains afebrile, count 7.6, repeat cultures as below.  Patient notes improvement in left arm pain and swelling  12/10 patient remains afebrile, no CBC this morning, tolerating vancomycin  12/11 patient remains afebrile, no CBC this morning, tolerating IV vancomycin.  Repeat blood cultures as below.   12/12 T-max 98.9, remains afebrile, white count today 7.9, tolerating vancomycin, repeat blood cultures  12/13 patient remains afebrile, no CBC this morning, tolerating vancomycin.  Repeat blood cultures no growth to date  12/14 patient remains afebrile, no CBC this morning, tolerating vancomycin. Repeat blood cultures pending  12/15 patient remains afebrile.  No CBC this morning, tolerated the switch to daptomycin.  Blood cultures as below    Labs Reviewed, Medications Reviewed, and Radiology Reviewed.    Review of Systems:  Review of Systems   Constitutional:  Positive for malaise/fatigue. Negative for fever.   Gastrointestinal:  Negative for abdominal pain, diarrhea, nausea and vomiting.   Musculoskeletal:  Positive for myalgias.   Skin:   Negative for itching and rash.   All other systems reviewed and are negative.    Hemodynamics:  Temp (24hrs), Av.8 °C (98.3 °F), Min:36.8 °C (98.2 °F), Max:37 °C (98.6 °F)  Temperature: 37 °C (98.6 °F)  Pulse  Av.4  Min: 54  Max: 136   Blood Pressure : 105/63       Physical Exam:  Physical Exam  Vitals and nursing note reviewed.   Constitutional:       General: She is not in acute distress.     Appearance: She is ill-appearing. She is not toxic-appearing or diaphoretic.   HENT:      Mouth/Throat:      Pharynx: No oropharyngeal exudate.      Comments: Fair dentition  Eyes:      General: No scleral icterus.     Extraocular Movements: Extraocular movements intact.      Pupils: Pupils are equal, round, and reactive to light.   Pulmonary:      Effort: Pulmonary effort is normal. No respiratory distress.      Breath sounds: No stridor.   Abdominal:      General: There is no distension.      Palpations: Abdomen is soft.      Tenderness: There is no abdominal tenderness.   Musculoskeletal:         General: Swelling and tenderness present.      Cervical back: Neck supple. No rigidity.   Skin:     Coloration: Skin is pale. Skin is not jaundiced.      Findings: Bruising and erythema present.      Comments: Left arm thrombophlebitis has resolved   Neurological:      General: No focal deficit present.      Mental Status: She is alert and oriented to person, place, and time.   Psychiatric:         Mood and Affect: Mood normal.         Behavior: Behavior normal.       Meds:    Current Facility-Administered Medications:     DAPTOmycin    Pharmacy Consult Request    acetaminophen **FOLLOWED BY** [START ON 2022] acetaminophen    oxyCODONE immediate-release **OR** oxyCODONE immediate-release **OR** HYDROmorphone    nystatin    metoprolol tartrate    Respiratory Therapy Consult    LR    guaiFENesin dextromethorphan    benzonatate    senna-docusate **AND** polyethylene glycol/lytes **AND** magnesium hydroxide **AND**  bisacodyl    hydrALAZINE    Metoprolol Tartrate    apixaban    Labs:  No results for input(s): WBC, RBC, HEMOGLOBIN, HEMATOCRIT, MCV, MCH, RDW, PLATELETCT, MPV, NEUTSPOLYS, LYMPHOCYTES, MONOCYTES, EOSINOPHILS, BASOPHILS, RBCMORPHOLO in the last 72 hours.    Recent Labs     22  0740   SODIUM 133*   POTASSIUM 4.5   CHLORIDE 102   CO2 25   GLUCOSE 93   BUN 21   CPKTOTAL 17       Recent Labs     22  0740   CREATININE 1.01         Imaging:  DX-CHEST-PORTABLE (1 VIEW)    Result Date: 2022 12:15 PM HISTORY/REASON FOR EXAM:  Shortness of Breath. TECHNIQUE/EXAM DESCRIPTION AND NUMBER OF VIEWS: Single portable view of the chest. COMPARISON: None FINDINGS: Heart size is within normal limits. There are curvilinear opacities in the lung bases. No pleural abnormalities are noted.     1.  Curvilinear opacities in the lung bases likely representing atelectasis, less likely pneumonitis.    US-EXTREMITY VENOUS UPPER UNILAT LEFT    Result Date: 2022   Upper Extremity  Venous Duplex Report  Vascular Laboratory  CONCLUSIONS  Left upper extremity venous duplex imaging.  No evidence of deep venous thrombosis.  Evidence of acute to subacute superficial venous thrombosis in the LEFT  cephalic vein from the mid to distal bicep.  MASSIEL SILVA  Exam Date:     2022 13:29  Room #:     Inpatient  Priority:     Routine  Ht (in):             Wt (lb):  Ordering Physician:        EWELINA WOOD  Referring Physician:       NINA Mcknight  Sonographer:               Giovana Moran RVT  Study Type:                Complete Unilateral  Technical Quality:         Adequate  Age:    77    Gender:     F  MRN:    [id number]  :    1945      BSA:  Indications:     Localized swelling, mass and lump, unspecified upper limb,                   Edema, unspecified  CPT Codes:       06822  ICD Codes:       R22.30  R60.9  History:         Left upper extremity swelling/edema. No prior exams.  Limitations:  PROCEDURES:  Left  upper extremity venous duplex imaging.  The following venous structures were evaluated: internal jugular,  subclavian, axillary, brachial, cephalic, and basilic veins.  Serial compression, color, and spectral Doppler flow evaluations were  performed.  FINDINGS:  Left upper extremity-  No evidence of deep venous thrombosis.  Evidence of acute to subacute superficial venous thrombosis in the cephalic  vein from the mid to distal bicep.  All other veins demonstrate complete color filling and compressibility with  normal venous flow dynamics including spontaneous flow and respiratory  phasicity.  Flow was evaluated in the contralateral subclavian vein and normal venous  flow dynamics including spontaneous flow and respiratory phasic variation  were demonstrated.  Brittney MUNOZ To  (Electronically Signed)  Final Date:      2022                   16:24    EC-ECHOCARDIOGRAM COMPLETE W/O CONT    Result Date: 2022  Transthoracic Echo Report Echocardiography Laboratory CONCLUSIONS No prior study is available for comparison. Normal left ventricular chamber size. Mild concentric left ventricular hypertrophy. The left ventricular ejection fraction is visually estimated to be 65%. Trace mitral regurgitation. MASSIEL SILVA Exam Date:         2022                    13:31 Exam Location:     Inpatient Priority:          Routine Ordering Physician:        BETH GIL Referring Physician:       [id number] Ibarra Sonographer:               Frank ARANDA Age:    77     Gender:    F MRN:    [id number] :    1945 BSA:    1.89   Ht (in):    67     Wt (lb):    170 Exam Type:     Complete Indications:     Atrial Fibrillation, Shortness of breath ICD Codes:       427.31  786.05 CPT Codes:       08881 BP:   121    /   65     HR: Technical Quality:       Poor MEASUREMENTS  (Male / Female) Normal Values 2D ECHO LV Diastolic Diameter PLAX        4.2 cm                4.2 - 5.9 / 3.9 - 5.3 cm LV Systolic Diameter  PLAX         2.7 cm                2.1 - 4.0 cm IVS Diastolic Thickness           1.1 cm                LVPW Diastolic Thickness          1.1 cm                LVOT Diameter                     1.8 cm                Estimated LV Ejection Fraction    65 %                  LV Ejection Fraction MOD BP       73.1 %                >= 55  % LV Ejection Fraction MOD 4C       78.7 %                LV Ejection Fraction MOD 2C       73 %                  DOPPLER AV Peak Velocity                  1.1 m/s               AV Peak Gradient                  5.3 mmHg              AV Mean Gradient                  2.9 mmHg              LVOT Peak Velocity                1.1 m/s               AV Area Cont Eq vti               2.6 cm2               MV Velocity Time Integral         21 cm                 Mitral E Point Velocity           0.86 m/s              Mitral E to A Ratio               0.75                  Mitral A Duration                 156 ms                MV Pressure Half Time             31.9 ms               MV Area PHT                       6.9 cm2               MV Deceleration Time              85.8 ms               * Indicates values subject to auto-interpretation LV EF:  65    % FINDINGS Left Ventricle Normal left ventricular chamber size. Mild concentric left ventricular hypertrophy. Normal left ventricular systolic function. The left ventricular ejection fraction is visually estimated to be 65%. Normal regional wall motion. Right Ventricle The right ventricle is not well visualized. Right Atrium The right atrium is not well visualized. Left Atrium Normal left atrial size. Left atrial volume index is 24 mL/sq m. Mitral Valve The mitral valve is not well visualized. No mitral stenosis. Trace mitral regurgitation. Aortic Valve The aortic valve is not well visualized. Tricuspid Valve The tricuspid valve is not well visualized. No stenosis or regurgitation seen. Pulmonic Valve The pulmonic valve is not well visualized. No  "stenosis or regurgitation seen. Pericardium No pericardial effusion. Aorta Normal aortic root for body surface area. The ascending aorta diameter is 3.1 cm. Zafar Bailon M.D. (Electronically Signed) Final Date:     27 November 2022                 20:23      Micro:  Results       Procedure Component Value Units Date/Time    BLOOD CULTURE [[id number]]  (Abnormal) Collected: 12/09/22 0223    Order Status: Completed Specimen: Blood from Peripheral Updated: 12/13/22 1028     Significant Indicator POS     Source BLD     Site PERIPHERAL     Culture Result Growth detected by Bactec instrument. 12/12/2022  10:39      Methicillin Resistant Staphylococcus aureus  Methicillin resistant by screening method  See previous culture for sensitivity report.      Narrative:      CALL  [facility]  141 tel. [phone],  CALLED  141 tel. [phone] 12/12/2022, 10:43, RB PERF. RESULTS CALLED TO: EVERT [place] [id number]  Contact  Per Hospital Policy: Only change Specimen Src: to \"Line\" if  specified by physician order.  Right Hand    BLOOD CULTURE [[id number]] Collected: 12/12/22 0308    Order Status: Completed Specimen: Blood from Peripheral Updated: 12/13/22 0932     Significant Indicator NEG     Source BLD     Site PERIPHERAL     Culture Result No Growth  Note: Blood cultures are incubated for 5 days and  are monitored continuously.Positive blood cultures  are called to the RN and reported as soon as  they are identified.      Narrative:      Contact  Per Hospital Policy: Only change Specimen Src: to \"Line\" if  specified by physician order.  Left Hand    BLOOD CULTURE [[id number]] Collected: 12/12/22 0308    Order Status: Completed Specimen: Blood from Peripheral Updated: 12/13/22 0932     Significant Indicator NEG     Source BLD     Site PERIPHERAL     Culture Result No Growth  Note: Blood cultures are incubated for 5 days and  are monitored continuously.Positive blood cultures  are called to the RN and reported as soon as  they are " "identified.      Narrative:      Contact  Per Hospital Policy: Only change Specimen Src: to \"Line\" if  specified by physician order.  Right Hand    BLOOD CULTURE [[id number]]  (Abnormal) Collected: 12/09/22 0306    Order Status: Completed Specimen: Blood from Peripheral Updated: 12/11/22 1208     Significant Indicator POS     Source BLD     Site PERIPHERAL     Culture Result Growth detected by Bactec instrument. 12/10/2022  06:43      Methicillin Resistant Staphylococcus aureus  See previous culture for sensitivity report.      Narrative:      CALL  [facility]  141 tel. [phone],  CALLED  141 tel. [phone] 12/10/2022, 06:46, RB PERF. RESULTS CALLED TO:  [id number] RN  Contact  Per Hospital Policy: Only change Specimen Src: to \"Line\" if  specified by physician order.  Right Hand            Assessment:  This is a 77-year-old female patient who was transferred from an outside facility, slid off her bed at home and was unable to get up for multiple days, found by a friend, was found to be in A. fib with RVR and with mild rhabdomyolysis.  She was also noted to have COVID-19 requiring nasal cannula oxygen, resolved.  On 12/1, she developed right arm thrombophlebitis and the following day had worsening leukocytosis and procalcitonin, blood cultures positive for MRSA. TEODORO positive for infective endocarditis    Pertinent Diagnoses:  Native mitral valve infective endocarditis  MRSA bacteremia, persistent  SO, resolved  Recent COVID-19  Thrombophlebitis     PLAN:   MRSA bacteremia  Native mitral valve infective endocarditis  Thrombophlebitis, improved  BCxs + MRSA 12/2 and 12/3, 12/5  Follow repeat blood cultures x2 from 12/9, 1 out of 2 positive  TEODORO with a large freely mobile mass 1.4 x 1 cm on the mitral valve.  Recommend CT surgery evaluation to weigh pros and cons of surgery given risk of continued embolization to systemic circulation  Repeat blood cultures x2 from 12/12 pending, no growth till date  Due to concern for rising " creatinine and vancomycin levels, changed to IV daptomycin 8 mg/KG every 24 hours    Disposition: Anticipate likely eventual discharge to a rehab facility on 6 weeks of IV antibiotics once blood cultures clear  Need for PICC line: Eventually yes.  Awaiting cardiothoracic surgery plans    Discussed with Dr. Brannon

## 2022-12-15 NOTE — PROGRESS NOTES
Report received from dreek RN, assumed care at 1900.  Assessment complete.  A&O x 4. Patient calls appropriately.  Patient ambulates with max assist, bed alarm on. Q2H turns  Patient denies pain at this time  Denies N&V. Tolerating level 7 easy to chew diet.  + void, + flatus, + BM. Incontinent of both  Red rash to inner thighs/groin.   Patient denies SOB. On room air.  Patient calm, pleasant, and cooperative.  Review plan with of care with patient. Call light and personal belongings within reach. Hourly rounding in place. All needs met at this time.

## 2022-12-15 NOTE — PROGRESS NOTES
Hospital Medicine Daily Progress Note    Date of Service  12/15/2022    Chief Complaint  Paulina Castellanos is a 77 y.o. female admitted 11/26/2022 with atrial fibrillation with rapid ventricular rate.      Hospital Course  Admitted for Atrial fibrillation with rapid ventricular rate, COVID-19 with pneumonia and Respiratory failure with hypoxia, mild rhabdomyolysis. She was started on Decadron, oxygen supplementation, and supportive measures.  She was placed on Metoprolol for rate control, and Eliquis for anticoagulation.  She apparently developed fever and encephalopathy, and was noted to have left arm thrombophlebitis.  Work-up further showed MRSA bacteremia.  Infectious disease was consulted on the case.  Patient was started on empiric coverage with IV vancomycin.  Repeat blood cultures remained positive for MRSA.  Cardiology was consulted on the case for a TEODORO, which showed mitral valve vegetation/endocarditis.     Interval Problem Update  Bacteremia -CTS to do no surgery, PICC line ordered. Remains afebrile.     IE-No surgery planned. Remains without further symptoms at this time. Afebrile.       I have discussed this patient's plan of care and discharge plan at IDT rounds today with Case Management, Nursing, Nursing leadership, and other members of the IDT team.    Consultants/Specialty  cardiology, infectious disease, and palliative care  Cardiothoracic surgery    Code Status  DNAR/DNI    Disposition  Patient is not medically cleared for discharge.   Anticipate discharge to to skilled nursing facility.  I have placed the appropriate orders for post-discharge needs.    Review of Systems  Review of Systems   Constitutional:  Positive for malaise/fatigue. Negative for diaphoresis.        No clear changes noted   HENT:  Negative for congestion and hearing loss.    Eyes:  Negative for blurred vision.   Respiratory:  Positive for cough. Negative for shortness of breath.    Cardiovascular:  Negative for chest pain.    Gastrointestinal:  Negative for abdominal pain, nausea and vomiting.   Genitourinary:  Negative for dysuria and flank pain.   Musculoskeletal:  Positive for back pain. Negative for joint pain, myalgias and neck pain.   Skin:  Negative for rash.   Neurological:  Positive for weakness (non focal). Negative for dizziness, sensory change, speech change, focal weakness and headaches.   Psychiatric/Behavioral:  The patient is not nervous/anxious.    All other systems reviewed and are negative.     Physical Exam  Temp:  [36.8 °C (98.2 °F)-37 °C (98.6 °F)] 37 °C (98.6 °F)  Pulse:  [] 83  Resp:  [16-18] 18  BP: ()/(60-66) 105/63  SpO2:  [92 %] 92 %    Physical Exam  Vitals and nursing note reviewed.   Constitutional:       Comments: Slow to answer, but appropriate  Remains comfortable appearing   HENT:      Head: Normocephalic and atraumatic.      Nose: No congestion.      Mouth/Throat:      Mouth: Mucous membranes are moist.   Eyes:      Extraocular Movements: Extraocular movements intact.      Conjunctiva/sclera: Conjunctivae normal.   Cardiovascular:      Rate and Rhythm: Normal rate and regular rhythm.      Heart sounds: Murmur heard.   Pulmonary:      Effort: Pulmonary effort is normal. No respiratory distress.      Breath sounds: Normal breath sounds. No wheezing.   Abdominal:      General: There is no distension.      Tenderness: There is no abdominal tenderness.   Musculoskeletal:         General: Swelling (left arm, mild) and tenderness (mild R forearm) present.      Cervical back: No tenderness.      Right lower leg: Edema present.      Left lower leg: Edema present.      Comments: All improving somewhat   Skin:     Findings: Erythema (epigastric area) present.   Neurological:      General: No focal deficit present.      Mental Status: She is alert and oriented to person, place, and time.      Cranial Nerves: No cranial nerve deficit.       Fluids    Intake/Output Summary (Last 24 hours) at 12/15/2022  1408  Last data filed at 12/15/2022 0452  Gross per 24 hour   Intake 240 ml   Output --   Net 240 ml         Laboratory        Recent Labs     12/14/22  0740   SODIUM 133*   POTASSIUM 4.5   CHLORIDE 102   CO2 25   GLUCOSE 93   BUN 21   CREATININE 1.01   CALCIUM 9.0                   Imaging  EC-TEODORO W/O CONT   Final Result      DX-CHEST-PORTABLE (1 VIEW)   Final Result      1.  Curvilinear opacities in the lung bases likely representing atelectasis, less likely pneumonitis.      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)  Assessment & Plan  IV Vancomycin  TTE with MV involvement  TEODORO done on 12/7, results finally released on 12/13, shows freely mobile MV mass  CTS Dr Vaughn, no surgical intervention at this time  NO clear focal neurological deficits at this time  No clear e/o clinical CHF at this time    Dysphagia- (present on admission)  Assessment & Plan  Level 7, Liquid level 0, crush pills  Aspiration precautions    Anemia- (present on admission)  Assessment & Plan  Follow CBC    Hypomagnesemia- (present on admission)  Assessment & Plan  IV Mg given  Follow level    Hypophosphatemia- (present on admission)  Assessment & Plan  Follow level    Hyponatremia- (present on admission)  Assessment & Plan  Follow BMP    MRSA bacteremia- (present on admission)  Assessment & Plan  IV Vancomycin changed to IV dapto 12/14  repeat blood cultures 12/12 ngtd, if negative again tomorrow, PICC line ordered 12/15 now with CTS clearing her  [facility]  Remains clinically stable  Watch serum Cr closely    Encephalopathy acute- (present on admission)  Assessment & Plan  secondary to infectious etiology, improving slowly and no clear focal neurological deficits  Any change in neuro status, low threshold to get MRI brain given large mobile mass on MV with IE  Avoid Benzodiazepines and Anticholinergics  Frequent orientation  Open window blinds during the day  Avoid naps during the day  Family at bedside whenever possible  Ensure adequate sleep at night - use Melatonin preferably  Avoid early morning labs  Avoid vital signs during sleep  Ambulate if possible  Provide adequate pain control  Monitor for urinary retention  Prevent and manage constipation  Discontinue IVs, Catheters, Tubes, Lines, Cardiac monitors, SCDs if possible    Sepsis (HCC)- (present on admission)  Assessment & Plan  Resolved  ID following  See elsehwere      Thrombophlebitis- (present on admission)  Assessment & Plan  IV Vancomycin changed to IV dapto  Monitor CPK  Eliquis    Acute respiratory failure with hypoxia (HCC)- (present on admission)  Assessment & Plan  due to COVID 19  RT protocol  Resolved    Metabolic acidosis, normal anion gap (NAG)- (present on admission)  Assessment & Plan  Resolved    Frailty syndrome in geriatric patient- (present on admission)  Assessment & Plan  Palliative care following    Non-traumatic rhabdomyolysis- (present on admission)  Assessment & Plan  resolved    Elevated LFTs- (present on admission)  Assessment & Plan  Follow CMP    Pneumonia due to COVID-19 virus- (present on admission)  Assessment & Plan  Finished course of Decadron    Hypokalemia- (present on admission)  Assessment & Plan  Follow bmp       VTE prophylaxis: therapeutic anticoagulation with Eliquis    I have performed a physical exam and reviewed and updated ROS and Plan today (12/15/2022). In review of yesterday's note (12/14/2022), there are no changes except as documented  above.

## 2022-12-15 NOTE — PROGRESS NOTES
"VAT RN to bedside to place PICC line. Pt refusing PICC, states she \"just doesn't think she wants it\". Pt alert and oriented x 4. VAT RN attempted to explain purpose of PICC for pt, primary RN attempted to also explain. Call made to hospitalist who came to bedside to also speak with pt. Pt refusing PICC at this time.   "

## 2022-12-15 NOTE — CARE PLAN
The patient is Stable - Low risk of patient condition declining or worsening    Shift Goals  Clinical Goals: Skin integrity  Patient Goals: Rest, comfort  Family Goals: RICARDO    Progress made toward(s) clinical / shift goals:    Patient is not progressing towards the following goals:  Upon skin check, new rash to flank and thighs noted. Will update dayshift. Redness under left breast. Interdry applied

## 2022-12-15 NOTE — CARE PLAN
The patient is Stable - Low risk of patient condition declining or worsening    Shift Goals  Clinical Goals: Pain Management, Q2 Turns  Patient Goals: Rest, Ambulate as tolerated  Family Goals: RICARDO    Progress made toward(s) clinical / shift goals:        Problem: Knowledge Deficit - Standard  Goal: Patient and family/care givers will demonstrate understanding of plan of care, disease process/condition, diagnostic tests and medications  Outcome: Progressing     Problem: Fall Risk  Goal: Patient will remain free from falls  Outcome: Progressing     Problem: Communication  Goal: The ability to communicate needs accurately and effectively will improve  Outcome: Progressing     Problem: Pain - Standard  Goal: Alleviation of pain or a reduction in pain to the patient’s comfort goal  Outcome: Progressing       Patient has call light in reach and calls appropriately. She has been updated on plan of care and will continue to be updated as information arises. She has not complained of any pain nor has needed any pain coping mechanisms during the shift. She has been educated on proper fall preventions and to call when needing assistance to get out of bed.

## 2022-12-16 ENCOUNTER — APPOINTMENT (OUTPATIENT)
Dept: RADIOLOGY | Facility: MEDICAL CENTER | Age: 77
DRG: 308 | End: 2022-12-16
Attending: INTERNAL MEDICINE
Payer: MEDICARE

## 2022-12-16 LAB
ANION GAP SERPL CALC-SCNC: 10 MMOL/L (ref 7–16)
BUN SERPL-MCNC: 27 MG/DL (ref 8–22)
CALCIUM SERPL-MCNC: 8.6 MG/DL (ref 8.5–10.5)
CHLORIDE SERPL-SCNC: 102 MMOL/L (ref 96–112)
CO2 SERPL-SCNC: 22 MMOL/L (ref 20–33)
CREAT SERPL-MCNC: 1.11 MG/DL (ref 0.5–1.4)
GFR SERPLBLD CREATININE-BSD FMLA CKD-EPI: 51 ML/MIN/1.73 M 2
GLUCOSE SERPL-MCNC: 133 MG/DL (ref 65–99)
POTASSIUM SERPL-SCNC: 4.2 MMOL/L (ref 3.6–5.5)
SODIUM SERPL-SCNC: 134 MMOL/L (ref 135–145)

## 2022-12-16 PROCEDURE — 700102 HCHG RX REV CODE 250 W/ 637 OVERRIDE(OP): Performed by: INTERNAL MEDICINE

## 2022-12-16 PROCEDURE — 700102 HCHG RX REV CODE 250 W/ 637 OVERRIDE(OP)

## 2022-12-16 PROCEDURE — A9270 NON-COVERED ITEM OR SERVICE: HCPCS | Performed by: PHYSICIAN ASSISTANT

## 2022-12-16 PROCEDURE — 02HV33Z INSERTION OF INFUSION DEVICE INTO SUPERIOR VENA CAVA, PERCUTANEOUS APPROACH: ICD-10-PCS | Performed by: INTERNAL MEDICINE

## 2022-12-16 PROCEDURE — 99233 SBSQ HOSP IP/OBS HIGH 50: CPT | Performed by: INTERNAL MEDICINE

## 2022-12-16 PROCEDURE — A9270 NON-COVERED ITEM OR SERVICE: HCPCS | Performed by: STUDENT IN AN ORGANIZED HEALTH CARE EDUCATION/TRAINING PROGRAM

## 2022-12-16 PROCEDURE — 99232 SBSQ HOSP IP/OBS MODERATE 35: CPT | Performed by: INTERNAL MEDICINE

## 2022-12-16 PROCEDURE — 36415 COLL VENOUS BLD VENIPUNCTURE: CPT

## 2022-12-16 PROCEDURE — A9270 NON-COVERED ITEM OR SERVICE: HCPCS

## 2022-12-16 PROCEDURE — 700105 HCHG RX REV CODE 258: Performed by: INTERNAL MEDICINE

## 2022-12-16 PROCEDURE — 80048 BASIC METABOLIC PNL TOTAL CA: CPT

## 2022-12-16 PROCEDURE — 700111 HCHG RX REV CODE 636 W/ 250 OVERRIDE (IP): Performed by: INTERNAL MEDICINE

## 2022-12-16 PROCEDURE — 770001 HCHG ROOM/CARE - MED/SURG/GYN PRIV*

## 2022-12-16 PROCEDURE — A9270 NON-COVERED ITEM OR SERVICE: HCPCS | Performed by: INTERNAL MEDICINE

## 2022-12-16 PROCEDURE — 700102 HCHG RX REV CODE 250 W/ 637 OVERRIDE(OP): Performed by: STUDENT IN AN ORGANIZED HEALTH CARE EDUCATION/TRAINING PROGRAM

## 2022-12-16 PROCEDURE — 700102 HCHG RX REV CODE 250 W/ 637 OVERRIDE(OP): Performed by: PHYSICIAN ASSISTANT

## 2022-12-16 PROCEDURE — 36573 INSJ PICC RS&I 5 YR+: CPT

## 2022-12-16 RX ORDER — DIPHENHYDRAMINE HCL 25 MG
25 TABLET ORAL ONCE
Status: COMPLETED | OUTPATIENT
Start: 2022-12-16 | End: 2022-12-16

## 2022-12-16 RX ORDER — SODIUM CHLORIDE 9 MG/ML
500 INJECTION, SOLUTION INTRAVENOUS ONCE
Status: COMPLETED | OUTPATIENT
Start: 2022-12-16 | End: 2022-12-16

## 2022-12-16 RX ADMIN — ACETAMINOPHEN 1000 MG: 500 TABLET ORAL at 16:49

## 2022-12-16 RX ADMIN — ACETAMINOPHEN 1000 MG: 500 TABLET ORAL at 11:33

## 2022-12-16 RX ADMIN — SODIUM CHLORIDE 500 ML: 9 INJECTION, SOLUTION INTRAVENOUS at 13:15

## 2022-12-16 RX ADMIN — VANCOMYCIN HYDROCHLORIDE 2000 MG: 500 INJECTION, POWDER, LYOPHILIZED, FOR SOLUTION INTRAVENOUS at 15:07

## 2022-12-16 RX ADMIN — APIXABAN 5 MG: 5 TABLET, FILM COATED ORAL at 05:04

## 2022-12-16 RX ADMIN — METOPROLOL TARTRATE 12.5 MG: 25 TABLET, FILM COATED ORAL at 16:52

## 2022-12-16 RX ADMIN — ACETAMINOPHEN 1000 MG: 500 TABLET ORAL at 05:04

## 2022-12-16 RX ADMIN — NYSTATIN: 100000 POWDER TOPICAL at 16:51

## 2022-12-16 RX ADMIN — NYSTATIN: 100000 POWDER TOPICAL at 05:04

## 2022-12-16 RX ADMIN — METOPROLOL TARTRATE 12.5 MG: 25 TABLET, FILM COATED ORAL at 05:04

## 2022-12-16 RX ADMIN — GUAIFENESIN SYRUP AND DEXTROMETHORPHAN 5 ML: 100; 10 SYRUP ORAL at 09:11

## 2022-12-16 RX ADMIN — GUAIFENESIN SYRUP AND DEXTROMETHORPHAN 5 ML: 100; 10 SYRUP ORAL at 20:10

## 2022-12-16 RX ADMIN — APIXABAN 5 MG: 5 TABLET, FILM COATED ORAL at 16:49

## 2022-12-16 RX ADMIN — DIPHENHYDRAMINE HYDROCHLORIDE 25 MG: 25 TABLET ORAL at 02:04

## 2022-12-16 ASSESSMENT — PAIN DESCRIPTION - PAIN TYPE
TYPE: ACUTE PAIN
TYPE: ACUTE PAIN

## 2022-12-16 ASSESSMENT — ENCOUNTER SYMPTOMS
ABDOMINAL PAIN: 0
FLANK PAIN: 0
SHORTNESS OF BREATH: 0
WEAKNESS: 1
SPEECH CHANGE: 0
COUGH: 1
NERVOUS/ANXIOUS: 0
HEADACHES: 0
MYALGIAS: 0
DIAPHORESIS: 0
MYALGIAS: 1
NAUSEA: 0
DIARRHEA: 0
FEVER: 0
FOCAL WEAKNESS: 0
DIZZINESS: 0
BLURRED VISION: 0
BACK PAIN: 1
NECK PAIN: 0
VOMITING: 0
SENSORY CHANGE: 0

## 2022-12-16 NOTE — PROGRESS NOTES
Pharmacy Vancomycin Kinetics Note for 12/16/2022     77 y.o. female on Vancomycin day # 1 (after Vanco was discontinued on 12/14)     Vancomycin Indication (Two level/Trough based Dosing): Bacteremia (goal trough 15-20)    Provider specified end date: 01/27/23    Active Antibiotics (From admission, onward)      Ordered     Ordering Provider       Fri Dec 16, 2022  1:41 PM    12/16/22 1341  vancomycin (VANCOCIN) 2,000 mg in  mL IVPB  (vancomycin (VANCOCIN) IV (LD + Maintenance))  ONCE         Ramesh Tee M.D.       Fri Dec 16, 2022 12:44 PM    12/16/22 1244  MD Alert...Vancomycin per Pharmacy  PHARMACY TO DOSE        Question:  Indication(s) for vancomycin?  Answer:  Staphylococcus aureus bacteremia    Ramesh Tee M.D.            Dosing Weight: 81 kg (178 lb 9.2 oz)      Admission History: Admitted on 11/26/2022 for Atrial fibrillation with rapid ventricular response (HCC) [I48.91]  Pertinent history: 77-year-old female admitted 11/26 after being found down. On 12/1, patient developed right arm thrombophlebitis, with worsening leukocytosis and blood cultures positive for MRSA likely 2' thrombophlebitis that may be seeded with infection. TEODORO was positive for endocarditis. Patient was started on vancomycin, but switched to Dapto on 12/14 for SO. Unfortunately, patient developed a puritic rash and ID switched Dapto back to Vanco.    Allergies:     Soap     Pertinent cultures to date:     Results       Procedure Component Value Units Date/Time    BLOOD CULTURE [[id number]]  (Abnormal) Collected: 12/09/22 0223    Order Status: Completed Specimen: Blood from Peripheral Updated: 12/13/22 1028     Significant Indicator POS     Source BLD     Site PERIPHERAL     Culture Result Growth detected by Bactec instrument. 12/12/2022  10:39      Methicillin Resistant Staphylococcus aureus  Methicillin resistant by screening method  See previous culture for sensitivity report.      Narrative:      CALL  [facility]  141 tel.  [phone],  CALLED  141 tel. [phone] 12/12/2022, 10:43, RB PERF. RESULTS CALLED TO: EVERT [place] [phone]  Contact  Per Hospital Policy: Only change Specimen Src: to \"Line\" if  specified by physician order.  Right Hand    BLOOD CULTURE [[id number]] Collected: 12/12/22 0308    Order Status: Completed Specimen: Blood from Peripheral Updated: 12/13/22 0932     Significant Indicator NEG     Source BLD     Site PERIPHERAL     Culture Result No Growth  Note: Blood cultures are incubated for 5 days and  are monitored continuously.Positive blood cultures  are called to the RN and reported as soon as  they are identified.      Narrative:      Contact  Per Hospital Policy: Only change Specimen Src: to \"Line\" if  specified by physician order.  Left Hand    BLOOD CULTURE [[id number]] Collected: 12/12/22 0308    Order Status: Completed Specimen: Blood from Peripheral Updated: 12/13/22 0932     Significant Indicator NEG     Source BLD     Site PERIPHERAL     Culture Result No Growth  Note: Blood cultures are incubated for 5 days and  are monitored continuously.Positive blood cultures  are called to the RN and reported as soon as  they are identified.      Narrative:      Contact  Per Hospital Policy: Only change Specimen Src: to \"Line\" if  specified by physician order.  Right Hand    BLOOD CULTURE [[id number]]  (Abnormal) Collected: 12/09/22 0306    Order Status: Completed Specimen: Blood from Peripheral Updated: 12/11/22 1208     Significant Indicator POS     Source BLD     Site PERIPHERAL     Culture Result Growth detected by Bactec instrument. 12/10/2022  06:43      Methicillin Resistant Staphylococcus aureus  See previous culture for sensitivity report.      Narrative:      CALL  [facility]  141 tel. [phone],  CALLED  141 tel. [phone] 12/10/2022, 06:46, RB PERF. RESULTS CALLED TO:  [phone] RN  Contact  Per Hospital Policy: Only change Specimen Src: to \"Line\" if  specified by physician order.  Right Hand    BLOOD CULTURE " "[[id number]]  (Abnormal) Collected: 12/05/22 1048    Order Status: Completed Specimen: Blood from Peripheral Updated: 12/08/22 0747     Significant Indicator POS     Source BLD     Site PERIPHERAL     Culture Result Growth detected by Bactec instrument. 12/06/2022  03:32      Methicillin Resistant Staphylococcus aureus  Methicillin resistant by screening method  See previous culture for sensitivity report.      Narrative:      CALL  [facility]  141 tel. [phone],  CALLED  141 tel. [phone] 12/06/2022, 03:33, RB PERF. RESULTS CALLED TO: RN  [phone]  Contact  Per Hospital Policy: Only change Specimen Src: to \"Line\" if  specified by physician order.  Left Hand    BLOOD CULTURE [[id number]]  (Abnormal)  (Susceptibility) Collected: 12/05/22 1048    Order Status: Completed Specimen: Blood from Peripheral Updated: 12/08/22 0747     Significant Indicator POS     Source BLD     Site PERIPHERAL     Culture Result Growth detected by Bactec instrument. 12/06/2022  04:29      Methicillin Resistant Staphylococcus aureus    Narrative:      CALL  [facility]  141 tel. [phone],  CALLED  141 tel. [phone] 12/06/2022, 04:30, RB PERF. RESULTS CALLED TO:RN  [phone]  Contact  Per Hospital Policy: Only change Specimen Src: to \"Line\" if  specified by physician order.  Right Forearm/Arm    BLOOD CULTURE [[id number]]  (Abnormal) Collected: 12/03/22 1233    Order Status: Completed Specimen: Blood from Peripheral Updated: 12/05/22 0801     Significant Indicator POS     Source BLD     Site PERIPHERAL     Culture Result Growth detected by Bactec instrument. 12/04/2022  10:42      Methicillin Resistant Staphylococcus aureus  See previous culture for sensitivity report.      Narrative:      CALL  [facility]  141 tel. [phone],  CALLED  141 tel. [phone] 12/04/2022, 10:41, RB PERF. RESULTS CALLED  TO:Mark LOYD [phone]  Enhanced Droplet, Contact, and Eye Protection  Per Hospital Policy: Only change Specimen Src: to \"Line\" if  specified by physician order.  Left " "Hand    BLOOD CULTURE [[id number]]  (Abnormal) Collected: 12/03/22 1233    Order Status: Completed Specimen: Blood from Peripheral Updated: 12/05/22 0759     Significant Indicator POS     Source BLD     Site PERIPHERAL     Culture Result Growth detected by Bactec instrument. 12/04/2022  17:48      Methicillin Resistant Staphylococcus aureus  See previous culture for sensitivity report.      Narrative:      Enhanced Droplet, Contact, and Eye Protection  Per Hospital Policy: Only change Specimen Src: to \"Line\" if  specified by physician order.  Right Hand    BLOOD CULTURE [[id number]]  (Abnormal) Collected: 12/02/22 0514    Order Status: Completed Specimen: Blood from Peripheral Updated: 12/05/22 0730     Significant Indicator POS     Source BLD     Site PERIPHERAL     Culture Result Growth detected by Bactec instrument. 12/03/2022  01:14      Methicillin Resistant Staphylococcus aureus  See previous culture for sensitivity report.      Narrative:      Enhanced Droplet, Contact, and Eye Protection  Per Hospital Policy: Only change Specimen Src: to \"Line\" if  specified by physician order.  Right AC    BLOOD CULTURE [[id number]]  (Abnormal)  (Susceptibility) Collected: 12/02/22 0514    Order Status: Completed Specimen: Blood from Peripheral Updated: 12/05/22 0729     Significant Indicator POS     Source BLD     Site PERIPHERAL     Culture Result Growth detected by Bactec instrument. 12/03/2022  01:14  Methicillin Resistant Staphylococcus aureus (MRSA)  detected by PCR.        Methicillin Resistant Staphylococcus aureus    Narrative:      CALL  [facility]  171 tel. [phone],  CALLED  171 tel. [phone] 12/03/2022, 03:18, RB PERF. RESULTS CALLED  TO:[phone] (Maksim)  Enhanced Droplet, Contact, and Eye Protection  Per Hospital Policy: Only change Specimen Src: to \"Line\" if  specified by physician order.  Right Hand    MRSA By PCR (Amp) [[id number]] Collected: 12/02/22 1220    Order Status: Completed Specimen: Respirate from Nares " "Updated: 22 1704     MRSA by PCR Negative    Narrative:      Enhanced Droplet, Contact, and Eye Protection  Collected By: [id number] RENETTA QUINTERO            Labs:     Estimated Creatinine Clearance: 42.6 mL/min (by C-G formula based on SCr of 1.11 mg/dL).  No results for input(s): WBC, NEUTSPOLYS, BANDSSTABS in the last 72 hours.  Recent Labs     22  0740 22  0313   BUN 21 27*   CREATININE 1.01 1.11       Intake/Output Summary (Last 24 hours) at 2022 1528  Last data filed at 2022 0803  Gross per 24 hour   Intake 180.07 ml   Output --   Net 180.07 ml      BP (!) 91/54   Pulse 82   Temp 37 °C (98.6 °F) (Temporal)   Resp 16   Ht 1.575 m (5' 2\")   Wt 81.3 kg (179 lb 3.7 oz)   SpO2 92%  Temp (24hrs), Av °C (98.6 °F), Min:36.8 °C (98.2 °F), Max:37.1 °C (98.8 °F)      List concerns for Vancomycin clearance:     SO;Age;Obesity;BUN/Scr ratio greater than 20:1    Pharmacokinetics:     Trough kinetics:   Recent Labs     22  1147   VANCORANDOM 22.3       A/P:     -  Vancomycin dose: pulse dosing due to SO     -  Next vancomycin level(s): Vr @1500    -  Comments: Patient's antibiotics are being switched back from daptomycin to vancomycin due to pruritic rash. Patient is in SO (Scr 0.69 > 1.11), therefore will start pulse dosing vanco. Last dose of vancomycin prior to starting dapto was on , which has likely been cleared. Reloaded patient this afternoon and ordered a random vanco level for tomorrow (~24 hr random). No documented UOP, but did request from nursing to document I&O to monitor renal function more closely.     Bernice Felix, PharmD    "

## 2022-12-16 NOTE — PROGRESS NOTES
"Received call from family member Carlos who stated he had a missed call from a [facility] number. Minimal communication able to be conducted over the phone due to hearing issues. Family member stated, \"I don't hear well.\" Family member requested that for all other calls to contact Marielena at [phone]. Though he also stated, \"she's currently in [place].\"   "

## 2022-12-16 NOTE — CARE PLAN
Problem: Knowledge Deficit - Standard  Goal: Patient and family/care givers will demonstrate understanding of plan of care, disease process/condition, diagnostic tests and medications  Outcome: Progressing     Problem: Skin Integrity  Goal: Skin integrity is maintained or improved  Outcome: Progressing       The patient is Stable - Low risk of patient condition declining or worsening    Shift Goals  Clinical Goals: skin integrity  Patient Goals: rest, comfrot  Family Goals: no family present at time of assessment    Progress made toward(s) clinical / shift goals: POC discussed with patient. Patient turns with q2 hour turns.

## 2022-12-16 NOTE — DISCHARGE PLANNING
Agency[facility]ility Name: [facility]   Outcome: DPA left a voicemail for Cecile regarding bed availability for today or this weekend. SANJANA requested a call back.     RN STACI Notified     [id number]  Agency[facility] Name: [facility]   Outcome: DPA left a voicemail for Cecile regarding pt is on Dapto. And if they are able to accept pt. As well as pt may be ready Monday or Tuesday with possible bed availability

## 2022-12-16 NOTE — PROCEDURES
Vascular Access Team     Date of Insertion: 12/16/22  Arm Circumference: 29  Internal length: 38  External Length: 0  Vein Occupancy %: 45   Reason for PICC: antibiotic therapy  Labs: on 12/12/22 WBC 7.9, , on 12/16/22 BUN 27, Cr 1.11, GFR 51, INR na     Consents confirmed, vessel patency confirmed with ultrasound. Risks and benefits of procedure explained to patient and education regarding central line associated bloodstream infections provided. Questions answered.      PICC placed in RUE per licensed provider order with ultrasound guidance.  4 Fr, single lumen PICC placed in basilic vein after 1 attempt(s). 2 mL of 1% lidocaine injected intradermally at the insertion site. A 21 gauge microintroducer needle was visualized entering the vein and modified Seldinger technique was used to obtain access to the vein. 38 cm catheter inserted and brisk blood return was observed from each lumen upon aspiration. Line secured at the 0 cm marker. TCS stylet removed and observed to be fully intact. Each lumen flushed using pulsatile method without resistance with 10 mL 0.9% normal saline. PICC line secured with Biopatch and Tegaderm.     PICC tip placement location is confirmed by nurse to be in the Superior Vena Cava (SVC) utilizing 3CG technology. PICC line is appropriate for use at this time. Patient tolerated procedure well, without complications.  Patient condition relayed to primary RN or ordering physician via this post procedure note in the EMR.      Ultrasound images uploaded to PACS and viewable in the EMR - yes  Ultrasound imaged printed and placed in paper chart - no     [employer] Power PICC ref # [id number], Lot # [id number], Expiration Date 10/31/2023

## 2022-12-16 NOTE — PROGRESS NOTES
Hospital Medicine Daily Progress Note    Date of Service  12/16/2022    Chief Complaint  Paulina Castellanos is a 77 y.o. female admitted 11/26/2022 with atrial fibrillation with rapid ventricular rate.      Hospital Course  Admitted for Atrial fibrillation with rapid ventricular rate, COVID-19 with pneumonia and Respiratory failure with hypoxia, mild rhabdomyolysis. She was started on Decadron, oxygen supplementation, and supportive measures.  She was placed on Metoprolol for rate control, and Eliquis for anticoagulation.  She apparently developed fever and encephalopathy, and was noted to have left arm thrombophlebitis.  Work-up further showed MRSA bacteremia.  Infectious disease was consulted on the case.  Patient was started on empiric coverage with IV vancomycin.  Repeat blood cultures remained positive for MRSA.  Cardiology was consulted on the case for a TEODORO, which showed mitral valve vegetation/endocarditis.     Interval Problem Update  Bacteremia -Patient got a picc line today. Cr boni a bit today. Remains on dapto. Otherwise, no acue events noted.     IE-denies any chest pain.       I have discussed this patient's plan of care and discharge plan at IDT rounds today with Case Management, Nursing, Nursing leadership, and other members of the IDT team.    Consultants/Specialty  cardiology, infectious disease, and palliative care  Cardiothoracic surgery    Code Status  DNAR/DNI    Disposition  Patient is not medically cleared for discharge.   Anticipate discharge to to skilled nursing facility.  I have placed the appropriate orders for post-discharge needs.    Review of Systems  Review of Systems   Constitutional:  Positive for malaise/fatigue. Negative for diaphoresis.        NO appreciable changes noted   HENT:  Negative for congestion and hearing loss.    Eyes:  Negative for blurred vision.   Respiratory:  Positive for cough. Negative for shortness of breath.    Cardiovascular:  Negative for chest pain.    Gastrointestinal:  Negative for abdominal pain, nausea and vomiting.   Genitourinary:  Negative for dysuria and flank pain.   Musculoskeletal:  Positive for back pain. Negative for joint pain, myalgias and neck pain.   Skin:  Negative for rash.   Neurological:  Positive for weakness (non focal). Negative for dizziness, sensory change, speech change, focal weakness and headaches.   Psychiatric/Behavioral:  The patient is not nervous/anxious.    All other systems reviewed and are negative.     Physical Exam  Temp:  [36.8 °C (98.2 °F)-37.1 °C (98.8 °F)] 37 °C (98.6 °F)  Pulse:  [82-95] 82  Resp:  [16-18] 16  BP: ()/(54-69) 91/54  SpO2:  [92 %-96 %] 92 %    Physical Exam  Vitals and nursing note reviewed.   Constitutional:       Comments: Slow to answer, but appropriate  Remains comfortable appearing   HENT:      Head: Normocephalic and atraumatic.      Nose: No congestion.      Mouth/Throat:      Mouth: Mucous membranes are moist.   Eyes:      Extraocular Movements: Extraocular movements intact.      Conjunctiva/sclera: Conjunctivae normal.   Cardiovascular:      Rate and Rhythm: Normal rate and regular rhythm.      Heart sounds: Murmur heard.   Pulmonary:      Effort: Pulmonary effort is normal. No respiratory distress.      Breath sounds: Normal breath sounds. No wheezing.   Abdominal:      General: There is no distension.      Tenderness: There is no abdominal tenderness.   Musculoskeletal:         General: Swelling (left arm, nearly absent) present. No tenderness.      Cervical back: No tenderness.      Right lower leg: Edema present.      Left lower leg: Edema present.      Comments: All improving somewhat   Skin:     Findings: No erythema.   Neurological:      General: No focal deficit present.      Mental Status: She is alert and oriented to person, place, and time.      Cranial Nerves: No cranial nerve deficit.       Fluids    Intake/Output Summary (Last 24 hours) at 12/16/2022 1208  Last data filed at  12/16/2022 0803  Gross per 24 hour   Intake 300.07 ml   Output --   Net 300.07 ml         Laboratory        Recent Labs     12/14/22  0740 12/16/22  0313   SODIUM 133* 134*   POTASSIUM 4.5 4.2   CHLORIDE 102 102   CO2 25 22   GLUCOSE 93 133*   BUN 21 27*   CREATININE 1.01 1.11   CALCIUM 9.0 8.6                   Imaging  IR-PICC LINE PLACEMENT W/ GUIDANCE > AGE 5   Final Result                  Ultrasound-guided PICC placement performed by qualified nursing staff as    above.          EC-TEODORO W/O CONT   Final Result      DX-CHEST-PORTABLE (1 VIEW)   Final Result      1.  Curvilinear opacities in the lung bases likely representing atelectasis, less likely pneumonitis.      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)  Assessment & Plan  IV Vancomycin  TTE with MV involvement  TEODORO done on 12/7, results finally released on 12/13, shows freely mobile MV mass  CTS Dr Vaughn, no surgical intervention at this time  NO clear focal neurological deficits at this time  No clear e/o clinical CHF at this time    Dysphagia- (present on admission)  Assessment & Plan  Level 7, Liquid level 0, crush pills  Aspiration precautions    Anemia- (present on admission)  Assessment & Plan  Follow CBC    Hypomagnesemia- (present on admission)  Assessment & Plan  IV Mg given  Follow level    Hypophosphatemia- (present on admission)  Assessment & Plan  Follow level    Hyponatremia- (present on admission)  Assessment & Plan  Follow BMP    MRSA bacteremia- (present on admission)  Assessment & Plan  IV Vancomycin changed to IV dapto  12/14  repeat blood cultures 12/12 ngtd, if negative again tomorrow, PICC line placed 12/16, awaiting SNF bed  [facility]  Remains clinically stable  Watch serum Cr closely    Encephalopathy acute- (present on admission)  Assessment & Plan  secondary to infectious etiology, improving slowly and no clear focal neurological deficits  Any change in neuro status, low threshold to get MRI brain given large mobile mass on MV with IE  Avoid Benzodiazepines and Anticholinergics  Frequent orientation  Open window blinds during the day  Avoid naps during the day  Family at bedside whenever possible  Ensure adequate sleep at night - use Melatonin preferably  Avoid early morning labs  Avoid vital signs during sleep  Ambulate if possible  Provide adequate pain control  Monitor for urinary retention  Prevent and manage constipation  Discontinue IVs, Catheters, Tubes, Lines, Cardiac monitors, SCDs if possible    Sepsis (HCC)- (present on admission)  Assessment & Plan  Resolved  ID following  See elsehwere      Thrombophlebitis- (present on admission)  Assessment & Plan  IV Vancomycin changed to IV dapto  Monitor CPK  Eliquis    Acute respiratory failure with hypoxia (HCC)- (present on admission)  Assessment & Plan  due to COVID 19  RT protocol  Resolved    Metabolic acidosis, normal anion gap (NAG)- (present on admission)  Assessment & Plan  Resolved    Frailty syndrome in geriatric patient- (present on admission)  Assessment & Plan  Palliative care following    Non-traumatic rhabdomyolysis- (present on admission)  Assessment & Plan  resolved    Elevated LFTs- (present on admission)  Assessment & Plan  Follow CMP    Pneumonia due to COVID-19 virus- (present on admission)  Assessment & Plan  Finished course of Decadron    Hypokalemia- (present on admission)  Assessment & Plan  Follow bmp       VTE prophylaxis: therapeutic anticoagulation with Eliquis    I have performed a physical exam and reviewed and updated ROS and  Plan today (12/16/2022). In review of yesterday's note (12/15/2022), there are no changes except as documented above.

## 2022-12-16 NOTE — PROGRESS NOTES
"Assumed care of patient at 0645. Bedside report received. Assessment complete.  AA&Ox4. Denies CP/SOB. Asked patient if she would be interested in getting a PICC line today. Patient stated, \"Oh I don't know, maybe.\" Inquired about concerns patient has. Patient stated she, \"Didn't quite feel up for it. I'd like to clean up and brush my hair today and use the shower cap instead.\" Communicated with CNA about getting shower cap for patient this morning and completing bed bath for patient. Educated patient indication and importance for PICC line. Patient verbalized understanding but still expressed hesitancy.   Reporting no pain. Declined intervention at this time. Educated patient regarding pharmacologic and non pharmacologic modalities for pain management.  Skin per flow sheets. Mild rash present on back and arms, seems to have improved from yesterday.   Tolerating diet. Denies N/V.  + void. + BM. Last BM 12/15  Q2 turns, max assist. Taps system in use.  Fall prevention measures in place per flowsheets.  Educated on SCD use, patient declined at this time, Pharmacologic VTE prophylaxis in use.  Plan of care discussed, all questions answered.  Educated regarding importance of oral care. Oral care kit at bedside. Call light is within reach, treaded slipper socks on, bed in lowest/ locked position, hourly rounding in place, all needs met at this time.   "

## 2022-12-16 NOTE — PROGRESS NOTES
Bedside report received.  Assessment complete.  A&O x 4. Patient calls appropriately.  Patient has 10/10 pain. Medicated per MAR.   Patient with red rash to back, sacrum and back left arm.  Tolerating level 7 (east to chew) diet. Denies N/V.  + void, + flatus, + BM. Last BM 12/15.  Reviewed plan of care with patient. Call light and personal belongings within reach. Hourly rounding in place. All needs met at this time.

## 2022-12-16 NOTE — PROGRESS NOTES
Infectious Disease Progress Note    Author: Ramesh Tee M.D. Date & Time of service: 12/16/2022  8:54 AM    Chief Complaint:  MRSA bacteremia    Interval History:  77 y.o. female admitted 11/26/2022. For Afib with RVR and rhabdomyolysis +COVID  Developed fever and AMS dueing hosp-blood cultures +MRSA  12/4 AF WBC 13.9 Oriented to person and place c/o LUE pain No dyspnea or CP. No new neck, back, joint pain  12/5 patient remains afebrile, white count is resolved now, down to 9.4, tolerating vancomycin.  Patient states she is tired but overall better.   left arm  12/6 patient remains afebrile, white count is 9.7, tolerating vancomycin.  Plan as below  12/7 patient remains afebrile, white count 7.7, tolerating vancomycin, no new events overnight.  12/8 patient remains afebrile, white count is 8.5, tolerating vancomycin.  TEODORO positive for endocarditis  12/9 patient remains afebrile, count 7.6, repeat cultures as below.  Patient notes improvement in left arm pain and swelling  12/10 patient remains afebrile, no CBC this morning, tolerating vancomycin  12/11 patient remains afebrile, no CBC this morning, tolerating IV vancomycin.  Repeat blood cultures as below.   12/12 T-max 98.9, remains afebrile, white count today 7.9, tolerating vancomycin, repeat blood cultures  12/13 patient remains afebrile, no CBC this morning, tolerating vancomycin.  Repeat blood cultures no growth to date  12/14 patient remains afebrile, no CBC this morning, tolerating vancomycin. Repeat blood cultures pending  12/15 patient remains afebrile.  No CBC this morning, tolerated the switch to daptomycin.  Blood cultures as below  12/16 Patient is afebrile, no CBC this morning, unfortunately developed worsening generalized pruritic rash.  Some improvement with Benadryl overnight    Labs Reviewed, Medications Reviewed, and Radiology Reviewed.    Review of Systems:  Review of Systems   Constitutional:  Positive for malaise/fatigue.  Negative for fever.   Gastrointestinal:  Negative for abdominal pain, diarrhea, nausea and vomiting.   Musculoskeletal:  Positive for myalgias.   Skin:  Negative for itching and rash.   All other systems reviewed and are negative.    Hemodynamics:  Temp (24hrs), Av °C (98.6 °F), Min:36.8 °C (98.2 °F), Max:37.1 °C (98.8 °F)  Temperature: 37 °C (98.6 °F)  Pulse  Av.2  Min: 54  Max: 136   Blood Pressure : (!) 91/54       Physical Exam:  Physical Exam  Vitals and nursing note reviewed.   Constitutional:       General: She is not in acute distress.     Appearance: She is ill-appearing. She is not toxic-appearing or diaphoretic.   HENT:      Mouth/Throat:      Pharynx: No oropharyngeal exudate.      Comments: Fair dentition  Eyes:      General: No scleral icterus.     Extraocular Movements: Extraocular movements intact.      Pupils: Pupils are equal, round, and reactive to light.   Pulmonary:      Effort: Pulmonary effort is normal. No respiratory distress.      Breath sounds: No stridor.   Abdominal:      General: There is no distension.      Palpations: Abdomen is soft.      Tenderness: There is no abdominal tenderness.   Musculoskeletal:         General: Swelling and tenderness present.      Cervical back: Neck supple. No rigidity.   Skin:     Coloration: Skin is pale. Skin is not jaundiced.      Findings: Bruising, erythema and rash present.      Comments: Left arm thrombophlebitis has resolved  [place] macular erythematous pruritic rash over all extremities, most pronounced over the back   Neurological:      General: No focal deficit present.      Mental Status: She is alert and oriented to person, place, and time.   Psychiatric:         Mood and Affect: Mood normal.         Behavior: Behavior normal.       Meds:    Current Facility-Administered Medications:     DAPTOmycin    Pharmacy Consult Request    acetaminophen **FOLLOWED BY** [START ON 2022] acetaminophen    oxyCODONE immediate-release **OR**  oxyCODONE immediate-release **OR** HYDROmorphone    nystatin    metoprolol tartrate    Respiratory Therapy Consult    LR    guaiFENesin dextromethorphan    benzonatate    senna-docusate **AND** polyethylene glycol/lytes **AND** magnesium hydroxide **AND** bisacodyl    hydrALAZINE    Metoprolol Tartrate    apixaban    Labs:  No results for input(s): WBC, RBC, HEMOGLOBIN, HEMATOCRIT, MCV, MCH, RDW, PLATELETCT, MPV, NEUTSPOLYS, LYMPHOCYTES, MONOCYTES, EOSINOPHILS, BASOPHILS, RBCMORPHOLO in the last 72 hours.    Recent Labs     12/14/22  0740 12/16/22  0313   SODIUM 133* 134*   POTASSIUM 4.5 4.2   CHLORIDE 102 102   CO2 25 22   GLUCOSE 93 133*   BUN 21 27*   CPKTOTAL 17  --        Recent Labs     12/14/22  0740 12/16/22  0313   CREATININE 1.01 1.11         Imaging:  DX-CHEST-PORTABLE (1 VIEW)    Result Date: 12/2/2022 12/2/2022 12:15 PM HISTORY/REASON FOR EXAM:  Shortness of Breath. TECHNIQUE/EXAM DESCRIPTION AND NUMBER OF VIEWS: Single portable view of the chest. COMPARISON: None FINDINGS: Heart size is within normal limits. There are curvilinear opacities in the lung bases. No pleural abnormalities are noted.     1.  Curvilinear opacities in the lung bases likely representing atelectasis, less likely pneumonitis.    US-EXTREMITY VENOUS UPPER UNILAT LEFT    Result Date: 11/30/2022   Upper Extremity  Venous Duplex Report  Vascular Laboratory  CONCLUSIONS  Left upper extremity venous duplex imaging.  No evidence of deep venous thrombosis.  Evidence of acute to subacute superficial venous thrombosis in the LEFT  cephalic vein from the mid to distal bicep.  MASSIEL SILVA  Exam Date:     11/30/2022 13:29  Room #:     Inpatient  Priority:     Routine  Ht (in):             Wt (lb):  Ordering Physician:        EWELINA WOOD  Referring Physician:       NINA Mcknight  Sonographer:               Giovana Moran RVT  Study Type:                Complete Unilateral  Technical Quality:         Adequate  Age:    77    Gender:     F   MRN:    [id number]  :    1945      BSA:  Indications:     Localized swelling, mass and lump, unspecified upper limb,                   Edema, unspecified  CPT Codes:       22926  ICD Codes:       R22.30  R60.9  History:         Left upper extremity swelling/edema. No prior exams.  Limitations:  PROCEDURES:  Left upper extremity venous duplex imaging.  The following venous structures were evaluated: internal jugular,  subclavian, axillary, brachial, cephalic, and basilic veins.  Serial compression, color, and spectral Doppler flow evaluations were  performed.  FINDINGS:  Left upper extremity-  No evidence of deep venous thrombosis.  Evidence of acute to subacute superficial venous thrombosis in the cephalic  vein from the mid to distal bicep.  All other veins demonstrate complete color filling and compressibility with  normal venous flow dynamics including spontaneous flow and respiratory  phasicity.  Flow was evaluated in the contralateral subclavian vein and normal venous  flow dynamics including spontaneous flow and respiratory phasic variation  were demonstrated.  Brittney MUNOZ To  (Electronically Signed)  Final Date:      2022                   16:24    EC-ECHOCARDIOGRAM COMPLETE W/O CONT    Result Date: 2022  Transthoracic Echo Report Echocardiography Laboratory CONCLUSIONS No prior study is available for comparison. Normal left ventricular chamber size. Mild concentric left ventricular hypertrophy. The left ventricular ejection fraction is visually estimated to be 65%. Trace mitral regurgitation. MASSIEL SILVA Exam Date:         2022                    13:31 Exam Location:     Inpatient Priority:          Routine Ordering Physician:        BETH GIL Referring Physician:       [id number] Rose Sonographer:               Frank ARANAD Age:    77     Gender:    F MRN:    [id number] :    1945 BSA:    1.89   Ht (in):    67     Wt (lb):    170 Exam Type:     Complete  Indications:     Atrial Fibrillation, Shortness of breath ICD Codes:       427.31  786.05 CPT Codes:       98584 BP:   121    /   65     HR: Technical Quality:       Poor MEASUREMENTS  (Male / Female) Normal Values 2D ECHO LV Diastolic Diameter PLAX        4.2 cm                4.2 - 5.9 / 3.9 - 5.3 cm LV Systolic Diameter PLAX         2.7 cm                2.1 - 4.0 cm IVS Diastolic Thickness           1.1 cm                LVPW Diastolic Thickness          1.1 cm                LVOT Diameter                     1.8 cm                Estimated LV Ejection Fraction    65 %                  LV Ejection Fraction MOD BP       73.1 %                >= 55  % LV Ejection Fraction MOD 4C       78.7 %                LV Ejection Fraction MOD 2C       73 %                  DOPPLER AV Peak Velocity                  1.1 m/s               AV Peak Gradient                  5.3 mmHg              AV Mean Gradient                  2.9 mmHg              LVOT Peak Velocity                1.1 m/s               AV Area Cont Eq vti               2.6 cm2               MV Velocity Time Integral         21 cm                 Mitral E Point Velocity           0.86 m/s              Mitral E to A Ratio               0.75                  Mitral A Duration                 156 ms                MV Pressure Half Time             31.9 ms               MV Area PHT                       6.9 cm2               MV Deceleration Time              85.8 ms               * Indicates values subject to auto-interpretation LV EF:  65    % FINDINGS Left Ventricle Normal left ventricular chamber size. Mild concentric left ventricular hypertrophy. Normal left ventricular systolic function. The left ventricular ejection fraction is visually estimated to be 65%. Normal regional wall motion. Right Ventricle The right ventricle is not well visualized. Right Atrium The right atrium is not well visualized. Left Atrium Normal left atrial size. Left atrial volume index  "is 24 mL/sq m. Mitral Valve The mitral valve is not well visualized. No mitral stenosis. Trace mitral regurgitation. Aortic Valve The aortic valve is not well visualized. Tricuspid Valve The tricuspid valve is not well visualized. No stenosis or regurgitation seen. Pulmonic Valve The pulmonic valve is not well visualized. No stenosis or regurgitation seen. Pericardium No pericardial effusion. Aorta Normal aortic root for body surface area. The ascending aorta diameter is 3.1 cm. Zafar Bailon M.D. (Electronically Signed) Final Date:     27 November 2022                 20:23      Micro:  Results       Procedure Component Value Units Date/Time    BLOOD CULTURE [[id number]]  (Abnormal) Collected: 12/09/22 0223    Order Status: Completed Specimen: Blood from Peripheral Updated: 12/13/22 1028     Significant Indicator POS     Source BLD     Site PERIPHERAL     Culture Result Growth detected by Bactec instrument. 12/12/2022  10:39      Methicillin Resistant Staphylococcus aureus  Methicillin resistant by screening method  See previous culture for sensitivity report.      Narrative:      CALL  [facility]  141 tel. [phone],  CALLED  141 tel. [phone] 12/12/2022, 10:43, RB PERF. RESULTS CALLED TO: EVERT [place] [id number]  Contact  Per Hospital Policy: Only change Specimen Src: to \"Line\" if  specified by physician order.  Right Hand    BLOOD CULTURE [[id number]] Collected: 12/12/22 0308    Order Status: Completed Specimen: Blood from Peripheral Updated: 12/13/22 0932     Significant Indicator NEG     Source BLD     Site PERIPHERAL     Culture Result No Growth  Note: Blood cultures are incubated for 5 days and  are monitored continuously.Positive blood cultures  are called to the RN and reported as soon as  they are identified.      Narrative:      Contact  Per Hospital Policy: Only change Specimen Src: to \"Line\" if  specified by physician order.  Left Hand    BLOOD CULTURE [[id number]] Collected: 12/12/22 0308    Order Status: " "Completed Specimen: Blood from Peripheral Updated: 12/13/22 0932     Significant Indicator NEG     Source BLD     Site PERIPHERAL     Culture Result No Growth  Note: Blood cultures are incubated for 5 days and  are monitored continuously.Positive blood cultures  are called to the RN and reported as soon as  they are identified.      Narrative:      Contact  Per Hospital Policy: Only change Specimen Src: to \"Line\" if  specified by physician order.  Right Hand    BLOOD CULTURE [[id number]]  (Abnormal) Collected: 12/09/22 0306    Order Status: Completed Specimen: Blood from Peripheral Updated: 12/11/22 1208     Significant Indicator POS     Source BLD     Site PERIPHERAL     Culture Result Growth detected by Bactec instrument. 12/10/2022  06:43      Methicillin Resistant Staphylococcus aureus  See previous culture for sensitivity report.      Narrative:      CALL  [facility]  141 tel. [phone],  CALLED  141 tel. [phone] 12/10/2022, 06:46, RB PERF. RESULTS CALLED TO:  [phone] RN  Contact  Per Hospital Policy: Only change Specimen Src: to \"Line\" if  specified by physician order.  Right Hand            Assessment:  This is a 77-year-old female patient who was transferred from an outside facility, slid off her bed at home and was unable to get up for multiple days, found by a friend, was found to be in A. fib with RVR and with mild rhabdomyolysis.  She was also noted to have COVID-19 requiring nasal cannula oxygen, resolved.  On 12/1, she developed right arm thrombophlebitis and the following day had worsening leukocytosis and procalcitonin, blood cultures positive for MRSA. TEODORO positive for infective endocarditis    Pertinent Diagnoses:  Native mitral valve infective endocarditis  MRSA bacteremia, persistent  SO, resolved  Recent COVID-19  Thrombophlebitis  Drug rash     PLAN:   MRSA bacteremia  Native mitral valve infective endocarditis  Thrombophlebitis, improved  BCxs + MRSA 12/2 and 12/3, 12/5  Follow repeat blood " cultures x2 from 12/9, 1 out of 2 positive  TEODORO with a large freely mobile mass 1.4 x 1 cm on the mitral valve.  CT surgery evaluated pros and cons of surgery - favors conservative management given no definitive embolization so far, no heart failure, and patient's advanced age.   Repeat blood cultures x2 from 12/12 no growth till date  Due to concern for rising creatinine and vancomycin levels, changed to IV daptomycin 8 mg/KG every 24 hours.  Unfortunately, she has now developed a generalized pruritic rash.  We will return to vancomycin and monitor creatinine level closely  Repeat CBC with differential in a.m.    Disposition: Patient likely being discharged to a rehab facility.  Not cleared for discharge at this time intervention  At least weekly CBC with differential, CMP, while on IV antibiotics  Repeat TTE at end of therapy  Need for PICC line: Yes, okay to place    Discussed with Dr. Brannon    ID clinic follow-up in 4 to 6 weeks

## 2022-12-16 NOTE — PROGRESS NOTES
Assumed care of patient at 0645. Bedside report received. Assessment complete.  AA&Ox4. Denies CP/SOB. Room air  Reporting mild pain. Declined intervention at this time.   Skin per flow sheets. Rash noted to back, back of arms and legs. Hospitalist and IND notified. Photos uploaded into epic.   Tolerating diet. Denies N/V.  + void. + BM. Last BM this morning 12/15. Patient is incontinent of bowel and bladder, brief in place.   Pt is a max assist Q2 turns   Educated on SCD use, patient declined at this time  Plan of care discussed, all questions answered.  Educated regarding importance of oral care. Oral care kit at bedside. Call light is within reach, treaded slipper socks on, bed in lowest/ locked position, hourly rounding in place, all needs met at this time.

## 2022-12-16 NOTE — PROGRESS NOTES
"VAT team called this RN into room because patient was refusing PICC line placement. Patient stated, \"I just don't feel like it,\" and \"I guess I'm old fashion,\" and \"I'm just lazy I guess.\"   Educated patient on what a PICC line was and indications for receiving a PICC line as well as current health status and treatment plan. Asked patient if she had any questions or had concerns regarding the PICC line. Educated patient on VAT team's job, PICC line and IV antibiotic treatment. Emotional support provided to patient. I asked the patient if she would still like to receive antibiotic treatment. The patient stated that she would.   Dr Brannon called to bedside who also educated patient on indication for PICC line, progression of care, antibiotic treatment and treatment plan. Emotional support and education provided to patient. Patient declines PICC line placement at this time.     "

## 2022-12-16 NOTE — PROGRESS NOTES
4 Eyes Skin Assessment Completed by EVERT Grover and EVERT Borrero.    Head WDL  Ears WDL  Nose WDL  Mouth WDL  Neck WDL  Breast/Chest - area of redness under L breat, interdry in place  Spine Redness/ rash (to all of back and bilateral flanks)  (R) Arm/Elbow/Hand red/rash to back arms  (L) Arm/Elbow/Hand   Abdomen red/rash to back arms  Groin Redness, nystatin applied  Scrotum/Coccyx/Buttocks red/rash  (R) Leg red/rash to inner thigh  (L) Leg red/rash to inner thigh  (R) Heel/Foot/Toe WDL  (L) Heel/Foot/Toe WDL          Devices In Places Blood Pressure Cuff, Pulse Ox, and Nasal Cannula      Interventions In Place Waffle Overlay, TAP System, Pillows, Q2 Turns, and Pressure Redistribution Mattress    Possible Skin Injury No    Pictures Uploaded Into Epic N/A  Wound Consult Placed N/A  RN Wound Prevention Protocol Ordered No

## 2022-12-17 LAB
BACTERIA BLD CULT: NORMAL
BACTERIA BLD CULT: NORMAL
BASOPHILS # BLD AUTO: 0.8 % (ref 0–1.8)
BASOPHILS # BLD: 0.05 K/UL (ref 0–0.12)
EOSINOPHIL # BLD AUTO: 0.83 K/UL (ref 0–0.51)
EOSINOPHIL NFR BLD: 13.6 % (ref 0–6.9)
ERYTHROCYTE [DISTWIDTH] IN BLOOD BY AUTOMATED COUNT: 49.1 FL (ref 35.9–50)
HCT VFR BLD AUTO: 29 % (ref 37–47)
HGB BLD-MCNC: 9.2 G/DL (ref 12–16)
LYMPHOCYTES # BLD AUTO: 1.5 K/UL (ref 1–4.8)
LYMPHOCYTES NFR BLD: 24.6 % (ref 22–41)
MANUAL DIFF BLD: NORMAL
MCH RBC QN AUTO: 30.2 PG (ref 27–33)
MCHC RBC AUTO-ENTMCNC: 31.7 G/DL (ref 33.6–35)
MCV RBC AUTO: 95.1 FL (ref 81.4–97.8)
MONOCYTES # BLD AUTO: 0.41 K/UL (ref 0–0.85)
MONOCYTES NFR BLD AUTO: 6.8 % (ref 0–13.4)
MORPHOLOGY BLD-IMP: NORMAL
NEUTROPHILS # BLD AUTO: 3.31 K/UL (ref 2–7.15)
NEUTROPHILS NFR BLD: 54.2 % (ref 44–72)
NRBC # BLD AUTO: 0 K/UL
NRBC BLD-RTO: 0 /100 WBC
PLATELET # BLD AUTO: 307 K/UL (ref 164–446)
PLATELET BLD QL SMEAR: NORMAL
PMV BLD AUTO: 8.7 FL (ref 9–12.9)
RBC # BLD AUTO: 3.05 M/UL (ref 4.2–5.4)
RBC BLD AUTO: NORMAL
SIGNIFICANT IND 70042: NORMAL
SIGNIFICANT IND 70042: NORMAL
SITE SITE: NORMAL
SITE SITE: NORMAL
SOURCE SOURCE: NORMAL
SOURCE SOURCE: NORMAL
WBC # BLD AUTO: 6.1 K/UL (ref 4.8–10.8)

## 2022-12-17 PROCEDURE — A9270 NON-COVERED ITEM OR SERVICE: HCPCS | Performed by: STUDENT IN AN ORGANIZED HEALTH CARE EDUCATION/TRAINING PROGRAM

## 2022-12-17 PROCEDURE — 700102 HCHG RX REV CODE 250 W/ 637 OVERRIDE(OP): Performed by: PHYSICIAN ASSISTANT

## 2022-12-17 PROCEDURE — A9270 NON-COVERED ITEM OR SERVICE: HCPCS | Performed by: PHYSICIAN ASSISTANT

## 2022-12-17 PROCEDURE — 85025 COMPLETE CBC W/AUTO DIFF WBC: CPT

## 2022-12-17 PROCEDURE — 36415 COLL VENOUS BLD VENIPUNCTURE: CPT

## 2022-12-17 PROCEDURE — 99233 SBSQ HOSP IP/OBS HIGH 50: CPT | Performed by: INTERNAL MEDICINE

## 2022-12-17 PROCEDURE — 770001 HCHG ROOM/CARE - MED/SURG/GYN PRIV*

## 2022-12-17 PROCEDURE — 700111 HCHG RX REV CODE 636 W/ 250 OVERRIDE (IP): Performed by: INTERNAL MEDICINE

## 2022-12-17 PROCEDURE — 700102 HCHG RX REV CODE 250 W/ 637 OVERRIDE(OP): Performed by: INTERNAL MEDICINE

## 2022-12-17 PROCEDURE — 99232 SBSQ HOSP IP/OBS MODERATE 35: CPT | Performed by: INTERNAL MEDICINE

## 2022-12-17 PROCEDURE — 700105 HCHG RX REV CODE 258: Performed by: INTERNAL MEDICINE

## 2022-12-17 PROCEDURE — 700101 HCHG RX REV CODE 250: Performed by: INTERNAL MEDICINE

## 2022-12-17 PROCEDURE — 700102 HCHG RX REV CODE 250 W/ 637 OVERRIDE(OP): Performed by: STUDENT IN AN ORGANIZED HEALTH CARE EDUCATION/TRAINING PROGRAM

## 2022-12-17 PROCEDURE — A9270 NON-COVERED ITEM OR SERVICE: HCPCS | Performed by: INTERNAL MEDICINE

## 2022-12-17 PROCEDURE — 85007 BL SMEAR W/DIFF WBC COUNT: CPT

## 2022-12-17 RX ORDER — DIPHENHYDRAMINE HCL 25 MG
25 TABLET ORAL EVERY 6 HOURS PRN
Status: DISCONTINUED | OUTPATIENT
Start: 2022-12-17 | End: 2022-12-23 | Stop reason: HOSPADM

## 2022-12-17 RX ADMIN — ACETAMINOPHEN 1000 MG: 500 TABLET ORAL at 18:18

## 2022-12-17 RX ADMIN — ACETAMINOPHEN 1000 MG: 500 TABLET ORAL at 05:02

## 2022-12-17 RX ADMIN — DIPHENHYDRAMINE HYDROCHLORIDE 25 MG: 25 TABLET ORAL at 15:58

## 2022-12-17 RX ADMIN — GUAIFENESIN SYRUP AND DEXTROMETHORPHAN 5 ML: 100; 10 SYRUP ORAL at 09:00

## 2022-12-17 RX ADMIN — METOPROLOL TARTRATE 12.5 MG: 25 TABLET, FILM COATED ORAL at 18:18

## 2022-12-17 RX ADMIN — APIXABAN 5 MG: 5 TABLET, FILM COATED ORAL at 05:03

## 2022-12-17 RX ADMIN — ACETAMINOPHEN 1000 MG: 500 TABLET ORAL at 12:23

## 2022-12-17 RX ADMIN — VANCOMYCIN HYDROCHLORIDE 1500 MG: 1 INJECTION, POWDER, LYOPHILIZED, FOR SOLUTION INTRAVENOUS at 16:31

## 2022-12-17 RX ADMIN — GUAIFENESIN SYRUP AND DEXTROMETHORPHAN 5 ML: 100; 10 SYRUP ORAL at 21:00

## 2022-12-17 RX ADMIN — METOPROLOL TARTRATE 12.5 MG: 25 TABLET, FILM COATED ORAL at 05:22

## 2022-12-17 RX ADMIN — APIXABAN 5 MG: 5 TABLET, FILM COATED ORAL at 18:19

## 2022-12-17 ASSESSMENT — PAIN DESCRIPTION - PAIN TYPE
TYPE: ACUTE PAIN

## 2022-12-17 ASSESSMENT — ENCOUNTER SYMPTOMS
BLURRED VISION: 0
DIARRHEA: 0
HEADACHES: 0
ABDOMINAL PAIN: 0
VOMITING: 0
MYALGIAS: 0
FEVER: 0
FOCAL WEAKNESS: 0
DIZZINESS: 0
DIAPHORESIS: 0
SENSORY CHANGE: 0
NAUSEA: 0
COUGH: 1
SPEECH CHANGE: 0
FLANK PAIN: 0
WEAKNESS: 1
NERVOUS/ANXIOUS: 0
MYALGIAS: 1
SHORTNESS OF BREATH: 0
NECK PAIN: 0
BACK PAIN: 1

## 2022-12-17 NOTE — PROGRESS NOTES
Assumed care of patient at 0645. Bedside report received. Assessment complete.  AA&Ox4. Denies CP/SOB.  Reporting 5/10 pain. Declined intervention at this time.  Educated patient regarding pharmacologic and non pharmacologic modalities for pain management.  Skin per flowsheets  Tolerating L7 Easy to Chew diet. Denies N/V.  + void via Purewick+ BM. Last BM 12/17  Pt ambulates Max assist  All needs met at this time. Call light within reach. Pt calls appropriately. Bed low and locked, non skid socks in place. Hourly rounding in place.

## 2022-12-17 NOTE — PROGRESS NOTES
Infectious Disease Progress Note    Author: Ramesh Tee M.D. Date & Time of service: 12/17/2022  9:14 AM    Chief Complaint:  MRSA bacteremia    Interval History:  77 y.o. female admitted 11/26/2022. For Afib with RVR and rhabdomyolysis +COVID  Developed fever and AMS dueing hosp-blood cultures +MRSA  12/4 AF WBC 13.9 Oriented to person and place c/o LUE pain No dyspnea or CP. No new neck, back, joint pain  12/5 patient remains afebrile, white count is resolved now, down to 9.4, tolerating vancomycin.  Patient states she is tired but overall better.   left arm  12/6 patient remains afebrile, white count is 9.7, tolerating vancomycin.  Plan as below  12/7 patient remains afebrile, white count 7.7, tolerating vancomycin, no new events overnight.  12/8 patient remains afebrile, white count is 8.5, tolerating vancomycin.  TEODORO positive for endocarditis  12/9 patient remains afebrile, count 7.6, repeat cultures as below.  Patient notes improvement in left arm pain and swelling  12/10 patient remains afebrile, no CBC this morning, tolerating vancomycin  12/11 patient remains afebrile, no CBC this morning, tolerating IV vancomycin.  Repeat blood cultures as below.   12/12 T-max 98.9, remains afebrile, white count today 7.9, tolerating vancomycin, repeat blood cultures  12/13 patient remains afebrile, no CBC this morning, tolerating vancomycin.  Repeat blood cultures no growth to date  12/14 patient remains afebrile, no CBC this morning, tolerating vancomycin. Repeat blood cultures pending  12/15 patient remains afebrile.  No CBC this morning, tolerated the switch to daptomycin.  Blood cultures as below  12/16 Patient is afebrile, no CBC this morning, unfortunately developed worsening generalized pruritic rash.  Some improvement with Benadryl overnight  12/17 patient remains afebrile, white count is 6.1, absolute eosinophilia of 830.  No worsening of rash after switching out of daptomycin    Labs Reviewed,  Medications Reviewed, and Radiology Reviewed.    Review of Systems:  Review of Systems   Constitutional:  Positive for malaise/fatigue. Negative for fever.   Gastrointestinal:  Negative for abdominal pain, diarrhea, nausea and vomiting.   Musculoskeletal:  Positive for myalgias.   Skin:  Negative for itching and rash.   All other systems reviewed and are negative.    Hemodynamics:  Temp (24hrs), Av.7 °C (98.1 °F), Min:36.2 °C (97.1 °F), Max:37 °C (98.6 °F)  Temperature: 37 °C (98.6 °F), Monitored Temp: 36.7 °C (98.1 °F)  Pulse  Av.8  Min: 54  Max: 136   Blood Pressure : (!) 98/61       Physical Exam:  Physical Exam  Vitals and nursing note reviewed.   Constitutional:       General: She is not in acute distress.     Appearance: She is ill-appearing. She is not toxic-appearing or diaphoretic.   HENT:      Mouth/Throat:      Pharynx: No oropharyngeal exudate.      Comments: Fair dentition  Eyes:      General: No scleral icterus.     Extraocular Movements: Extraocular movements intact.      Pupils: Pupils are equal, round, and reactive to light.   Pulmonary:      Effort: Pulmonary effort is normal. No respiratory distress.      Breath sounds: No stridor.   Abdominal:      General: There is no distension.      Palpations: Abdomen is soft.      Tenderness: There is no abdominal tenderness.   Musculoskeletal:         General: Swelling and tenderness present.      Cervical back: Neck supple. No rigidity.   Skin:     Coloration: Skin is pale. Skin is not jaundiced.      Findings: Bruising, erythema and rash present.      Comments: Left arm thrombophlebitis has resolved  [place] macular erythematous pruritic rash over all extremities, most pronounced over the back   Neurological:      General: No focal deficit present.      Mental Status: She is alert and oriented to person, place, and time.   Psychiatric:         Mood and Affect: Mood normal.         Behavior: Behavior normal.       Meds:    Current  Facility-Administered Medications:     MD Alert...Vancomycin per Pharmacy    Pharmacy Consult Request    acetaminophen **FOLLOWED BY** [START ON 12/18/2022] acetaminophen    oxyCODONE immediate-release **OR** oxyCODONE immediate-release **OR** HYDROmorphone    metoprolol tartrate    Respiratory Therapy Consult    LR    guaiFENesin dextromethorphan    benzonatate    senna-docusate **AND** polyethylene glycol/lytes **AND** magnesium hydroxide **AND** bisacodyl    hydrALAZINE    Metoprolol Tartrate    apixaban    Labs:  Recent Labs     12/17/22  0010   WBC 6.1   RBC 3.05*   HEMOGLOBIN 9.2*   HEMATOCRIT 29.0*   MCV 95.1   MCH 30.2   RDW 49.1   PLATELETCT 307   MPV 8.7*   NEUTSPOLYS 54.20   LYMPHOCYTES 24.60   MONOCYTES 6.80   EOSINOPHILS 13.60*   BASOPHILS 0.80   RBCMORPHOLO Normal       Recent Labs     12/16/22  0313   SODIUM 134*   POTASSIUM 4.2   CHLORIDE 102   CO2 22   GLUCOSE 133*   BUN 27*       Recent Labs     12/16/22  0313   CREATININE 1.11         Imaging:  DX-CHEST-PORTABLE (1 VIEW)    Result Date: 12/2/2022 12/2/2022 12:15 PM HISTORY/REASON FOR EXAM:  Shortness of Breath. TECHNIQUE/EXAM DESCRIPTION AND NUMBER OF VIEWS: Single portable view of the chest. COMPARISON: None FINDINGS: Heart size is within normal limits. There are curvilinear opacities in the lung bases. No pleural abnormalities are noted.     1.  Curvilinear opacities in the lung bases likely representing atelectasis, less likely pneumonitis.    US-EXTREMITY VENOUS UPPER UNILAT LEFT    Result Date: 11/30/2022   Upper Extremity  Venous Duplex Report  Vascular Laboratory  CONCLUSIONS  Left upper extremity venous duplex imaging.  No evidence of deep venous thrombosis.  Evidence of acute to subacute superficial venous thrombosis in the LEFT  cephalic vein from the mid to distal bicep.  SHIRA MASSIEL  Exam Date:     11/30/2022 13:29  Room #:     Inpatient  Priority:     Routine  Ht (in):             Wt (lb):  Ordering Physician:        EWELINA WOOD   Referring Physician:       NINA Mcknight  Sonographer:               Giovana Moran RVKAROLINA  Study Type:                Complete Unilateral  Technical Quality:         Adequate  Age:    77    Gender:     F  MRN:    [id number]  :    1945      BSA:  Indications:     Localized swelling, mass and lump, unspecified upper limb,                   Edema, unspecified  CPT Codes:       06532  ICD Codes:       R22.30  R60.9  History:         Left upper extremity swelling/edema. No prior exams.  Limitations:  PROCEDURES:  Left upper extremity venous duplex imaging.  The following venous structures were evaluated: internal jugular,  subclavian, axillary, brachial, cephalic, and basilic veins.  Serial compression, color, and spectral Doppler flow evaluations were  performed.  FINDINGS:  Left upper extremity-  No evidence of deep venous thrombosis.  Evidence of acute to subacute superficial venous thrombosis in the cephalic  vein from the mid to distal bicep.  All other veins demonstrate complete color filling and compressibility with  normal venous flow dynamics including spontaneous flow and respiratory  phasicity.  Flow was evaluated in the contralateral subclavian vein and normal venous  flow dynamics including spontaneous flow and respiratory phasic variation  were demonstrated.  Brittney MUNOZ To  (Electronically Signed)  Final Date:      2022                   16:24    EC-ECHOCARDIOGRAM COMPLETE W/O CONT    Result Date: 2022  Transthoracic Echo Report Echocardiography Laboratory CONCLUSIONS No prior study is available for comparison. Normal left ventricular chamber size. Mild concentric left ventricular hypertrophy. The left ventricular ejection fraction is visually estimated to be 65%. Trace mitral regurgitation. MASSIEL SILVA Exam Date:         2022                    13:31 Exam Location:     Inpatient Priority:          Routine Ordering Physician:        BETH GIL Referring Physician:        [id number], LOUISE Sonographer:               Frank ARANDA Age:    77     Gender:    F MRN:    [id number] :    1945 BSA:    1.89   Ht (in):    67     Wt (lb):    170 Exam Type:     Complete Indications:     Atrial Fibrillation, Shortness of breath ICD Codes:       427.31  786.05 CPT Codes:       81551 BP:   121    /   65     HR: Technical Quality:       Poor MEASUREMENTS  (Male / Female) Normal Values 2D ECHO LV Diastolic Diameter PLAX        4.2 cm                4.2 - 5.9 / 3.9 - 5.3 cm LV Systolic Diameter PLAX         2.7 cm                2.1 - 4.0 cm IVS Diastolic Thickness           1.1 cm                LVPW Diastolic Thickness          1.1 cm                LVOT Diameter                     1.8 cm                Estimated LV Ejection Fraction    65 %                  LV Ejection Fraction MOD BP       73.1 %                >= 55  % LV Ejection Fraction MOD 4C       78.7 %                LV Ejection Fraction MOD 2C       73 %                  DOPPLER AV Peak Velocity                  1.1 m/s               AV Peak Gradient                  5.3 mmHg              AV Mean Gradient                  2.9 mmHg              LVOT Peak Velocity                1.1 m/s               AV Area Cont Eq vti               2.6 cm2               MV Velocity Time Integral         21 cm                 Mitral E Point Velocity           0.86 m/s              Mitral E to A Ratio               0.75                  Mitral A Duration                 156 ms                MV Pressure Half Time             31.9 ms               MV Area PHT                       6.9 cm2               MV Deceleration Time              85.8 ms               * Indicates values subject to auto-interpretation LV EF:  65    % FINDINGS Left Ventricle Normal left ventricular chamber size. Mild concentric left ventricular hypertrophy. Normal left ventricular systolic function. The left ventricular ejection fraction is visually estimated to be 65%. Normal  "regional wall motion. Right Ventricle The right ventricle is not well visualized. Right Atrium The right atrium is not well visualized. Left Atrium Normal left atrial size. Left atrial volume index is 24 mL/sq m. Mitral Valve The mitral valve is not well visualized. No mitral stenosis. Trace mitral regurgitation. Aortic Valve The aortic valve is not well visualized. Tricuspid Valve The tricuspid valve is not well visualized. No stenosis or regurgitation seen. Pulmonic Valve The pulmonic valve is not well visualized. No stenosis or regurgitation seen. Pericardium No pericardial effusion. Aorta Normal aortic root for body surface area. The ascending aorta diameter is 3.1 cm. Zafar Bailon M.D. (Electronically Signed) Final Date:     27 November 2022                 20:23      Micro:  Results       Procedure Component Value Units Date/Time    BLOOD CULTURE [[id number]] Collected: 12/12/22 0308    Order Status: Completed Specimen: Blood from Peripheral Updated: 12/17/22 0501     Significant Indicator NEG     Source BLD     Site PERIPHERAL     Culture Result No growth after 5 days of incubation.    Narrative:      Contact  Per Hospital Policy: Only change Specimen Src: to \"Line\" if  specified by physician order.  Left Hand    BLOOD CULTURE [[id number]] Collected: 12/12/22 0308    Order Status: Completed Specimen: Blood from Peripheral Updated: 12/17/22 0501     Significant Indicator NEG     Source BLD     Site PERIPHERAL     Culture Result No growth after 5 days of incubation.    Narrative:      Contact  Per Hospital Policy: Only change Specimen Src: to \"Line\" if  specified by physician order.  Right Hand    BLOOD CULTURE [[id number]]  (Abnormal) Collected: 12/09/22 0223    Order Status: Completed Specimen: Blood from Peripheral Updated: 12/13/22 1028     Significant Indicator POS     Source BLD     Site PERIPHERAL     Culture Result Growth detected by Bactec instrument. 12/12/2022  10:39      Methicillin Resistant " "Staphylococcus aureus  Methicillin resistant by screening method  See previous culture for sensitivity report.      Narrative:      CALL  [facility]  141 tel. [phone],  CALLED  141 tel. [phone] 12/12/2022, 10:43, RB PERF. RESULTS CALLED TO: RN  Suzie Wellington [phone]  Contact  Per Hospital Policy: Only change Specimen Src: to \"Line\" if  specified by physician order.  Right Hand    BLOOD CULTURE [[id number]]  (Abnormal) Collected: 12/09/22 0306    Order Status: Completed Specimen: Blood from Peripheral Updated: 12/11/22 1208     Significant Indicator POS     Source BLD     Site PERIPHERAL     Culture Result Growth detected by Bactec instrument. 12/10/2022  06:43      Methicillin Resistant Staphylococcus aureus  See previous culture for sensitivity report.      Narrative:      CALL  [facility]  141 tel. [phone],  CALLED  141 tel. [phone] 12/10/2022, 06:46, RB PERF. RESULTS CALLED TO:  [phone] RN  Contact  Per Hospital Policy: Only change Specimen Src: to \"Line\" if  specified by physician order.  Right Hand            Assessment:  This is a 77-year-old female patient who was transferred from an outside facility, slid off her bed at home and was unable to get up for multiple days, found by a friend, was found to be in A. fib with RVR and with mild rhabdomyolysis.  She was also noted to have COVID-19 requiring nasal cannula oxygen, resolved.  On 12/1, she developed right arm thrombophlebitis and the following day had worsening leukocytosis and procalcitonin, blood cultures positive for MRSA. TEODORO positive for infective endocarditis    Pertinent Diagnoses:  Native mitral valve infective endocarditis  MRSA bacteremia, persistent  SO, resolved  Recent COVID-19  Thrombophlebitis  Drug rash     PLAN:   MRSA bacteremia  Native mitral valve infective endocarditis  Thrombophlebitis, improved  BCxs + MRSA 12/2 and 12/3, 12/5  Follow repeat blood cultures x2 from 12/9, 1 out of 2 positive  TEODORO with a large freely mobile mass 1.4 x 1 cm on " the mitral valve.  CT surgery evaluated pros and cons of surgery - favors conservative management given no definitive embolization so far, no heart failure, and patient's advanced age.   Repeat blood cultures x2 from 12/12 no growth till date  Due to concern for rising creatinine and vancomycin levels, changed to IV daptomycin 8 mg/KG every 24 hours.  Unfortunately, she has now developed a generalized pruritic rash with eosinophilia.  Returned to vancomycin from daptomycin on 12/16 and will monitor creatinine level closely    Disposition: Patient likely being discharged to a rehab facility.  Not cleared for discharge at this time given new drug reaction and need to ensure no return of SO after vancomycin rechallenge  At least weekly CBC with differential, CMP, while on IV antibiotics  Repeat TTE at end of therapy  Need for PICC line: Yes, okay to place    Discussed with Dr. Brannon    ID clinic follow-up in 4 to 6 weeks

## 2022-12-17 NOTE — PROGRESS NOTES
Hospital Medicine Daily Progress Note    Date of Service  12/17/2022    Chief Complaint  Paulina Castellanos is a 77 y.o. female admitted 11/26/2022 with atrial fibrillation with rapid ventricular rate.      Hospital Course  Admitted for Atrial fibrillation with rapid ventricular rate, COVID-19 with pneumonia and Respiratory failure with hypoxia, mild rhabdomyolysis. She was started on Decadron, oxygen supplementation, and supportive measures.  She was placed on Metoprolol for rate control, and Eliquis for anticoagulation.  She apparently developed fever and encephalopathy, and was noted to have left arm thrombophlebitis.  Work-up further showed MRSA bacteremia.  Infectious disease was consulted on the case.  Patient was started on empiric coverage with IV vancomycin.  Repeat blood cultures remained positive for MRSA.  Cardiology was consulted on the case for a TEODORO, which showed mitral valve vegetation/endocarditis.     Interval Problem Update  Bacteremia -remains unchanged. No acute events overnight. Awaiting CR. Patient was placed back on IV vancomycin yesterday due to rash and SNF refusing to use daptomycin.    IE-denies any chest pain.       I have discussed this patient's plan of care and discharge plan at IDT rounds today with Case Management, Nursing, Nursing leadership, and other members of the IDT team.    Consultants/Specialty  cardiology, infectious disease, and palliative care  Cardiothoracic surgery    Code Status  DNAR/DNI    Disposition  Patient is not medically cleared for discharge.   Anticipate discharge to to skilled nursing facility.  I have placed the appropriate orders for post-discharge needs.    Review of Systems  Review of Systems   Constitutional:  Positive for malaise/fatigue. Negative for diaphoresis.        She remains about the same today   HENT:  Negative for congestion and hearing loss.    Eyes:  Negative for blurred vision.   Respiratory:  Positive for cough. Negative for shortness of  breath.    Cardiovascular:  Negative for chest pain.   Gastrointestinal:  Negative for abdominal pain, nausea and vomiting.   Genitourinary:  Negative for dysuria and flank pain.   Musculoskeletal:  Positive for back pain. Negative for joint pain, myalgias and neck pain.   Skin:  Negative for rash.   Neurological:  Positive for weakness (non focal). Negative for dizziness, sensory change, speech change, focal weakness and headaches.   Psychiatric/Behavioral:  The patient is not nervous/anxious.    All other systems reviewed and are negative.     Physical Exam  Temp:  [36.2 °C (97.1 °F)-37 °C (98.6 °F)] 36.7 °C (98.1 °F)  Pulse:  [] 92  Resp:  [16-17] 16  BP: ()/(57-69) 117/69  SpO2:  [91 %-96 %] 96 %    Physical Exam  Vitals and nursing note reviewed.   Constitutional:       Comments: Slow to answer, but appropriate  Happy, unchanged   HENT:      Head: Normocephalic and atraumatic.      Nose: No congestion.      Mouth/Throat:      Mouth: Mucous membranes are moist.   Eyes:      Extraocular Movements: Extraocular movements intact.      Conjunctiva/sclera: Conjunctivae normal.   Cardiovascular:      Rate and Rhythm: Normal rate and regular rhythm.      Heart sounds: Murmur heard.   Pulmonary:      Effort: Pulmonary effort is normal. No respiratory distress.      Breath sounds: Normal breath sounds. No wheezing.   Abdominal:      General: There is no distension.      Tenderness: There is no abdominal tenderness.   Musculoskeletal:         General: Swelling (left arm, nearly absent) present. No tenderness.      Cervical back: No tenderness.      Right lower leg: Edema present.      Left lower leg: Edema present.      Comments: All improving somewhat   Skin:     Findings: No erythema.   Neurological:      General: No focal deficit present.      Mental Status: She is alert and oriented to person, place, and time.      Cranial Nerves: No cranial nerve deficit.       Fluids    Intake/Output Summary (Last 24  hours) at 12/17/2022 1308  Last data filed at 12/17/2022 0724  Gross per 24 hour   Intake 1446.83 ml   Output 0 ml   Net 1446.83 ml         Laboratory  Recent Labs     12/17/22  0010   WBC 6.1   RBC 3.05*   HEMOGLOBIN 9.2*   HEMATOCRIT 29.0*   MCV 95.1   MCH 30.2   MCHC 31.7*   RDW 49.1   PLATELETCT 307   MPV 8.7*       Recent Labs     12/16/22  0313   SODIUM 134*   POTASSIUM 4.2   CHLORIDE 102   CO2 22   GLUCOSE 133*   BUN 27*   CREATININE 1.11   CALCIUM 8.6                   Imaging  IR-PICC LINE PLACEMENT W/ GUIDANCE > AGE 5   Final Result                  Ultrasound-guided PICC placement performed by qualified nursing staff as    above.          EC-TEODORO W/O CONT   Final Result      DX-CHEST-PORTABLE (1 VIEW)   Final Result      1.  Curvilinear opacities in the lung bases likely representing atelectasis, less likely pneumonitis.      US-EXTREMITY VENOUS UPPER UNILAT LEFT   Final Result      EC-ECHOCARDIOGRAM COMPLETE W/O CONT   Final Result      OUTSIDE IMAGES-CT CHEST   Final Result             Assessment/Plan  * Atrial fibrillation with rapid ventricular response (HCC)- (present on admission)  Assessment & Plan  Metoprolol, Eliquis  [id number] -  3  Remains in rate controlled    Cellulitis- (present on admission)  Assessment & Plan  IV Vancomycin    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)  Assessment & Plan  IV Vancomycin  TTE with MV involvement  TEODORO done on 12/7, results finally released on 12/13, shows freely mobile MV mass  CTS Dr Vaughn, no surgical intervention at this time  NO clear focal neurological deficits at this time  No clear e/o clinical CHF at this time    Dysphagia- (present on admission)  Assessment & Plan  Level 7, Liquid level 0, crush pills  Aspiration precautions    Anemia- (present on admission)  Assessment & Plan  Follow CBC    Hypomagnesemia- (present on admission)  Assessment & Plan  IV Mg given  Follow level    Hypophosphatemia- (present on admission)  Assessment & Plan  Follow  level    Hyponatremia- (present on admission)  Assessment & Plan  Follow BMP    MRSA bacteremia- (present on admission)  Assessment & Plan  IV Vancomycin changed to IV dapto 12/14 changed back to dapto 12/16 (SNF will not do dapto)  repeat blood cultures 12/12 ngtd, if negative again tomorrow, PICC line placed 12/16, awaiting SNF bed  [facility]  Remains clinically stable  Serum Cr rising, was 1.11 on 12/16, awaiting repeat values at this time    Encephalopathy acute- (present on admission)  Assessment & Plan  secondary to infectious etiology, improving slowly and no clear focal neurological deficits  Any change in neuro status, low threshold to get MRI brain given large mobile mass on MV with IE  Avoid Benzodiazepines and Anticholinergics  Frequent orientation  Open window blinds during the day  Avoid naps during the day  Family at bedside whenever possible  Ensure adequate sleep at night - use Melatonin preferably  Avoid early morning labs  Avoid vital signs during sleep  Ambulate if possible  Provide adequate pain control  Monitor for urinary retention  Prevent and manage constipation  Discontinue IVs, Catheters, Tubes, Lines, Cardiac monitors, SCDs if possible    Sepsis (HCC)- (present on admission)  Assessment & Plan  Resolved  ID following  See elsehwere      Thrombophlebitis- (present on admission)  Assessment & Plan  IV Vancomycin changed to IV dapto changed back to IV vanco  Monitor CPK  Eliquis    Acute respiratory failure with hypoxia (HCC)- (present on admission)  Assessment & Plan  due to COVID 19  RT protocol  Resolved    Metabolic acidosis, normal anion gap (NAG)- (present on admission)  Assessment & Plan  Resolved    Frailty syndrome in geriatric patient- (present on admission)  Assessment & Plan  Palliative care following    Non-traumatic rhabdomyolysis- (present on admission)  Assessment & Plan  resolved    Elevated LFTs- (present on admission)  Assessment & Plan  Follow  CMP    Pneumonia due to COVID-19 virus- (present on admission)  Assessment & Plan  Finished course of Decadron    Hypokalemia- (present on admission)  Assessment & Plan  Follow bmp       VTE prophylaxis: therapeutic anticoagulation with Eliquis    I have performed a physical exam and reviewed and updated ROS and Plan today (12/17/2022). In review of yesterday's note (12/16/2022), there are no changes except as documented above.

## 2022-12-17 NOTE — PROGRESS NOTES
Bedside report received.  Assessment complete.  A&O x 4. Patient calls appropriately.  Patient has 5/10 pain. Declined intervention at this time.  Red rash to patient's back, sacrum, groin, and back of arms.  Tolerating  diet. Denies N/V.  Tolerating level 7 (east to chew) diet. Denies N/V.  + void, + flatus, + BM. Last BM 12/16.  Reviewed plan of care with patient. Call light and personal belongings within reach. Hourly rounding in place. All needs met at this time.

## 2022-12-17 NOTE — CARE PLAN
Problem: Knowledge Deficit - Standard  Goal: Patient and family/care givers will demonstrate understanding of plan of care, disease process/condition, diagnostic tests and medications  Outcome: Progressing     Problem: Skin Integrity  Goal: Skin integrity is maintained or improved  Outcome: Progressing     Problem: Psychosocial  Goal: Patient's level of anxiety will decrease  Outcome: Progressing   The patient is Stable - Low risk of patient condition declining or worsening    Shift Goals  Clinical Goals: Maintain Skin Integrity  Patient Goals: Comfort  Family Goals: N/A    Progress made toward(s) clinical / shift goals:  Q2 Turns in place, Educated patient on plan of care this shift, patient denies anxiety at this time     Patient is not progressing towards the following goals:

## 2022-12-17 NOTE — PROGRESS NOTES
4 Eyes Skin Assessment Completed by EVERT Grover and EVERT Borrero.     Head WDL  Ears WDL  Nose WDL  Mouth WDL  Neck WDL  Breast/Chest - area of redness under L breat, interdry in place  Spine Redness/ rash (to all of back and bilateral flanks)  (R) Arm/Elbow/Hand red/rash to back arms  (L) Arm/Elbow/Hand red/rash to back arms  Abdomen red/rash to back arms  Groin Redness, nystatin applied  Scrotum/Coccyx/Buttocks red/rash  (R) Leg red/rash to inner thigh  (L) Leg red/rash to inner thigh  (R) Heel/Foot/Toe WDL  (L) Heel/Foot/Toe WDL              Devices In Places Blood Pressure Cuff, Pulse Ox, and Nasal Cannula        Interventions In Place Waffle Overlay, TAP System, Pillows, Q2 Turns, and Pressure Redistribution Mattress     Possible Skin Injury No     Pictures Uploaded Into Epic N/A  Wound Consult Placed N/A  RN Wound Prevention Protocol Ordered No

## 2022-12-17 NOTE — CARE PLAN
Problem: Knowledge Deficit - Standard  Goal: Patient and family/care givers will demonstrate understanding of plan of care, disease process/condition, diagnostic tests and medications  Outcome: Progressing     Problem: Skin Integrity  Goal: Skin integrity is maintained or improved  Outcome: Progressing       The patient is Stable - Low risk of patient condition declining or worsening    Shift Goals  Clinical Goals: skin integrity, monitor UO  Patient Goals: comfort  Family Goals: no family present at this time    Progress made toward(s) clinical / shift goals: POC discussed with patient. Q2 hour turns, barrier cream applied. Frequently checking for incontinence to keep groin area clean.

## 2022-12-18 PROBLEM — L27.0 DRUG RASH: Status: ACTIVE | Noted: 2022-12-18

## 2022-12-18 LAB
ANION GAP SERPL CALC-SCNC: 9 MMOL/L (ref 7–16)
BUN SERPL-MCNC: 23 MG/DL (ref 8–22)
CALCIUM SERPL-MCNC: 8.9 MG/DL (ref 8.5–10.5)
CHLORIDE SERPL-SCNC: 104 MMOL/L (ref 96–112)
CO2 SERPL-SCNC: 23 MMOL/L (ref 20–33)
CREAT SERPL-MCNC: 1.02 MG/DL (ref 0.5–1.4)
GFR SERPLBLD CREATININE-BSD FMLA CKD-EPI: 57 ML/MIN/1.73 M 2
GLUCOSE SERPL-MCNC: 99 MG/DL (ref 65–99)
POTASSIUM SERPL-SCNC: 4.2 MMOL/L (ref 3.6–5.5)
SODIUM SERPL-SCNC: 136 MMOL/L (ref 135–145)

## 2022-12-18 PROCEDURE — A9270 NON-COVERED ITEM OR SERVICE: HCPCS | Performed by: STUDENT IN AN ORGANIZED HEALTH CARE EDUCATION/TRAINING PROGRAM

## 2022-12-18 PROCEDURE — 97602 WOUND(S) CARE NON-SELECTIVE: CPT

## 2022-12-18 PROCEDURE — 99232 SBSQ HOSP IP/OBS MODERATE 35: CPT | Performed by: INTERNAL MEDICINE

## 2022-12-18 PROCEDURE — 700102 HCHG RX REV CODE 250 W/ 637 OVERRIDE(OP): Performed by: STUDENT IN AN ORGANIZED HEALTH CARE EDUCATION/TRAINING PROGRAM

## 2022-12-18 PROCEDURE — A9270 NON-COVERED ITEM OR SERVICE: HCPCS | Performed by: PHYSICIAN ASSISTANT

## 2022-12-18 PROCEDURE — 700102 HCHG RX REV CODE 250 W/ 637 OVERRIDE(OP): Performed by: PHYSICIAN ASSISTANT

## 2022-12-18 PROCEDURE — 700105 HCHG RX REV CODE 258: Performed by: INTERNAL MEDICINE

## 2022-12-18 PROCEDURE — A9270 NON-COVERED ITEM OR SERVICE: HCPCS | Performed by: INTERNAL MEDICINE

## 2022-12-18 PROCEDURE — 302196 LINEN, HYPOALLERGENIC: Performed by: INTERNAL MEDICINE

## 2022-12-18 PROCEDURE — 770001 HCHG ROOM/CARE - MED/SURG/GYN PRIV*

## 2022-12-18 PROCEDURE — 700111 HCHG RX REV CODE 636 W/ 250 OVERRIDE (IP): Performed by: INTERNAL MEDICINE

## 2022-12-18 PROCEDURE — 99233 SBSQ HOSP IP/OBS HIGH 50: CPT | Performed by: INTERNAL MEDICINE

## 2022-12-18 PROCEDURE — 80048 BASIC METABOLIC PNL TOTAL CA: CPT

## 2022-12-18 PROCEDURE — 700102 HCHG RX REV CODE 250 W/ 637 OVERRIDE(OP): Performed by: INTERNAL MEDICINE

## 2022-12-18 PROCEDURE — 700101 HCHG RX REV CODE 250: Performed by: INTERNAL MEDICINE

## 2022-12-18 RX ADMIN — ACETAMINOPHEN 1000 MG: 500 TABLET ORAL at 04:41

## 2022-12-18 RX ADMIN — DIPHENHYDRAMINE HYDROCHLORIDE 25 MG: 25 TABLET ORAL at 08:11

## 2022-12-18 RX ADMIN — GUAIFENESIN SYRUP AND DEXTROMETHORPHAN 5 ML: 100; 10 SYRUP ORAL at 07:57

## 2022-12-18 RX ADMIN — VANCOMYCIN HYDROCHLORIDE 1500 MG: 1 INJECTION, POWDER, LYOPHILIZED, FOR SOLUTION INTRAVENOUS at 16:14

## 2022-12-18 RX ADMIN — APIXABAN 5 MG: 5 TABLET, FILM COATED ORAL at 17:56

## 2022-12-18 RX ADMIN — GUAIFENESIN SYRUP AND DEXTROMETHORPHAN 5 ML: 100; 10 SYRUP ORAL at 21:00

## 2022-12-18 RX ADMIN — GUAIFENESIN SYRUP AND DEXTROMETHORPHAN 5 ML: 100; 10 SYRUP ORAL at 15:41

## 2022-12-18 RX ADMIN — METOPROLOL TARTRATE 12.5 MG: 25 TABLET, FILM COATED ORAL at 17:56

## 2022-12-18 RX ADMIN — APIXABAN 5 MG: 5 TABLET, FILM COATED ORAL at 04:41

## 2022-12-18 RX ADMIN — METOPROLOL TARTRATE 12.5 MG: 25 TABLET, FILM COATED ORAL at 04:42

## 2022-12-18 RX ADMIN — DIPHENHYDRAMINE HYDROCHLORIDE 25 MG: 25 TABLET ORAL at 16:17

## 2022-12-18 ASSESSMENT — ENCOUNTER SYMPTOMS
ABDOMINAL PAIN: 0
SENSORY CHANGE: 0
NAUSEA: 0
COUGH: 0
NECK PAIN: 0
DIZZINESS: 0
VOMITING: 0
MYALGIAS: 1
DIAPHORESIS: 0
NERVOUS/ANXIOUS: 0
FOCAL WEAKNESS: 0
WEAKNESS: 1
MYALGIAS: 0
FLANK PAIN: 0
HEADACHES: 0
DIARRHEA: 0
BACK PAIN: 1
FEVER: 0
SHORTNESS OF BREATH: 0
SPEECH CHANGE: 0
BLURRED VISION: 0

## 2022-12-18 ASSESSMENT — PAIN DESCRIPTION - PAIN TYPE
TYPE: ACUTE PAIN
TYPE: ACUTE PAIN;CHRONIC PAIN

## 2022-12-18 NOTE — PROGRESS NOTES
Hospital Medicine Daily Progress Note    Date of Service  12/18/2022    Chief Complaint  Paulina Castellanos is a 77 y.o. female admitted 11/26/2022 with atrial fibrillation with rapid ventricular rate.      Hospital Course  Admitted for Atrial fibrillation with rapid ventricular rate, COVID-19 with pneumonia and Respiratory failure with hypoxia, mild rhabdomyolysis. She was started on Decadron, oxygen supplementation, and supportive measures.  She was placed on Metoprolol for rate control, and Eliquis for anticoagulation.  She apparently developed fever and encephalopathy, and was noted to have left arm thrombophlebitis.  Work-up further showed MRSA bacteremia.  Infectious disease was consulted on the case.  Patient was started on empiric coverage with IV vancomycin.  Repeat blood cultures remained positive for MRSA.  Cardiology was consulted on the case for a TEODORO, which showed mitral valve vegetation/endocarditis.     Interval Problem Update  Bacteremia -afebrile. Serum Cr has improved slightly, BP's have rebounded.    Diffuse rash-less itchy, patient feels it is slightly better today.     IE-denies any chest pain.       I have discussed this patient's plan of care and discharge plan at IDT rounds today with Case Management, Nursing, Nursing leadership, and other members of the IDT team.    Consultants/Specialty  cardiology, infectious disease, and palliative care  Cardiothoracic surgery    Code Status  DNAR/DNI    Disposition  Patient is not medically cleared for discharge.   Anticipate discharge to to skilled nursing facility.  I have placed the appropriate orders for post-discharge needs.    Review of Systems  Review of Systems   Constitutional:  Positive for malaise/fatigue. Negative for diaphoresis.        No clear changes today   HENT:  Negative for congestion and hearing loss.    Eyes:  Negative for blurred vision.   Respiratory:  Negative for cough and shortness of breath.    Cardiovascular:  Negative for chest  pain.   Gastrointestinal:  Negative for abdominal pain, nausea and vomiting.   Genitourinary:  Negative for dysuria and flank pain.   Musculoskeletal:  Positive for back pain. Negative for joint pain, myalgias and neck pain.   Skin:  Positive for itching (slightly less intense) and rash (improving).   Neurological:  Positive for weakness (non focal). Negative for dizziness, sensory change, speech change, focal weakness and headaches.   Psychiatric/Behavioral:  The patient is not nervous/anxious.    All other systems reviewed and are negative.     Physical Exam  Temp:  [36.7 °C (98.1 °F)-37.1 °C (98.8 °F)] 37.1 °C (98.8 °F)  Pulse:  [] 90  Resp:  [16-17] 16  BP: (100-127)/(62-85) 100/62  SpO2:  [92 %-94 %] 92 %    Physical Exam  Vitals and nursing note reviewed.   Constitutional:       Comments: Slow to answer, but appropriate  Happy, unchanged   HENT:      Head: Normocephalic and atraumatic.      Nose: No congestion.      Mouth/Throat:      Mouth: Mucous membranes are moist.   Eyes:      Extraocular Movements: Extraocular movements intact.      Conjunctiva/sclera: Conjunctivae normal.   Cardiovascular:      Rate and Rhythm: Normal rate and regular rhythm.      Heart sounds: Murmur heard.   Pulmonary:      Effort: Pulmonary effort is normal. No respiratory distress.      Breath sounds: Normal breath sounds. No wheezing.   Abdominal:      General: There is no distension.      Tenderness: There is no abdominal tenderness.   Musculoskeletal:         General: Swelling (left arm, nearly absent) present. No tenderness.      Cervical back: No tenderness.      Right lower leg: Edema present.      Left lower leg: Edema present.      Comments: All improving somewhat   Skin:     Findings: Rash (diffuse, blanchable, arms/chest/legs) present. No erythema.   Neurological:      General: No focal deficit present.      Mental Status: She is alert and oriented to person, place, and time.      Cranial Nerves: No cranial nerve  deficit.       Fluids    Intake/Output Summary (Last 24 hours) at 12/18/2022 1258  Last data filed at 12/18/2022 0425  Gross per 24 hour   Intake 421.76 ml   Output 0 ml   Net 421.76 ml         Laboratory  Recent Labs     12/17/22  0010   WBC 6.1   RBC 3.05*   HEMOGLOBIN 9.2*   HEMATOCRIT 29.0*   MCV 95.1   MCH 30.2   MCHC 31.7*   RDW 49.1   PLATELETCT 307   MPV 8.7*       Recent Labs     12/16/22  0313 12/18/22  0445   SODIUM 134* 136   POTASSIUM 4.2 4.2   CHLORIDE 102 104   CO2 22 23   GLUCOSE 133* 99   BUN 27* 23*   CREATININE 1.11 1.02   CALCIUM 8.6 8.9                   Imaging  IR-PICC LINE PLACEMENT W/ GUIDANCE > AGE 5   Final Result                  Ultrasound-guided PICC placement performed by qualified nursing staff as    above.          EC-TEODORO W/O CONT   Final Result      DX-CHEST-PORTABLE (1 VIEW)   Final Result      1.  Curvilinear opacities in the lung bases likely representing atelectasis, less likely pneumonitis.      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)  Assessment & Plan  IV Vancomycin  TTE with MV involvement  TEODORO done on 12/7, results finally released on 12/13, shows freely mobile MV mass  CTS Dr Vaughn, no surgical intervention at this time  NO clear focal neurological deficits at this time  No clear e/o clinical CHF at this time    Dysphagia- (present on admission)  Assessment & Plan  Level 7, Liquid level 0, crush pills  Aspiration  precautions    Anemia- (present on admission)  Assessment & Plan  Follow CBC    Hypomagnesemia- (present on admission)  Assessment & Plan  IV Mg given  Follow level    Hypophosphatemia- (present on admission)  Assessment & Plan  Follow level    Hyponatremia- (present on admission)  Assessment & Plan  Follow BMP    MRSA bacteremia- (present on admission)  Assessment & Plan  IV Vancomycin changed to IV dapto 12/14 changed back to vanco 12/16 (SNF will not do dapto and she developed a rash to dapto)  repeat blood cultures 12/12 ngtd  PICC line placed 12/16, awaiting SNF bed  [facility]  Remains clinically stable  Serum Cr improving    Encephalopathy acute- (present on admission)  Assessment & Plan  secondary to infectious etiology, improving slowly and no clear focal neurological deficits  Any change in neuro status, low threshold to get MRI brain given large mobile mass on MV with IE  Avoid Benzodiazepines and Anticholinergics  Frequent orientation  Open window blinds during the day  Avoid naps during the day  Family at bedside whenever possible  Ensure adequate sleep at night - use Melatonin preferably  Avoid early morning labs  Avoid vital signs during sleep  Ambulate if possible  Provide adequate pain control  Monitor for urinary retention  Prevent and manage constipation  Discontinue IVs, Catheters, Tubes, Lines, Cardiac monitors, SCDs if possible    Sepsis (HCC)- (present on admission)  Assessment & Plan  Resolved  ID following  See elsehwere      Thrombophlebitis- (present on admission)  Assessment & Plan  IV Vancomycin changed to IV dapto changed back to IV vanco  Monitor CPK  Eliquis    Acute respiratory failure with hypoxia (HCC)- (present on admission)  Assessment & Plan  due to COVID 19  RT protocol  Resolved    Metabolic acidosis, normal anion gap (NAG)- (present on admission)  Assessment & Plan  Resolved    Frailty syndrome in geriatric patient- (present on admission)  Assessment &  Plan  Palliative care following    Non-traumatic rhabdomyolysis- (present on admission)  Assessment & Plan  resolved    Elevated LFTs- (present on admission)  Assessment & Plan  Follow CMP    Pneumonia due to COVID-19 virus- (present on admission)  Assessment & Plan  Finished course of Decadron    Hypokalemia- (present on admission)  Assessment & Plan  Follow bmp       VTE prophylaxis: therapeutic anticoagulation with Eliquis    I have performed a physical exam and reviewed and updated ROS and Plan today (12/18/2022). In review of yesterday's note (12/17/2022), there are no changes except as documented above.

## 2022-12-18 NOTE — PROGRESS NOTES
Infectious Disease Progress Note    Author: Ramesh Tee M.D. Date & Time of service: 12/18/2022  12:05 PM    Chief Complaint:  MRSA bacteremia    Interval History:  77 y.o. female admitted 11/26/2022. For Afib with RVR and rhabdomyolysis +COVID  Developed fever and AMS dueing hosp-blood cultures +MRSA  12/4 AF WBC 13.9 Oriented to person and place c/o LUE pain No dyspnea or CP. No new neck, back, joint pain  12/5 patient remains afebrile, white count is resolved now, down to 9.4, tolerating vancomycin.  Patient states she is tired but overall better.   left arm  12/6 patient remains afebrile, white count is 9.7, tolerating vancomycin.  Plan as below  12/7 patient remains afebrile, white count 7.7, tolerating vancomycin, no new events overnight.  12/8 patient remains afebrile, white count is 8.5, tolerating vancomycin.  TEODORO positive for endocarditis  12/9 patient remains afebrile, count 7.6, repeat cultures as below.  Patient notes improvement in left arm pain and swelling  12/10 patient remains afebrile, no CBC this morning, tolerating vancomycin  12/11 patient remains afebrile, no CBC this morning, tolerating IV vancomycin.  Repeat blood cultures as below.   12/12 T-max 98.9, remains afebrile, white count today 7.9, tolerating vancomycin, repeat blood cultures  12/13 patient remains afebrile, no CBC this morning, tolerating vancomycin.  Repeat blood cultures no growth to date  12/14 patient remains afebrile, no CBC this morning, tolerating vancomycin. Repeat blood cultures pending  12/15 patient remains afebrile.  No CBC this morning, tolerated the switch to daptomycin.  Blood cultures as below  12/16 Patient is afebrile, no CBC this morning, unfortunately developed worsening generalized pruritic rash.  Some improvement with Benadryl overnight  12/17 patient remains afebrile, white count is 6.1, absolute eosinophilia of 830.  No worsening of rash after switching out of daptomycin  12/18 patient  remains afebrile, no CBC this morning, the rash is coalesced but appears lighter, mild itching persists, no worsening, renal function stable    Labs Reviewed, Medications Reviewed, and Radiology Reviewed.    Review of Systems:  Review of Systems   Constitutional:  Positive for malaise/fatigue. Negative for fever.   Gastrointestinal:  Negative for abdominal pain, diarrhea, nausea and vomiting.   Musculoskeletal:  Positive for myalgias.   Skin:  Negative for itching and rash.   All other systems reviewed and are negative.    Hemodynamics:  Temp (24hrs), Av.9 °C (98.4 °F), Min:36.7 °C (98.1 °F), Max:37.1 °C (98.8 °F)  Temperature: 37.1 °C (98.8 °F)  Pulse  Av.8  Min: 54  Max: 136   Blood Pressure : 100/62       Physical Exam:  Physical Exam  Vitals and nursing note reviewed.   Constitutional:       General: She is not in acute distress.     Appearance: She is ill-appearing. She is not toxic-appearing or diaphoretic.   HENT:      Mouth/Throat:      Pharynx: No oropharyngeal exudate.      Comments: Fair dentition  Eyes:      General: No scleral icterus.     Extraocular Movements: Extraocular movements intact.      Pupils: Pupils are equal, round, and reactive to light.   Pulmonary:      Effort: Pulmonary effort is normal. No respiratory distress.      Breath sounds: No stridor.   Abdominal:      General: There is no distension.      Palpations: Abdomen is soft.      Tenderness: There is no abdominal tenderness.   Musculoskeletal:         General: Swelling and tenderness present.      Cervical back: Neck supple. No rigidity.   Skin:     Coloration: Skin is pale. Skin is not jaundiced.      Findings: Bruising, erythema and rash present.      Comments: Left arm thrombophlebitis has resolved  Lacy macular erythematous pruritic rash over all extremities, most pronounced over the back -now appear less intense, light pink, coalesced   Neurological:      General: No focal deficit present.      Mental Status: She is  alert and oriented to person, place, and time.   Psychiatric:         Mood and Affect: Mood normal.         Behavior: Behavior normal.       Meds:    Current Facility-Administered Medications:     diphenhydrAMINE    vancomycin    MD Alert...Vancomycin per Pharmacy    Pharmacy Consult Request    [] acetaminophen **FOLLOWED BY** acetaminophen    oxyCODONE immediate-release **OR** oxyCODONE immediate-release **OR** HYDROmorphone    metoprolol tartrate    Respiratory Therapy Consult    LR    guaiFENesin dextromethorphan    benzonatate    senna-docusate **AND** polyethylene glycol/lytes **AND** magnesium hydroxide **AND** bisacodyl    hydrALAZINE    Metoprolol Tartrate    apixaban    Labs:  Recent Labs     22  0010   WBC 6.1   RBC 3.05*   HEMOGLOBIN 9.2*   HEMATOCRIT 29.0*   MCV 95.1   MCH 30.2   RDW 49.1   PLATELETCT 307   MPV 8.7*   NEUTSPOLYS 54.20   LYMPHOCYTES 24.60   MONOCYTES 6.80   EOSINOPHILS 13.60*   BASOPHILS 0.80   RBCMORPHOLO Normal       Recent Labs     22  0313 22  0445   SODIUM 134* 136   POTASSIUM 4.2 4.2   CHLORIDE 102 104   CO2 22 23   GLUCOSE 133* 99   BUN 27* 23*       Recent Labs     22  0313 22  0445   CREATININE 1.11 1.02         Imaging:  DX-CHEST-PORTABLE (1 VIEW)    Result Date: 2022 12:15 PM HISTORY/REASON FOR EXAM:  Shortness of Breath. TECHNIQUE/EXAM DESCRIPTION AND NUMBER OF VIEWS: Single portable view of the chest. COMPARISON: None FINDINGS: Heart size is within normal limits. There are curvilinear opacities in the lung bases. No pleural abnormalities are noted.     1.  Curvilinear opacities in the lung bases likely representing atelectasis, less likely pneumonitis.    US-EXTREMITY VENOUS UPPER UNILAT LEFT    Result Date: 2022   Upper Extremity  Venous Duplex Report  Vascular Laboratory  CONCLUSIONS  Left upper extremity venous duplex imaging.  No evidence of deep venous thrombosis.  Evidence of acute to subacute superficial venous  thrombosis in the LEFT  cephalic vein from the mid to distal bicep.  MASSIEL SILVA  Exam Date:     2022 13:29  Room #:     Inpatient  Priority:     Routine  Ht (in):             Wt (lb):  Ordering Physician:        EWELINA WOOD  Referring Physician:       NINA Mcknight  Sonographer:               Giovana Moran RVT  Study Type:                Complete Unilateral  Technical Quality:         Adequate  Age:    77    Gender:     F  MRN:    [id number]  :    1945      BSA:  Indications:     Localized swelling, mass and lump, unspecified upper limb,                   Edema, unspecified  CPT Codes:       74635  ICD Codes:       R22.30  R60.9  History:         Left upper extremity swelling/edema. No prior exams.  Limitations:  PROCEDURES:  Left upper extremity venous duplex imaging.  The following venous structures were evaluated: internal jugular,  subclavian, axillary, brachial, cephalic, and basilic veins.  Serial compression, color, and spectral Doppler flow evaluations were  performed.  FINDINGS:  Left upper extremity-  No evidence of deep venous thrombosis.  Evidence of acute to subacute superficial venous thrombosis in the cephalic  vein from the mid to distal bicep.  All other veins demonstrate complete color filling and compressibility with  normal venous flow dynamics including spontaneous flow and respiratory  phasicity.  Flow was evaluated in the contralateral subclavian vein and normal venous  flow dynamics including spontaneous flow and respiratory phasic variation  were demonstrated.  Brittney MUNOZ To  (Electronically Signed)  Final Date:      2022                   16:24    EC-ECHOCARDIOGRAM COMPLETE W/O CONT    Result Date: 2022  Transthoracic Echo Report Echocardiography Laboratory CONCLUSIONS No prior study is available for comparison. Normal left ventricular chamber size. Mild concentric left ventricular hypertrophy. The left ventricular ejection fraction is visually  estimated to be 65%. Trace mitral regurgitation. MASSIEL SILVA Exam Date:         2022                    13:31 Exam Location:     Inpatient Priority:          Routine Ordering Physician:        BETH GIL Referring Physician:       LOUISE Phelan Sonographer:               Frank ARANDA Age:    77     Gender:    F MRN:    [id number] :    1945 BSA:    1.89   Ht (in):    67     Wt (lb):    170 Exam Type:     Complete Indications:     Atrial Fibrillation, Shortness of breath ICD Codes:       427.31  786.05 CPT Codes:       41781 BP:   121    /   65     HR: Technical Quality:       Poor MEASUREMENTS  (Male / Female) Normal Values 2D ECHO LV Diastolic Diameter PLAX        4.2 cm                4.2 - 5.9 / 3.9 - 5.3 cm LV Systolic Diameter PLAX         2.7 cm                2.1 - 4.0 cm IVS Diastolic Thickness           1.1 cm                LVPW Diastolic Thickness          1.1 cm                LVOT Diameter                     1.8 cm                Estimated LV Ejection Fraction    65 %                  LV Ejection Fraction MOD BP       73.1 %                >= 55  % LV Ejection Fraction MOD 4C       78.7 %                LV Ejection Fraction MOD 2C       73 %                  DOPPLER AV Peak Velocity                  1.1 m/s               AV Peak Gradient                  5.3 mmHg              AV Mean Gradient                  2.9 mmHg              LVOT Peak Velocity                1.1 m/s               AV Area Cont Eq vti               2.6 cm2               MV Velocity Time Integral         21 cm                 Mitral E Point Velocity           0.86 m/s              Mitral E to A Ratio               0.75                  Mitral A Duration                 156 ms                MV Pressure Half Time             31.9 ms               MV Area PHT                       6.9 cm2               MV Deceleration Time              85.8 ms               * Indicates values subject to auto-interpretation LV EF:   "65    % FINDINGS Left Ventricle Normal left ventricular chamber size. Mild concentric left ventricular hypertrophy. Normal left ventricular systolic function. The left ventricular ejection fraction is visually estimated to be 65%. Normal regional wall motion. Right Ventricle The right ventricle is not well visualized. Right Atrium The right atrium is not well visualized. Left Atrium Normal left atrial size. Left atrial volume index is 24 mL/sq m. Mitral Valve The mitral valve is not well visualized. No mitral stenosis. Trace mitral regurgitation. Aortic Valve The aortic valve is not well visualized. Tricuspid Valve The tricuspid valve is not well visualized. No stenosis or regurgitation seen. Pulmonic Valve The pulmonic valve is not well visualized. No stenosis or regurgitation seen. Pericardium No pericardial effusion. Aorta Normal aortic root for body surface area. The ascending aorta diameter is 3.1 cm. Zafar Bailon M.D. (Electronically Signed) Final Date:     27 November 2022                 20:23      Micro:  Results       Procedure Component Value Units Date/Time    BLOOD CULTURE [[id number]] Collected: 12/12/22 0308    Order Status: Completed Specimen: Blood from Peripheral Updated: 12/17/22 0501     Significant Indicator NEG     Source BLD     Site PERIPHERAL     Culture Result No growth after 5 days of incubation.    Narrative:      Contact  Per Hospital Policy: Only change Specimen Src: to \"Line\" if  specified by physician order.  Left Hand    BLOOD CULTURE [[id number]] Collected: 12/12/22 0308    Order Status: Completed Specimen: Blood from Peripheral Updated: 12/17/22 0501     Significant Indicator NEG     Source BLD     Site PERIPHERAL     Culture Result No growth after 5 days of incubation.    Narrative:      Contact  Per Hospital Policy: Only change Specimen Src: to \"Line\" if  specified by physician order.  Right Hand    BLOOD CULTURE [[id number]]  (Abnormal) Collected: 12/09/22 0223    Order Status: " "Completed Specimen: Blood from Peripheral Updated: 12/13/22 1028     Significant Indicator POS     Source BLD     Site PERIPHERAL     Culture Result Growth detected by Bactec instrument. 12/12/2022  10:39      Methicillin Resistant Staphylococcus aureus  Methicillin resistant by screening method  See previous culture for sensitivity report.      Narrative:      CALL  [facility]  141 tel. [phone],  CALLED  141 tel. [phone] 12/12/2022, 10:43, RB PERF. RESULTS CALLED TO: EVERT [place] [phone]  Contact  Per Hospital Policy: Only change Specimen Src: to \"Line\" if  specified by physician order.  Right Hand    BLOOD CULTURE [[id number]]  (Abnormal) Collected: 12/09/22 0306    Order Status: Completed Specimen: Blood from Peripheral Updated: 12/11/22 1208     Significant Indicator POS     Source BLD     Site PERIPHERAL     Culture Result Growth detected by Bactec instrument. 12/10/2022  06:43      Methicillin Resistant Staphylococcus aureus  See previous culture for sensitivity report.      Narrative:      CALL  [facility]  141 tel. [phone],  CALLED  141 tel. [phone] 12/10/2022, 06:46, RB PERF. RESULTS CALLED TO:  [phone] RN  Contact  Per Hospital Policy: Only change Specimen Src: to \"Line\" if  specified by physician order.  Right Hand            Assessment:  This is a 77-year-old female patient who was transferred from an outside facility, slid off her bed at home and was unable to get up for multiple days, found by a friend, was found to be in A. fib with RVR and with mild rhabdomyolysis.  She was also noted to have COVID-19 requiring nasal cannula oxygen, resolved.  On 12/1, she developed right arm thrombophlebitis and the following day had worsening leukocytosis and procalcitonin, blood cultures positive for MRSA. TEODORO positive for infective endocarditis    Pertinent Diagnoses:  Native mitral valve infective endocarditis  MRSA bacteremia, persistent  SO, resolved  Recent COVID-19  Thrombophlebitis  Drug rash     PLAN:   MRSA " bacteremia  Native mitral valve infective endocarditis  Thrombophlebitis, improved  BCxs + MRSA 12/2 and 12/3, 12/5  Follow repeat blood cultures x2 from 12/9, 1 out of 2 positive  TEODORO with a large freely mobile mass 1.4 x 1 cm on the mitral valve.  CT surgery evaluated pros and cons of surgery - favors conservative management given no definitive embolization so far, no heart failure, and patient's advanced age.   Repeat blood cultures x2 from 12/12 no growth till date  Due to concern for rising creatinine and vancomycin levels, changed to IV daptomycin 8 mg/KG every 24 hours but developed a rash to this with eosinophilia, returned to vancomycin from daptomycin on 12/16, monitor creatinine level closely    Disposition: Patient likely being discharged to a rehab facility.  Recommend IV vancomycin (dosed and monitored by Pharm.D.) through 1/22/2023.  Unable to use daptomycin due to rash and eosinophilia.   At least weekly CBC with differential, CMP, while on IV antibiotics  Repeat TTE at end of therapy (ordered)  Need for PICC line: Yes, okay to place    Discussed with Dr. Brannon.  ID will sign off    ID clinic follow-up in 4 to 6 weeks

## 2022-12-18 NOTE — PROGRESS NOTES
"Pharmacy Vancomycin Kinetics Note for 12/17/2022     77 y.o. female on Vancomycin day # 15     Provider specified end date: 01/27/23    Active Antibiotics (From admission, onward)      Ordered     Ordering Provider       Sat Dec 17, 2022  4:08 PM    12/17/22 1608  vancomycin 1500 mg/250mL NS IVPB premix  (vancomycin (VANCOCIN) IV (LD + Maintenance))  EVERY 24 HOURS         Sunil Barnnon M.D.       Fri Dec 16, 2022 12:44 PM    12/16/22 1244  MD Alert...Vancomycin per Pharmacy  PHARMACY TO DOSE        Question:  Indication(s) for vancomycin?  Answer:  Staphylococcus aureus bacteremia    Ramesh Tee M.D.            Dosing Weight: 81.3 kg (179 lb 3.7 oz)      Admission History: Admitted on 11/26/2022 for Atrial fibrillation with rapid ventricular response (HCC) [I48.91]  Pertinent history: 77-year-old female admitted 11/26 after being found down. On 12/1, patient developed right arm thrombophlebitis, with worsening leukocytosis and blood cultures positive for MRSA likely 2' thrombophlebitis that may be seeded with infection. TEODORO was positive for endocarditis. Patient was started on vancomycin, but switched to Dapto on 12/14 for SO. Unfortunately, patient developed a puritic rash and ID switched Dapto back to Vanco.    Allergies:     Soap     Pertinent cultures to date:     Results       Procedure Component Value Units Date/Time    BLOOD CULTURE [[id number]] Collected: 12/12/22 0308    Order Status: Completed Specimen: Blood from Peripheral Updated: 12/17/22 0501     Significant Indicator NEG     Source BLD     Site PERIPHERAL     Culture Result No growth after 5 days of incubation.    Narrative:      Contact  Per Hospital Policy: Only change Specimen Src: to \"Line\" if  specified by physician order.  Left Hand    BLOOD CULTURE [[id number]] Collected: 12/12/22 0308    Order Status: Completed Specimen: Blood from Peripheral Updated: 12/17/22 0501     Significant Indicator NEG     Source BLD     Site PERIPHERAL     " "Culture Result No growth after 5 days of incubation.    Narrative:      Contact  Per Hospital Policy: Only change Specimen Src: to \"Line\" if  specified by physician order.  Right Hand    BLOOD CULTURE [[id number]]  (Abnormal) Collected: 12/09/22 0223    Order Status: Completed Specimen: Blood from Peripheral Updated: 12/13/22 1028     Significant Indicator POS     Source BLD     Site PERIPHERAL     Culture Result Growth detected by Bactec instrument. 12/12/2022  10:39      Methicillin Resistant Staphylococcus aureus  Methicillin resistant by screening method  See previous culture for sensitivity report.      Narrative:      CALL  [facility]  141 tel. [phone],  CALLED  141 tel. [phone] 12/12/2022, 10:43, RB PERF. RESULTS CALLED TO: EVERT [place] [phone]  Contact  Per Hospital Policy: Only change Specimen Src: to \"Line\" if  specified by physician order.  Right Hand    BLOOD CULTURE [[id number]]  (Abnormal) Collected: 12/09/22 0306    Order Status: Completed Specimen: Blood from Peripheral Updated: 12/11/22 1208     Significant Indicator POS     Source BLD     Site PERIPHERAL     Culture Result Growth detected by Bactec instrument. 12/10/2022  06:43      Methicillin Resistant Staphylococcus aureus  See previous culture for sensitivity report.      Narrative:      CALL  [facility]  141 tel. [phone],  CALLED  141 tel. [phone] 12/10/2022, 06:46, RB PERF. RESULTS CALLED TO:  [phone] RN  Contact  Per Hospital Policy: Only change Specimen Src: to \"Line\" if  specified by physician order.  Right Hand    BLOOD CULTURE [[id number]]  (Abnormal) Collected: 12/05/22 1048    Order Status: Completed Specimen: Blood from Peripheral Updated: 12/08/22 0747     Significant Indicator POS     Source BLD     Site PERIPHERAL     Culture Result Growth detected by Bactec instrument. 12/06/2022  03:32      Methicillin Resistant Staphylococcus aureus  Methicillin resistant by screening method  See previous culture for sensitivity report.      " "Narrative:      CALL  [facility]  141 tel. [phone],  CALLED  141 tel. [phone] 12/06/2022, 03:33, RB PERF. RESULTS CALLED TO: RN  [phone]  Contact  Per Hospital Policy: Only change Specimen Src: to \"Line\" if  specified by physician order.  Left Hand    BLOOD CULTURE [[id number]]  (Abnormal)  (Susceptibility) Collected: 12/05/22 1048    Order Status: Completed Specimen: Blood from Peripheral Updated: 12/08/22 0747     Significant Indicator POS     Source BLD     Site PERIPHERAL     Culture Result Growth detected by Bactec instrument. 12/06/2022  04:29      Methicillin Resistant Staphylococcus aureus    Narrative:      CALL  [facility]  141 tel. [phone],  CALLED  141 tel. [phone] 12/06/2022, 04:30, RB PERF. RESULTS CALLED TO:RN  [phone]  Contact  Per Hospital Policy: Only change Specimen Src: to \"Line\" if  specified by physician order.  Right Forearm/Arm    BLOOD CULTURE [[id number]]  (Abnormal) Collected: 12/03/22 1233    Order Status: Completed Specimen: Blood from Peripheral Updated: 12/05/22 0801     Significant Indicator POS     Source BLD     Site PERIPHERAL     Culture Result Growth detected by Bactec instrument. 12/04/2022  10:42      Methicillin Resistant Staphylococcus aureus  See previous culture for sensitivity report.      Narrative:      CALL  [facility]  141 tel. [phone],  CALLED  141 tel. [phone] 12/04/2022, 10:41, RB PERF. RESULTS CALLED  TO:Mark LOYD [phone]  Enhanced Droplet, Contact, and Eye Protection  Per Hospital Policy: Only change Specimen Src: to \"Line\" if  specified by physician order.  Left Hand    BLOOD CULTURE [[id number]]  (Abnormal) Collected: 12/03/22 1233    Order Status: Completed Specimen: Blood from Peripheral Updated: 12/05/22 0759     Significant Indicator POS     Source BLD     Site PERIPHERAL     Culture Result Growth detected by Bactec instrument. 12/04/2022  17:48      Methicillin Resistant Staphylococcus aureus  See previous culture for sensitivity report.      Narrative:      " "Enhanced Droplet, Contact, and Eye Protection  Per Hospital Policy: Only change Specimen Src: to \"Line\" if  specified by physician order.  Right Hand    BLOOD CULTURE [[id number]]  (Abnormal) Collected: 12/02/22 0514    Order Status: Completed Specimen: Blood from Peripheral Updated: 12/05/22 0730     Significant Indicator POS     Source BLD     Site PERIPHERAL     Culture Result Growth detected by Bactec instrument. 12/03/2022  01:14      Methicillin Resistant Staphylococcus aureus  See previous culture for sensitivity report.      Narrative:      Enhanced Droplet, Contact, and Eye Protection  Per Hospital Policy: Only change Specimen Src: to \"Line\" if  specified by physician order.  Right AC    BLOOD CULTURE [[id number]]  (Abnormal)  (Susceptibility) Collected: 12/02/22 0514    Order Status: Completed Specimen: Blood from Peripheral Updated: 12/05/22 0729     Significant Indicator POS     Source BLD     Site PERIPHERAL     Culture Result Growth detected by Bactec instrument. 12/03/2022  01:14  Methicillin Resistant Staphylococcus aureus (MRSA)  detected by PCR.        Methicillin Resistant Staphylococcus aureus    Narrative:      CALL  [facility]  171 tel. [phone],  CALLED  171 tel. [phone] 12/03/2022, 03:18, RB PERF. RESULTS CALLED  TO:[phone] ([place]ak[place])  Enhanced Droplet, Contact, and Eye Protection  Per Hospital Policy: Only change Specimen Src: to \"Line\" if  specified by physician order.  Right Hand            Labs:     Estimated Creatinine Clearance: 42.6 mL/min (by C-G formula based on SCr of 1.11 mg/dL).  Recent Labs     12/17/22  0010   WBC 6.1   NEUTSPOLYS 54.20     Recent Labs     12/16/22  0313   BUN 27*   CREATININE 1.11       Intake/Output Summary (Last 24 hours) at 12/17/2022 1610  Last data filed at 12/17/2022 1202  Gross per 24 hour   Intake 1566.83 ml   Output 200 ml   Net 1366.83 ml      /69   Pulse 92   Temp 36.7 °C (98.1 °F) (Temporal)   Resp 16   Ht 1.575 m (5' 2\")   Wt 81.3 kg (179 lb " 3.7 oz)   SpO2 96%  Temp (24hrs), Av.7 °C (98 °F), Min:36.2 °C (97.1 °F), Max:37 °C (98.6 °F)      List concerns for Vancomycin clearance:     Age;SO    Pharmacokinetics:   AUC kinetics:   Ke (hr ^-1): 0.05 hr^-1  Half life: 13.86 hr  Clearance: 2.642  Estimated TDD: 1321  Estimated Dose: 875  Estimated interval: 15.9    A/P:     -  Vancomycin dose: 1500 mg maintenance     -  Next vancomycin level(s): next set of levels due  or sooner if renal function worsens.  Appears Scr baseline is ~1, opting to continue with previous dosing and monitor BMP.      -  Predicted vancomycin AUC from initial AUC test calculator: 568 mg·hr/L    Flavia Thorpe, PharmD  [id number]

## 2022-12-18 NOTE — PROGRESS NOTES
4 Eyes Skin Assessment Completed by Lenore RN and EVERT Jackson.     Head WDL  Ears WDL  Nose WDL  Mouth WDL  Neck WDL  Breast/Chest - area of redness under L breat, interdry in place  Spine Redness/ rash (to all of back and bilateral flanks)  (R) Arm/Elbow/Hand red/rash to back arms  (L) Arm/Elbow/Hand red/rash to back arms  Abdomen red/rash to back arms  Groin Redness/rash  Scrotum/Coccyx/Buttocks red/rash + excoriation to buttocks, barrier cream applied  (R) Leg red/rash to inner thigh  (L) Leg red/rash to inner thigh  (R) Heel/Foot/Toe WDL  (L) Heel/Foot/Toe WDL              Devices In Places Blood Pressure Cuff, Pulse Ox, and Nasal Cannula        Interventions In Place Waffle Overlay, TAP System, Pillows, Q2 Turns, and Pressure Redistribution Mattress     Possible Skin Injury No     Pictures Uploaded Into Epic N/A  Wound Consult Placed N/A  RN Wound Prevention Protocol Ordered No

## 2022-12-18 NOTE — PROGRESS NOTES
Assumed care of patient at 0645. Bedside report received. Assessment complete.  AA&Ox4. Denies CP/SOB.  Reporting 5/10 pain. Declined intervention at this time.  Educated patient regarding pharmacologic and non pharmacologic modalities for pain management.  Skin per flowsheets  Tolerating L7 Easy to CHew diet. Denies N/V.  + void. + BM. Last BM 12/18  Pt ambulates Max assist.  All needs met at this time. Call light within reach. Pt calls appropriately. Bed low and locked, non skid socks in place. Hourly rounding in place.

## 2022-12-18 NOTE — ASSESSMENT & PLAN NOTE
Likely secondary to Daptomycin then Vancomycin  Benadryl, Vistaril  Monitor closely  Start Prednisone

## 2022-12-18 NOTE — CARE PLAN
Problem: Knowledge Deficit - Standard  Goal: Patient and family/care givers will demonstrate understanding of plan of care, disease process/condition, diagnostic tests and medications  Outcome: Progressing     Problem: Skin Integrity  Goal: Skin integrity is maintained or improved  Outcome: Progressing     Problem: Fall Risk  Goal: Patient will remain free from falls  Outcome: Progressing   The patient is Stable - Low risk of patient condition declining or worsening    Shift Goals  Clinical Goals: Skin Integrity  Patient Goals: Comfort  Family Goals: N/A    Progress made toward(s) clinical / shift goals:  Q2 Turns in place, Patient placed on bariatric Bed, wound consult completed      Patient is not progressing towards the following goals:

## 2022-12-18 NOTE — CARE PLAN
Problem: Knowledge Deficit - Standard  Goal: Patient and family/care givers will demonstrate understanding of plan of care, disease process/condition, diagnostic tests and medications  Outcome: Progressing     Problem: Skin Integrity  Goal: Skin integrity is maintained or improved  Outcome: Progressing       The patient is Stable - Low risk of patient condition declining or worsening    Shift Goals  Clinical Goals: skin integrity  Patient Goals: Comfort  Family Goals: N/A    Progress made toward(s) clinical / shift goals: POC discussed with patient. Q2 turns in place. Barrier cream applied, barrier wipes ordered.   No significant past surgical history

## 2022-12-18 NOTE — PROGRESS NOTES
Bedside report received.  Assessment complete.  A&O x 4. Patient calls appropriately.  Patient has 5/10 pain. Declined intervention at this time.  Skin per flow sheet.  Tolerating regular diet. Denies N/V.  + void, + flatus, + BM. Last BM 12/17.  Reviewed plan of care with patient. Call light and personal belongings within reach. Hourly rounding in place. All needs met at this time.

## 2022-12-18 NOTE — WOUND TEAM
[facility]  Inpatient Services  Initial Wound and Skin Care Evaluation    Admission Date: 11/26/2022     Last order of IP CONSULT TO WOUND CARE was found on 12/18/2022 from Hospital Encounter on 11/26/2022     HPI, PMH, SH: Reviewed    History reviewed. No pertinent surgical history.  Social History     Tobacco Use    Smoking status: Never     Passive exposure: Never    Smokeless tobacco: Never   Substance Use Topics    Alcohol use: Never     Chief Complaint   Patient presents with    Fall    Shortness of Breath    Abnormal Labs     Diagnosis: Atrial fibrillation with rapid ventricular response (HCC) [I48.91]    Unit where seen by Wound Team: T406/00     WOUND CONSULT/FOLLOW UP RELATED TO:  Sacrum and generalized rash     WOUND HISTORY:  Pt is a 77 yr old female admitted from outside facility with A. Fib RVR. Upon admission pt was also noted to be covid positive. Pt has been here since end of November and was noted to develop a generalized rash. Hypoallogenic sheets and NELIDA bed were ordered for pt.     WOUND ASSESSMENT/LDA  Moisture Associated Skin Damage 12/18/22 Buttock;Groin;Perineum;Panus;Thigh (Active)   Wound Image    12/18/22 1200   NEXT Weekly Photo (Inpatient Only) 12/25/22    Drainage Amount Scant    Drainage Description Serosanguineous    Periwound Assessment Red;Rash    IAD Cleansing Foam Cleanser/Washcloth    Periwound Protectant Antifungal Therapy    WOUND NURSE ONLY - Time Spent with Patient (mins) 60    Number of days: 0      Vascular:    ETHAN:   No results found.    Lab Values:    Lab Results   Component Value Date/Time    WBC 6.1 12/17/2022 12:10 AM    RBC 3.05 (L) 12/17/2022 12:10 AM    HEMOGLOBIN 9.2 (L) 12/17/2022 12:10 AM    HEMATOCRIT 29.0 (L) 12/17/2022 12:10 AM    CREACTPROT 25.02 (H) 12/02/2022 01:42 AM      Culture Results show:  No results found for this or any previous visit (from the past 720 hour(s)).    Pain Level/Medicated:  Denies pain       INTERVENTIONS BY WOUND  TEAM:  Chart and images reviewed. Discussed with bedside RN. All areas of concern (based on picture review, LDA review and discussion with bedside RN) have been thoroughly assessed. Documentation of areas based on significant findings. This RN in to assess patient. Performed standard wound care which includes appropriate positioning, dressing removal and non-selective debridement. Pictures and measurements obtained weekly if/when required.  Preparation for Dressing removal: TOMMY  Non-selectively Debrided with:  NA  Sharp debridement: NA  Mary wound: Cleansed with moist warm washcloth  Primary Dressing: NA  Secondary (Outer) Dressing: NA    Interdisciplinary consultation: Patient, Bedside RN,     EVALUATION / RATIONALE FOR TREATMENT:  Most Recent Date:  12/18/22: Pt with generalized rash. Pt was placed on NELIDA bariatric bed and hypoallogenic sheets. Sacrum intact, barrier paste applied due to incontinence     Goals: Steady decrease in wound area and depth weekly.    WOUND TEAM PLAN OF CARE ([X] for frequency of wound follow up,):   Nursing to follow dressing orders written for wound care. Contact wound team if area fails to progress, deteriorates or with any questions/concerns if something comes up before next scheduled follow up (See below as to whether wound is following and frequency of wound follow up)  Dressing changes by wound team:                   Follow up 3 times weekly:                NPWT change 3 times weekly:     Follow up 1-2 times weekly:      Follow up Bi-Monthly:           Follow up Monthly (High Risk):                        Follow up as needed:   X  Other (explain):     NURSING PLAN OF CARE ORDERS (X):  Dressing changes: See Dressing Care orders:   Skin care: See Skin Care orders:   RN Prevention Protocol: X  Rectal tube care: See Rectal Tube Care orders:   Other orders:    RSKIN:   CURRENTLY IN PLACE (X), APPLIED THIS VISIT (A), ORDERED (O):   Q shift North:  X  Q shift pressure point  assessments:  X    Surface/Positioning   Pressure redistribution mattress            Low Airloss          ICU Low Airloss   Bariatric NELIDA   X  Waffle cushion        Waffle Overlay          Reposition q 2 hours   X   TAPs Turning system     Z Tariq Pillow     Offloading/Redistribution   Sacral Mepilex (Silicone dressing)     Heel Mepilex (Silicone dressing)         Heel float boots (Prevalon boot)             Float Heels off Bed with Pillows           Respiratory   Silicone O2 tubing       X  Gray Foam Ear protectors   X  Cannula fixation Device (Tender )          High flow offloading Clip    Elastic head band offloading device      Anchorfast                                                         Trach with Optifoam split foam             Containment/Moisture Prevention     Rectal tube or BMS    Purwick/Condom Cath        Corona Catheter    Barrier wipes           Barrier paste   X    Antifungal tx      Interdry        Mobilization       Up to chair        Ambulate      PT/OT      Nutrition       Dietician        Diabetes Education      PO   X  TF     TPN     NPO   # days     Other        Anticipated discharge plans:   LTACH:        SNF/Rehab:                  Home Health Care:           Outpatient Wound Center:            Self/Family Care:    X    Other:                  Vac Discharge Needs:   Not Applicable Pt not on a wound vac:     X  Regular Vac while inpatient, alternative dressing at DC:        Regular Vac in use and continued at DC:            Reg. Vac w/ Skin Sub/Biologic in use. Will need to be changed 2x wkly:      Veraflo Vac while inpatient, ok to transition to Regular Vac on Discharge:           Veraflo Vac while inpatient, will need to remain on Veraflo Vac upon discharge:

## 2022-12-19 PROBLEM — R19.7 DIARRHEA: Status: ACTIVE | Noted: 2022-12-19

## 2022-12-19 LAB
C DIFF DNA SPEC QL NAA+PROBE: NEGATIVE
C DIFF TOX GENS STL QL NAA+PROBE: NEGATIVE

## 2022-12-19 PROCEDURE — 700102 HCHG RX REV CODE 250 W/ 637 OVERRIDE(OP): Performed by: PHYSICIAN ASSISTANT

## 2022-12-19 PROCEDURE — 99232 SBSQ HOSP IP/OBS MODERATE 35: CPT | Performed by: INTERNAL MEDICINE

## 2022-12-19 PROCEDURE — A9270 NON-COVERED ITEM OR SERVICE: HCPCS | Performed by: PHYSICIAN ASSISTANT

## 2022-12-19 PROCEDURE — 97535 SELF CARE MNGMENT TRAINING: CPT

## 2022-12-19 PROCEDURE — 700102 HCHG RX REV CODE 250 W/ 637 OVERRIDE(OP): Performed by: STUDENT IN AN ORGANIZED HEALTH CARE EDUCATION/TRAINING PROGRAM

## 2022-12-19 PROCEDURE — 700102 HCHG RX REV CODE 250 W/ 637 OVERRIDE(OP): Performed by: INTERNAL MEDICINE

## 2022-12-19 PROCEDURE — 700111 HCHG RX REV CODE 636 W/ 250 OVERRIDE (IP): Performed by: INTERNAL MEDICINE

## 2022-12-19 PROCEDURE — 87493 C DIFF AMPLIFIED PROBE: CPT

## 2022-12-19 PROCEDURE — A9270 NON-COVERED ITEM OR SERVICE: HCPCS | Performed by: INTERNAL MEDICINE

## 2022-12-19 PROCEDURE — A9270 NON-COVERED ITEM OR SERVICE: HCPCS | Performed by: STUDENT IN AN ORGANIZED HEALTH CARE EDUCATION/TRAINING PROGRAM

## 2022-12-19 PROCEDURE — 302196 LINEN, HYPOALLERGENIC: Performed by: INTERNAL MEDICINE

## 2022-12-19 PROCEDURE — 770001 HCHG ROOM/CARE - MED/SURG/GYN PRIV*

## 2022-12-19 RX ORDER — LOPERAMIDE HYDROCHLORIDE 2 MG/1
2 CAPSULE ORAL 4 TIMES DAILY PRN
Status: DISCONTINUED | OUTPATIENT
Start: 2022-12-19 | End: 2022-12-23 | Stop reason: HOSPADM

## 2022-12-19 RX ADMIN — METOPROLOL TARTRATE 12.5 MG: 25 TABLET, FILM COATED ORAL at 04:50

## 2022-12-19 RX ADMIN — CALAMINE AND PRAMOXINE HYDROCHLORIDE: 80; 10 LOTION TOPICAL at 20:45

## 2022-12-19 RX ADMIN — GUAIFENESIN SYRUP AND DEXTROMETHORPHAN 5 ML: 100; 10 SYRUP ORAL at 20:44

## 2022-12-19 RX ADMIN — CALAMINE AND PRAMOXINE HYDROCHLORIDE: 80; 10 LOTION TOPICAL at 10:22

## 2022-12-19 RX ADMIN — VANCOMYCIN HYDROCHLORIDE 1500 MG: 1 INJECTION, POWDER, LYOPHILIZED, FOR SOLUTION INTRAVENOUS at 14:58

## 2022-12-19 RX ADMIN — GUAIFENESIN SYRUP AND DEXTROMETHORPHAN 5 ML: 100; 10 SYRUP ORAL at 14:56

## 2022-12-19 RX ADMIN — DIPHENHYDRAMINE HYDROCHLORIDE 25 MG: 25 TABLET ORAL at 03:21

## 2022-12-19 RX ADMIN — DIPHENHYDRAMINE HYDROCHLORIDE 25 MG: 25 TABLET ORAL at 13:15

## 2022-12-19 RX ADMIN — CALAMINE AND PRAMOXINE HYDROCHLORIDE: 80; 10 LOTION TOPICAL at 04:50

## 2022-12-19 RX ADMIN — GUAIFENESIN SYRUP AND DEXTROMETHORPHAN 5 ML: 100; 10 SYRUP ORAL at 10:07

## 2022-12-19 RX ADMIN — METOPROLOL TARTRATE 12.5 MG: 25 TABLET, FILM COATED ORAL at 18:00

## 2022-12-19 RX ADMIN — APIXABAN 5 MG: 5 TABLET, FILM COATED ORAL at 18:00

## 2022-12-19 RX ADMIN — APIXABAN 5 MG: 5 TABLET, FILM COATED ORAL at 04:50

## 2022-12-19 ASSESSMENT — ENCOUNTER SYMPTOMS
HEADACHES: 0
FOCAL WEAKNESS: 0
BACK PAIN: 1
DIAPHORESIS: 0
WEAKNESS: 1
FLANK PAIN: 0
ROS SKIN COMMENTS: IMPROVING
VOMITING: 0
SENSORY CHANGE: 0
DIZZINESS: 0
ABDOMINAL PAIN: 0
COUGH: 0
NECK PAIN: 0
MYALGIAS: 0
SPEECH CHANGE: 0
BLURRED VISION: 0
NAUSEA: 0
NERVOUS/ANXIOUS: 0
SHORTNESS OF BREATH: 0

## 2022-12-19 ASSESSMENT — PAIN DESCRIPTION - PAIN TYPE
TYPE: ACUTE PAIN

## 2022-12-19 NOTE — THERAPY
"Physical Therapy   Daily Treatment     Patient Name: Paulina Castellanos  Age:  77 y.o., Sex:  female  Medical Record #: [id number]  Today's Date: 12/19/2022     Precautions  Precautions: Fall Risk    Plan    Continue current treatment plan.    DC Equipment Recommendations: Unable to determine at this time  Discharge Recommendations: Recommend post-acute placement for additional physical therapy services prior to discharge home      Subjective    \"I'm cold.\"     Objective       12/19/22 0911   Charge Group   Charges  Yes   PT Self Care / Home Evaluation  1  (8 min)   Precautions   Precautions Fall Risk   Vitals   O2 (LPM) 0   O2 Delivery Device None - Room Air   Patient / Family Goals    Patient / Family Goal #1 go home   Goal #1 Outcome Goal not met   Short Term Goals    Short Term Goal # 1 pt will be able to complete supine<>sitting with SPV in 6tx in order to dc home   Goal Outcome # 1 goal not met   Short Term Goal # 2 pt will be able to complete functional transfers with FWW and min assist in 6tx in order to progress home   Goal Outcome # 2 Goal not met   Short Term Goal # 3 pt will be able to ambulate 25ft with FWW and min assist in 6tx in order to progress to home   Goal Outcome # 3 Goal not met   Short Term Goal # 4 pt will be able to negotiate 4 steps with min assist in 6tx in order to progress to home   Goal Outcome # 4 Goal not met   Education Group   Additional Comments Provided education regarding role of acute PT, pathology of bed rest, and how to progress mobility while in house. Patient with limited receptivity and tangential regarding being cold in bed.   Anticipated Discharge Equipment and Recommendations   DC Equipment Recommendations Unable to determine at this time   Discharge Recommendations Recommend post-acute placement for additional physical therapy services prior to discharge home   Interdisciplinary Plan of Care Collaboration   IDT Collaboration with  Nursing   Patient Position at End of Therapy In " Bed;Call Light within Reach;Tray Table within Reach;Phone within Reach   Session Information   Date / Session Number  12/14-6 (0/3, 12/24) attempted 12/19

## 2022-12-19 NOTE — PROGRESS NOTES
Hospital Medicine Daily Progress Note    Date of Service  12/19/2022    Chief Complaint  Paulina Castellanos is a 77 y.o. female admitted 11/26/2022 with atrial fibrillation with rapid ventricular rate.      Hospital Course  Admitted for Atrial fibrillation with rapid ventricular rate, COVID-19 with pneumonia and Respiratory failure with hypoxia, mild rhabdomyolysis. She was started on Decadron, oxygen supplementation, and supportive measures.  She was placed on Metoprolol for rate control, and Eliquis for anticoagulation.  She apparently developed fever and encephalopathy, and was noted to have left arm thrombophlebitis.  Work-up further showed MRSA bacteremia.  Infectious disease was consulted on the case.  Patient was started on empiric coverage with IV vancomycin.  Repeat blood cultures remained positive for MRSA.  Cardiology was consulted on the case for a TEODORO, which showed mitral valve vegetation/endocarditis.     Interval Problem Update  Bacteremia -afebrile. Rash improving.   Diarrhea-significantly increased over the last 1-2 days. Possible C diff, she does have multiple risk factors.    I have discussed this patient's plan of care and discharge plan at IDT rounds today with Case Management, Nursing, Nursing leadership, and other members of the IDT team.    Consultants/Specialty  cardiology, infectious disease, and palliative care  Cardiothoracic surgery    Code Status  DNAR/DNI    Disposition  Patient is not medically cleared for discharge.   Anticipate discharge to to skilled nursing facility.  I have placed the appropriate orders for post-discharge needs.    Review of Systems  Review of Systems   Constitutional:  Positive for malaise/fatigue. Negative for diaphoresis.        No clear changes today   HENT:  Negative for congestion and hearing loss.    Eyes:  Negative for blurred vision.   Respiratory:  Negative for cough and shortness of breath.    Cardiovascular:  Negative for chest pain.   Gastrointestinal:   Negative for abdominal pain, nausea and vomiting.   Genitourinary:  Negative for dysuria and flank pain.   Musculoskeletal:  Positive for back pain. Negative for joint pain, myalgias and neck pain.   Skin:  Positive for itching (well controlled) and rash.        Improving   Neurological:  Positive for weakness (non focal). Negative for dizziness, sensory change, speech change, focal weakness and headaches.   Psychiatric/Behavioral:  The patient is not nervous/anxious.    All other systems reviewed and are negative.     Physical Exam  Temp:  [37 °C (98.6 °F)-37.5 °C (99.5 °F)] 37 °C (98.6 °F)  Pulse:  [72-99] 99  Resp:  [16-20] 16  BP: (114-131)/(61-72) 114/72  SpO2:  [93 %-95 %] 95 %    Physical Exam  Vitals and nursing note reviewed.   Constitutional:       Comments: Slow to answer, but appropriate  Happy, unchanged   HENT:      Head: Normocephalic and atraumatic.      Nose: No congestion.      Mouth/Throat:      Mouth: Mucous membranes are moist.   Eyes:      Extraocular Movements: Extraocular movements intact.      Conjunctiva/sclera: Conjunctivae normal.   Cardiovascular:      Rate and Rhythm: Normal rate and regular rhythm.      Heart sounds: Murmur heard.   Pulmonary:      Effort: Pulmonary effort is normal. No respiratory distress.      Breath sounds: Normal breath sounds. No wheezing.   Abdominal:      General: There is no distension.      Tenderness: There is no abdominal tenderness.   Musculoskeletal:         General: Swelling (left arm, nearly absent) present. No tenderness.      Cervical back: No tenderness.      Right lower leg: Edema present.      Left lower leg: Edema present.      Comments: All improving somewhat   Skin:     Findings: Rash (diffuse, blanchable, arms/chest/legs) present. No erythema.      Comments: Less intense rash noted today   Neurological:      General: No focal deficit present.      Mental Status: She is alert and oriented to person, place, and time.      Cranial Nerves: No  cranial nerve deficit.       Fluids    Intake/Output Summary (Last 24 hours) at 12/19/2022 1617  Last data filed at 12/18/2022 2208  Gross per 24 hour   Intake 367.92 ml   Output --   Net 367.92 ml         Laboratory  Recent Labs     12/17/22  0010   WBC 6.1   RBC 3.05*   HEMOGLOBIN 9.2*   HEMATOCRIT 29.0*   MCV 95.1   MCH 30.2   MCHC 31.7*   RDW 49.1   PLATELETCT 307   MPV 8.7*       Recent Labs     12/18/22  0445   SODIUM 136   POTASSIUM 4.2   CHLORIDE 104   CO2 23   GLUCOSE 99   BUN 23*   CREATININE 1.02   CALCIUM 8.9                   Imaging  IR-PICC LINE PLACEMENT W/ GUIDANCE > AGE 5   Final Result                  Ultrasound-guided PICC placement performed by qualified nursing staff as    above.          EC-TEODORO W/O CONT   Final Result      DX-CHEST-PORTABLE (1 VIEW)   Final Result      1.  Curvilinear opacities in the lung bases likely representing atelectasis, less likely pneumonitis.      US-EXTREMITY VENOUS UPPER UNILAT LEFT   Final Result      EC-ECHOCARDIOGRAM COMPLETE W/O CONT   Final Result      OUTSIDE IMAGES-CT CHEST   Final Result             Assessment/Plan  * Atrial fibrillation with rapid ventricular response (HCC)- (present on admission)  Assessment & Plan  Metoprolol, Eliquis  [id number] -  3  Remains in rate controlled    Diarrhea  Assessment & Plan  Started around 12/18, C diff negative  Presumed 2/2 abx-associated Diarrhea  Loperamide PRN    Drug rash- (present on admission)  Assessment & Plan  Diffuse, blanchable-  Likely 2/2 daptomycin  Continuing to Improving off abx  Benadryl and cream  Monitor    Cellulitis- (present on admission)  Assessment & Plan  IV Vancomycin    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)  Assessment & Plan  IV Vancomycin  TTE with MV involvement  TEODORO done on 12/7, results finally released on 12/13, shows freely mobile MV mass  CTS Dr Vaughn, no surgical intervention at this time  NO clear focal neurological deficits at this time  No clear e/o clinical CHF at this  time    Dysphagia- (present on admission)  Assessment & Plan  Level 7, Liquid level 0, crush pills  Aspiration precautions    Anemia- (present on admission)  Assessment & Plan  Follow CBC    Hypomagnesemia- (present on admission)  Assessment & Plan  IV Mg given  Follow level    Hypophosphatemia- (present on admission)  Assessment & Plan  Follow level    Hyponatremia- (present on admission)  Assessment & Plan  Follow BMP    MRSA bacteremia- (present on admission)  Assessment & Plan  IV Vancomycin changed to IV dapto 12/14 changed back to vanco 12/16 (SNF will not do dapto and she developed a rash to dapto)  repeat blood cultures 12/12 ngtd  PICC line placed 12/16, awaiting SNF bed  Will go to [facility]  Remains clinically stable  Serum Cr remains stable    Encephalopathy acute- (present on admission)  Assessment & Plan  secondary to infectious etiology, improving slowly and no clear focal neurological deficits  Any change in neuro status, low threshold to get MRI brain given large mobile mass on MV with IE  Avoid Benzodiazepines and Anticholinergics  Frequent orientation  Open window blinds during the day  Avoid naps during the day  Family at bedside whenever possible  Ensure adequate sleep at night - use Melatonin preferably  Avoid early morning labs  Avoid vital signs during sleep  Ambulate if possible  Provide adequate pain control  Monitor for urinary retention  Prevent and manage constipation  Discontinue IVs, Catheters, Tubes, Lines, Cardiac monitors, SCDs if possible    Sepsis (HCC)- (present on admission)  Assessment & Plan  Resolved  ID following  See elsehwere      Thrombophlebitis- (present on admission)  Assessment & Plan  IV Vancomycin changed to IV dapto changed back to IV vanco  Monitor CPK  Eliquis    Acute respiratory failure with hypoxia (HCC)- (present on admission)  Assessment & Plan  due to COVID 19  RT protocol  Resolved    Metabolic acidosis, normal anion gap (NAG)- (present on  admission)  Assessment & Plan  Resolved    Frailty syndrome in geriatric patient- (present on admission)  Assessment & Plan  Palliative care following    Non-traumatic rhabdomyolysis- (present on admission)  Assessment & Plan  resolved    Elevated LFTs- (present on admission)  Assessment & Plan  Follow CMP    Pneumonia due to COVID-19 virus- (present on admission)  Assessment & Plan  Finished course of Decadron    Hypokalemia- (present on admission)  Assessment & Plan  Follow bmp       VTE prophylaxis: therapeutic anticoagulation with Eliquis    I have performed a physical exam and reviewed and updated ROS and Plan today (12/19/2022). In review of yesterday's note (12/18/2022), there are no changes except as documented above.

## 2022-12-19 NOTE — PROGRESS NOTES
Report received from Octavio LOYD, assumed care at 1900  A0x4  Pt declines any SOB on room air, chest pain, new onset of numbness/tingling  Pt rates pain at 0/10, on a scale of 1-10  + voiding   Pt has + flatus, + bowel sounds, + BM on 12/18  Pt ambulates with a max assist  Pt is tolerating a level 7 diet, pt experiencing intermittent nausea   Plan of care discussed, all questions answered.Call light is within reach, treaded slipper socks on, bed in lowest/locked position, hourly rounding in place, all needs met at this time.

## 2022-12-19 NOTE — CARE PLAN
Problem: Knowledge Deficit - Standard  Goal: Patient and family/care givers will demonstrate understanding of plan of care, disease process/condition, diagnostic tests and medications  Outcome: Progressing     Problem: Skin Integrity  Goal: Skin integrity is maintained or improved  Outcome: Progressing     Problem: Fall Risk  Goal: Patient will remain free from falls  Outcome: Progressing   The patient is Stable - Low risk of patient condition declining or worsening    Shift Goals  Clinical Goals: skin integrity  Patient Goals: comfort  Family Goals: N/A

## 2022-12-19 NOTE — DISCHARGE PLANNING
Agency/Facility Name: [facility]   Outcome: Left a voicemail with Cecile in admissions in regards to bed availability and possibility of transportation today. Waiting for callback.

## 2022-12-19 NOTE — CARE PLAN
Problem: Knowledge Deficit - Standard  Goal: Patient and family/care givers will demonstrate understanding of plan of care, disease process/condition, diagnostic tests and medications  Outcome: Progressing     Problem: Skin Integrity  Goal: Skin integrity is maintained or improved  Outcome: Progressing     Problem: Fall Risk  Goal: Patient will remain free from falls  Outcome: Progressing   The patient is Stable - Low risk of patient condition declining or worsening    Shift Goals  Clinical Goals: Skin Integrity  Patient Goals: Rest  Family Goals: N/A    Progress made toward(s) clinical / shift goals:  Bed alarm in place, q2 Turns in place, Call light within patient reach     Patient is not progressing towards the following goals:

## 2022-12-19 NOTE — PROGRESS NOTES
Assumed care of patient at 0645. Bedside report received. Assessment complete.  AA&Ox4. Denies CP/SOB.  Reporting 0/10 pain. Declined intervention at this time.  Educated patient regarding pharmacologic and non pharmacologic modalities for pain management.  Skin per flowsheets  Tolerating regular diet. Denies N/V.  + void. + BM. Last BM 12/19  Pt ambulates Max assist   All needs met at this time. Call light within reach. Pt calls appropriately. Bed low and locked, non skid socks in place. Hourly rounding in place.

## 2022-12-20 PROBLEM — T14.8XXA DEEP TISSUE INJURY: Status: ACTIVE | Noted: 2022-12-20

## 2022-12-20 LAB
ANION GAP SERPL CALC-SCNC: 10 MMOL/L (ref 7–16)
BUN SERPL-MCNC: 23 MG/DL (ref 8–22)
CALCIUM SERPL-MCNC: 8.5 MG/DL (ref 8.5–10.5)
CHLORIDE SERPL-SCNC: 103 MMOL/L (ref 96–112)
CO2 SERPL-SCNC: 22 MMOL/L (ref 20–33)
CREAT SERPL-MCNC: 1.2 MG/DL (ref 0.5–1.4)
GFR SERPLBLD CREATININE-BSD FMLA CKD-EPI: 47 ML/MIN/1.73 M 2
GLUCOSE SERPL-MCNC: 104 MG/DL (ref 65–99)
POTASSIUM SERPL-SCNC: 4.4 MMOL/L (ref 3.6–5.5)
SARS-COV+SARS-COV-2 AG RESP QL IA.RAPID: NOTDETECTED
SODIUM SERPL-SCNC: 135 MMOL/L (ref 135–145)
SPECIMEN SOURCE: NORMAL

## 2022-12-20 PROCEDURE — 700111 HCHG RX REV CODE 636 W/ 250 OVERRIDE (IP): Performed by: INTERNAL MEDICINE

## 2022-12-20 PROCEDURE — 700101 HCHG RX REV CODE 250: Performed by: INTERNAL MEDICINE

## 2022-12-20 PROCEDURE — 80048 BASIC METABOLIC PNL TOTAL CA: CPT

## 2022-12-20 PROCEDURE — 700102 HCHG RX REV CODE 250 W/ 637 OVERRIDE(OP): Performed by: INTERNAL MEDICINE

## 2022-12-20 PROCEDURE — 99232 SBSQ HOSP IP/OBS MODERATE 35: CPT | Performed by: FAMILY MEDICINE

## 2022-12-20 PROCEDURE — 700102 HCHG RX REV CODE 250 W/ 637 OVERRIDE(OP): Performed by: PHYSICIAN ASSISTANT

## 2022-12-20 PROCEDURE — A9270 NON-COVERED ITEM OR SERVICE: HCPCS | Performed by: PHYSICIAN ASSISTANT

## 2022-12-20 PROCEDURE — 97535 SELF CARE MNGMENT TRAINING: CPT

## 2022-12-20 PROCEDURE — 700102 HCHG RX REV CODE 250 W/ 637 OVERRIDE(OP): Performed by: STUDENT IN AN ORGANIZED HEALTH CARE EDUCATION/TRAINING PROGRAM

## 2022-12-20 PROCEDURE — 770001 HCHG ROOM/CARE - MED/SURG/GYN PRIV*

## 2022-12-20 PROCEDURE — 700105 HCHG RX REV CODE 258: Performed by: INTERNAL MEDICINE

## 2022-12-20 PROCEDURE — 87426 SARSCOV CORONAVIRUS AG IA: CPT

## 2022-12-20 PROCEDURE — A9270 NON-COVERED ITEM OR SERVICE: HCPCS | Performed by: STUDENT IN AN ORGANIZED HEALTH CARE EDUCATION/TRAINING PROGRAM

## 2022-12-20 PROCEDURE — A9270 NON-COVERED ITEM OR SERVICE: HCPCS | Performed by: INTERNAL MEDICINE

## 2022-12-20 RX ADMIN — GUAIFENESIN SYRUP AND DEXTROMETHORPHAN 5 ML: 100; 10 SYRUP ORAL at 15:00

## 2022-12-20 RX ADMIN — GUAIFENESIN SYRUP AND DEXTROMETHORPHAN 5 ML: 100; 10 SYRUP ORAL at 09:00

## 2022-12-20 RX ADMIN — APIXABAN 5 MG: 5 TABLET, FILM COATED ORAL at 06:10

## 2022-12-20 RX ADMIN — VANCOMYCIN HYDROCHLORIDE 1500 MG: 1 INJECTION, POWDER, LYOPHILIZED, FOR SOLUTION INTRAVENOUS at 14:24

## 2022-12-20 RX ADMIN — METOPROLOL TARTRATE 12.5 MG: 25 TABLET, FILM COATED ORAL at 17:36

## 2022-12-20 RX ADMIN — GUAIFENESIN SYRUP AND DEXTROMETHORPHAN 5 ML: 100; 10 SYRUP ORAL at 21:00

## 2022-12-20 RX ADMIN — METOPROLOL TARTRATE 12.5 MG: 25 TABLET, FILM COATED ORAL at 06:10

## 2022-12-20 RX ADMIN — CALAMINE AND PRAMOXINE HYDROCHLORIDE: 80; 10 LOTION TOPICAL at 21:23

## 2022-12-20 RX ADMIN — DIPHENHYDRAMINE HYDROCHLORIDE 25 MG: 25 TABLET ORAL at 01:27

## 2022-12-20 RX ADMIN — CALAMINE AND PRAMOXINE HYDROCHLORIDE: 80; 10 LOTION TOPICAL at 09:10

## 2022-12-20 RX ADMIN — APIXABAN 5 MG: 5 TABLET, FILM COATED ORAL at 17:36

## 2022-12-20 ASSESSMENT — ENCOUNTER SYMPTOMS
PALPITATIONS: 0
HEARTBURN: 0
NERVOUS/ANXIOUS: 0
DIAPHORESIS: 0
ABDOMINAL PAIN: 0
BACK PAIN: 0
MYALGIAS: 0
NECK PAIN: 0
HEADACHES: 0
DIARRHEA: 0
SORE THROAT: 0
BLURRED VISION: 0
NAUSEA: 0
WHEEZING: 0
WEIGHT LOSS: 0
FOCAL WEAKNESS: 0
VOMITING: 0
FLANK PAIN: 0
SPEECH CHANGE: 0
COUGH: 0
SENSORY CHANGE: 0
FEVER: 0
CHILLS: 0
WEAKNESS: 1
SHORTNESS OF BREATH: 0
DIZZINESS: 0

## 2022-12-20 ASSESSMENT — COGNITIVE AND FUNCTIONAL STATUS - GENERAL
HELP NEEDED FOR BATHING: A LOT
SUGGESTED CMS G CODE MODIFIER DAILY ACTIVITY: CK
DRESSING REGULAR UPPER BODY CLOTHING: A LITTLE
DRESSING REGULAR LOWER BODY CLOTHING: A LOT
DAILY ACTIVITIY SCORE: 16
TOILETING: A LOT
PERSONAL GROOMING: A LITTLE

## 2022-12-20 ASSESSMENT — PAIN DESCRIPTION - PAIN TYPE
TYPE: ACUTE PAIN

## 2022-12-20 NOTE — CARE PLAN
The patient is Stable - Low risk of patient condition declining or worsening    Shift Goals  Clinical Goals: skin integrity  Patient Goals: Rest  Family Goals: N/A    Progress made toward(s) clinical / shift goals:    Problem: Knowledge Deficit - Standard  Goal: Patient and family/care givers will demonstrate understanding of plan of care, disease process/condition, diagnostic tests and medications  Outcome: Progressing     Problem: Skin Integrity  Goal: Skin integrity is maintained or improved  Outcome: Progressing  PRN lotion applied, frequent incontinent checks     Problem: Fall Risk  Goal: Patient will remain free from falls  Outcome: Progressing     Problem: Communication  Goal: The ability to communicate needs accurately and effectively will improve  Outcome: Progressing     Problem: Pain - Standard  Goal: Alleviation of pain or a reduction in pain to the patient’s comfort goal  Outcome: Progressing       Patient is not progressing towards the following goals:

## 2022-12-20 NOTE — PROGRESS NOTES
4 Eyes Skin Assessment Completed by EVERT Salazar and EVERT Casas.    Head Redness, rash   Ears WDL  Nose WDL  Mouth WDL  Neck Redness and Blanching, rash  Breast/Chest Redness and Rash  Shoulder Blades Redness  Spine Redness  (R) Arm/Elbow/Hand Redness, Rash, and Edema  (L) Arm/Elbow/Hand Redness, Rash, and Edema  Abdomen Redness and Rash  Groin Redness and Rash  Scrotum/Coccyx/Buttocks Redness and Blanching, rash, peeling  (R) Leg Redness and Rash  (L) Leg Redness and Rash  (R) Heel/Foot/Toe Redness and Rash  (L) Heel/Foot/Toe Redness and Rash          Devices In Places Pulse Ox      Interventions In Place Pillows, Q2 Turns, Low Air Loss Mattress, and Barrier Cream    Possible Skin Injury No    Pictures Uploaded Into Epic N/A  Wound Consult Placed N/A  RN Wound Prevention Protocol Ordered No

## 2022-12-20 NOTE — DISCHARGE PLANNING
"Agency/Facility Name: [facility]   Spoke To: Cecile   Outcome: Placed call to see if they would be able to accept pt today. Cecile asked why she was switched back to Vanco from Dapto. DPA informed her that the pt had an allergic reaction to Dapto (rash) and so they had to switch her back. Unfortunately [facility] can only take Dapto and not Vanco so they had to decline the referral for this reason. RN CM notified.     @[phone]  Agency/Facility Name: [facility]   Spoke To: Jayro   Outcome: Shortage on IV flushes. Will try to see if they can get the resources for the pt as she will be on IV Vanco through 1/22/23. Stated that if it is possible to switch to oral Vanco it would much easier to accept. Jayro stated she would check to see if she could find the supplies needed and SANJA[facility] will see if it can be changed to oral. Possibly will have a bed tomorrow, Jayro to confirm discharges before confirming.     @[id number]  Agency/Facility Name: [facility]   Outcome: Left a voicemail with Natalio in admissions in regards to referral status. Pt is marked as \"considering\" in Epic. Informed her pt will be on IV Vanco through 1/22/23 and has MRSA. [facility] is unsure if they could take a pt like this considering they have active Covid and Influenza in their facility. Waiting for callback.     "

## 2022-12-20 NOTE — DISCHARGE PLANNING
Case Management Discharge Planning    Admission Date: 11/26/2022  GMLOS: 3.4  ALOS: 24    6-Clicks ADL Score: 15  6-Clicks Mobility Score: 7  PT and/or OT Eval ordered: Yes  Post-acute Referrals Ordered: Yes  Post-acute Choice Obtained: Yes  Has referral(s) been sent to post-acute provider:  Yes      Anticipated Discharge Dispo: Discharge Disposition: D/T to SNF with medicare cert w/planned hosp IP readmit (83)    DME Needed: Pending PT/OT recommendations    Action(s) Taken: Updated Provider/Nurse on Discharge Plan    Pt was discussed in IDT rounds today and per Dr Palacios Pt  is now medically cleared.    This RN CM requested Ramona ALLAN to follow up with [facility] if Pt can be accepted today .    This RN CM requested Octavio LOYD to send rapid covid test.   Will start [facility]/transfer packet.    Will set up transport once bed is confirmed.     Pt was declined at [facility] as they cannot accept Pt on Vancomycin , only on Daptomycin. This RN CM requested Ramona ALLAN to follow up with [facility] if Pt can be accepted.    [facility] is asking if Vancomycin can be switched to oral.    This RN CM requested Michelle jimenez [facility] to look into the case if Pt will fit  LTACH criteria.  Informed Dr Palacios and requested LTACH referral.     This RN CM provided update to Marielena ,Pt's Sister in [place]. Marielena agreed that we [facility]      Escalations Completed: None    Medically Clear: Yes    Next Steps:   This RN CM to continue to assist Pt with discharge as needed    Barriers to Discharge:   Pending placement  Transportation to [facility]/SNF    Is the patient up for discharge tomorrow: No    Addendum: 12/21/22    Per Michelle jimenez [facility] they have a  bed today, pending Insurance auth and review.    Informed EVERT Mcfarlane and Dr Palacios.     Pt has MRSA and Pt can only have Dapto or Vanco  I .D. will be consulted because of Red Man's Syndrome? , pending medical clearance  Pt was switched to Teflaro  Per Dr Palacios, Pt might be  medically cleared tomorrow. Per Michelle she will call if there is a bed for Pt at [facility] tomorrow.    If Cleared by I.D. [facility] can accept Pt today around 17:00. Will set up [facility].

## 2022-12-20 NOTE — PROGRESS NOTES
Assumed care of patient at 0645. Bedside report received. Assessment complete.  AA&Ox4. Denies CP/SOB.  Reporting 0/10 pain. Declined intervention at this time.  Educated patient regarding pharmacologic and non pharmacologic modalities for pain management.  Skin per flowsheets  Tolerating L7 Easy to Chew diet. Denies N/V.  + void. + BM. Last BM 12/20  Pt ambulates Max assist.  All needs met at this time. Call light within reach. Pt calls appropriately. Bed low and locked, non skid socks in place. Hourly rounding in place.

## 2022-12-20 NOTE — CARE PLAN
Problem: Knowledge Deficit - Standard  Goal: Patient and family/care givers will demonstrate understanding of plan of care, disease process/condition, diagnostic tests and medications  Outcome: Progressing     Problem: Skin Integrity  Goal: Skin integrity is maintained or improved  Outcome: Progressing     Problem: Fall Risk  Goal: Patient will remain free from falls  Outcome: Progressing   The patient is Stable - Low risk of patient condition declining or worsening    Shift Goals  Clinical Goals: Skin Integrity  Patient Goals: Rest  Family Goals: N/A    Progress made toward(s) clinical / shift goals:  Q2 Turns in place, patient on bariatric bed, staff available for ambulation     Patient is not progressing towards the following goals:       - - -

## 2022-12-21 PROCEDURE — 700102 HCHG RX REV CODE 250 W/ 637 OVERRIDE(OP): Performed by: INTERNAL MEDICINE

## 2022-12-21 PROCEDURE — 99233 SBSQ HOSP IP/OBS HIGH 50: CPT | Performed by: INTERNAL MEDICINE

## 2022-12-21 PROCEDURE — A9270 NON-COVERED ITEM OR SERVICE: HCPCS | Performed by: INTERNAL MEDICINE

## 2022-12-21 PROCEDURE — 700102 HCHG RX REV CODE 250 W/ 637 OVERRIDE(OP): Performed by: STUDENT IN AN ORGANIZED HEALTH CARE EDUCATION/TRAINING PROGRAM

## 2022-12-21 PROCEDURE — 700102 HCHG RX REV CODE 250 W/ 637 OVERRIDE(OP): Performed by: PHYSICIAN ASSISTANT

## 2022-12-21 PROCEDURE — A9270 NON-COVERED ITEM OR SERVICE: HCPCS | Performed by: STUDENT IN AN ORGANIZED HEALTH CARE EDUCATION/TRAINING PROGRAM

## 2022-12-21 PROCEDURE — 700105 HCHG RX REV CODE 258: Performed by: INTERNAL MEDICINE

## 2022-12-21 PROCEDURE — 700111 HCHG RX REV CODE 636 W/ 250 OVERRIDE (IP): Mod: JG | Performed by: INTERNAL MEDICINE

## 2022-12-21 PROCEDURE — 99232 SBSQ HOSP IP/OBS MODERATE 35: CPT | Performed by: FAMILY MEDICINE

## 2022-12-21 PROCEDURE — A9270 NON-COVERED ITEM OR SERVICE: HCPCS | Performed by: PHYSICIAN ASSISTANT

## 2022-12-21 PROCEDURE — 770001 HCHG ROOM/CARE - MED/SURG/GYN PRIV*

## 2022-12-21 RX ORDER — HYDROXYZINE HYDROCHLORIDE 25 MG/1
25 TABLET, FILM COATED ORAL 3 TIMES DAILY PRN
Status: DISCONTINUED | OUTPATIENT
Start: 2022-12-21 | End: 2022-12-23 | Stop reason: HOSPADM

## 2022-12-21 RX ORDER — GUAIFENESIN/DEXTROMETHORPHAN 100-10MG/5
5 SYRUP ORAL 3 TIMES DAILY PRN
Status: DISCONTINUED | OUTPATIENT
Start: 2022-12-21 | End: 2022-12-22

## 2022-12-21 RX ADMIN — APIXABAN 5 MG: 5 TABLET, FILM COATED ORAL at 06:29

## 2022-12-21 RX ADMIN — CEFTAROLINE FOSAMIL 400 MG: 600 POWDER, FOR SOLUTION INTRAVENOUS at 14:55

## 2022-12-21 RX ADMIN — APIXABAN 5 MG: 5 TABLET, FILM COATED ORAL at 18:10

## 2022-12-21 RX ADMIN — METOPROLOL TARTRATE 12.5 MG: 25 TABLET, FILM COATED ORAL at 06:00

## 2022-12-21 RX ADMIN — DIPHENHYDRAMINE HYDROCHLORIDE 25 MG: 25 TABLET ORAL at 20:58

## 2022-12-21 RX ADMIN — METOPROLOL TARTRATE 12.5 MG: 25 TABLET, FILM COATED ORAL at 18:10

## 2022-12-21 ASSESSMENT — ENCOUNTER SYMPTOMS
PALPITATIONS: 0
SENSORY CHANGE: 0
COUGH: 0
MYALGIAS: 0
SHORTNESS OF BREATH: 0
DIARRHEA: 0
WEIGHT LOSS: 0
MYALGIAS: 1
DIAPHORESIS: 0
BACK PAIN: 0
WHEEZING: 0
BLURRED VISION: 0
NECK PAIN: 0
CHILLS: 0
WEAKNESS: 1
HEARTBURN: 0
ABDOMINAL PAIN: 0
FOCAL WEAKNESS: 0
SORE THROAT: 0
FEVER: 0
FLANK PAIN: 0
SPEECH CHANGE: 0
NAUSEA: 0
HEADACHES: 0
NERVOUS/ANXIOUS: 0
DIZZINESS: 0
VOMITING: 0

## 2022-12-21 NOTE — PROGRESS NOTES
Hospital Medicine Daily Progress Note    Date of Service  12/20/2022    Chief Complaint  Paulina Castellanos is a 77 y.o. female admitted 11/26/2022 with atrial fibrillation with rapid ventricular rate.      Hospital Course  Admitted for Atrial fibrillation with rapid ventricular rate, COVID-19 with pneumonia and Respiratory failure with hypoxia, mild rhabdomyolysis. She was started on Decadron, oxygen supplementation, and supportive measures.  She was placed on Metoprolol for rate control, and Eliquis for anticoagulation.  She apparently developed fever and encephalopathy, and was noted to have left arm thrombophlebitis.  Work-up further showed MRSA bacteremia.  Infectious disease was consulted on the case.  Patient was started on empiric coverage with IV vancomycin.  Repeat blood cultures remained positive for MRSA.  Cardiology was consulted on the case for a TEODORO, which showed mitral valve vegetation/endocarditis.  Repeat cultures eventually were negative.  PICC line placement was done.  Patient was placed on IV daptomycin anticipation of discharge to a skilled nursing facility.  However she developed a rash which was likely an allergic reaction to the daptomycin.  She was changed back to IV vancomycin.  TEODORO showed a mitral valve freely mobile mass.  CT surgery was consulted on the case and recommended medical management for now.    Interval Problem Update  Bacteremia -repeat cultures negative  Rash - resolving  A. Fib - currently sinus    I have discussed this patient's plan of care and discharge plan at IDT rounds today with Case Management, Nursing, Nursing leadership, and other members of the IDT team.    Consultants/Specialty  cardiology, infectious disease, palliative care, and CT surgery    Code Status  DNAR/DNI    Disposition  Patient is medically cleared for discharge.   Anticipate discharge to to a long-term acute care hospital.  I have placed the appropriate orders for post-discharge needs.    Review of  Systems  Review of Systems   Constitutional:  Positive for malaise/fatigue. Negative for chills, diaphoresis, fever and weight loss.   HENT:  Negative for congestion, hearing loss and sore throat.    Eyes:  Negative for blurred vision.   Respiratory:  Negative for cough, shortness of breath and wheezing.    Cardiovascular:  Positive for leg swelling. Negative for chest pain and palpitations.   Gastrointestinal:  Negative for abdominal pain, diarrhea, heartburn, nausea and vomiting.   Genitourinary:  Negative for dysuria, flank pain and hematuria.   Musculoskeletal:  Negative for back pain, joint pain, myalgias and neck pain.   Skin:  Positive for rash. Negative for itching.   Neurological:  Positive for weakness. Negative for dizziness, sensory change, speech change, focal weakness and headaches.   Psychiatric/Behavioral:  The patient is not nervous/anxious.       Physical Exam  Temp:  [35.9 °C (96.6 °F)-36.2 °C (97.2 °F)] 36.2 °C (97.2 °F)  Pulse:  [] 92  Resp:  [16-17] 16  BP: (100-116)/(62-77) 108/62  SpO2:  [90 %-94 %] 94 %    Physical Exam  Vitals and nursing note reviewed.   HENT:      Head: Normocephalic and atraumatic.      Nose: No congestion.      Mouth/Throat:      Mouth: Mucous membranes are moist.   Eyes:      Extraocular Movements: Extraocular movements intact.      Conjunctiva/sclera: Conjunctivae normal.   Cardiovascular:      Rate and Rhythm: Normal rate and regular rhythm.      Heart sounds: Murmur heard.   Pulmonary:      Effort: Pulmonary effort is normal.      Breath sounds: Normal breath sounds.   Abdominal:      General: There is no distension.      Tenderness: There is no abdominal tenderness. There is no guarding or rebound.   Musculoskeletal:         General: Swelling (left arm) present.      Cervical back: No tenderness.      Right lower leg: Edema present.      Left lower leg: Edema present.   Skin:     Findings: Rash present.      Comments: Deep tissue injury sacrococcygeal area    Neurological:      General: No focal deficit present.      Mental Status: She is alert and oriented to person, place, and time.      Cranial Nerves: No cranial nerve deficit.       Fluids    Intake/Output Summary (Last 24 hours) at 12/20/2022 1617  Last data filed at 12/20/2022 0900  Gross per 24 hour   Intake 360 ml   Output 0 ml   Net 360 ml         Laboratory        Recent Labs     12/18/22  0445 12/20/22  0120   SODIUM 136 135   POTASSIUM 4.2 4.4   CHLORIDE 104 103   CO2 23 22   GLUCOSE 99 104*   BUN 23* 23*   CREATININE 1.02 1.20   CALCIUM 8.9 8.5                   Imaging  IR-PICC LINE PLACEMENT W/ GUIDANCE > AGE 5   Final Result                  Ultrasound-guided PICC placement performed by qualified nursing staff as    above.          EC-TEODORO W/O CONT   Final Result      DX-CHEST-PORTABLE (1 VIEW)   Final Result      1.  Curvilinear opacities in the lung bases likely representing atelectasis, less likely pneumonitis.      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)  Assessment & Plan  IV Vancomycin  CT Surgery recommendation - continued medical therapy and against operative intervention in the form of MVR at this time    MRSA bacteremia- (present on admission)  Assessment & Plan  IV Vancomycin   PICC line placed 12/16    Encephalopathy acute- (present on admission)  Assessment & Plan  Avoid Benzodiazepines and Anticholinergics  Frequent orientation  Open window blinds during the day  Avoid naps during the day  Family at bedside whenever possible  Ensure adequate sleep at night - use Melatonin preferably  Avoid early morning labs  Avoid vital signs during sleep  Ambulate if possible  Provide  adequate pain control  Monitor for urinary retention  Prevent and manage constipation  Discontinue IVs, Catheters, Tubes, Lines, Cardiac monitors, SCDs if possible    Sepsis (HCC)- (present on admission)  Assessment & Plan  Resolved      Thrombophlebitis- (present on admission)  Assessment & Plan  IV Vancomycin   Eliquis    Pneumonia due to COVID-19 virus- (present on admission)  Assessment & Plan  Finished course of Decadron    Deep tissue injury  Assessment & Plan  Wound care    Diarrhea  Assessment & Plan  C diff negative    Drug rash- (present on admission)  Assessment & Plan  Likely secondary to daptomycin    Cellulitis- (present on admission)  Assessment & Plan  IV Vancomycin    Dysphagia- (present on admission)  Assessment & Plan  Level 7, Liquid level 0, crush pills  Aspiration precautions    Anemia- (present on admission)  Assessment & Plan  Follow CBC    Hypomagnesemia- (present on admission)  Assessment & Plan  IV Mg given  Follow level    Hypophosphatemia- (present on admission)  Assessment & Plan  Follow level    Hyponatremia- (present on admission)  Assessment & Plan  Follow BMP    Acute respiratory failure with hypoxia (HCC)- (present on admission)  Assessment & Plan  due to COVID 19  RT protocol  Resolved    Metabolic acidosis, normal anion gap (NAG)- (present on admission)  Assessment & Plan  Resolved    Frailty syndrome in geriatric patient- (present on admission)  Assessment & Plan  Palliative care following    Non-traumatic rhabdomyolysis- (present on admission)  Assessment & Plan  resolved    Elevated LFTs- (present on admission)  Assessment & Plan  Follow CMP    Hypokalemia- (present on admission)  Assessment & Plan  Follow bmp       VTE prophylaxis: therapeutic anticoagulation with Eliquis    I have performed a physical exam and reviewed and updated ROS and Plan today (12/20/2022). In review of yesterday's note (12/19/2022), there are no changes except as documented above.

## 2022-12-21 NOTE — PROGRESS NOTES
Hospital Medicine Daily Progress Note    Date of Service  12/21/2022    Chief Complaint  Paulina Castellanos is a 77 y.o. female admitted 11/26/2022 with atrial fibrillation with rapid ventricular rate.      Hospital Course  Admitted for Atrial fibrillation with rapid ventricular rate, COVID-19 with pneumonia and Respiratory failure with hypoxia, mild rhabdomyolysis. She was started on Decadron, oxygen supplementation, and supportive measures.  She was placed on Metoprolol for rate control, and Eliquis for anticoagulation.  She apparently developed fever and encephalopathy, and was noted to have left arm thrombophlebitis.  Work-up further showed MRSA bacteremia.  Infectious disease was consulted on the case.  Patient was started on empiric coverage with IV vancomycin.  Repeat blood cultures remained positive for MRSA.  Cardiology was consulted on the case for a TEODORO, which showed mitral valve vegetation/endocarditis.  Repeat cultures eventually were negative.  PICC line placement was done.  Patient was placed on IV daptomycin anticipation of discharge to a skilled nursing facility.  However she developed a rash which was likely an allergic reaction to the daptomycin.  She was changed back to IV vancomycin.  TEODORO showed a mitral valve freely mobile mass.  CT surgery was consulted on the case and recommended medical management for now.    Interval Problem Update  Bacteremia - repeat cultures negative  Rash - diffuse and generalized, has been off daptomycin since 12/16, discussed with VERITO Gracia - currently sinus    I have discussed this patient's plan of care and discharge plan at IDT rounds today with Case Management, Nursing, Nursing leadership, and other members of the IDT team.    Consultants/Specialty  cardiology, infectious disease, palliative care, and CT surgery    Code Status  DNAR/DNI    Disposition  Patient is medically cleared for discharge.   Anticipate discharge to to a long-term acute care hospital.  I have  placed the appropriate orders for post-discharge needs.    Review of Systems  Review of Systems   Constitutional:  Positive for malaise/fatigue. Negative for chills, diaphoresis, fever and weight loss.   HENT:  Negative for congestion, hearing loss and sore throat.    Eyes:  Negative for blurred vision.   Respiratory:  Negative for cough, shortness of breath and wheezing.    Cardiovascular:  Positive for leg swelling. Negative for chest pain and palpitations.   Gastrointestinal:  Negative for abdominal pain, diarrhea, heartburn, nausea and vomiting.   Genitourinary:  Negative for dysuria, flank pain and hematuria.   Musculoskeletal:  Negative for back pain, joint pain, myalgias and neck pain.   Skin:  Positive for itching and rash.   Neurological:  Positive for weakness. Negative for dizziness, sensory change, speech change, focal weakness and headaches.   Psychiatric/Behavioral:  The patient is not nervous/anxious.       Physical Exam  Temp:  [36.8 °C (98.2 °F)-38.6 °C (101.5 °F)] 37.2 °C (99 °F)  Pulse:  [] 96  Resp:  [16-18] 16  BP: ()/(57-71) 101/61  SpO2:  [90 %-94 %] 92 %    Physical Exam  Vitals and nursing note reviewed.   HENT:      Head: Normocephalic and atraumatic.      Nose: No congestion.      Mouth/Throat:      Mouth: Mucous membranes are moist.   Eyes:      Extraocular Movements: Extraocular movements intact.      Conjunctiva/sclera: Conjunctivae normal.   Cardiovascular:      Rate and Rhythm: Normal rate and regular rhythm.      Heart sounds: Murmur heard.   Pulmonary:      Effort: Pulmonary effort is normal.      Breath sounds: Normal breath sounds.   Abdominal:      General: There is no distension.      Tenderness: There is no abdominal tenderness. There is no guarding or rebound.   Musculoskeletal:         General: Swelling (left arm) present.      Cervical back: No tenderness.      Right lower leg: Edema present.      Left lower leg: Edema present.   Skin:     Findings: Rash  (generalized) present. Rash is macular and papular.      Comments: Deep tissue injury sacrococcygeal area   Neurological:      General: No focal deficit present.      Mental Status: She is alert and oriented to person, place, and time.      Cranial Nerves: No cranial nerve deficit.       Fluids    Intake/Output Summary (Last 24 hours) at 12/21/2022 1314  Last data filed at 12/21/2022 0300  Gross per 24 hour   Intake 0 ml   Output 0 ml   Net 0 ml           Laboratory        Recent Labs     12/20/22  0120   SODIUM 135   POTASSIUM 4.4   CHLORIDE 103   CO2 22   GLUCOSE 104*   BUN 23*   CREATININE 1.20   CALCIUM 8.5                     Imaging  IR-PICC LINE PLACEMENT W/ GUIDANCE > AGE 5   Final Result                  Ultrasound-guided PICC placement performed by qualified nursing staff as    above.          EC-TEODORO W/O CONT   Final Result      DX-CHEST-PORTABLE (1 VIEW)   Final Result      1.  Curvilinear opacities in the lung bases likely representing atelectasis, less likely pneumonitis.      US-EXTREMITY VENOUS UPPER UNILAT LEFT   Final Result      EC-ECHOCARDIOGRAM COMPLETE W/O CONT   Final Result      OUTSIDE IMAGES-CT CHEST   Final Result             Assessment/Plan  * Atrial fibrillation with rapid ventricular response (HCC)- (present on admission)  Assessment & Plan  Metoprolol, Eliquis  [id number] -  3    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)  Assessment & Plan  changed to Teflaro  CT Surgery recommendation - continued medical therapy and against operative intervention in the form of MVR at this time    MRSA bacteremia- (present on admission)  Assessment & Plan  changed to Teflaro  PICC line placed 12/16    Encephalopathy acute- (present on admission)  Assessment & Plan  Avoid Benzodiazepines and Anticholinergics  Frequent orientation  Open window blinds during the day  Avoid naps during the day  Family at bedside whenever possible  Ensure adequate sleep at night - use Melatonin preferably  Avoid early morning  labs  Avoid vital signs during sleep  Ambulate if possible  Provide adequate pain control  Monitor for urinary retention  Prevent and manage constipation  Discontinue IVs, Catheters, Tubes, Lines, Cardiac monitors, SCDs if possible    Sepsis (HCC)- (present on admission)  Assessment & Plan  Resolved      Thrombophlebitis- (present on admission)  Assessment & Plan  Eliquis    Pneumonia due to COVID-19 virus- (present on admission)  Assessment & Plan  Finished course of Decadron    Deep tissue injury  Assessment & Plan  Wound care    Diarrhea  Assessment & Plan  C diff negative    Drug rash- (present on admission)  Assessment & Plan  Likely secondary to Daptomycin then Vancomycin  Benadryl, Vistaril  Monitor closely    Cellulitis- (present on admission)  Assessment & Plan  resolved    Dysphagia- (present on admission)  Assessment & Plan  Level 7, Liquid level 0, crush pills  Aspiration precautions    Anemia- (present on admission)  Assessment & Plan  Follow CBC    Hypomagnesemia- (present on admission)  Assessment & Plan  IV Mg given  Follow level    Hypophosphatemia- (present on admission)  Assessment & Plan  Follow level    Hyponatremia- (present on admission)  Assessment & Plan  Follow BMP    Acute respiratory failure with hypoxia (HCC)- (present on admission)  Assessment & Plan  due to COVID 19  RT protocol  Resolved    Metabolic acidosis, normal anion gap (NAG)- (present on admission)  Assessment & Plan  Resolved    Frailty syndrome in geriatric patient- (present on admission)  Assessment & Plan  Palliative care following    Non-traumatic rhabdomyolysis- (present on admission)  Assessment & Plan  resolved    Elevated LFTs- (present on admission)  Assessment & Plan  Follow CMP    Hypokalemia- (present on admission)  Assessment & Plan  Follow bmp         VTE prophylaxis: therapeutic anticoagulation with Eliquis    I have performed a physical exam and reviewed and updated ROS and Plan today (12/21/2022). In review  of yesterday's note (12/20/2022), there are no changes except as documented above.

## 2022-12-21 NOTE — THERAPY
"Occupational Therapy  Daily Treatment     Patient Name: Paulina Castellanos  Age:  77 y.o., Sex:  female  Medical Record #: [id number]  Today's Date: 12/20/2022       Precautions: Fall Risk  Comments: covid recovered    Assessment    Pt seen for OT tx.  Pt remains very motivated, eager to get OOB & happy to be sitting up in chair.  Pt was Min A for supine to sit EOB.  Pt's extremities are red from rash & pt reports they're very itchy.  Pt was Mod A transfer to bedside commode.  Pt the was Mod A transfer to bedside chair.  Pt encouraged to be OOB & stated her brother may be coming in for a visit.  Pt will need Post acute OT services as she is highly motivated to participate.    Plan    Continue current treatment plan.    DC Equipment Recommendations: Unable to determine at this time  Discharge Recommendations: Recommend post-acute placement for additional occupational therapy services prior to discharge home    Subjective    \"It feel so good to be OOB\"     Objective       12/20/22 1428   Cognition    Comments Pt remains very motivated, & willing to participate in OOB ADL's   Balance   Sitting Balance (Static) Fair +   Sitting Balance (Dynamic) Fair   Standing Balance (Static) Poor +   Standing Balance (Dynamic) Poor   Weight Shift Sitting Fair   Weight Shift Standing Poor   Comments pt seems fearful in standing & during transfers but is improving   Bed Mobility    Supine to Sit Minimal Assist   Sit to Supine   (pt left up in chair)   Scooting Contact Guard Assist   Rolling Contact Guard Assist   Activities of Daily Living   Eating Modified Independent   Grooming Supervision;Seated   Upper Body Dressing Minimal Assist   Lower Body Dressing Maximal Assist   Toileting Moderate Assist   Functional Mobility   Sit to Stand Minimal Assist   Bed, Chair, Wheelchair Transfer Moderate Assist   Toilet Transfers Moderate Assist  (BSC)   Patient / Family Goals   Patient / Family Goal #1 to go home   Goal #1 Outcome Goal not met   Short Term " Goals   Short Term Goal # 1 pt will complete grooming seated w/set up   Goal Outcome # 1 Progressing as expected   Short Term Goal # 2 pt will complete txf to BSC w/min A   Goal Outcome # 2 Progressing as expected   Short Term Goal # 3 pt will complete UB dressing w/set u p   Goal Outcome # 3 Progressing as expected

## 2022-12-21 NOTE — PROGRESS NOTES
Infectious Disease Progress Note    Author: Keli Abbott M.D. Date & Time of service: 12/21/2022  10:07 AM    Chief Complaint:  MRSA bacteremia    Interval History:  77 y.o. female admitted 11/26/2022. For Afib with RVR and rhabdomyolysis +COVID  Developed fever and AMS dueing hosp-blood cultures +MRSA  12/4 AF WBC 13.9 Oriented to person and place c/o LUE pain No dyspnea or CP. No new neck, back, joint pain  12/5 patient remains afebrile, white count is resolved now, down to 9.4, tolerating vancomycin.  Patient states she is tired but overall better.   left arm  12/6 patient remains afebrile, white count is 9.7, tolerating vancomycin.  Plan as below  12/7 patient remains afebrile, white count 7.7, tolerating vancomycin, no new events overnight.  12/8 patient remains afebrile, white count is 8.5, tolerating vancomycin.  TEODORO positive for endocarditis  12/9 patient remains afebrile, count 7.6, repeat cultures as below.  Patient notes improvement in left arm pain and swelling  12/10 patient remains afebrile, no CBC this morning, tolerating vancomycin  12/11 patient remains afebrile, no CBC this morning, tolerating IV vancomycin.  Repeat blood cultures as below.   12/12 T-max 98.9, remains afebrile, white count today 7.9, tolerating vancomycin, repeat blood cultures  12/13 patient remains afebrile, no CBC this morning, tolerating vancomycin.  Repeat blood cultures no growth to date  12/14 patient remains afebrile, no CBC this morning, tolerating vancomycin. Repeat blood cultures pending  12/15 patient remains afebrile.  No CBC this morning, tolerated the switch to daptomycin.  Blood cultures as below  12/16 Patient is afebrile, no CBC this morning, unfortunately developed worsening generalized pruritic rash.  Some improvement with Benadryl overnight  12/17 patient remains afebrile, white count is 6.1, absolute eosinophilia of 830.  No worsening of rash after switching out of daptomycin  12/18 patient  remains afebrile, no CBC this morning, the rash is coalesced but appears lighter, mild itching persists, no worsening, renal function stable   T-max 101.5, no CBC done today.  ID reconsulted secondary to patient developing worsening rash on daptomycin and was placed on vancomycin but continued have worsening rash that spread to the face.  Patient also agrees that she has a worsening rash.  Discussed antibiotic change with patient and brother at bedside.      Labs Reviewed, Medications Reviewed, and Radiology Reviewed.    Review of Systems:  Review of Systems   Constitutional:  Positive for malaise/fatigue. Negative for chills and fever.   Gastrointestinal:  Negative for abdominal pain, diarrhea, nausea and vomiting.   Musculoskeletal:  Positive for myalgias.   Skin:  Positive for itching and rash.   All other systems reviewed and are negative.    Hemodynamics:  Temp (24hrs), Av.4 °C (99.4 °F), Min:36.8 °C (98.2 °F), Max:38.6 °C (101.5 °F)  Temperature: 37.2 °C (99 °F)  Pulse  Av.4  Min: 54  Max: 136   Blood Pressure : 101/61       Physical Exam:  Physical Exam  Vitals and nursing note reviewed.   Constitutional:       General: She is not in acute distress.     Appearance: She is ill-appearing. She is not toxic-appearing or diaphoretic.   HENT:      Mouth/Throat:      Mouth: Mucous membranes are moist.      Pharynx: No oropharyngeal exudate.      Comments: Fair dentition  Eyes:      General: No scleral icterus.     Extraocular Movements: Extraocular movements intact.      Pupils: Pupils are equal, round, and reactive to light.   Pulmonary:      Effort: Pulmonary effort is normal. No respiratory distress.      Breath sounds: No stridor.   Abdominal:      General: There is no distension.      Palpations: Abdomen is soft.      Tenderness: There is no abdominal tenderness.   Musculoskeletal:         General: Swelling and tenderness present.      Cervical back: Neck supple. No rigidity.   Skin:      Coloration: Skin is pale. Skin is not jaundiced.      Findings: Bruising, erythema and rash present.      Comments: Diffuse erythematous rash over entire body and face   Neurological:      General: No focal deficit present.      Mental Status: She is alert and oriented to person, place, and time.   Psychiatric:         Mood and Affect: Mood normal.         Behavior: Behavior normal.      Comments: Pleasant       Meds:    Current Facility-Administered Medications:     hydrOXYzine HCl    loperamide    diphenhydrAMINE    [Held by provider] vancomycin    [Held by provider] MD Alert...Vancomycin per Pharmacy    Pharmacy Consult Request    [] acetaminophen **FOLLOWED BY** acetaminophen    oxyCODONE immediate-release **OR** oxyCODONE immediate-release **OR** HYDROmorphone    metoprolol tartrate    Respiratory Therapy Consult    LR    guaiFENesin dextromethorphan    benzonatate    hydrALAZINE    Metoprolol Tartrate    apixaban    Labs:  No results for input(s): WBC, RBC, HEMOGLOBIN, HEMATOCRIT, MCV, MCH, RDW, PLATELETCT, MPV, NEUTSPOLYS, LYMPHOCYTES, MONOCYTES, EOSINOPHILS, BASOPHILS, RBCMORPHOLO in the last 72 hours.    Recent Labs     22  0120   SODIUM 135   POTASSIUM 4.4   CHLORIDE 103   CO2 22   GLUCOSE 104*   BUN 23*       Recent Labs     22  0120   CREATININE 1.20         Imaging:  DX-CHEST-PORTABLE (1 VIEW)    Result Date: 2022 12:15 PM HISTORY/REASON FOR EXAM:  Shortness of Breath. TECHNIQUE/EXAM DESCRIPTION AND NUMBER OF VIEWS: Single portable view of the chest. COMPARISON: None FINDINGS: Heart size is within normal limits. There are curvilinear opacities in the lung bases. No pleural abnormalities are noted.     1.  Curvilinear opacities in the lung bases likely representing atelectasis, less likely pneumonitis.    US-EXTREMITY VENOUS UPPER UNILAT LEFT    Result Date: 2022   Upper Extremity  Venous Duplex Report  Vascular Laboratory  CONCLUSIONS  Left upper extremity venous  duplex imaging.  No evidence of deep venous thrombosis.  Evidence of acute to subacute superficial venous thrombosis in the LEFT  cephalic vein from the mid to distal bicep.  MASSIEL SILVA  Exam Date:     2022 13:29  Room #:     Inpatient  Priority:     Routine  Ht (in):             Wt (lb):  Ordering Physician:        EWELINA WOOD  Referring Physician:       [id number] Hanna  Sonographer:               Giovana Moran RVT  Study Type:                Complete Unilateral  Technical Quality:         Adequate  Age:    77    Gender:     F  MRN:    [id number]  :    1945      BSA:  Indications:     Localized swelling, mass and lump, unspecified upper limb,                   Edema, unspecified  CPT Codes:       87927  ICD Codes:       R22.30  R60.9  History:         Left upper extremity swelling/edema. No prior exams.  Limitations:  PROCEDURES:  Left upper extremity venous duplex imaging.  The following venous structures were evaluated: internal jugular,  subclavian, axillary, brachial, cephalic, and basilic veins.  Serial compression, color, and spectral Doppler flow evaluations were  performed.  FINDINGS:  Left upper extremity-  No evidence of deep venous thrombosis.  Evidence of acute to subacute superficial venous thrombosis in the cephalic  vein from the mid to distal bicep.  All other veins demonstrate complete color filling and compressibility with  normal venous flow dynamics including spontaneous flow and respiratory  phasicity.  Flow was evaluated in the contralateral subclavian vein and normal venous  flow dynamics including spontaneous flow and respiratory phasic variation  were demonstrated.  Brittney MUNOZ To  (Electronically Signed)  Final Date:      2022                   16:24    EC-ECHOCARDIOGRAM COMPLETE W/O CONT    Result Date: 2022  Transthoracic Echo Report Echocardiography Laboratory CONCLUSIONS No prior study is available for comparison. Normal left ventricular chamber  size. Mild concentric left ventricular hypertrophy. The left ventricular ejection fraction is visually estimated to be 65%. Trace mitral regurgitation. MASSIEL SILVA Exam Date:         2022                    13:31 Exam Location:     Inpatient Priority:          Routine Ordering Physician:        BETH GIL Referring Physician:       [id number] Rose Sonographer:               Frank Weber RCS Age:    77     Gender:    F MRN:    [id number] :    1945 BSA:    1.89   Ht (in):    67     Wt (lb):    170 Exam Type:     Complete Indications:     Atrial Fibrillation, Shortness of breath ICD Codes:       427.31  786.05 CPT Codes:       99308 BP:   121    /   65     HR: Technical Quality:       Poor MEASUREMENTS  (Male / Female) Normal Values 2D ECHO LV Diastolic Diameter PLAX        4.2 cm                4.2 - 5.9 / 3.9 - 5.3 cm LV Systolic Diameter PLAX         2.7 cm                2.1 - 4.0 cm IVS Diastolic Thickness           1.1 cm                LVPW Diastolic Thickness          1.1 cm                LVOT Diameter                     1.8 cm                Estimated LV Ejection Fraction    65 %                  LV Ejection Fraction MOD BP       73.1 %                >= 55  % LV Ejection Fraction MOD 4C       78.7 %                LV Ejection Fraction MOD 2C       73 %                  DOPPLER AV Peak Velocity                  1.1 m/s               AV Peak Gradient                  5.3 mmHg              AV Mean Gradient                  2.9 mmHg              LVOT Peak Velocity                1.1 m/s               AV Area Cont Eq vti               2.6 cm2               MV Velocity Time Integral         21 cm                 Mitral E Point Velocity           0.86 m/s              Mitral E to A Ratio               0.75                  Mitral A Duration                 156 ms                MV Pressure Half Time             31.9 ms               MV Area PHT                       6.9 cm2               MV  Deceleration Time              85.8 ms               * Indicates values subject to auto-interpretation LV EF:  65    % FINDINGS Left Ventricle Normal left ventricular chamber size. Mild concentric left ventricular hypertrophy. Normal left ventricular systolic function. The left ventricular ejection fraction is visually estimated to be 65%. Normal regional wall motion. Right Ventricle The right ventricle is not well visualized. Right Atrium The right atrium is not well visualized. Left Atrium Normal left atrial size. Left atrial volume index is 24 mL/sq m. Mitral Valve The mitral valve is not well visualized. No mitral stenosis. Trace mitral regurgitation. Aortic Valve The aortic valve is not well visualized. Tricuspid Valve The tricuspid valve is not well visualized. No stenosis or regurgitation seen. Pulmonic Valve The pulmonic valve is not well visualized. No stenosis or regurgitation seen. Pericardium No pericardial effusion. Aorta Normal aortic root for body surface area. The ascending aorta diameter is 3.1 cm. Zafar Bailon M.D. (Electronically Signed) Final Date:     27 November 2022                 20:23      Micro:  Results       Procedure Component Value Units Date/Time    C Diff by PCR rflx Toxin [[id number]] Collected: 12/19/22 1320    Order Status: Completed Specimen: Stool Updated: 12/19/22 1503     C Diff by PCR Negative     Comment: C. difficile NOT detected by PCR.  Treatment not indicated per guidelines.  Repeat testing not indicated within 7 days.          027-NAP1-BI Presumptive Negative     Comment: Presumptive 027/NAP1/BI target DNA sequences are NOT DETECTED.       Narrative:      Special Contact Isolation  Collected By: [id number] NEYMAR HERNANDEZ  Does this patient have risk factors for C-diff?->Yes  C-Diff Risk Factors->antibiotic exposure    BLOOD CULTURE [[id number]] Collected: 12/12/22 0308    Order Status: Completed Specimen: Blood from Peripheral Updated: 12/17/22 0501     Significant  "Indicator NEG     Source BLD     Site PERIPHERAL     Culture Result No growth after 5 days of incubation.    Narrative:      Contact  Per Hospital Policy: Only change Specimen Src: to \"Line\" if  specified by physician order.  Left Hand    BLOOD CULTURE [[id number]] Collected: 12/12/22 0308    Order Status: Completed Specimen: Blood from Peripheral Updated: 12/17/22 0501     Significant Indicator NEG     Source BLD     Site PERIPHERAL     Culture Result No growth after 5 days of incubation.    Narrative:      Contact  Per Hospital Policy: Only change Specimen Src: to \"Line\" if  specified by physician order.  Right Hand            Assessment:  This is a 77-year-old female patient who was transferred from an outside facility, slid off her bed at home and was unable to get up for multiple days, found by a friend, was found to be in A. fib with RVR and with mild rhabdomyolysis.  She was also noted to have COVID-19 requiring nasal cannula oxygen, resolved.  On 12/1, she developed right arm thrombophlebitis and the following day had worsening leukocytosis and procalcitonin, blood cultures positive for MRSA. TEODORO positive for infective endocarditis    Pertinent Diagnoses:  Native mitral valve infective endocarditis  MRSA bacteremia  SO, resolved  Recent COVID-19  Thrombophlebitis  Drug rash     PLAN:   MRSA bacteremia  Native mitral valve infective endocarditis  Thrombophlebitis, improved  BCxs + MRSA 12/2 and 12/3, 12/5  Follow repeat blood cultures x2 from 12/9, 1 out of 2 positive  TEODORO with a large freely mobile mass 1.4 x 1 cm on the mitral valve.  CT surgery evaluated pros and cons of surgery - favors conservative management given no definitive embolization so far, no heart failure, and patient's advanced age.   Repeat blood cultures x2 from 12/12 negative  Patient developed a rash and eosinophilia with daptomycin  She was transitioned to IV vancomycin however developed worsening rash today  Start IV ceftaroline 600 mg " twice daily  Stop date of antibiotics 1/22/2022    Disposition: Patient likely being discharged to a rehab facility.  At least weekly CBC with differential, CMP, while on IV antibiotics  Repeat TTE at end of therapy (ordered)  Need for PICC line: Yes, okay to place    Discussed with patient that her rash will likely worsen before it improves.  Recommend monitoring rash for the next 24 hours since ceftaroline started today.  If rash stable and/or improved tomorrow, okay from ID standpoint to transfer to skilled nursing facility    Disposition: [facility]    ID clinic follow-up    Discussed with Dr. Palacios.

## 2022-12-21 NOTE — PROGRESS NOTES
4 Eyes Skin Assessment Completed by EVERT Salazar and EVERT Casas.     Head Redness, rash   Ears WDL  Nose Redness  Mouth WDL  Neck Redness and Blanching, rash  Breast/Chest Redness and Rash  Shoulder Blades Redness  Spine Redness  (R) Arm/Elbow/Hand Redness, Rash, and Edema  (L) Arm/Elbow/Hand Redness, Rash, and Edema  Abdomen Redness and Rash  Groin Redness and Rash  Scrotum/Coccyx/Buttocks Redness and Blanching, rash, peeling  (R) Leg Redness and Rash  (L) Leg Redness and Rash  (R) Heel/Foot/Toe Redness and Rash  (L) Heel/Foot/Toe Redness and Rash              Devices In Places Pulse Ox        Interventions In Place Pillows, Q2 Turns, Low Air Loss Mattress, and Barrier Cream     Possible Skin Injury No     Pictures Uploaded Into Epic N/A  Wound Consult Placed N/A  RN Wound Prevention Protocol Ordered No

## 2022-12-22 PROBLEM — N17.9 AKI (ACUTE KIDNEY INJURY) (HCC): Status: ACTIVE | Noted: 2022-12-22

## 2022-12-22 LAB
ANION GAP SERPL CALC-SCNC: 9 MMOL/L (ref 7–16)
BUN SERPL-MCNC: 30 MG/DL (ref 8–22)
CALCIUM SERPL-MCNC: 8.2 MG/DL (ref 8.5–10.5)
CHLORIDE SERPL-SCNC: 109 MMOL/L (ref 96–112)
CO2 SERPL-SCNC: 21 MMOL/L (ref 20–33)
CREAT SERPL-MCNC: 1.47 MG/DL (ref 0.5–1.4)
ERYTHROCYTE [DISTWIDTH] IN BLOOD BY AUTOMATED COUNT: 50.8 FL (ref 35.9–50)
GFR SERPLBLD CREATININE-BSD FMLA CKD-EPI: 37 ML/MIN/1.73 M 2
GLUCOSE SERPL-MCNC: 98 MG/DL (ref 65–99)
HCT VFR BLD AUTO: 30.1 % (ref 37–47)
HGB BLD-MCNC: 9.9 G/DL (ref 12–16)
MAGNESIUM SERPL-MCNC: 1.9 MG/DL (ref 1.5–2.5)
MCH RBC QN AUTO: 30.7 PG (ref 27–33)
MCHC RBC AUTO-ENTMCNC: 32.9 G/DL (ref 33.6–35)
MCV RBC AUTO: 93.5 FL (ref 81.4–97.8)
PHOSPHATE SERPL-MCNC: 4.5 MG/DL (ref 2.5–4.5)
PLATELET # BLD AUTO: 281 K/UL (ref 164–446)
PMV BLD AUTO: 8.8 FL (ref 9–12.9)
POTASSIUM SERPL-SCNC: 4.4 MMOL/L (ref 3.6–5.5)
RBC # BLD AUTO: 3.22 M/UL (ref 4.2–5.4)
SODIUM SERPL-SCNC: 139 MMOL/L (ref 135–145)
WBC # BLD AUTO: 8.6 K/UL (ref 4.8–10.8)

## 2022-12-22 PROCEDURE — 700102 HCHG RX REV CODE 250 W/ 637 OVERRIDE(OP): Performed by: FAMILY MEDICINE

## 2022-12-22 PROCEDURE — 83735 ASSAY OF MAGNESIUM: CPT

## 2022-12-22 PROCEDURE — 700111 HCHG RX REV CODE 636 W/ 250 OVERRIDE (IP): Mod: JG | Performed by: INTERNAL MEDICINE

## 2022-12-22 PROCEDURE — 700102 HCHG RX REV CODE 250 W/ 637 OVERRIDE(OP): Performed by: PHYSICIAN ASSISTANT

## 2022-12-22 PROCEDURE — A9270 NON-COVERED ITEM OR SERVICE: HCPCS | Performed by: FAMILY MEDICINE

## 2022-12-22 PROCEDURE — 80048 BASIC METABOLIC PNL TOTAL CA: CPT

## 2022-12-22 PROCEDURE — 99232 SBSQ HOSP IP/OBS MODERATE 35: CPT | Performed by: FAMILY MEDICINE

## 2022-12-22 PROCEDURE — A9270 NON-COVERED ITEM OR SERVICE: HCPCS | Performed by: STUDENT IN AN ORGANIZED HEALTH CARE EDUCATION/TRAINING PROGRAM

## 2022-12-22 PROCEDURE — 84100 ASSAY OF PHOSPHORUS: CPT

## 2022-12-22 PROCEDURE — 700111 HCHG RX REV CODE 636 W/ 250 OVERRIDE (IP): Performed by: FAMILY MEDICINE

## 2022-12-22 PROCEDURE — 700105 HCHG RX REV CODE 258: Performed by: FAMILY MEDICINE

## 2022-12-22 PROCEDURE — A9270 NON-COVERED ITEM OR SERVICE: HCPCS | Performed by: PHYSICIAN ASSISTANT

## 2022-12-22 PROCEDURE — 99233 SBSQ HOSP IP/OBS HIGH 50: CPT | Performed by: INTERNAL MEDICINE

## 2022-12-22 PROCEDURE — 700102 HCHG RX REV CODE 250 W/ 637 OVERRIDE(OP): Performed by: STUDENT IN AN ORGANIZED HEALTH CARE EDUCATION/TRAINING PROGRAM

## 2022-12-22 PROCEDURE — A9270 NON-COVERED ITEM OR SERVICE: HCPCS | Performed by: INTERNAL MEDICINE

## 2022-12-22 PROCEDURE — 85027 COMPLETE CBC AUTOMATED: CPT

## 2022-12-22 PROCEDURE — 700105 HCHG RX REV CODE 258: Performed by: INTERNAL MEDICINE

## 2022-12-22 PROCEDURE — 700102 HCHG RX REV CODE 250 W/ 637 OVERRIDE(OP): Performed by: INTERNAL MEDICINE

## 2022-12-22 PROCEDURE — 770001 HCHG ROOM/CARE - MED/SURG/GYN PRIV*

## 2022-12-22 RX ORDER — PREDNISONE 20 MG/1
40 TABLET ORAL DAILY
Status: DISCONTINUED | OUTPATIENT
Start: 2022-12-22 | End: 2022-12-23 | Stop reason: HOSPADM

## 2022-12-22 RX ORDER — FAMOTIDINE 20 MG/1
20 TABLET, FILM COATED ORAL DAILY
Status: DISCONTINUED | OUTPATIENT
Start: 2022-12-22 | End: 2022-12-23 | Stop reason: HOSPADM

## 2022-12-22 RX ORDER — LANOLIN ALCOHOL/MO/W.PET/CERES
400 CREAM (GRAM) TOPICAL DAILY
Status: DISCONTINUED | OUTPATIENT
Start: 2022-12-22 | End: 2022-12-23 | Stop reason: HOSPADM

## 2022-12-22 RX ORDER — FAMOTIDINE 20 MG/1
20 TABLET, FILM COATED ORAL 2 TIMES DAILY
Status: DISCONTINUED | OUTPATIENT
Start: 2022-12-22 | End: 2022-12-22

## 2022-12-22 RX ORDER — SODIUM CHLORIDE 9 MG/ML
INJECTION, SOLUTION INTRAVENOUS CONTINUOUS
Status: DISCONTINUED | OUTPATIENT
Start: 2022-12-22 | End: 2022-12-23 | Stop reason: HOSPADM

## 2022-12-22 RX ADMIN — METOPROLOL TARTRATE 12.5 MG: 25 TABLET, FILM COATED ORAL at 18:00

## 2022-12-22 RX ADMIN — PREDNISONE 40 MG: 20 TABLET ORAL at 15:38

## 2022-12-22 RX ADMIN — CEFTAROLINE FOSAMIL 400 MG: 600 POWDER, FOR SOLUTION INTRAVENOUS at 04:42

## 2022-12-22 RX ADMIN — SODIUM CHLORIDE: 9 INJECTION, SOLUTION INTRAVENOUS at 21:24

## 2022-12-22 RX ADMIN — CEFTAROLINE FOSAMIL 400 MG: 600 POWDER, FOR SOLUTION INTRAVENOUS at 18:01

## 2022-12-22 RX ADMIN — FAMOTIDINE 20 MG: 20 TABLET, FILM COATED ORAL at 15:38

## 2022-12-22 RX ADMIN — SODIUM CHLORIDE: 9 INJECTION, SOLUTION INTRAVENOUS at 09:04

## 2022-12-22 RX ADMIN — DIPHENHYDRAMINE HYDROCHLORIDE 25 MG: 25 TABLET ORAL at 18:00

## 2022-12-22 RX ADMIN — DIPHENHYDRAMINE HYDROCHLORIDE 25 MG: 25 TABLET ORAL at 04:40

## 2022-12-22 RX ADMIN — METOPROLOL TARTRATE 12.5 MG: 25 TABLET, FILM COATED ORAL at 04:40

## 2022-12-22 RX ADMIN — Medication 400 MG: at 09:03

## 2022-12-22 RX ADMIN — APIXABAN 5 MG: 5 TABLET, FILM COATED ORAL at 04:40

## 2022-12-22 RX ADMIN — APIXABAN 5 MG: 5 TABLET, FILM COATED ORAL at 18:00

## 2022-12-22 ASSESSMENT — ENCOUNTER SYMPTOMS
SHORTNESS OF BREATH: 0
DIAPHORESIS: 0
NECK PAIN: 0
SORE THROAT: 0
NAUSEA: 0
HEADACHES: 0
SENSORY CHANGE: 0
MYALGIAS: 1
CHILLS: 0
BACK PAIN: 0
COUGH: 0
BLURRED VISION: 0
DIARRHEA: 0
DIZZINESS: 0
ABDOMINAL PAIN: 0
MYALGIAS: 0
NERVOUS/ANXIOUS: 0
FLANK PAIN: 0
FOCAL WEAKNESS: 0
WEAKNESS: 1
FEVER: 0
WHEEZING: 0
PALPITATIONS: 0
WEIGHT LOSS: 0
VOMITING: 0
SPEECH CHANGE: 0
HEARTBURN: 0

## 2022-12-22 ASSESSMENT — PAIN DESCRIPTION - PAIN TYPE
TYPE: ACUTE PAIN
TYPE: ACUTE PAIN

## 2022-12-22 NOTE — PROGRESS NOTES
4 Eyes Skin Assessment Completed by EVERT Gaona and Carolina RN.    Head Redness rash  Ears WDL  Nose WDL  Mouth WDL  Neck Redness rash  Breast/Chest Redness and Rash  Shoulder Blades Redness rash  Spine Redness rash  (R) Arm/Elbow/Hand Redness and Rash, dryness/peeling  (L) Arm/Elbow/Hand Redness and Rash, dryness/peeling  Abdomen Redness, Rash, and Scab/bruising to RUQ  Groin Redness and Rash  Scrotum/Coccyx/Buttocks Redness and Blanching, rash  (R) Leg Redness, Rash, and Edema, dryness  (L) Leg Redness, Rash, and Edema, dryness  (R) Heel/Foot/Toe WDL, dryness  (L) Heel/Foot/Toe WDL, dryness          Devices In Places Pulse Ox      Interventions In Place TAP System, Pillows, Q2 Turns, Low Air Loss Mattress, Barrier Cream, and Heels Loaded W/Pillows    Possible Skin Injury No    Pictures Uploaded Into Epic N/A  Wound Consult Placed N/A  RN Wound Prevention Protocol Ordered No

## 2022-12-22 NOTE — PROGRESS NOTES
Infectious Disease Progress Note    Author: eKli Abbott M.D. Date & Time of service: 12/22/2022  9:39 AM    Chief Complaint:  MRSA bacteremia    Interval History:  77 y.o. female admitted 11/26/2022. For Afib with RVR and rhabdomyolysis +COVID  Developed fever and AMS dueing hosp-blood cultures +MRSA  12/4 AF WBC 13.9 Oriented to person and place c/o LUE pain No dyspnea or CP. No new neck, back, joint pain  12/5 patient remains afebrile, white count is resolved now, down to 9.4, tolerating vancomycin.  Patient states she is tired but overall better.   left arm  12/6 patient remains afebrile, white count is 9.7, tolerating vancomycin.  Plan as below  12/7 patient remains afebrile, white count 7.7, tolerating vancomycin, no new events overnight.  12/8 patient remains afebrile, white count is 8.5, tolerating vancomycin.  TEODORO positive for endocarditis  12/9 patient remains afebrile, count 7.6, repeat cultures as below.  Patient notes improvement in left arm pain and swelling  12/10 patient remains afebrile, no CBC this morning, tolerating vancomycin  12/11 patient remains afebrile, no CBC this morning, tolerating IV vancomycin.  Repeat blood cultures as below.   12/12 T-max 98.9, remains afebrile, white count today 7.9, tolerating vancomycin, repeat blood cultures  12/13 patient remains afebrile, no CBC this morning, tolerating vancomycin.  Repeat blood cultures no growth to date  12/14 patient remains afebrile, no CBC this morning, tolerating vancomycin. Repeat blood cultures pending  12/15 patient remains afebrile.  No CBC this morning, tolerated the switch to daptomycin.  Blood cultures as below  12/16 Patient is afebrile, no CBC this morning, unfortunately developed worsening generalized pruritic rash.  Some improvement with Benadryl overnight  12/17 patient remains afebrile, white count is 6.1, absolute eosinophilia of 830.  No worsening of rash after switching out of daptomycin  12/18 patient  remains afebrile, no CBC this morning, the rash is coalesced but appears lighter, mild itching persists, no worsening, renal function stable   T-max 101.5, no CBC done today.  ID reconsulted secondary to patient developing worsening rash on daptomycin and was placed on vancomycin but continued have worsening rash that spread to the face.  Patient also agrees that she has a worsening rash.  Discussed antibiotic change with patient and brother at bedside.   afebrile, WBC 8.6.  Renal function worse today.  Erythema and swelling worse today.  Erythema and swelling spread to face.  She denies any throat swelling or shortness of breath.  Discussed with patient that this is the evolution of her rash.  Not likely due to new antibiotic started yesterday.      Labs Reviewed, Medications Reviewed, and Radiology Reviewed.    Review of Systems:  Review of Systems   Constitutional:  Positive for malaise/fatigue. Negative for chills and fever.   Gastrointestinal:  Negative for abdominal pain, diarrhea, nausea and vomiting.   Musculoskeletal:  Positive for myalgias.   Skin:  Positive for itching and rash.   All other systems reviewed and are negative.    Hemodynamics:  Temp (24hrs), Av °C (98.6 °F), Min:36.6 °C (97.8 °F), Max:37.2 °C (99 °F)  Temperature: 36.6 °C (97.8 °F), Monitored Temp: 37.4 °C (99.3 °F)  Pulse  Av.4  Min: 54  Max: 136   Blood Pressure : 113/65       Physical Exam:  Physical Exam  Vitals and nursing note reviewed.   Constitutional:       General: She is not in acute distress.     Appearance: She is ill-appearing. She is not toxic-appearing or diaphoretic.   HENT:      Mouth/Throat:      Mouth: Mucous membranes are moist.      Pharynx: No oropharyngeal exudate.      Comments: Fair dentition  Eyes:      General: No scleral icterus.     Extraocular Movements: Extraocular movements intact.      Pupils: Pupils are equal, round, and reactive to light.   Pulmonary:      Effort: Pulmonary effort is  normal. No respiratory distress.      Breath sounds: No stridor.   Abdominal:      General: There is no distension.      Palpations: Abdomen is soft.      Tenderness: There is no abdominal tenderness.   Musculoskeletal:         General: Swelling and tenderness present.      Cervical back: Neck supple. No rigidity.   Skin:     Coloration: Skin is pale. Skin is not jaundiced.      Findings: Bruising, erythema and rash present.      Comments: Diffuse erythematous rash over entire body and face   Neurological:      General: No focal deficit present.      Mental Status: She is alert and oriented to person, place, and time.   Psychiatric:         Mood and Affect: Mood normal.         Behavior: Behavior normal.      Comments: Pleasant       Meds:    Current Facility-Administered Medications:     magnesium oxide    NS    hydrOXYzine HCl    ceftaroline (TEFLARO) ivpb    guaiFENesin dextromethorphan    loperamide    diphenhydrAMINE    Pharmacy Consult Request    [] acetaminophen **FOLLOWED BY** acetaminophen    oxyCODONE immediate-release **OR** oxyCODONE immediate-release **OR** HYDROmorphone    metoprolol tartrate    Respiratory Therapy Consult    LR    benzonatate    hydrALAZINE    Metoprolol Tartrate    apixaban    Labs:  Recent Labs     22  0445   WBC 8.6   RBC 3.22*   HEMOGLOBIN 9.9*   HEMATOCRIT 30.1*   MCV 93.5   MCH 30.7   RDW 50.8*   PLATELETCT 281   MPV 8.8*       Recent Labs     22  0120 22  0445   SODIUM 135 139   POTASSIUM 4.4 4.4   CHLORIDE 103 109   CO2 22 21   GLUCOSE 104* 98   BUN 23* 30*       Recent Labs     22  0120 22  0445   CREATININE 1.20 1.47*         Imaging:  DX-CHEST-PORTABLE (1 VIEW)    Result Date: 2022 12:15 PM HISTORY/REASON FOR EXAM:  Shortness of Breath. TECHNIQUE/EXAM DESCRIPTION AND NUMBER OF VIEWS: Single portable view of the chest. COMPARISON: None FINDINGS: Heart size is within normal limits. There are curvilinear opacities in the  lung bases. No pleural abnormalities are noted.     1.  Curvilinear opacities in the lung bases likely representing atelectasis, less likely pneumonitis.    US-EXTREMITY VENOUS UPPER UNILAT LEFT    Result Date: 2022   Upper Extremity  Venous Duplex Report  Vascular Laboratory  CONCLUSIONS  Left upper extremity venous duplex imaging.  No evidence of deep venous thrombosis.  Evidence of acute to subacute superficial venous thrombosis in the LEFT  cephalic vein from the mid to distal bicep.  MASSIEL SILVA  Exam Date:     2022 13:29  Room #:     Inpatient  Priority:     Routine  Ht (in):             Wt (lb):  Ordering Physician:        EWELINA WOOD  Referring Physician:       [id number] Hanna  Sonographer:               Giovana Moran RVT  Study Type:                Complete Unilateral  Technical Quality:         Adequate  Age:    77    Gender:     F  MRN:    [id number]  :    1945      BSA:  Indications:     Localized swelling, mass and lump, unspecified upper limb,                   Edema, unspecified  CPT Codes:       28310  ICD Codes:       R22.30  R60.9  History:         Left upper extremity swelling/edema. No prior exams.  Limitations:  PROCEDURES:  Left upper extremity venous duplex imaging.  The following venous structures were evaluated: internal jugular,  subclavian, axillary, brachial, cephalic, and basilic veins.  Serial compression, color, and spectral Doppler flow evaluations were  performed.  FINDINGS:  Left upper extremity-  No evidence of deep venous thrombosis.  Evidence of acute to subacute superficial venous thrombosis in the cephalic  vein from the mid to distal bicep.  All other veins demonstrate complete color filling and compressibility with  normal venous flow dynamics including spontaneous flow and respiratory  phasicity.  Flow was evaluated in the contralateral subclavian vein and normal venous  flow dynamics including spontaneous flow and respiratory phasic variation  were  cecille MUNOZ To  (Electronically Signed)  Final Date:      2022                   16:24    EC-ECHOCARDIOGRAM COMPLETE W/O CONT    Result Date: 2022  Transthoracic Echo Report Echocardiography Laboratory CONCLUSIONS No prior study is available for comparison. Normal left ventricular chamber size. Mild concentric left ventricular hypertrophy. The left ventricular ejection fraction is visually estimated to be 65%. Trace mitral regurgitation. MASSIEL SILVA Exam Date:         2022                    13:31 Exam Location:     Inpatient Priority:          Routine Ordering Physician:        BETH GIL Referring Physician:       [id number] Ibarra Sonographer:               Frank ARANDA Age:    77     Gender:    F MRN:    [id number] :    1945 BSA:    1.89   Ht (in):    67     Wt (lb):    170 Exam Type:     Complete Indications:     Atrial Fibrillation, Shortness of breath ICD Codes:       427.31  786.05 CPT Codes:       68570 BP:   121    /   65     HR: Technical Quality:       Poor MEASUREMENTS  (Male / Female) Normal Values 2D ECHO LV Diastolic Diameter PLAX        4.2 cm                4.2 - 5.9 / 3.9 - 5.3 cm LV Systolic Diameter PLAX         2.7 cm                2.1 - 4.0 cm IVS Diastolic Thickness           1.1 cm                LVPW Diastolic Thickness          1.1 cm                LVOT Diameter                     1.8 cm                Estimated LV Ejection Fraction    65 %                  LV Ejection Fraction MOD BP       73.1 %                >= 55  % LV Ejection Fraction MOD 4C       78.7 %                LV Ejection Fraction MOD 2C       73 %                  DOPPLER AV Peak Velocity                  1.1 m/s               AV Peak Gradient                  5.3 mmHg              AV Mean Gradient                  2.9 mmHg              LVOT Peak Velocity                1.1 m/s               AV Area Cont Eq vti               2.6 cm2               MV Velocity Time  Integral         21 cm                 Mitral E Point Velocity           0.86 m/s              Mitral E to A Ratio               0.75                  Mitral A Duration                 156 ms                MV Pressure Half Time             31.9 ms               MV Area PHT                       6.9 cm2               MV Deceleration Time              85.8 ms               * Indicates values subject to auto-interpretation LV EF:  65    % FINDINGS Left Ventricle Normal left ventricular chamber size. Mild concentric left ventricular hypertrophy. Normal left ventricular systolic function. The left ventricular ejection fraction is visually estimated to be 65%. Normal regional wall motion. Right Ventricle The right ventricle is not well visualized. Right Atrium The right atrium is not well visualized. Left Atrium Normal left atrial size. Left atrial volume index is 24 mL/sq m. Mitral Valve The mitral valve is not well visualized. No mitral stenosis. Trace mitral regurgitation. Aortic Valve The aortic valve is not well visualized. Tricuspid Valve The tricuspid valve is not well visualized. No stenosis or regurgitation seen. Pulmonic Valve The pulmonic valve is not well visualized. No stenosis or regurgitation seen. Pericardium No pericardial effusion. Aorta Normal aortic root for body surface area. The ascending aorta diameter is 3.1 cm. Zafar Bailon M.D. (Electronically Signed) Final Date:     27 November 2022                 20:23      Micro:  Results       Procedure Component Value Units Date/Time    C Diff by PCR rflx Toxin [[id number]] Collected: 12/19/22 1320    Order Status: Completed Specimen: Stool Updated: 12/19/22 1503     C Diff by PCR Negative     Comment: C. difficile NOT detected by PCR.  Treatment not indicated per guidelines.  Repeat testing not indicated within 7 days.          027-NAP1-BI Presumptive Negative     Comment: Presumptive 027/NAP1/BI target DNA sequences are NOT DETECTED.       Narrative:    "   Special Contact Isolation  Collected By: [id number] NEYMAR EPIFANIO HERNANDEZ  Does this patient have risk factors for C-diff?->Yes  C-Diff Risk Factors->antibiotic exposure    BLOOD CULTURE [[id number]] Collected: 12/12/22 0308    Order Status: Completed Specimen: Blood from Peripheral Updated: 12/17/22 0501     Significant Indicator NEG     Source BLD     Site PERIPHERAL     Culture Result No growth after 5 days of incubation.    Narrative:      Contact  Per Hospital Policy: Only change Specimen Src: to \"Line\" if  specified by physician order.  Left Hand    BLOOD CULTURE [[id number]] Collected: 12/12/22 0308    Order Status: Completed Specimen: Blood from Peripheral Updated: 12/17/22 0501     Significant Indicator NEG     Source BLD     Site PERIPHERAL     Culture Result No growth after 5 days of incubation.    Narrative:      Contact  Per Hospital Policy: Only change Specimen Src: to \"Line\" if  specified by physician order.  Right Hand            Assessment:  This is a 77-year-old female patient who was transferred from an outside facility, slid off her bed at home and was unable to get up for multiple days, found by a friend, was found to be in A. fib with RVR and with mild rhabdomyolysis.  She was also noted to have COVID-19 requiring nasal cannula oxygen, resolved.  On 12/1, she developed right arm thrombophlebitis and the following day had worsening leukocytosis and procalcitonin, blood cultures positive for MRSA. TEODORO positive for infective endocarditis    Pertinent Diagnoses:  Native mitral valve infective endocarditis  MRSA bacteremia  SO  Recent COVID-19  Thrombophlebitis  Drug rash     PLAN:   MRSA bacteremia  Native mitral valve infective endocarditis  Thrombophlebitis, improved  BCxs + MRSA 12/2 and 12/3, 12/5  Follow repeat blood cultures x2 from 12/9, 1 out of 2 positive  TEODORO with a large freely mobile mass 1.4 x 1 cm on the mitral valve.  CT surgery evaluated pros and cons of surgery - favors conservative " management given no definitive embolization so far, no heart failure, and patient's advanced age.   Repeat blood cultures x2 from 12/12 negative  Patient developed a rash and eosinophilia with daptomycin  She was transitioned to IV vancomycin however developed worsening rash on 12/21  Continue IV ceftaroline 400 mg twice a day per renal function  Stop date of antibiotics 1/22/2022    Disposition: Patient likely being discharged to a rehab facility.  At least weekly CBC with differential, CMP, while on IV antibiotics  Repeat TTE at end of therapy (ordered)  Need for PICC line: Yes, okay to place    Discussed with patient that her rash will likely worsen before it improves.  Would like to see improvement of rash and swelling prior to transfer to skilled nursing facility    Disposition: SNF    ID clinic follow-up    I have performed a physical exam and reviewed and updated ROS and plan today 12/22/2022.  In review of yesterday's note 12/21/2022, there are no changes except as documented above.      Discussed with Dr. Palacios.

## 2022-12-22 NOTE — PROGRESS NOTES
Received report from night shift RN. Assumed patient care.    Patient is A+O x4.  Tolerating regular diet. Denies N/V.  Denies chest pain or SOB.  Pain 0 on a 0-10 pain scale.   + void, + BM. Last BM 12/21.  Patient is a high fall risk, bed alarm is on.    Plan of care discussed, all questions answered. Educated on the importance of calling before getting OOB and pt verbalizes understanding. Educated regarding importance of oral care. Oral care kit at bedside. Call light is within reach, treaded slipper socks on, bed in lowest/ locked position, hourly rounding in place, all needs met at this time

## 2022-12-22 NOTE — CARE PLAN
The patient is Stable - Low risk of patient condition declining or worsening    Shift Goals  Clinical Goals: maintain skin integrity  Patient Goals: itching control  Family Goals: No family present    Progress made toward(s) clinical / shift goals:  Bed alarm on, non-slip socks in place, yellow armband on, bed locked and in lowest position, call light and personal belongings within reach. Fall risk education provided to pt, pt verbalized understanding, and calls appropriately.  Patient scores as moderate risk on North scale. Q2hr turns, TAPS system, barrier paste, barrier cream, and barrier wipes in use. Low airloss mattress in use.    Problem: Knowledge Deficit - Standard  Goal: Patient and family/care givers will demonstrate understanding of plan of care, disease process/condition, diagnostic tests and medications  Outcome: Progressing     Problem: Skin Integrity  Goal: Skin integrity is maintained or improved  Outcome: Progressing     Problem: Fall Risk  Goal: Patient will remain free from falls  Outcome: Progressing     Problem: Infection - Standard  Goal: Patient will remain free from infection  Outcome: Progressing       Patient is not progressing towards the following goals:

## 2022-12-22 NOTE — PROGRESS NOTES
Hospital Medicine Daily Progress Note    Date of Service  12/22/2022    Chief Complaint  Paulina Castellanos is a 77 y.o. female admitted 11/26/2022 with atrial fibrillation with rapid ventricular rate.      Hospital Course  Admitted for Atrial fibrillation with rapid ventricular rate, COVID-19 with pneumonia and Respiratory failure with hypoxia, mild rhabdomyolysis. She was started on Decadron, oxygen supplementation, and supportive measures.  She was placed on Metoprolol for rate control, and Eliquis for anticoagulation.  She apparently developed fever and encephalopathy, and was noted to have left arm thrombophlebitis.  Work-up further showed MRSA bacteremia.  Infectious disease was consulted on the case.  Patient was started on empiric coverage with IV vancomycin.  Repeat blood cultures remained positive for MRSA.  Cardiology was consulted on the case for a TEODORO, which showed mitral valve vegetation/endocarditis.  Repeat cultures eventually were negative.  PICC line placement was done.  Patient was placed on IV daptomycin anticipation of discharge to a skilled nursing facility.  However she developed a rash which was likely an allergic reaction to the daptomycin.  She was changed back to IV vancomycin.  TEODORO showed a mitral valve freely mobile mass.  CT surgery was consulted on the case and recommended medical management for now.    Interval Problem Update  Bacteremia - repeat cultures negative  Rash - diffuse and generalized, appears worse, discussed with ID  A. Fib - currently sinus  SO - crea trended up to 1.4  Low magnesium    I have discussed this patient's plan of care and discharge plan at IDT rounds today with Case Management, Nursing, Nursing leadership, and other members of the IDT team.    Consultants/Specialty  cardiology, infectious disease, palliative care, and CT surgery    Code Status  DNAR/DNI    Disposition  Patient is not medically cleared for discharge.   Anticipate discharge to to a long-term acute  [facility].  I have placed the appropriate orders for post-discharge needs.    Review of Systems  Review of Systems   Constitutional:  Positive for malaise/fatigue. Negative for chills, diaphoresis, fever and weight loss.   HENT:  Negative for congestion, hearing loss and sore throat.    Eyes:  Negative for blurred vision.   Respiratory:  Negative for cough, shortness of breath and wheezing.    Cardiovascular:  Positive for leg swelling. Negative for chest pain and palpitations.   Gastrointestinal:  Negative for abdominal pain, diarrhea, heartburn, nausea and vomiting.   Genitourinary:  Negative for dysuria, flank pain and hematuria.   Musculoskeletal:  Negative for back pain, joint pain, myalgias and neck pain.   Skin:  Positive for itching and rash.   Neurological:  Positive for weakness. Negative for dizziness, sensory change, speech change, focal weakness and headaches.   Psychiatric/Behavioral:  The patient is not nervous/anxious.       Physical Exam  Temp:  [36.6 °C (97.8 °F)-37.2 °C (99 °F)] 36.6 °C (97.8 °F)  Pulse:  [] 89  Resp:  [15-18] 17  BP: ()/(60-71) 113/65  SpO2:  [91 %-96 %] 96 %    Physical Exam  Vitals and nursing note reviewed.   HENT:      Head: Normocephalic and atraumatic.      Nose: No congestion.      Mouth/Throat:      Mouth: Mucous membranes are moist.   Eyes:      Extraocular Movements: Extraocular movements intact.      Conjunctiva/sclera: Conjunctivae normal.   Cardiovascular:      Rate and Rhythm: Normal rate and regular rhythm.      Heart sounds: Murmur heard.   Pulmonary:      Effort: Pulmonary effort is normal.      Breath sounds: Normal breath sounds.   Abdominal:      General: There is no distension.      Tenderness: There is no abdominal tenderness. There is no guarding or rebound.   Musculoskeletal:         General: Swelling (left arm) present.      Cervical back: No tenderness.      Right lower leg: Edema present.      Left lower leg: Edema present.   Skin:      Findings: Rash (generalized) present. Rash is macular and papular.      Comments: Deep tissue injury sacrococcygeal area   Neurological:      General: No focal deficit present.      Mental Status: She is alert and oriented to person, place, and time.      Cranial Nerves: No cranial nerve deficit.       Fluids    Intake/Output Summary (Last 24 hours) at 12/22/2022 1207  Last data filed at 12/22/2022 0920  Gross per 24 hour   Intake 788.82 ml   Output --   Net 788.82 ml           Laboratory  Recent Labs     12/22/22  0445   WBC 8.6   RBC 3.22*   HEMOGLOBIN 9.9*   HEMATOCRIT 30.1*   MCV 93.5   MCH 30.7   MCHC 32.9*   RDW 50.8*   PLATELETCT 281   MPV 8.8*       Recent Labs     12/20/22  0120 12/22/22  0445   SODIUM 135 139   POTASSIUM 4.4 4.4   CHLORIDE 103 109   CO2 22 21   GLUCOSE 104* 98   BUN 23* 30*   CREATININE 1.20 1.47*   CALCIUM 8.5 8.2*                     Imaging  IR-PICC LINE PLACEMENT W/ GUIDANCE > AGE 5   Final Result                  Ultrasound-guided PICC placement performed by qualified nursing staff as    above.          EC-TEODORO W/O CONT   Final Result      DX-CHEST-PORTABLE (1 VIEW)   Final Result      1.  Curvilinear opacities in the lung bases likely representing atelectasis, less likely pneumonitis.      US-EXTREMITY VENOUS UPPER UNILAT LEFT   Final Result      EC-ECHOCARDIOGRAM COMPLETE W/O CONT   Final Result      OUTSIDE IMAGES-CT CHEST   Final Result             Assessment/Plan  * Atrial fibrillation with rapid ventricular response (HCC)- (present on admission)  Assessment & Plan  Metoprolol, Eliquis  [id number] -  3    Drug rash- (present on admission)  Assessment & Plan  Likely secondary to Daptomycin then Vancomycin  Benadryl, Vistaril  Monitor closely  Start Prednisone    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)  Assessment & Plan  Teflaro  CT Surgery recommendation - continued medical therapy and against operative intervention in the form of MVR at this time    MRSA bacteremia- (present on  admission)  Assessment & Plan  Teflaro  PICC line placed 12/16    Encephalopathy acute- (present on admission)  Assessment & Plan  Avoid Benzodiazepines and Anticholinergics  Frequent orientation  Open window blinds during the day  Avoid naps during the day  Family at bedside whenever possible  Ensure adequate sleep at night - use Melatonin preferably  Avoid early morning labs  Avoid vital signs during sleep  Ambulate if possible  Provide adequate pain control  Monitor for urinary retention  Prevent and manage constipation  Discontinue IVs, Catheters, Tubes, Lines, Cardiac monitors, SCDs if possible    Sepsis (HCC)- (present on admission)  Assessment & Plan  Resolved      Thrombophlebitis- (present on admission)  Assessment & Plan  Eliquis    Pneumonia due to COVID-19 virus- (present on admission)  Assessment & Plan  Finished course of Decadron    SO (acute kidney injury) (HCC)  Assessment & Plan  Start IVF NS  Follow bmp, check PTH  Check urine studies    Deep tissue injury  Assessment & Plan  Wound care    Diarrhea  Assessment & Plan  C diff negative    Cellulitis- (present on admission)  Assessment & Plan  resolved    Dysphagia- (present on admission)  Assessment & Plan  Level 7, Liquid level 0, crush pills  Aspiration precautions    Anemia- (present on admission)  Assessment & Plan  Follow CBC    Hypomagnesemia- (present on admission)  Assessment & Plan  IV Mg given  Start oral Mg  Follow level    Hypophosphatemia- (present on admission)  Assessment & Plan  Follow level    Hyponatremia- (present on admission)  Assessment & Plan  Follow BMP    Acute respiratory failure with hypoxia (HCC)- (present on admission)  Assessment & Plan  due to COVID 19  RT protocol  Resolved    Metabolic acidosis, normal anion gap (NAG)- (present on admission)  Assessment & Plan  Resolved    Frailty syndrome in geriatric patient- (present on admission)  Assessment & Plan  Palliative care following    Non-traumatic rhabdomyolysis-  (present on admission)  Assessment & Plan  resolved    Elevated LFTs- (present on admission)  Assessment & Plan  Follow CMP    Hypokalemia- (present on admission)  Assessment & Plan  Follow bmp         VTE prophylaxis: therapeutic anticoagulation with Eliquis    I have performed a physical exam and reviewed and updated ROS and Plan today (12/22/2022). In review of yesterday's note (12/21/2022), there are no changes except as documented above.

## 2022-12-22 NOTE — PROGRESS NOTES
Received report from previous shift RN. Assumed care of patient at 1900.  Assessment complete.  Patient A&O x 4. Patient calls appropriately.  Patient ambulates with max assist. Bed alarm on.   Patient has 0/10 pain.   Systemic rash, patient complaining of itchiness. PRN Benadryl given per MAR.   Denies N&V. Tolerating level 7 diet with thin liquids.  + void, + flatus, + BM.  Patient on RA, denies SOB.     Reviewed plan of care with patient. Call light and personal belongings within reach. Hourly rounding in place. All needs met at this time.

## 2022-12-22 NOTE — DISCHARGE PLANNING
Case Management Discharge Planning    Admission Date: 11/26/2022  GMLOS: 3.4  ALOS: 26    6-Clicks ADL Score: 16  6-Clicks Mobility Score: 7  PT and/or OT Eval ordered: Yes  Post-acute Referrals Ordered: Yes  Post-acute Choice Obtained: Yes  Has referral(s) been sent to post-acute provider:  Yes      Anticipated Discharge Dispo: Discharge Disposition: D/T to Medicare cert LTCH w/planned hosp IP readmit (91)     DME Needed: No    Action(s) Taken: Updated Provider/Nurse on Discharge Plan    Pt was discussed in IDT rounds today and per Dr Palacios Pt is not medically cleared yet as Pt's rashes are not stable/improved yet.   Per discussion Pt  is now on Ceftaroline 400 mg BID with end date 1/22/23 per renal dosing.     Plan is to start Pt on steroid.    Will continue to coordinate with Admissions/Liaison with [facility]/ bed availability .    Escalations Completed: None    Medically Clear: No    Next Steps:   This RN CM to continue to assist Pt with discharge as needed    Barriers to Discharge:   Medical clearance  Bed availability at [facility]    Is the patient up for discharge tomorrow: No    Addendum: 12/23/22  Pt was discussed with Dr Palacios today who states that Pt is not medically cleared yet as Pt's  generalized rashes have not improved yet. Pt had a reaction to Daptomycin .

## 2022-12-23 VITALS
HEIGHT: 62 IN | SYSTOLIC BLOOD PRESSURE: 111 MMHG | HEART RATE: 86 BPM | OXYGEN SATURATION: 90 % | BODY MASS INDEX: 32.98 KG/M2 | WEIGHT: 179.23 LBS | DIASTOLIC BLOOD PRESSURE: 64 MMHG | TEMPERATURE: 98.1 F | RESPIRATION RATE: 16 BRPM

## 2022-12-23 LAB
ANION GAP SERPL CALC-SCNC: 7 MMOL/L (ref 7–16)
BUN SERPL-MCNC: 29 MG/DL (ref 8–22)
CALCIUM SERPL-MCNC: 8.1 MG/DL (ref 8.5–10.5)
CHLORIDE SERPL-SCNC: 106 MMOL/L (ref 96–112)
CO2 SERPL-SCNC: 21 MMOL/L (ref 20–33)
CREAT SERPL-MCNC: 1.36 MG/DL (ref 0.5–1.4)
ERYTHROCYTE [DISTWIDTH] IN BLOOD BY AUTOMATED COUNT: 50.4 FL (ref 35.9–50)
GFR SERPLBLD CREATININE-BSD FMLA CKD-EPI: 40 ML/MIN/1.73 M 2
GLUCOSE SERPL-MCNC: 134 MG/DL (ref 65–99)
HCT VFR BLD AUTO: 26.2 % (ref 37–47)
HGB BLD-MCNC: 8.7 G/DL (ref 12–16)
MCH RBC QN AUTO: 31.1 PG (ref 27–33)
MCHC RBC AUTO-ENTMCNC: 33.2 G/DL (ref 33.6–35)
MCV RBC AUTO: 93.6 FL (ref 81.4–97.8)
PLATELET # BLD AUTO: 283 K/UL (ref 164–446)
PMV BLD AUTO: 8.9 FL (ref 9–12.9)
POTASSIUM SERPL-SCNC: 4.7 MMOL/L (ref 3.6–5.5)
PTH-INTACT SERPL-MCNC: 118 PG/ML (ref 14–72)
RBC # BLD AUTO: 2.8 M/UL (ref 4.2–5.4)
SODIUM SERPL-SCNC: 134 MMOL/L (ref 135–145)
WBC # BLD AUTO: 6.5 K/UL (ref 4.8–10.8)

## 2022-12-23 PROCEDURE — A9270 NON-COVERED ITEM OR SERVICE: HCPCS | Performed by: STUDENT IN AN ORGANIZED HEALTH CARE EDUCATION/TRAINING PROGRAM

## 2022-12-23 PROCEDURE — 97112 NEUROMUSCULAR REEDUCATION: CPT

## 2022-12-23 PROCEDURE — A9270 NON-COVERED ITEM OR SERVICE: HCPCS | Performed by: INTERNAL MEDICINE

## 2022-12-23 PROCEDURE — 700102 HCHG RX REV CODE 250 W/ 637 OVERRIDE(OP): Performed by: FAMILY MEDICINE

## 2022-12-23 PROCEDURE — A9270 NON-COVERED ITEM OR SERVICE: HCPCS | Performed by: PHYSICIAN ASSISTANT

## 2022-12-23 PROCEDURE — 99233 SBSQ HOSP IP/OBS HIGH 50: CPT | Performed by: INTERNAL MEDICINE

## 2022-12-23 PROCEDURE — 97535 SELF CARE MNGMENT TRAINING: CPT

## 2022-12-23 PROCEDURE — 99239 HOSP IP/OBS DSCHRG MGMT >30: CPT | Performed by: FAMILY MEDICINE

## 2022-12-23 PROCEDURE — 85027 COMPLETE CBC AUTOMATED: CPT

## 2022-12-23 PROCEDURE — 700102 HCHG RX REV CODE 250 W/ 637 OVERRIDE(OP): Performed by: INTERNAL MEDICINE

## 2022-12-23 PROCEDURE — 700111 HCHG RX REV CODE 636 W/ 250 OVERRIDE (IP): Mod: JG | Performed by: INTERNAL MEDICINE

## 2022-12-23 PROCEDURE — 700105 HCHG RX REV CODE 258: Performed by: INTERNAL MEDICINE

## 2022-12-23 PROCEDURE — 36415 COLL VENOUS BLD VENIPUNCTURE: CPT

## 2022-12-23 PROCEDURE — 80048 BASIC METABOLIC PNL TOTAL CA: CPT

## 2022-12-23 PROCEDURE — A9270 NON-COVERED ITEM OR SERVICE: HCPCS | Performed by: FAMILY MEDICINE

## 2022-12-23 PROCEDURE — 700102 HCHG RX REV CODE 250 W/ 637 OVERRIDE(OP): Performed by: PHYSICIAN ASSISTANT

## 2022-12-23 PROCEDURE — 83970 ASSAY OF PARATHORMONE: CPT

## 2022-12-23 PROCEDURE — 700111 HCHG RX REV CODE 636 W/ 250 OVERRIDE (IP): Performed by: FAMILY MEDICINE

## 2022-12-23 PROCEDURE — 700102 HCHG RX REV CODE 250 W/ 637 OVERRIDE(OP): Performed by: STUDENT IN AN ORGANIZED HEALTH CARE EDUCATION/TRAINING PROGRAM

## 2022-12-23 RX ORDER — FAMOTIDINE 20 MG/1
20 TABLET, FILM COATED ORAL DAILY
Qty: 60 TABLET | Status: SHIPPED
Start: 2022-12-24

## 2022-12-23 RX ORDER — DIPHENHYDRAMINE HCL 25 MG
25 TABLET ORAL EVERY 6 HOURS PRN
Qty: 30 TABLET | Refills: 0 | Status: SHIPPED
Start: 2022-12-23

## 2022-12-23 RX ORDER — HYDROXYZINE HYDROCHLORIDE 25 MG/1
25 TABLET, FILM COATED ORAL 3 TIMES DAILY PRN
Qty: 30 TABLET | Refills: 0 | Status: SHIPPED
Start: 2022-12-23

## 2022-12-23 RX ORDER — PREDNISONE 20 MG/1
TABLET ORAL
Qty: 30 TABLET | Refills: 0 | Status: SHIPPED
Start: 2022-12-24

## 2022-12-23 RX ORDER — ACETAMINOPHEN 500 MG
1000 TABLET ORAL EVERY 6 HOURS PRN
Qty: 30 TABLET | Refills: 0 | Status: SHIPPED
Start: 2022-12-23

## 2022-12-23 RX ORDER — LANOLIN ALCOHOL/MO/W.PET/CERES
400 CREAM (GRAM) TOPICAL DAILY
Qty: 30 TABLET | Status: SHIPPED
Start: 2022-12-24

## 2022-12-23 RX ADMIN — PREDNISONE 40 MG: 20 TABLET ORAL at 04:52

## 2022-12-23 RX ADMIN — METOPROLOL TARTRATE 12.5 MG: 25 TABLET, FILM COATED ORAL at 04:53

## 2022-12-23 RX ADMIN — CEFTAROLINE FOSAMIL 400 MG: 600 POWDER, FOR SOLUTION INTRAVENOUS at 05:17

## 2022-12-23 RX ADMIN — APIXABAN 5 MG: 5 TABLET, FILM COATED ORAL at 04:52

## 2022-12-23 RX ADMIN — FAMOTIDINE 20 MG: 20 TABLET, FILM COATED ORAL at 04:53

## 2022-12-23 RX ADMIN — DIPHENHYDRAMINE HYDROCHLORIDE 25 MG: 25 TABLET ORAL at 04:53

## 2022-12-23 RX ADMIN — Medication 400 MG: at 04:52

## 2022-12-23 ASSESSMENT — COGNITIVE AND FUNCTIONAL STATUS - GENERAL
DAILY ACTIVITIY SCORE: 17
HELP NEEDED FOR BATHING: A LOT
SUGGESTED CMS G CODE MODIFIER DAILY ACTIVITY: CK
PERSONAL GROOMING: A LITTLE
TOILETING: A LOT
DRESSING REGULAR LOWER BODY CLOTHING: A LITTLE
DRESSING REGULAR UPPER BODY CLOTHING: A LITTLE

## 2022-12-23 ASSESSMENT — ENCOUNTER SYMPTOMS
ABDOMINAL PAIN: 0
VOMITING: 0
FEVER: 0
CHILLS: 0
DIARRHEA: 0
MYALGIAS: 1
NAUSEA: 0

## 2022-12-23 ASSESSMENT — PAIN DESCRIPTION - PAIN TYPE
TYPE: ACUTE PAIN

## 2022-12-23 NOTE — THERAPY
Physical Therapy Contact Note    Pt discharging today to [facility] per chart review; will follow up week of 12/26 if dc delayed for continuation of PT plan of care.    Ramya TOWNSEND, PT,  [phone]

## 2022-12-23 NOTE — DISCHARGE SUMMARY
Discharge Summary    CHIEF COMPLAINT ON ADMISSION  Chief Complaint   Patient presents with    Fall    Shortness of Breath    Abnormal Labs       Reason for Admission  EMS    Admission Date  11/26/2022     CODE STATUS  DNAR/DNI    HPI & HOSPITAL COURSE  This is a 77 y.o. female here with  Atrial fibrillation with rapid ventricular rate, COVID-19 with pneumonia and Respiratory failure with hypoxia, mild rhabdomyolysis. She was started on Decadron, oxygen supplementation, and supportive measures.  She was placed on Metoprolol for rate control, and Eliquis for anticoagulation.  She apparently developed fever and encephalopathy, and was noted to have left arm thrombophlebitis.  Work-up further showed MRSA bacteremia.  Infectious disease was consulted on the case.  Patient was started on empiric coverage with IV vancomycin.  Repeat blood cultures remained positive for MRSA.  Cardiology was consulted on the case for a TEODORO, which showed mitral valve vegetation/endocarditis.  Repeat cultures eventually were negative.  PICC line placement was done.  Patient was placed on IV daptomycin on anticipation of discharge to a skilled nursing facility.  However she developed a rash which was likely an allergic reaction to the daptomycin.  She was changed back to IV vancomycin.  TEODORO showed a mitral valve freely mobile mass. CT surgery was consulted on the case and recommended medical management for now.  Her drug rash was improving, however after several days with IV vancomycin, she had a diffuse generalized erythematous maculopapular rash that worsened.  IV vancomycin was then stopped.  She was placed on IV Teflaro 400 mg every 12 hours with recommendation to finish the course on 1/22/2023.  She was started on prednisone for the drug rash.      Therefore, she is discharged in fair and stable condition to a long-term acute care hospital.    The patient met 2-midnight criteria for an inpatient stay at the time of discharge.      FOLLOW  UP ITEMS POST DISCHARGE  Follow-up as below    DISCHARGE DIAGNOSES  Principal Problem:    Atrial fibrillation with rapid ventricular response (HCC) POA: Yes  Active Problems:    Pneumonia due to COVID-19 virus POA: Yes    Thrombophlebitis POA: Yes    Sepsis (HCC) POA: Yes    Encephalopathy acute POA: Yes    MRSA bacteremia POA: Yes    Endocarditis POA: Yes    Drug rash POA: Yes    Hypokalemia POA: Yes    Elevated LFTs POA: Yes    Non-traumatic rhabdomyolysis POA: Yes    Frailty syndrome in geriatric patient POA: Yes    Metabolic acidosis, normal anion gap (NAG) POA: Yes    Acute respiratory failure with hypoxia (HCC) POA: Yes    Hyponatremia POA: Yes    Hypophosphatemia POA: Yes    Hypomagnesemia POA: Yes    Anemia POA: Yes    Dysphagia POA: Yes    Cellulitis POA: Yes    Diarrhea POA: No    Deep tissue injury POA: No    SO (acute kidney injury) (HCC) POA: No  Resolved Problems:    MRSA (methicillin resistant Staphylococcus aureus) septicemia (HCC) POA: Unknown      FOLLOW UP  No future appointments.  Keli Abbott M.D.  [address] [facility] [address]  [place] [facility] [address]  [phone]    Follow up in 4 week(s)      Hoa Vaughn M.D.  [address] [facility] [address]  [place] [address]  [phone]    Follow up in 4 week(s)        MEDICATIONS ON DISCHARGE    Teflaro 400 mg every 12 hrs until 1/22/2023       Medication List        START taking these medications        Instructions   acetaminophen 500 MG Tabs  Commonly known as: TYLENOL   Take 2 Tablets by mouth every 6 hours as needed for Moderate Pain or Mild Pain.  Dose: 1,000 mg     apixaban 5mg Tabs  Commonly known as: ELIQUIS   Take 1 Tablet by mouth 2 times a day. Indications: Thromboembolism secondary to Atrial Fibrillation  Dose: 5 mg     diphenhydrAMINE 25 MG Tabs  Commonly known as: BENADRYL   Take 1 Tablet by mouth every 6 hours as needed for Itching.  Dose: 25 mg     famotidine 20 MG Tabs  Start taking on: December 24, 2022  Commonly known as: PEPCID   Take 1  Tablet by mouth every day.  Dose: 20 mg     hydrOXYzine HCl 25 MG Tabs  Commonly known as: ATARAX   Take 1 Tablet by mouth 3 times a day as needed for Itching.  Dose: 25 mg     magnesium oxide 400 (240 Mg) MG Tabs  Start taking on: December 24, 2022   Take 1 Tablet by mouth every day.  Dose: 400 mg     metoprolol tartrate 25 MG Tabs  Commonly known as: LOPRESSOR   Take 0.5 Tablets by mouth 2 times a day.  Dose: 12.5 mg     predniSONE 20 MG Tabs  Start taking on: December 24, 2022  Commonly known as: DELTASONE   40 mg daily            STOP taking these medications      ibuprofen 200 MG Tabs  Commonly known as: MOTRIN              Allergies  Allergies   Allergen Reactions    Daptomycin Rash     Patient developed Systemic Rash/ Redness during hospital stay    Soap Rash     Specifically adams dish soap        DIET  Orders Placed This Encounter   Procedures    Diet Order Diet: Level 7 - Easy to Chew (Crush pills); Liquid level: Level 0 - Thin     Standing Status:   Standing     Number of Occurrences:   1     Order Specific Question:   Diet:     Answer:   Level 7 - Easy to Chew [22]     Comments:   Crush pills     Order Specific Question:   Liquid level     Answer:   Level 0 - Thin       ACTIVITY  As tolerated.  Weight bearing as tolerated    LINES, DRAINS, AND WOUNDS  This is an automated list. Peripheral IVs will be removed prior to discharge.  PICC Single Lumen 12/16/22 Right Basilic (Active)   Site Assessment Clean;Dry;Intact 12/22/22 2045   Line Status Blood return noted;Scrubbed the hub prior to access;Flushed;Infusing 12/22/22 2045   Line Secured at (cm) 0 cm 12/16/22 1019   Extremity Circumference (cm) 29 cm 12/16/22 1019   Dressing Type Biopatch;Transparent Film 12/22/22 2045   Dressing Status Clean;Dry;Intact 12/22/22 2045   Dressing Intervention N/A 12/22/22 2045   Dressing Change Due 12/24/22 12/22/22 2045   Date Primary Tubing Changed 12/19/22 12/20/22 2100   Date Secondary Tubing Changed 12/22/22 12/22/22  2045   Date IV Connector(s) Changed 12/17/22 12/22/22 2045   NEXT Primary Tubing Change  12/24/22 12/22/22 2045   NEXT Secondary Tubing Change  12/23/22 12/22/22 2045   NEXT IV Connector(s) Change 12/24/22 12/22/22 2045   Line Necessity Assessed Antibiotic Therapy Greater than 7 Days 12/22/22 2045   $ Single Lumen PICC Charge Single kit used 12/17/22 1500       Moisture Associated Skin Damage 12/18/22 Buttock;Groin;Perineum;Panus;Thigh (Active)   Wound Image    12/18/22 1200   NEXT Weekly Photo (Inpatient Only) 12/25/22 12/18/22 1200   Drainage Amount None 12/22/22 2045   Drainage Description Serosanguineous 12/18/22 1200   Periwound Assessment Red;Rash;Pink 12/22/22 2045   IAD Cleansing Soap and Water 12/22/22 2045   Periwound Protectant Antifungal Therapy 12/20/22 0900   WOUND NURSE ONLY - Time Spent with Patient (mins) 60 12/18/22 1200      PICC Single Lumen 12/16/22 Right Basilic (Active)   Site Assessment Clean;Dry;Intact 12/22/22 2045   Line Status Blood return noted;Scrubbed the hub prior to access;Flushed;Infusing 12/22/22 2045   Line Secured at (cm) 0 cm 12/16/22 1019   Extremity Circumference (cm) 29 cm 12/16/22 1019   Dressing Type Biopatch;Transparent Film 12/22/22 2045   Dressing Status Clean;Dry;Intact 12/22/22 2045   Dressing Intervention N/A 12/22/22 2045   Dressing Change Due 12/24/22 12/22/22 2045   Date Primary Tubing Changed 12/19/22 12/20/22 2100   Date Secondary Tubing Changed 12/22/22 12/22/22 2045   Date IV Connector(s) Changed 12/17/22 12/22/22 2045   NEXT Primary Tubing Change  12/24/22 12/22/22 2045   NEXT Secondary Tubing Change  12/23/22 12/22/22 2045   NEXT IV Connector(s) Change 12/24/22 12/22/22 2045   Line Necessity Assessed Antibiotic Therapy Greater than 7 Days 12/22/22 2045   $ Single Lumen PICC Charge Single kit used 12/17/22 1500                MENTAL STATUS ON TRANSFER  Alert oriented x3           CONSULTATIONS  Cardiac surgery - Vaughn  Infectious disease  Palliative  care  Cardiology    PROCEDURES  PICC line placement 12/16/2022    LABORATORY  Lab Results   Component Value Date    SODIUM 134 (L) 12/23/2022    POTASSIUM 4.7 12/23/2022    CHLORIDE 106 12/23/2022    CO2 21 12/23/2022    GLUCOSE 134 (H) 12/23/2022    BUN 29 (H) 12/23/2022    CREATININE 1.36 12/23/2022        Lab Results   Component Value Date    WBC 6.5 12/23/2022    HEMOGLOBIN 8.7 (L) 12/23/2022    HEMATOCRIT 26.2 (L) 12/23/2022    PLATELETCT 283 12/23/2022        Total time of the discharge process exceeds 40 minutes.

## 2022-12-23 NOTE — DISCHARGE PLANNING
Transport Request Received: 12/23/2022 at 1227    Transport Company: [employer] Spoke with [employer]    Transport Date: 12/23/2022  Time: 1600    Dest[facility]: [facility]  at [address] [facility] 7th [address] [facility]    Notified care team of scheduled transport via [employer].      If there are any changes needed to the DC transportation scheduled, please contact [facility] at ext. [phone] between the hours of [phone] Mon-Fri. If outside those hours, contact the ED Case Manager at ext. [phone].

## 2022-12-23 NOTE — PROGRESS NOTES
Received report from night shift RN. Assumed patient care.    Patient is A+O x4.  Tolerating level 7 diet. Denies N/V.  Denies chest pain or SOB.  Pain 0 on a 0-10 pain scale. Patient is very upset because she did not get good sleep last night.   + void, + BM. Last BM 12/21.  Patient max assist, Q2 turns.   Patient is very red and has increased swelling since yesterday. Increased swelling especially noted in face, eyelid, and bilateral upper extremities. Redness not improving. MD notified.     Plan of care discussed, all questions answered. Educated on the importance of calling before getting OOB and pt verbalizes understanding. Educated regarding importance of oral care. Oral care kit at bedside. Call light is within reach, treaded slipper socks on, bed in lowest/ locked position, hourly rounding in place, all needs met at this time

## 2022-12-23 NOTE — PROGRESS NOTES
Infectious Disease Progress Note    Author: Keli Abobtt M.D. Date & Time of service: 12/23/2022  11:06 AM    Chief Complaint:  MRSA bacteremia    Interval History:  77 y.o. female admitted 11/26/2022. For Afib with RVR and rhabdomyolysis +COVID  Developed fever and AMS dueing hosp-blood cultures +MRSA  12/4 AF WBC 13.9 Oriented to person and place c/o LUE pain No dyspnea or CP. No new neck, back, joint pain  12/5 patient remains afebrile, white count is resolved now, down to 9.4, tolerating vancomycin.  Patient states she is tired but overall better.   left arm  12/6 patient remains afebrile, white count is 9.7, tolerating vancomycin.  Plan as below  12/7 patient remains afebrile, white count 7.7, tolerating vancomycin, no new events overnight.  12/8 patient remains afebrile, white count is 8.5, tolerating vancomycin.  TEODORO positive for endocarditis  12/9 patient remains afebrile, count 7.6, repeat cultures as below.  Patient notes improvement in left arm pain and swelling  12/10 patient remains afebrile, no CBC this morning, tolerating vancomycin  12/11 patient remains afebrile, no CBC this morning, tolerating IV vancomycin.  Repeat blood cultures as below.   12/12 T-max 98.9, remains afebrile, white count today 7.9, tolerating vancomycin, repeat blood cultures  12/13 patient remains afebrile, no CBC this morning, tolerating vancomycin.  Repeat blood cultures no growth to date  12/14 patient remains afebrile, no CBC this morning, tolerating vancomycin. Repeat blood cultures pending  12/15 patient remains afebrile.  No CBC this morning, tolerated the switch to daptomycin.  Blood cultures as below  12/16 Patient is afebrile, no CBC this morning, unfortunately developed worsening generalized pruritic rash.  Some improvement with Benadryl overnight  12/17 patient remains afebrile, white count is 6.1, absolute eosinophilia of 830.  No worsening of rash after switching out of daptomycin  12/18 patient  remains afebrile, no CBC this morning, the rash is coalesced but appears lighter, mild itching persists, no worsening, renal function stable   T-max 101.5, no CBC done today.  ID reconsulted secondary to patient developing worsening rash on daptomycin and was placed on vancomycin but continued have worsening rash that spread to the face.  Patient also agrees that she has a worsening rash.  Discussed antibiotic change with patient and brother at bedside.   afebrile, WBC 8.6.  Renal function worse today.  Erythema and swelling worse today.  Erythema and swelling spread to face.  She denies any throat swelling or shortness of breath.  Discussed with patient that this is the evolution of her rash.  Not likely due to new antibiotic started yesterday.   afebrile, WBC 6.5 patient sitting up in chair, patient's facial and eyelid swelling has improved from yesterday.  She does have some darkened erythema on posterior aspects of bilateral upper extremities    Labs Reviewed, Medications Reviewed, and Radiology Reviewed.    Review of Systems:  Review of Systems   Constitutional:  Positive for malaise/fatigue. Negative for chills and fever.   Gastrointestinal:  Negative for abdominal pain, diarrhea, nausea and vomiting.   Musculoskeletal:  Positive for myalgias.   Skin:  Positive for itching and rash.   All other systems reviewed and are negative.    Hemodynamics:  Temp (24hrs), Av.4 °C (97.5 °F), Min:35.9 °C (96.7 °F), Max:36.7 °C (98.1 °F)  Temperature: 36.7 °C (98.1 °F), Monitored Temp: 36.7 °C (98.1 °F)  Pulse  Av.2  Min: 54  Max: 136   Blood Pressure : 111/64       Physical Exam:  Physical Exam  Vitals and nursing note reviewed.   Constitutional:       General: She is not in acute distress.     Appearance: She is ill-appearing. She is not toxic-appearing or diaphoretic.   HENT:      Mouth/Throat:      Mouth: Mucous membranes are moist.      Pharynx: No oropharyngeal exudate.      Comments: Fair  dentition    Facial and eyelid swelling have improved.  Eyes:      General: No scleral icterus.     Extraocular Movements: Extraocular movements intact.      Pupils: Pupils are equal, round, and reactive to light.   Pulmonary:      Effort: Pulmonary effort is normal. No respiratory distress.      Breath sounds: No stridor.   Abdominal:      General: There is no distension.      Palpations: Abdomen is soft.      Tenderness: There is no abdominal tenderness.   Musculoskeletal:         General: Swelling and tenderness present.      Cervical back: Neck supple. No rigidity.   Skin:     Coloration: Skin is pale. Skin is not jaundiced.      Findings: Bruising, erythema and rash present.      Comments: Diffuse erythematous rash over entire body and face overall improving.  There is some darker erythema on the posterior aspect of both upper extremities which typically means her rash is plateauing.   Neurological:      General: No focal deficit present.      Mental Status: She is alert and oriented to person, place, and time.   Psychiatric:         Mood and Affect: Mood normal.         Behavior: Behavior normal.      Comments: Pleasant       Meds:    Current Facility-Administered Medications:     magnesium oxide    NS    predniSONE    famotidine    hydrOXYzine HCl    ceftaroline (TEFLARO) ivpb    loperamide    diphenhydrAMINE    Pharmacy Consult Request    [] acetaminophen **FOLLOWED BY** acetaminophen    oxyCODONE immediate-release **OR** oxyCODONE immediate-release **OR** HYDROmorphone    metoprolol tartrate    Respiratory Therapy Consult    LR    hydrALAZINE    Metoprolol Tartrate    apixaban    Labs:  Recent Labs     22  0445 22  0510   WBC 8.6 6.5   RBC 3.22* 2.80*   HEMOGLOBIN 9.9* 8.7*   HEMATOCRIT 30.1* 26.2*   MCV 93.5 93.6   MCH 30.7 31.1   RDW 50.8* 50.4*   PLATELETCT 281 283   MPV 8.8* 8.9*       Recent Labs     22  0445 22  0510   SODIUM 139 134*   POTASSIUM 4.4 4.7   CHLORIDE 109  106   CO2 21 21   GLUCOSE 98 134*   BUN 30* 29*       Recent Labs     22  0445 22  0510   CREATININE 1.47* 1.36         Imaging:  DX-CHEST-PORTABLE (1 VIEW)    Result Date: 2022 12:15 PM HISTORY/REASON FOR EXAM:  Shortness of Breath. TECHNIQUE/EXAM DESCRIPTION AND NUMBER OF VIEWS: Single portable view of the chest. COMPARISON: None FINDINGS: Heart size is within normal limits. There are curvilinear opacities in the lung bases. No pleural abnormalities are noted.     1.  Curvilinear opacities in the lung bases likely representing atelectasis, less likely pneumonitis.    US-EXTREMITY VENOUS UPPER UNILAT LEFT    Result Date: 2022   Upper Extremity  Venous Duplex Report  Vascular Laboratory  CONCLUSIONS  Left upper extremity venous duplex imaging.  No evidence of deep venous thrombosis.  Evidence of acute to subacute superficial venous thrombosis in the LEFT  cephalic vein from the mid to distal bicep.  MASSIEL SILVA  Exam Date:     2022 13:29  Room #:     Inpatient  Priority:     Routine  Ht (in):             Wt (lb):  Ordering Physician:        EWELINA WOOD  Referring Physician:       [id number] Hanna  Sonographer:               Giovana Moran RVT  Study Type:                Complete Unilateral  Technical Quality:         Adequate  Age:    77    Gender:     F  MRN:    [id number]  :    1945      BSA:  Indications:     Localized swelling, mass and lump, unspecified upper limb,                   Edema, unspecified  CPT Codes:       93898  ICD Codes:       R22.30  R60.9  History:         Left upper extremity swelling/edema. No prior exams.  Limitations:  PROCEDURES:  Left upper extremity venous duplex imaging.  The following venous structures were evaluated: internal jugular,  subclavian, axillary, brachial, cephalic, and basilic veins.  Serial compression, color, and spectral Doppler flow evaluations were  performed.  FINDINGS:  Left upper extremity-  No evidence of deep  venous thrombosis.  Evidence of acute to subacute superficial venous thrombosis in the cephalic  vein from the mid to distal bicep.  All other veins demonstrate complete color filling and compressibility with  normal venous flow dynamics including spontaneous flow and respiratory  phasicity.  Flow was evaluated in the contralateral subclavian vein and normal venous  flow dynamics including spontaneous flow and respiratory phasic variation  were demonstrated.  Brittney MUNOZ To  (Electronically Signed)  Final Date:      2022                   16:24    EC-ECHOCARDIOGRAM COMPLETE W/O CONT    Result Date: 2022  Transthoracic Echo Report Echocardiography Laboratory CONCLUSIONS No prior study is available for comparison. Normal left ventricular chamber size. Mild concentric left ventricular hypertrophy. The left ventricular ejection fraction is visually estimated to be 65%. Trace mitral regurgitation. SILVAMELVIAH Exam Date:         2022                    13:31 Exam Location:     Inpatient Priority:          Routine Ordering Physician:        BETH GIL Referring Physician:       [id number] Rose Sonographer:               Frank ARANDA Age:    77     Gender:    F MRN:    [id number] :    1945 BSA:    1.89   Ht (in):    67     Wt (lb):    170 Exam Type:     Complete Indications:     Atrial Fibrillation, Shortness of breath ICD Codes:       427.31  786.05 CPT Codes:       37355 BP:   121    /   65     HR: Technical Quality:       Poor MEASUREMENTS  (Male / Female) Normal Values 2D ECHO LV Diastolic Diameter PLAX        4.2 cm                4.2 - 5.9 / 3.9 - 5.3 cm LV Systolic Diameter PLAX         2.7 cm                2.1 - 4.0 cm IVS Diastolic Thickness           1.1 cm                LVPW Diastolic Thickness          1.1 cm                LVOT Diameter                     1.8 cm                Estimated LV Ejection Fraction    65 %                  LV Ejection Fraction MOD BP        73.1 %                >= 55  % LV Ejection Fraction MOD 4C       78.7 %                LV Ejection Fraction MOD 2C       73 %                  DOPPLER AV Peak Velocity                  1.1 m/s               AV Peak Gradient                  5.3 mmHg              AV Mean Gradient                  2.9 mmHg              LVOT Peak Velocity                1.1 m/s               AV Area Cont Eq vti               2.6 cm2               MV Velocity Time Integral         21 cm                 Mitral E Point Velocity           0.86 m/s              Mitral E to A Ratio               0.75                  Mitral A Duration                 156 ms                MV Pressure Half Time             31.9 ms               MV Area PHT                       6.9 cm2               MV Deceleration Time              85.8 ms               * Indicates values subject to auto-interpretation LV EF:  65    % FINDINGS Left Ventricle Normal left ventricular chamber size. Mild concentric left ventricular hypertrophy. Normal left ventricular systolic function. The left ventricular ejection fraction is visually estimated to be 65%. Normal regional wall motion. Right Ventricle The right ventricle is not well visualized. Right Atrium The right atrium is not well visualized. Left Atrium Normal left atrial size. Left atrial volume index is 24 mL/sq m. Mitral Valve The mitral valve is not well visualized. No mitral stenosis. Trace mitral regurgitation. Aortic Valve The aortic valve is not well visualized. Tricuspid Valve The tricuspid valve is not well visualized. No stenosis or regurgitation seen. Pulmonic Valve The pulmonic valve is not well visualized. No stenosis or regurgitation seen. Pericardium No pericardial effusion. Aorta Normal aortic root for body surface area. The ascending aorta diameter is 3.1 cm. Zafar Bailon M.D. (Electronically Signed) Final Date:     27 November 2022                 20:23      Micro:  Results       Procedure Component  "Value Units Date/Time    URINALYSIS [[id number]]     Order Status: No result Specimen: Urine, Clean Catch     C Diff by PCR rflx Toxin [[id number]] Collected: 12/19/22 1320    Order Status: Completed Specimen: Stool Updated: 12/19/22 1503     C Diff by PCR Negative     Comment: C. difficile NOT detected by PCR.  Treatment not indicated per guidelines.  Repeat testing not indicated within 7 days.          027-NAP1-BI Presumptive Negative     Comment: Presumptive 027/NAP1/BI target DNA sequences are NOT DETECTED.       Narrative:      Special Contact Isolation  Collected By: [id number] NEYMAR HERNANDEZ  Does this patient have risk factors for C-diff?->Yes  C-Diff Risk Factors->antibiotic exposure    BLOOD CULTURE [[id number]] Collected: 12/12/22 0308    Order Status: Completed Specimen: Blood from Peripheral Updated: 12/17/22 0501     Significant Indicator NEG     Source BLD     Site PERIPHERAL     Culture Result No growth after 5 days of incubation.    Narrative:      Contact  Per Hospital Policy: Only change Specimen Src: to \"Line\" if  specified by physician order.  Left Hand    BLOOD CULTURE [[id number]] Collected: 12/12/22 0308    Order Status: Completed Specimen: Blood from Peripheral Updated: 12/17/22 0501     Significant Indicator NEG     Source BLD     Site PERIPHERAL     Culture Result No growth after 5 days of incubation.    Narrative:      Contact  Per Hospital Policy: Only change Specimen Src: to \"Line\" if  specified by physician order.  Right Hand            Assessment:  This is a 77-year-old female patient who was transferred from an outside facility, slid off her bed at home and was unable to get up for multiple days, found by a friend, was found to be in A. fib with RVR and with mild rhabdomyolysis.  She was also noted to have COVID-19 requiring nasal cannula oxygen, resolved.  On 12/1, she developed right arm thrombophlebitis and the following day had worsening leukocytosis and procalcitonin, blood " cultures positive for MRSA. TEODORO positive for infective endocarditis    Pertinent Diagnoses:  Native mitral valve infective endocarditis  MRSA bacteremia  SO  Recent COVID-19  Thrombophlebitis  Drug rash, slowly improving     PLAN:   MRSA bacteremia  Native mitral valve infective endocarditis  Thrombophlebitis, improved  BCxs + MRSA 12/2 and 12/3, 12/5  Follow repeat blood cultures x2 from 12/9, 1 out of 2 positive  TEODORO with a large freely mobile mass 1.4 x 1 cm on the mitral valve.  CT surgery evaluated pros and cons of surgery - favors conservative management given no definitive embolization so far, no heart failure, and patient's advanced age.   Repeat blood cultures x2 from 12/12 negative  Patient developed a rash and eosinophilia with daptomycin  She was transitioned to IV vancomycin however developed worsening rash on 12/21  Continue IV ceftaroline 400 mg twice a day per renal function  Stop date of antibiotics 1/22/2022    Disposition: Patient likely being discharged to a rehab facility.  At least weekly CBC with differential, CMP, while on IV antibiotics  Repeat TTE at end of therapy (ordered)  Need for PICC line: Yes, okay to place      Disposition: SNF.  Okay to transfer from ID standpoint given improvement of rash    ID clinic follow-up    I have performed a physical exam and reviewed and updated ROS and plan today 12/23/2022.  In review of yesterday's note 12/22/2022, there are no changes except as documented above.      Discussed with Dr. Palcaios.  ID signing off.

## 2022-12-23 NOTE — PROGRESS NOTES
Patient complaining of worsening/new looking rash to bilateral arms (worse on the right arm) and more swelling. This RN observed a deeper red/purple splotchy rash on bilateral upper extremities (worse on the right arm) and increased swelling. This RN outlined the worsening redness on both arm, gave the patient her PRN benadryl, and notified both pharmacy and the hospitalist, Harpal Garcia. Hospitalist said to see if the benadryl helps improve the worsening rash and if not to notify her or the covering hospitalist.

## 2022-12-23 NOTE — DISCHARGE PLANNING
Case Management Discharge Planning    Admission Date: 11/26/2022  GMLOS: 3.4  ALOS: 27    6-Clicks ADL Score: 16  6-Clicks Mobility Score: 7  PT and/or OT Eval ordered: Yes  Post-acute Referrals Ordered: Yes  Post-acute Choice Obtained: Yes  Has referral(s) been sent to post-acute provider:  Yes      Anticipated Discharge Dispo: Discharge Disposition: D/T to Medicare cert LTCH w/planned hosp IP readmit (91)    DME Needed: Pending PT/OT recommendations    Action(s) Taken: Updated Provider/Nurse on Discharge Plan    Pt was discussed in IDT rounds today .    Per Dr Palacios, Pt has been cleared by Dr Abbott to discharge to [facility] today.    This RN CM informed Marielena Slade , Pt's Sister in [place] and informed her about the discharge today. Marielena agreed with the discharge plan and per Marielena she will inform Pt .    This RN CM informed EVERT Chaves of this update.    This RN CM prepared [facility]/Transfer packet.    Per Paulina of [facility] is all set at 16:00 today.    Informed Michelle Liaison with [facility] via phone.    Escalations Completed: None    Medically Clear: Yes    Next Steps:   This RN CM to continue to assist Pt with discharge as needed    Barriers to Discharge: None    Is the patient up for discharge tomorrow: No

## 2022-12-23 NOTE — PROGRESS NOTES
AA&Ox4. Denies CP/SOB.  Reporting 2/10 pain.   Educated patient regarding pharmacologic and non pharmacologic modalities for pain management.  Skin per flowsheet.  Tolerating level 7 easy to chew diet. Denies N/V.  + void. Last BM 12/21.  Pt is max assist.  All needs met at this time. Call light within reach. Pt calls appropriately. Bed low and locked, non skid socks in place. Hourly rounding in place.

## 2022-12-23 NOTE — PROGRESS NOTES
Pt is AxOx4; on room air.  Declines acute pain. PRN pain meds available per MAR.  Skin per flowsheets. Red skin rash throughout the body: face, sacrum, BUE and BLE. Hospitalist and ID team aware.  ROSIO PICC; drg CDI    Denies SOB/chest pain.  + tingling to BUE and BLE - baseline per pt.  +void; incontinent. +bowel sounds. incontinent. LBM 12/21 per report.  Denies N/V. Tolerating level 7, easy to chew, thin liquids diet.  SCDs refused due to painful skin rash; pt needs 2-3 person assist OOB.  Eliquis in use.

## 2022-12-23 NOTE — PROGRESS NOTES
NOC HOSPITALIST CROSS COVER    Notified by RN regarding the patient having a worsening/new looking rash on bilateral arms.  Per the nurses report, the rash is more red/purple on her upper extremities and is worse on the right arm.  The dayshift provider was aware of this rash, along with infectious disease.  Due to increasing erythema and swelling, the patient was started on 40 mg of prednisone daily.  Shift also ordered diphenhydramine for rash and itching.  Infectious disease was concerned that the rash was secondary to daptomycin administration, so she was transitioned to vancomycin, but unfortunately her rash became progressively worse, prompting another antibiotic change to IV ceftaroline.  Review of that note indicates that it is believed the rash will continue to worsen before it improves.  Discussed this with the bedside RN.  The patient had just received the ordered Benadryl.  Recommended that the RN wait to see how the patient did with the Benadryl.  Additionally the patient was started on famotidine today which can sometimes assist with allergy mediated reactions.  Recommended that the nurse notify provider if the rash continues to get worse or the patient develops any shortness of breath or difficulty breathing.      Vitals:    12/22/22 2000   BP: 128/71   Pulse: 99   Resp: 19   Temp: 35.9 °C (96.7 °F)   SpO2: 91%          -----------------------------------------------------------------------------------------------------------    Electronically signed by:  Harpal Garcia, GABRIEL, APRN, [id number]  Hospitalist Services

## 2022-12-24 NOTE — THERAPY
"Occupational Therapy  Daily Treatment     Patient Name: Paulina Castellanos  Age:  77 y.o., Sex:  female  Medical Record #: [id number]  Today's Date: 12/23/2022     Precautions  Precautions: Fall Risk  Comments: covid recoverd    Assessment    Pt seen for OT treatment. Pt able to wash face/brush hair EOB with supv, perform seated sponge bath w/ mod A, don socks with SBA, and don gown w/ min A. Pt required mod A for functional transfer to chair. Pt demonstrates impaired activity tolerance, and impaired strength that are impacting current functional performance. Pt will continue to benefit from skilled OT while admitted to acute care.     Plan    Occupational Therapy Treatment Plan  O.T. Treatment Plan: Continue Current Treatment Plan    DC Equipment Recommendations: Unable to determine at this time  Discharge Recommendations: Recommend post-acute placement for additional occupational therapy services prior to discharge home    Subjective    \"I am glad you are here. I want to work on being more independent.\"     Objective     12/23/22 1201   Non Verbal Descriptors   Non Verbal Scale  Calm   Cognition    Cognition / Consciousness X   Level of Consciousness Alert   Attention Impaired   Comments Pt very pleasant and cooperative. Pt motivated to work on improving independence and to complete ADLs.   Neuro-Muscular Treatments   Neuro-Muscular Treatments Anterior weight shift;Weight Shift Right;Weight Shift Left;Compensatory Strategies;Tactile Cuing;Verbal Cuing   Comments Pt continues to require extra time for sequencing and motor planning mobility. Pt required cues to stand up straight.   Balance   Sitting Balance (Static) Fair +   Sitting Balance (Dynamic) Fair   Standing Balance (Static) Poor +   Standing Balance (Dynamic) Poor   Weight Shift Sitting Fair   Weight Shift Standing Poor   Skilled Intervention Compensatory Strategies;Postural Facilitation;Tactile Cuing;Verbal Cuing;Sequencing   Comments Pt reported having a fear of " Continue making lifestyle changes that focus on good nutrition, regular exercise and stress management. Medication Plan: Increase Topamax to 25 mg twice a day.  If no noted weight loss over 2 weeks at this dose contact office so I can increase Topamax to falling. HHA for transfer.   Bed Mobility    Supine to Sit Minimal Assist   Sit to Supine   (up to chair post)   Scooting Minimal Assist   Rolling Standby Assist   Skilled Intervention Compensatory Strategies;Sequencing;Tactile Cuing;Verbal Cuing;Postural Facilitation   Comments HOB slightly elevated, no use of rails   Activities of Daily Living   Eating Modified Independent   Grooming Supervision;Seated  (washed face and brushed hair)   Bathing Moderate Assist  (sponge bath sitting EOB)   Upper Body Dressing Minimal Assist  (don/doff gown)   Lower Body Dressing Standby Assist  (don socks in supine)   Toileting   (declined the need)   Skilled Intervention Verbal Cuing;Tactile Cuing;Sequencing;Compensatory Strategies   Comments w/ extra time   Functional Mobility   Sit to Stand Minimal Assist   Bed, Chair, Wheelchair Transfer Moderate Assist   Transfer Method Stand Step   Mobility EOB>chair   Skilled Intervention Verbal Cuing;Tactile Cuing;Sequencing;Facilitation;Postural Facilitation   Comments HHA x1   Visual Perception   Visual Perception  Not Tested   Activity Tolerance   Sitting in Chair up to chair post, pt encouraged to remain in chair for lunch   Sitting Edge of Bed >20 min   Standing <3 min total   Comments limited by weakness and fatigue   Patient / Family Goals   Patient / Family Goal #1 to go home   Goal #1 Outcome Progressing as expected   Short Term Goals   Short Term Goal # 1 pt will complete grooming seated w/set up   Goal Outcome # 1 Goal met, new goal added   Short Term Goal # 1 B  Pt will complete toilet hygiene with min A   Short Term Goal # 2 pt will complete txf to BSC w/min A   Goal Outcome # 2 Progressing as expected   Short Term Goal # 3 pt will complete UB dressing w/set u p   Goal Outcome # 3 Progressing as expected   Education Group   Education Provided Role of Occupational Therapist;Activities of Daily Living   Role of Occupational Therapist Patient Response  muscles, etc. The health and lifespan of your cells depend on the nutrients you get from food. That is why healthy food choices are so important.    Once you can start to see food as being fuel for your body, you will get better at making the healthy choice Patient;Acceptance;Explanation;Verbal Demonstration   ADL Patient Response Patient;Acceptance;Explanation;Verbal Demonstration;Demonstration;Action Demonstration   Occupational Therapy Treatment Plan   O.T. Treatment Plan Continue Current Treatment Plan          continuum (high value/low value), not on a moral continuum (good/bad). Stay clear of guilt or shame: Refrain from allowing guilt or shame to contaminate your eating decisions.  Avoid secret eating and get clear on who you are really hiding from if you ea

## 2023-01-06 ENCOUNTER — HOSPITAL ENCOUNTER (INPATIENT)
Facility: REHABILITATION | Age: 78
End: 2023-01-06
Attending: PHYSICAL MEDICINE & REHABILITATION | Admitting: PHYSICAL MEDICINE & REHABILITATION
Payer: MEDICARE